# Patient Record
Sex: FEMALE | Race: BLACK OR AFRICAN AMERICAN | Employment: UNEMPLOYED | URBAN - METROPOLITAN AREA
[De-identification: names, ages, dates, MRNs, and addresses within clinical notes are randomized per-mention and may not be internally consistent; named-entity substitution may affect disease eponyms.]

---

## 2017-01-04 ENCOUNTER — HOSPITAL ENCOUNTER (EMERGENCY)
Age: 62
Discharge: HOME | End: 2017-01-04
Attending: EMERGENCY MEDICINE
Payer: COMMERCIAL

## 2017-01-04 ENCOUNTER — APPOINTMENT (OUTPATIENT)
Dept: GENERAL RADIOLOGY | Age: 62
End: 2017-01-04
Attending: EMERGENCY MEDICINE
Payer: COMMERCIAL

## 2017-01-04 ENCOUNTER — APPOINTMENT (OUTPATIENT)
Dept: ULTRASOUND IMAGING | Age: 62
End: 2017-01-04
Attending: EMERGENCY MEDICINE
Payer: COMMERCIAL

## 2017-01-04 VITALS
SYSTOLIC BLOOD PRESSURE: 129 MMHG | TEMPERATURE: 98 F | OXYGEN SATURATION: 96 % | RESPIRATION RATE: 18 BRPM | DIASTOLIC BLOOD PRESSURE: 76 MMHG | HEART RATE: 75 BPM

## 2017-01-04 DIAGNOSIS — M17.12 OSTEOARTHRITIS OF LEFT KNEE, UNSPECIFIED OSTEOARTHRITIS TYPE: Primary | ICD-10-CM

## 2017-01-04 LAB
ALBUMIN SERPL BCP-MCNC: 3.7 G/DL (ref 3.5–5)
ALBUMIN/GLOB SERPL: 0.8 {RATIO} (ref 1.1–2.2)
ALP SERPL-CCNC: 95 U/L (ref 45–117)
ALT SERPL-CCNC: 27 U/L (ref 12–78)
ANION GAP BLD CALC-SCNC: 7 MMOL/L (ref 5–15)
AST SERPL W P-5'-P-CCNC: 13 U/L (ref 15–37)
BASOPHILS # BLD AUTO: 0 K/UL (ref 0–0.1)
BASOPHILS # BLD: 0 % (ref 0–1)
BILIRUB SERPL-MCNC: 0.2 MG/DL (ref 0.2–1)
BUN SERPL-MCNC: 14 MG/DL (ref 6–20)
BUN/CREAT SERPL: 18 (ref 12–20)
CALCIUM SERPL-MCNC: 10.1 MG/DL (ref 8.5–10.1)
CHLORIDE SERPL-SCNC: 101 MMOL/L (ref 97–108)
CO2 SERPL-SCNC: 31 MMOL/L (ref 21–32)
CREAT SERPL-MCNC: 0.76 MG/DL (ref 0.55–1.02)
EOSINOPHIL # BLD: 0 K/UL (ref 0–0.4)
EOSINOPHIL NFR BLD: 0 % (ref 0–7)
ERYTHROCYTE [DISTWIDTH] IN BLOOD BY AUTOMATED COUNT: 14.7 % (ref 11.5–14.5)
GLOBULIN SER CALC-MCNC: 4.7 G/DL (ref 2–4)
GLUCOSE SERPL-MCNC: 102 MG/DL (ref 65–100)
HCT VFR BLD AUTO: 39.2 % (ref 35–47)
HGB BLD-MCNC: 12.6 G/DL (ref 11.5–16)
LYMPHOCYTES # BLD AUTO: 8 % (ref 12–49)
LYMPHOCYTES # BLD: 1.1 K/UL (ref 0.8–3.5)
MCH RBC QN AUTO: 29 PG (ref 26–34)
MCHC RBC AUTO-ENTMCNC: 32.1 G/DL (ref 30–36.5)
MCV RBC AUTO: 90.3 FL (ref 80–99)
MONOCYTES # BLD: 0.5 K/UL (ref 0–1)
MONOCYTES NFR BLD AUTO: 4 % (ref 5–13)
NEUTS SEG # BLD: 12.4 K/UL (ref 1.8–8)
NEUTS SEG NFR BLD AUTO: 88 % (ref 32–75)
PLATELET # BLD AUTO: 359 K/UL (ref 150–400)
POTASSIUM SERPL-SCNC: 4 MMOL/L (ref 3.5–5.1)
PROT SERPL-MCNC: 8.4 G/DL (ref 6.4–8.2)
RBC # BLD AUTO: 4.34 M/UL (ref 3.8–5.2)
SODIUM SERPL-SCNC: 139 MMOL/L (ref 136–145)
WBC # BLD AUTO: 13.9 K/UL (ref 3.6–11)

## 2017-01-04 PROCEDURE — 74011250637 HC RX REV CODE- 250/637: Performed by: EMERGENCY MEDICINE

## 2017-01-04 PROCEDURE — 99284 EMERGENCY DEPT VISIT MOD MDM: CPT

## 2017-01-04 PROCEDURE — 85025 COMPLETE CBC W/AUTO DIFF WBC: CPT | Performed by: EMERGENCY MEDICINE

## 2017-01-04 PROCEDURE — 73562 X-RAY EXAM OF KNEE 3: CPT

## 2017-01-04 PROCEDURE — 93971 EXTREMITY STUDY: CPT

## 2017-01-04 PROCEDURE — 80053 COMPREHEN METABOLIC PANEL: CPT | Performed by: EMERGENCY MEDICINE

## 2017-01-04 PROCEDURE — 36415 COLL VENOUS BLD VENIPUNCTURE: CPT | Performed by: EMERGENCY MEDICINE

## 2017-01-04 RX ORDER — OXYCODONE AND ACETAMINOPHEN 5; 325 MG/1; MG/1
1 TABLET ORAL
Qty: 20 TAB | Refills: 0 | Status: SHIPPED | OUTPATIENT
Start: 2017-01-04 | End: 2017-01-08

## 2017-01-04 RX ORDER — OXYCODONE AND ACETAMINOPHEN 5; 325 MG/1; MG/1
1 TABLET ORAL
Status: COMPLETED | OUTPATIENT
Start: 2017-01-04 | End: 2017-01-04

## 2017-01-04 RX ADMIN — OXYCODONE HYDROCHLORIDE AND ACETAMINOPHEN 1 TABLET: 5; 325 TABLET ORAL at 18:39

## 2017-01-04 NOTE — ED PROVIDER NOTES
HPI Comments:   Zain Saunders is a 57 y.o. female with a hx of arthritis, HTN, and neuropathy presenting to the ED via EMS with her  C/O LLE pain/swelling/numbness which started 3 weeks ago. Pt states she has been unable to walk due to the pain. The pain is not present in the morning when she wakes up but develops and worsens throughout the day. Pt reports her left knee has been \"popping out\" recurrently and moves back in place on its own. She saw her orthopedic surgeon regarding the pain last week and was given a cortisone injection with no relief. Pt then saw her PCP for the pain yesterday, had an x-ray of her lumbar spine performed to see if it was caused by a back issue, it resulted normal, had an MRI of her back scheduled for today for further evaluation, and was rx'd Prednisone with no improvement. She also notes frequency over the last week. Pt denies hx of DVT/PE's. Patient denies back pain, SOB, fever, chills, bladder/bowel incontinence, or any other symptoms or complaints. PCP: Kenneth Fajardo MD  Orthopedics: Dr. Wong Haro    There are no other complaints, changes or physical findings at this time. Written by CARMEN Santos, as dictated by Ej Smith MD      The history is provided by the patient. No  was used.         Past Medical History:   Diagnosis Date    Arrhythmia      irregular heart beat hx and beating fast per patient/no cardiologist    Arthritis      JOINTS  WORSE WAIST DOWN     Hypertension     Ill-defined condition      neuropathy feet       Past Surgical History:   Procedure Laterality Date    Hx heent       cyst removed left eye    Hx tubal ligation      Hx hysterectomy      Pr breast surgery procedure unlisted       mass removed left breast x2 benign    Hx orthopaedic       bilat knee injections    Hx orthopaedic       laser treatment left knee and foot    Hx orthopaedic       hx of gel shots bilat knee    Hx other surgical       cyst removed front left scalp    Hx other surgical       colonoscopy    Hx other surgical       tooth implant    Vascular surgery procedure unlist       vein striping         History reviewed. No pertinent family history. Social History     Social History    Marital status:      Spouse name: N/A    Number of children: N/A    Years of education: N/A     Occupational History    Not on file. Social History Main Topics    Smoking status: Never Smoker    Smokeless tobacco: Never Used    Alcohol use Yes      Comment: once a year    Drug use: No    Sexual activity: Not on file     Other Topics Concern    Not on file     Social History Narrative         ALLERGIES: Review of patient's allergies indicates no known allergies. Review of Systems   Constitutional: Negative for chills, fatigue and fever. HENT: Negative for congestion, ear pain, facial swelling, rhinorrhea, sinus pressure, sore throat, trouble swallowing and voice change. Eyes: Negative for photophobia, pain, discharge and visual disturbance. Respiratory: Negative for cough, chest tightness, shortness of breath and wheezing. Cardiovascular: Positive for leg swelling (LLE). Negative for chest pain. Gastrointestinal: Negative for abdominal pain, blood in stool, constipation, diarrhea, nausea and vomiting. Endocrine: Negative for polyuria. Genitourinary: Positive for frequency. Negative for dysuria, flank pain, hematuria, pelvic pain, urgency and vaginal discharge. No bladder/bowel incontinence   Musculoskeletal: Negative for arthralgias, back pain, gait problem, joint swelling, neck pain and neck stiffness. +LLE pain   Skin: Negative for rash and wound. Allergic/Immunologic: Negative for immunocompromised state. Neurological: Positive for numbness (LLE). Negative for dizziness, weakness, light-headedness and headaches. Hematological: Negative for adenopathy.    Psychiatric/Behavioral: Negative for agitation, confusion, hallucinations and suicidal ideas. The patient is not nervous/anxious. Vitals:    01/04/17 1623 01/04/17 1628 01/04/17 1730 01/04/17 1830   BP: 147/76 147/76 153/87 129/76   Pulse:  86 75    Resp:  16 18    Temp:  98 °F (36.7 °C)     SpO2: 98% 98% 97% 96%            Physical Exam   Constitutional: She is oriented to person, place, and time. She appears well-developed and well-nourished. No distress. HENT:   Head: Normocephalic and atraumatic. Eyes: Conjunctivae are normal.   Neck: Normal range of motion. Neck supple. Cardiovascular: Normal rate, regular rhythm, normal heart sounds and intact distal pulses. Exam reveals no gallop and no friction rub. No murmur heard. Pulses:       Dorsalis pedis pulses are 2+ on the right side, and 2+ on the left side. Posterior tibial pulses are 2+ on the right side, and 2+ on the left side. Pulmonary/Chest: Effort normal and breath sounds normal.   Abdominal: Soft. Normal aorta and bowel sounds are normal. She exhibits no distension, no abdominal bruit, no pulsatile midline mass and no mass. There is no hepatosplenomegaly. There is no tenderness. There is no rebound, no guarding and no CVA tenderness. No hernia. Musculoskeletal: Normal range of motion. She exhibits tenderness. She exhibits no edema or deformity. Left knee: She exhibits normal range of motion, no swelling, no effusion, no deformity and no erythema. Tenderness found. Lateral joint line tenderness noted. No warmth or signs of gout or septic joint since cortisone injection    No cords and negative Rohit's    No ischemic changes, good distal pulses no trophic changes   Neurological: She is alert and oriented to person, place, and time. Skin: She is not diaphoretic. Nursing note and vitals reviewed.        MDM  Number of Diagnoses or Management Options  Osteoarthritis of left knee, unspecified osteoarthritis type:   Diagnosis management comments: DDx: DJD, sciatica, arterial ischemia, DVT, septic arthritis, osteoarthritis, gout    Impression and Plan: Pt presents with atraumatic left knee pain for 2 weeks and has been seen by orthopedics with steroid injection. She described her knee \"popping out. \" She also saw her PCP who thought she may have sciatica and was prescribed steroids and supposed to get an MRI today. She has no spinal cord or neurological symptoms at this time. X-ray shows severe osteoarthritis. Clinically no signs of infection. Will treat supportively and follow back with orthopedics. Amount and/or Complexity of Data Reviewed  Clinical lab tests: ordered and reviewed  Tests in the radiology section of CPT®: ordered and reviewed    Risk of Complications, Morbidity, and/or Mortality  Presenting problems: moderate  Diagnostic procedures: moderate  Management options: low    Patient Progress  Patient progress: stable    Procedures    PROGRESS NOTE:   9:08 PM  Pain improved, does have a mild elevation of white count explained by steroids and injection and she is currently on steroids. No clinical evidence of septic arthritis. Written by CARMEN Adamsibharris, as dictated by Arnold Patel MD.     LABORATORY TESTS:  Recent Results (from the past 12 hour(s))   METABOLIC PANEL, COMPREHENSIVE    Collection Time: 01/04/17  6:56 PM   Result Value Ref Range    Sodium 139 136 - 145 mmol/L    Potassium 4.0 3.5 - 5.1 mmol/L    Chloride 101 97 - 108 mmol/L    CO2 31 21 - 32 mmol/L    Anion gap 7 5 - 15 mmol/L    Glucose 102 (H) 65 - 100 mg/dL    BUN 14 6 - 20 MG/DL    Creatinine 0.76 0.55 - 1.02 MG/DL    BUN/Creatinine ratio 18 12 - 20      GFR est AA >60 >60 ml/min/1.73m2    GFR est non-AA >60 >60 ml/min/1.73m2    Calcium 10.1 8.5 - 10.1 MG/DL    Bilirubin, total 0.2 0.2 - 1.0 MG/DL    ALT 27 12 - 78 U/L    AST 13 (L) 15 - 37 U/L    Alk.  phosphatase 95 45 - 117 U/L    Protein, total 8.4 (H) 6.4 - 8.2 g/dL    Albumin 3.7 3.5 - 5.0 g/dL Globulin 4.7 (H) 2.0 - 4.0 g/dL    A-G Ratio 0.8 (L) 1.1 - 2.2     CBC WITH AUTOMATED DIFF    Collection Time: 01/04/17  6:56 PM   Result Value Ref Range    WBC 13.9 (H) 3.6 - 11.0 K/uL    RBC 4.34 3.80 - 5.20 M/uL    HGB 12.6 11.5 - 16.0 g/dL    HCT 39.2 35.0 - 47.0 %    MCV 90.3 80.0 - 99.0 FL    MCH 29.0 26.0 - 34.0 PG    MCHC 32.1 30.0 - 36.5 g/dL    RDW 14.7 (H) 11.5 - 14.5 %    PLATELET 005 712 - 927 K/uL    NEUTROPHILS 88 (H) 32 - 75 %    LYMPHOCYTES 8 (L) 12 - 49 %    MONOCYTES 4 (L) 5 - 13 %    EOSINOPHILS 0 0 - 7 %    BASOPHILS 0 0 - 1 %    ABS. NEUTROPHILS 12.4 (H) 1.8 - 8.0 K/UL    ABS. LYMPHOCYTES 1.1 0.8 - 3.5 K/UL    ABS. MONOCYTES 0.5 0.0 - 1.0 K/UL    ABS. EOSINOPHILS 0.0 0.0 - 0.4 K/UL    ABS. BASOPHILS 0.0 0.0 - 0.1 K/UL       IMAGING RESULTS:  XR KNEE LT 3 V   Final Result   EXAM: XR KNEE LT 3 V     INDICATION: pain.     COMPARISON: None.     FINDINGS: Three views of the left knee demonstrate tricompartment osteoarthritis  without visible fracture or subluxation. There is no significant joint  effusion.     IMPRESSION  IMPRESSION:   1. Tricompartment osteoarthritis. Signed by      Signed Date/Time    Phone Pager     RACIEL JAUREGUI 1/04/2017 20:23 [phone]       DUPLEX LOWER EXT VENOUS LEFT   Final Result   INDICATION: leg pain     COMPARISON: None. Nik Alexander FINDINGS: Duplex Doppler sonography of the left lower extremity was performed  from the groin to the calf. The left common femoral, femoral and popliteal veins  are compressible with normal color-flow and wave forms and response to  augmentation. .  IMPRESSION  IMPRESSION:   No deep venous thrombosis identified. Signed by      Signed Date/Time    Phone Pager     RACIEL JAUREGUI 1/04/2017 19:42 [phone]           MEDICATIONS GIVEN:  Medications   oxyCODONE-acetaminophen (PERCOCET) 5-325 mg per tablet 1 Tab (1 Tab Oral Given 1/4/17 1839)       IMPRESSION:  1.  Osteoarthritis of left knee, unspecified osteoarthritis type PLAN:  1. Discharge Medication List as of 2017  9:05 PM      START taking these medications    Details   oxyCODONE-acetaminophen (PERCOCET) 5-325 mg per tablet Take 1 Tab by mouth every four (4) hours as needed for Pain. Max Daily Amount: 6 Tabs., Print, Disp-20 Tab, R-0         CONTINUE these medications which have NOT CHANGED    Details   olmesartan-hydrochlorothiazide (BENICAR HCT) 40-12.5 mg per tablet Take 1 Tab by mouth daily. , Historical Med      cholecalciferol, vitamin D3, 2,000 unit tab Take 1 Tab by mouth daily. , Historical Med      vitamin E (AQUA GEMS) 400 unit capsule Take 400 Units by mouth daily. , Historical Med      lansoprazole (PREVACID) 15 mg capsule Take 15 mg by mouth every Monday, Wednesday, Friday., Historical Med      PV W-O YAN/FERROUS FUMARATE/FA (M-VIT PO) Take 1 Tab by mouth daily. , Historical Med      estradiol (VIVELLE) 0.05 mg/24 hr 1 Patch by TransDERmal route two (2) days a week. Wed and sun, Historical Med      diclofenac EC (VOLTAREN) 75 mg EC tablet Take 75 mg by mouth two (2) times a day., Historical Med      niacin ER (NIASPAN) 500 mg tablet Take 500 mg by mouth nightly., Historical Med      ascorbic acid (VITAMIN C) 250 mg tablet Take 500 mg by mouth daily. , Historical Med      Ferrous Fumarate 325 mg (106 mg iron) tab Take 1 Tab by mouth two (2) times a day., Historical Med      acetaminophen (TYLENOL) 650 mg CR tablet Take 1,300 mg by mouth two (2) times a day., Historical Med      aspirin delayed-release 325 mg tablet Take 325 mg by mouth nightly., Historical Med      Omega-3-DHA-EPA-Fish Oil 1,000 mg (120 mg-180 mg) cap Take 2 Tabs by mouth two (2) times a day., Historical Med         STOP taking these medications       HYDROcodone-acetaminophen (NORCO) 5-325 mg per tablet Comments:   Reason for Stoppin.   Follow-up Information     Follow up With Details Comments Contact Info    Jasbir Lawrence MD Call in 1 day  [address] [facility]  [phone]      [facility] EMERGENCY DEPT  If symptoms worsen [address] [facility] [place] [address]  [phone]        Return to ED if worse     Discharge Note:  9:11 PM  The patient is ready for discharge. The patient's signs, symptoms, diagnosis, and discharge instruction have been discussed and the patient has conveyed their understanding. The patient is to follow up as recommended or return to the ER should their symptoms worsen. Plan has been discussed and the patient is in agreement. Written by Syliva Kocher, ED Scribe, as dictated by Storm House MD.     Attestation: This note is prepared by Syliva Kocher, acting as Scribe for Storm House MD.    Storm House MD: The scribe's documentation has been prepared under my direction and personally reviewed by me in its entirety. I confirm that the note above accurately reflects all work, treatment, procedures, and medical decision making performed by me.

## 2017-01-05 NOTE — ED NOTES
Patient discharged per LP. IV removed catheter intact. Family is at bedside. No acute distress noted at discharge.

## 2017-01-05 NOTE — DISCHARGE INSTRUCTIONS
Knee Arthritis: Care Instructions  Your Care Instructions  Knee arthritis is a breakdown of the cartilage that cushions your knee joint. When the cartilage wears down, your bones rub against each other. This causes pain and stiffness. Knee arthritis tends to get worse with time. Treatment for knee arthritis involves reducing pain, making the leg muscles stronger, and staying at a healthy body weight. The treatment usually does not improve the health of the cartilage, but it can reduce pain and improve how well your knee works. You can take simple measures to protect your knee joints, ease your pain, and help you stay active. Follow-up care is a key part of your treatment and safety. Be sure to make and go to all appointments, and call your doctor if you are having problems. It's also a good idea to know your test results and keep a list of the medicines you take. How can you care for yourself at home? · Know that knee arthritis will cause more pain on some days than on others. · Stay at a healthy weight. Lose weight if you are overweight. When you stand up, the pressure on your knees from every pound of body weight is multiplied four times. So if you lose 10 pounds, you will reduce the pressure on your knees by 40 pounds. · Talk to your doctor or physical therapist about exercises that will help ease joint pain. ¨ Stretch to help prevent stiffness and to prevent injury before you exercise. You may enjoy gentle forms of yoga to help keep your knee joints and muscles flexible. ¨ Walk instead of jog. ¨ Ride a bike. This makes your thigh muscles stronger and takes pressure off your knee. ¨ Wear well-fitting and comfortable shoes. ¨ Exercise in chest-deep water. This can help you exercise longer with less pain. ¨ Avoid exercises that include squatting or kneeling. They can put a lot of strain on your knees.   ¨ Talk to your doctor to make sure that the exercise you do is not making the arthritis worse.  · Do not sit for long periods of time. Try to walk once in a while to keep your knee from getting stiff. · Ask your doctor or physical therapist whether shoe inserts may reduce your arthritis pain. · If you can afford it, get new athletic shoes at least every year. This can help reduce the strain on your knees. · Use a device to help you do everyday activities. ¨ A cane or walking stick can help you keep your balance when you walk. Hold the cane or walking stick in the hand opposite the painful knee. ¨ If you feel like you may fall when you walk, try using crutches or a front-wheeled walker. These can prevent falls that could cause more damage to your knee. ¨ A knee brace may help keep your knee stable and prevent pain. ¨ You also can use other things to make life easier, such as a higher toilet seat and handrails in the bathtub or shower. · Take pain medicines exactly as directed. ¨ Do not wait until you are in severe pain. You will get better results if you take it sooner. ¨ If you are not taking a prescription pain medicine, take an over-the-counter medicine such as acetaminophen (Tylenol), ibuprofen (Advil, Motrin), or naproxen (Aleve). Read and follow all instructions on the label. ¨ Do not take two or more pain medicines at the same time unless the doctor told you to. Many pain medicines have acetaminophen, which is Tylenol. Too much acetaminophen (Tylenol) can be harmful. ¨ Tell your doctor if you take a blood thinner, have diabetes, or have allergies to shellfish. · Ask your doctor if you might benefit from a shot of steroid medicine into your knee. This may provide pain relief for several months. · Many people take the supplements glucosamine and chondroitin for osteoarthritis. Some people feel they help, but the medical research does not show that they work. Talk to your doctor before you take these supplements. When should you call for help?   Call your doctor now or seek immediate medical care if:  · You have sudden swelling, warmth, or pain in your knee. · You have knee pain and a fever or rash. · You have such bad pain that you cannot use your knee. Watch closely for changes in your health, and be sure to contact your doctor if you have any problems. Where can you learn more? Go to http://mikael-oskar.info/. Enter D064 in the search box to learn more about \"Knee Arthritis: Care Instructions. \"  Current as of: 2016  Content Version: 11.1  © 5937-8869 [employer]. Care instructions adapted under license by [employer] (which disclaims liability or warranty for this information). If you have questions about a medical condition or this instruction, always ask your healthcare professional. [address] any warranty or liability for your use of this information. [employer] Activation    Thank you for requesting access to [employer]. Please follow the instructions below to securely access and download your online medical record. [employer] allows you to send messages to your doctor, view your test results, renew your prescriptions, schedule appointments, and more. How Do I Sign Up? 1. In your internet browser, go to [email or web address]  2. Click on the First Time User? Click Here link in the Sign In box. You will be redirect to the New Member Sign Up page. 3. Enter your [employer] Access Code exactly as it appears below. You will not need to use this code after youve completed the sign-up process. If you do not sign up before the expiration date, you must request a new code. [employer] Access Code: [email or web address]  Expires: 2017  2:50 PM (This is the date your [employer] access code will )    4. Enter the last four digits of your Social Security Number (xxxx) and Date of Birth (mm/dd/yyyy) as indicated and click Submit. You will be taken to the next sign-up page. 5. Create a [employer] ID.  This will be your [employer] login ID and cannot be changed, so think of one that is secure and easy to remember. 6. Create a [employer] password. You can change your password at any time. 7. Enter your Password Reset Question and Answer. This can be used at a later time if you forget your password. 8. Enter your e-mail address. You will receive e-mail notification when new information is available in [address] 9. Click Sign Up. You can now view and download portions of your medical record. 10. Click the Download Summary menu link to download a portable copy of your medical information. Additional Information    If you have questions, please visit the Frequently Asked Questions section of the [employer] website at [email or web address] Remember, [employer] is NOT to be used for urgent needs. For medical emergencies, dial 911.

## 2017-01-08 ENCOUNTER — HOSPITAL ENCOUNTER (EMERGENCY)
Age: 62
Discharge: HOME | End: 2017-01-08
Attending: EMERGENCY MEDICINE
Payer: COMMERCIAL

## 2017-01-08 ENCOUNTER — APPOINTMENT (OUTPATIENT)
Dept: GENERAL RADIOLOGY | Age: 62
End: 2017-01-08
Attending: EMERGENCY MEDICINE
Payer: COMMERCIAL

## 2017-01-08 VITALS
TEMPERATURE: 97.5 F | HEIGHT: 62 IN | SYSTOLIC BLOOD PRESSURE: 139 MMHG | HEART RATE: 70 BPM | BODY MASS INDEX: 37.54 KG/M2 | OXYGEN SATURATION: 97 % | RESPIRATION RATE: 18 BRPM | DIASTOLIC BLOOD PRESSURE: 70 MMHG | WEIGHT: 204 LBS

## 2017-01-08 DIAGNOSIS — M17.12 PRIMARY OSTEOARTHRITIS OF LEFT KNEE: Primary | ICD-10-CM

## 2017-01-08 PROCEDURE — 73502 X-RAY EXAM HIP UNI 2-3 VIEWS: CPT

## 2017-01-08 PROCEDURE — 96372 THER/PROPH/DIAG INJ SC/IM: CPT

## 2017-01-08 PROCEDURE — 99283 EMERGENCY DEPT VISIT LOW MDM: CPT

## 2017-01-08 PROCEDURE — 81001 URINALYSIS AUTO W/SCOPE: CPT | Performed by: EMERGENCY MEDICINE

## 2017-01-08 PROCEDURE — 74011250636 HC RX REV CODE- 250/636: Performed by: EMERGENCY MEDICINE

## 2017-01-08 RX ORDER — TRAMADOL HYDROCHLORIDE 50 MG/1
50 TABLET ORAL
COMMUNITY
End: 2017-03-16

## 2017-01-08 RX ORDER — MORPHINE SULFATE 10 MG/ML
6 INJECTION, SOLUTION INTRAMUSCULAR; INTRAVENOUS
Status: COMPLETED | OUTPATIENT
Start: 2017-01-08 | End: 2017-01-08

## 2017-01-08 RX ORDER — OXYCODONE AND ACETAMINOPHEN 5; 325 MG/1; MG/1
1 TABLET ORAL
Qty: 15 TAB | Refills: 0 | Status: SHIPPED | OUTPATIENT
Start: 2017-01-08 | End: 2017-03-16

## 2017-01-08 RX ADMIN — MORPHINE SULFATE 6 MG: 10 INJECTION INTRAMUSCULAR; INTRAVENOUS; SUBCUTANEOUS at 12:13

## 2017-01-08 NOTE — ED NOTES
Pt states she cannot walk on the leg due to the pain, and needs something stronger. Her ortho by phone, told her there is nothing else she can call her in.

## 2017-01-08 NOTE — ED PROVIDER NOTES
HPI Comments: Endy uDbon is a 57 y.o. female who presents ambulatory to ED [facility] ED with cc of left leg pain extending from the knee up to the hip. She states that she has been experiencing the pain intermittently for the past month \"every time (her) knee pops,\" and notes that the pain has become constant for the past five days. Pt states that she has been unable to ambulate secondary to severity of pain. Pt states that she was evaluated in the ED for symptoms four days ago with a negative Duplex and XR significant for arthritis. Pt was discharged home at that time with Percocet and states that she subsequently received a prescription fore prednisone and tramadol from her orthopedist, but she denies any relief in pain from medication. Pt notes several evaluations with her PCP and orthopedist for symptoms over the past month with treatment including cortisone injections into the left knee, but denies any relief in symptoms. Pt states that she was scheduled for an MRI earlier this week, but was unable to make the appointment secondary to pain. She denies any fever, chills, abdominal pain, nausea, vomiting, diarrhea, back pain. Mohini Mcnamara MD  Orthopedics: Dr. Nga Red    PMHx: HTN, arrhythmia, arthritis  Social Hx: - smoking; - EtOH; - drug use    There are no other complains, changes, or physical findings at this time. The history is provided by the patient. No  was used.         Past Medical History:   Diagnosis Date    Arrhythmia      irregular heart beat hx and beating fast per patient/no cardiologist    Arthritis      JOINTS  WORSE WAIST DOWN     Hypertension     Ill-defined condition      neuropathy feet       Past Surgical History:   Procedure Laterality Date    Hx heent       cyst removed left eye    Hx tubal ligation      Hx hysterectomy      Pr breast surgery procedure unlisted       mass removed left breast x2 benign    Hx orthopaedic       bilat knee injections    Hx orthopaedic       laser treatment left knee and foot    Hx orthopaedic       hx of gel shots bilat knee    Hx other surgical       cyst removed front left scalp    Hx other surgical       colonoscopy    Hx other surgical       tooth implant    Vascular surgery procedure unlist       vein striping         History reviewed. No pertinent family history. Social History     Social History    Marital status:      Spouse name: N/A    Number of children: N/A    Years of education: N/A     Occupational History    Not on file. Social History Main Topics    Smoking status: Never Smoker    Smokeless tobacco: Never Used    Alcohol use Yes      Comment: once a year    Drug use: No    Sexual activity: Not on file     Other Topics Concern    Not on file     Social History Narrative         ALLERGIES: Review of patient's allergies indicates no known allergies. Review of Systems   Constitutional: Negative for chills, fatigue and fever. HENT: Negative for congestion, rhinorrhea and sore throat. Eyes: Negative for pain, discharge and visual disturbance. Respiratory: Negative for cough, chest tightness, shortness of breath and wheezing. Cardiovascular: Negative for chest pain, palpitations and leg swelling. Gastrointestinal: Negative for abdominal pain, constipation, diarrhea, nausea and vomiting. Genitourinary: Negative for dysuria, frequency and hematuria. Musculoskeletal: Positive for arthralgias and myalgias. Negative for back pain. Skin: Negative for rash. Neurological: Negative for dizziness, weakness, light-headedness and headaches. Psychiatric/Behavioral: Negative.         Vitals:    01/08/17 1137 01/08/17 1218 01/08/17 1220   BP: (!) 163/110 132/71    Pulse: 70     Resp: 18     Temp: 97.5 °F (36.4 °C)     SpO2: 98%  98%   Weight: 92.5 kg (204 lb)     Height: 5' 2\" (1.575 m)              Physical Exam   Constitutional: She is oriented to person, place, and time. She appears well-developed and well-nourished. No distress. HENT:   Head: Normocephalic and atraumatic. Eyes: EOM are normal. Right eye exhibits no discharge. Left eye exhibits no discharge. No scleral icterus. Neck: Normal range of motion. Neck supple. No tracheal deviation present. Cardiovascular: Normal rate, regular rhythm, normal heart sounds and intact distal pulses. Exam reveals no gallop and no friction rub. No murmur heard. Pulses:       Dorsalis pedis pulses are 2+ on the right side, and 2+ on the left side. Pulmonary/Chest: Effort normal and breath sounds normal. No respiratory distress. She has no wheezes. She has no rales. Abdominal: Soft. She exhibits no distension. There is no tenderness. Musculoskeletal: Normal range of motion. She exhibits no edema. SLR negative bilaterally. All joints with good ROM, without tenderness. No calf tenderness. Lymphadenopathy:     She has no cervical adenopathy. Neurological: She is alert and oriented to person, place, and time. No focal neuro deficits   Skin: Skin is warm and dry. No rash noted. No LE joint or calf erythema, edema, or warmth. Psychiatric: She has a normal mood and affect. MDM  Number of Diagnoses or Management Options  Primary osteoarthritis of left knee:   Diagnosis management comments:     Patient presents to ED with left posterior upper leg pain. She is NVI on exam without joint erythema, warmth to suggest septic joint or inflammatory arthritis. She had duplex on 1/4 that was negative for DVT; do not suspect DVT given exam today. X-ray of left knee on 1/4 showed tricompartment arthritis, and left hip x-ray today is WNL. She denies back pain, and exam is not consistent with sciatica. Suspect symptoms are secondary to OA. She is already on prednisone, which was prescribed by orthopedist.  She has crutches at home.   Treated pain in ED and advised patient to continue taking prednisone and analgesics as previously prescribed. Will provide additional prescription for analgesic so patient has enough medication until she can follow up with orthopedist and obtain MRI. Discussed results, prescriptions and follow up plan with patient. Provided customary return to ED instructions. Patient expressed understanding. Dorene Monae MD           Amount and/or Complexity of Data Reviewed  Tests in the radiology section of CPT®: ordered and reviewed  Review and summarize past medical records: yes    Patient Progress  Patient progress: stable    ED Course       Procedures    Progress Note:  1:20 PM  Upon entering room to give pt discharge paperwork, she reports that she has been experiencing urinary frequency and notes concern for a possible UTI. IMAGING RESULTS:  EXAM: XR HIP LT W OR WO PELV 2-3 VWS     INDICATION: pain.     COMPARISON: None.     FINDINGS: An AP view of the pelvis and a frogleg lateral view of the left hip  demonstrate no fracture, dislocation or other acute abnormality.     IMPRESSION  IMPRESSION: No acute abnormality.                    VITALS:  Patient Vitals for the past 12 hrs:   Temp Pulse Resp BP SpO2   01/08/17 1220 - - - - 98 %   01/08/17 1218 - - - 132/71 -   01/08/17 1137 97.5 °F (36.4 °C) 70 18 (!) 163/110 98 %       MEDICATIONS GIVEN:  Medications   morphine injection 6 mg (6 mg IntraMUSCular Given 1/8/17 1213)       IMPRESSION:  1. Primary osteoarthritis of left knee        PLAN:  1. Current Discharge Medication List      CONTINUE these medications which have CHANGED    Details   oxyCODONE-acetaminophen (PERCOCET) 5-325 mg per tablet Take 1 Tab by mouth every four (4) hours as needed for Pain. Max Daily Amount: 6 Tabs. Qty: 15 Tab, Refills: 0         CONTINUE these medications which have NOT CHANGED    Details   traMADol (ULTRAM) 50 mg tablet Take 50 mg by mouth every six (6) hours as needed for Pain.       olmesartan-hydrochlorothiazide (BENICAR HCT) 40-12.5 mg per tablet Take 1 Tab by mouth daily. cholecalciferol, vitamin D3, 2,000 unit tab Take 1 Tab by mouth daily. vitamin E (AQUA GEMS) 400 unit capsule Take 400 Units by mouth daily. lansoprazole (PREVACID) 15 mg capsule Take 15 mg by mouth every Monday, Wednesday, Friday. PV W-O YAN/FERROUS FUMARATE/FA (M-VIT PO) Take 1 Tab by mouth daily. estradiol (VIVELLE) 0.05 mg/24 hr 1 Patch by TransDERmal route two (2) days a week. Wed and sun      diclofenac EC (VOLTAREN) 75 mg EC tablet Take 75 mg by mouth two (2) times a day. niacin ER (NIASPAN) 500 mg tablet Take 500 mg by mouth nightly. ascorbic acid (VITAMIN C) 250 mg tablet Take 500 mg by mouth daily. Ferrous Fumarate 325 mg (106 mg iron) tab Take 1 Tab by mouth two (2) times a day. acetaminophen (TYLENOL) 650 mg CR tablet Take 1,300 mg by mouth two (2) times a day. aspirin delayed-release 325 mg tablet Take 325 mg by mouth nightly. Omega-3-DHA-EPA-Fish Oil 1,000 mg (120 mg-180 mg) cap Take 2 Tabs by mouth two (2) times a day. 2.   Follow-up Information     Follow up With Details Comments Contact Info    Soledad Forbes MD  Please follow up with your orthopedist as soon as possible [address] [facility]  [phone]      [facility] EMERGENCY DEPT  As needed, If symptoms worsen [address]  [address] [facility]  [phone]        3. Return to ED if worse     Discharge Note:  2:39 PM  The patient has been re-evaluated and is ready for discharge. Reviewed available results with patient. Counseled patient on diagnosis and care plan. Patient has expressed understanding, and all questions have been answered. Patient agrees with plan and agrees to follow up as recommended, or to return to the ED if their symptoms worsen. Discharge instructions have been provided and explained to the patient, along with reasons to return to the ED.         This note is prepared by Julian Swain, acting as Scribe for Nirav Montilla MD.    Nirav Montilla MD: The scribe's documentation has been prepared under my direction and personally reviewed by me in its entirety. I confirm that the note above accurately reflects all work, treatment, procedures, and medical decision making performed by me.

## 2017-01-08 NOTE — ED NOTES
Pt discharged by Dr. Karen Liu. Patient provided with discharge instructions, Rx, and follow-up care instructions. Pt out of ED via wheelchair, accompanied by family.

## 2017-01-08 NOTE — ED NOTES
Per Dr. Day Finney, upon attempting to discharge the pt, the pt advised the MD that she has been urinating every five minutes, and would like to have her urine checked, and would like to wait for the results. Pt provided with a urinal, per pt request, for urine sample at this time.

## 2017-01-08 NOTE — ED NOTES
Routine rounding on pt. Pt resting comfortably in bed. Call bell within reach. When asked about pain, pt began moving in bed and states that her pain is no better and is still a 10 on the pain scale.

## 2017-01-08 NOTE — DISCHARGE INSTRUCTIONS
Arthritis: Care Instructions  Your Care Instructions  Arthritis, also called osteoarthritis, is a breakdown of the cartilage that cushions your joints. When the cartilage wears down, your bones rub against each other. This causes pain and stiffness. Many people have some arthritis as they age. Arthritis most often affects the joints of the spine, hands, hips, knees, or feet. You can take simple measures to protect your joints, ease your pain, and help you stay active. Follow-up care is a key part of your treatment and safety. Be sure to make and go to all appointments, and call your doctor if you are having problems. It's also a good idea to know your test results and keep a list of the medicines you take. How can you care for yourself at home? · Stay at a healthy weight. Being overweight puts extra strain on your joints. · Talk to your doctor or physical therapist about exercises that will help ease joint pain. ¨ Stretch. You may enjoy gentle forms of yoga to help keep your joints and muscles flexible. ¨ Walk instead of jog. Other types of exercise that are less stressful on the joints include riding a bicycle, swimming, or water exercise. ¨ Lift weights. Strong muscles help reduce stress on your joints. Stronger thigh muscles, for example, take some of the stress off of the knees and hips. Learn the right way to lift weights so you do not make joint pain worse. · Take your medicines exactly as prescribed. Call your doctor if you think you are having a problem with your medicine. · Take pain medicines exactly as directed. ¨ If the doctor gave you a prescription medicine for pain, take it as prescribed. ¨ If you are not taking a prescription pain medicine, ask your doctor if you can take an over-the-counter medicine. · Use a cane, crutch, walker, or another device if you need help to get around. These can help rest your joints.  You also can use other things to make life easier, such as a higher toilet seat and padded handles on kitchen utensils. · Do not sit in low chairs, which can make it hard to get up. · Put heat or cold on your sore joints as needed. Use whichever helps you most. You also can take turns with hot and cold packs. ¨ Apply heat 2 or 3 times a day for 20 to 30 minutes--using a heating pad, hot shower, or hot pack--to relieve pain and stiffness. ¨ Put ice or a cold pack on your sore joint for 10 to 20 minutes at a time. Put a thin cloth between the ice and your skin. When should you call for help? Call your doctor now or seek immediate medical care if:  · You have sudden swelling, warmth, or pain in any joint. · You have joint pain and a fever or rash. · You have such bad pain that you cannot use a joint. Watch closely for changes in your health, and be sure to contact your doctor if:  · You have mild joint symptoms that continue even with more than 6 weeks of care at home. · You have stomach pain or other problems with your medicine. Where can you learn more? Go to http://mikael-oskar.info/. Enter O877 in the search box to learn more about \"Arthritis: Care Instructions. \"  Current as of: February 24, 2016  Content Version: 11.1  © 1058-1277 [employer]. Care instructions adapted under license by [employer] (which disclaims liability or warranty for this information). If you have questions about a medical condition or this instruction, always ask your healthcare professional. [place] [address] any warranty or liability for your use of this information.

## 2017-01-08 NOTE — ED NOTES
Pt states she has arthritis in her left knee. Pt was seen here on Wednesday, and discharged with the same, and told to follow up with ortho. Pt has been taking pain meds and prednisone, and icing and putting heat on the leg, with no improvement.

## 2017-01-09 LAB
APPEARANCE UR: CLEAR
BACTERIA URNS QL MICRO: NEGATIVE /HPF
BILIRUB UR QL: NEGATIVE
COLOR UR: YELLOW
EPITH CASTS URNS QL MICRO: NORMAL /LPF
GLUCOSE UR STRIP.AUTO-MCNC: NEGATIVE MG/DL
HGB UR QL STRIP: NEGATIVE
HYALINE CASTS URNS QL MICRO: NORMAL /LPF (ref 0–5)
KETONES UR QL STRIP.AUTO: NEGATIVE MG/DL
LEUKOCYTE ESTERASE UR QL STRIP.AUTO: NEGATIVE
NITRITE UR QL STRIP.AUTO: NEGATIVE
PH UR STRIP: 6 [PH] (ref 5–8)
PROT UR STRIP-MCNC: NEGATIVE MG/DL
RBC #/AREA URNS HPF: NORMAL /HPF (ref 0–5)
SP GR UR REFRACTOMETRY: <1.005 (ref 1–1.03)
UA: UC IF INDICATED,UAUC: NORMAL
UROBILINOGEN UR QL STRIP.AUTO: 0.2 EU/DL (ref 0.2–1)
WBC URNS QL MICRO: NORMAL /HPF (ref 0–4)

## 2017-01-23 ENCOUNTER — HOSPITAL ENCOUNTER (OUTPATIENT)
Dept: MRI IMAGING | Age: 62
Discharge: HOME | End: 2017-01-23
Attending: INTERNAL MEDICINE

## 2017-01-23 DIAGNOSIS — M54.9 BACK PAIN: ICD-10-CM

## 2017-01-30 ENCOUNTER — HOSPITAL ENCOUNTER (OUTPATIENT)
Dept: MRI IMAGING | Age: 62
Discharge: HOME | End: 2017-01-30
Attending: INTERNAL MEDICINE

## 2017-02-22 ENCOUNTER — HOSPITAL ENCOUNTER (OUTPATIENT)
Dept: MRI IMAGING | Age: 62
Discharge: HOME | End: 2017-02-22
Attending: INTERNAL MEDICINE
Payer: COMMERCIAL

## 2017-02-22 VITALS
OXYGEN SATURATION: 100 % | DIASTOLIC BLOOD PRESSURE: 68 MMHG | SYSTOLIC BLOOD PRESSURE: 116 MMHG | HEIGHT: 66 IN | RESPIRATION RATE: 20 BRPM | BODY MASS INDEX: 32.95 KG/M2 | WEIGHT: 205 LBS | HEART RATE: 88 BPM

## 2017-02-22 DIAGNOSIS — M54.9 BACK PAIN: ICD-10-CM

## 2017-02-22 PROCEDURE — 72148 MRI LUMBAR SPINE W/O DYE: CPT

## 2017-02-22 PROCEDURE — 74011250636 HC RX REV CODE- 250/636: Performed by: RADIOLOGY

## 2017-02-22 RX ORDER — FENTANYL CITRATE 50 UG/ML
100 INJECTION, SOLUTION INTRAMUSCULAR; INTRAVENOUS
Status: DISCONTINUED | OUTPATIENT
Start: 2017-02-22 | End: 2017-02-26 | Stop reason: HOSPADM

## 2017-02-22 RX ORDER — MIDAZOLAM HYDROCHLORIDE 1 MG/ML
5 INJECTION, SOLUTION INTRAMUSCULAR; INTRAVENOUS
Status: DISCONTINUED | OUTPATIENT
Start: 2017-02-22 | End: 2017-02-26 | Stop reason: HOSPADM

## 2017-02-22 RX ADMIN — MIDAZOLAM HYDROCHLORIDE 2 MG: 1 INJECTION INTRAMUSCULAR; INTRAVENOUS at 13:20

## 2017-02-22 RX ADMIN — FENTANYL CITRATE 50 MCG: 50 INJECTION, SOLUTION INTRAMUSCULAR; INTRAVENOUS at 13:10

## 2017-02-22 NOTE — DISCHARGE INSTRUCTIONS
[facility] [address]  Special Procedures/Radiology Department    MRI With Sedation Discharge Instructions    You received sedation for your MRI. You may feel tired today, so rest as much as possible. You may return to work tomorrow without restrictions. No driving for the next 24 hours. Do not sign any legal documents for the next 24 hours. Resume your previous diet and follow the medication reconciliation form. Follow up with your physician for the test results.

## 2017-02-22 NOTE — IP AVS SNAPSHOT
Current Discharge Medication List  
  
ASK your doctor about these medications Dose & Instructions Dispensing Information Comments Morning Noon Evening Bedtime  
 acetaminophen 650 mg CR tablet Commonly known as:  TYLENOL Your next dose is: Today, Tomorrow Other:  ____________ Dose:  1300 mg Take 1,300 mg by mouth two (2) times a day. Refills:  0  
     
   
   
   
  
 ascorbic acid (vitamin C) 250 mg tablet Commonly known as:  VITAMIN C Your next dose is: Today, Tomorrow Other:  ____________ Dose:  500 mg Take 500 mg by mouth daily. Refills:  0  
     
   
   
   
  
 aspirin delayed-release 325 mg tablet Your next dose is: Today, Tomorrow Other:  ____________ Dose:  325 mg Take 325 mg by mouth nightly. Refills:  0 BENICAR HCT 40-12.5 mg per tablet Generic drug:  olmesartan-hydroCHLOROthiazide Your next dose is: Today, Tomorrow Other:  ____________ Dose:  1 Tab Take 1 Tab by mouth daily. Refills:  0  
     
   
   
   
  
 cholecalciferol (vitamin D3) 2,000 unit Tab Your next dose is: Today, Tomorrow Other:  ____________ Dose:  1 Tab Take 1 Tab by mouth daily. Refills:  0  
     
   
   
   
  
 diclofenac EC 75 mg EC tablet Commonly known as:  VOLTAREN Your next dose is: Today, Tomorrow Other:  ____________ Dose:  75 mg Take 75 mg by mouth two (2) times a day. Refills:  0  
     
   
   
   
  
 estradiol 0.05 mg/24 hr  
Commonly known as:  Shan Loser Your next dose is: Today, Tomorrow Other:  ____________ Dose:  1 Patch 1 Patch by TransDERmal route two (2) days a week. Wed and sun Refills:  0 Ferrous Fumarate 325 mg (106 mg iron) Tab Your next dose is: Today, Tomorrow Other:  ____________ Dose:  1 Tab Take 1 Tab by mouth two (2) times a day. Refills:  0  
     
   
   
   
  
 lansoprazole 15 mg capsule Commonly known as:  PREVACID Your next dose is: Today, Tomorrow Other:  ____________ Dose:  15 mg Take 15 mg by mouth every Monday, Wednesday, Friday. Refills:  0  
     
   
   
   
  
 M-VIT PO Your next dose is: Today, Tomorrow Other:  ____________ Dose:  1 Tab Take 1 Tab by mouth daily. Refills:  0  
     
   
   
   
  
 niacin  mg tablet Commonly known as:  Cresenciano Taylor Your next dose is: Today, Tomorrow Other:  ____________ Dose:  500 mg Take 500 mg by mouth nightly. Refills:  0 Omega-3-DHA-EPA-Fish Oil 1,000 mg (120 mg-180 mg) Cap Your next dose is: Today, Tomorrow Other:  ____________ Dose:  2 Tab Take 2 Tabs by mouth two (2) times a day. Refills:  0  
     
   
   
   
  
 oxyCODONE-acetaminophen 5-325 mg per tablet Commonly known as:  PERCOCET Your next dose is: Today, Tomorrow Other:  ____________ Dose:  1 Tab Take 1 Tab by mouth every four (4) hours as needed for Pain. Max Daily Amount: 6 Tabs. Quantity:  15 Tab Refills:  0  
     
   
   
   
  
 traMADol 50 mg tablet Commonly known as:  ULTRAM  
   
Your next dose is: Today, Tomorrow Other:  ____________ Dose:  50 mg Take 50 mg by mouth every six (6) hours as needed for Pain. Refills:  0  
     
   
   
   
  
 vitamin E 400 unit capsule Commonly known as:  [address] Your next dose is: Today, Tomorrow Other:  ____________ Dose:  400 Units Take 400 Units by mouth daily. Refills:  0

## 2017-02-22 NOTE — IP AVS SNAPSHOT
[address] [facility] 
[phone] Patient: Анна Huang MRN: [id number] UM You are allergic to the following No active allergies Recent Documentation Height  
  
  
  
  
  
 1.676 m Emergency Contacts Name Discharge Info Relation Home Work Mobile Adrian Youssef DISCHARGE CAREGIVER [3] Spouse [3] [phone] About your hospitalization You were admitted on:  2017 You last received care in the:  [facility] You were discharged on:  2017 Unit phone number:  [phone] Why you were hospitalized Your primary diagnosis was:  Not on File Providers Seen During Your Hospitalizations Provider Role Specialty Primary office phone Shana Haddad MD Attending Provider Internal Medicine [phone] Your Primary Care Physician (PCP) Primary Care Physician Office Phone Office Fax Kianna Abel [phone] Follow-up Information None Current Discharge Medication List  
  
ASK your doctor about these medications Dose & Instructions Dispensing Information Comments Morning Noon Evening Bedtime  
 acetaminophen 650 mg CR tablet Commonly known as:  TYLENOL Your next dose is: Today, Tomorrow Other:  _________ Dose:  1300 mg Take 1,300 mg by mouth two (2) times a day. Refills:  0  
     
   
   
   
  
 ascorbic acid (vitamin C) 250 mg tablet Commonly known as:  VITAMIN C Your next dose is: Today, Tomorrow Other:  _________ Dose:  500 mg Take 500 mg by mouth daily. Refills:  0  
     
   
   
   
  
 aspirin delayed-release 325 mg tablet Your next dose is: Today, Tomorrow Other:  _________ Dose:  325 mg Take 325 mg by mouth nightly. Refills:  0 BENICAR HCT 40-12.5 mg per tablet Generic drug:  olmesartan-hydroCHLOROthiazide Your next dose is: Today, Tomorrow Other:  _________ Dose:  1 Tab Take 1 Tab by mouth daily. Refills:  0  
     
   
   
   
  
 cholecalciferol (vitamin D3) 2,000 unit Tab Your next dose is: Today, Tomorrow Other:  _________ Dose:  1 Tab Take 1 Tab by mouth daily. Refills:  0  
     
   
   
   
  
 diclofenac EC 75 mg EC tablet Commonly known as:  VOLTAREN Your next dose is: Today, Tomorrow Other:  _________ Dose:  75 mg Take 75 mg by mouth two (2) times a day. Refills:  0  
     
   
   
   
  
 estradiol 0.05 mg/24 hr  
Commonly known as:  Perlita Morita Your next dose is: Today, Tomorrow Other:  _________ Dose:  1 Patch 1 Patch by TransDERmal route two (2) days a week. Wed and sun Refills:  0 Ferrous Fumarate 325 mg (106 mg iron) Tab Your next dose is: Today, Tomorrow Other:  _________ Dose:  1 Tab Take 1 Tab by mouth two (2) times a day. Refills:  0  
     
   
   
   
  
 lansoprazole 15 mg capsule Commonly known as:  PREVACID Your next dose is: Today, Tomorrow Other:  _________ Dose:  15 mg Take 15 mg by mouth every Monday, Wednesday, Friday. Refills:  0  
     
   
   
   
  
 M-VIT PO Your next dose is: Today, Tomorrow Other:  _________ Dose:  1 Tab Take 1 Tab by mouth daily. Refills:  0  
     
   
   
   
  
 niacin  mg tablet Commonly known as:  Verita Reap Your next dose is: Today, Tomorrow Other:  _________ Dose:  500 mg Take 500 mg by mouth nightly. Refills:  0 Omega-3-DHA-EPA-Fish Oil 1,000 mg (120 mg-180 mg) Cap Your next dose is: Today, Tomorrow Other:  _________ Dose:  2 Tab Take 2 Tabs by mouth two (2) times a day. Refills:  0  
     
   
   
   
  
 oxyCODONE-acetaminophen 5-325 mg per tablet Commonly known as:  PERCOCET Your next dose is: Today, Tomorrow Other:  _________ Dose:  1 Tab Take 1 Tab by mouth every four (4) hours as needed for Pain. Max Daily Amount: 6 Tabs. Quantity:  15 Tab Refills:  0  
     
   
   
   
  
 traMADol 50 mg tablet Commonly known as:  ULTRAM  
   
Your next dose is: Today, Tomorrow Other:  _________ Dose:  50 mg Take 50 mg by mouth every six (6) hours as needed for Pain. Refills:  0  
     
   
   
   
  
 vitamin E 400 unit capsule Commonly known as:  [address] Your next dose is: Today, Tomorrow Other:  _________ Dose:  400 Units Take 400 Units by mouth daily. Refills:  0 Discharge Instructions [facility] Special Procedures/Radiology Department MRI With Sedation Discharge Instructions You received sedation for your MRI. You may feel tired today, so rest as much as possible. You may return to work tomorrow without restrictions. No driving for the next 24 hours. Do not sign any legal documents for the next 24 hours. Resume your previous diet and follow the medication reconciliation form. Follow up with your physician for the test results. Discharge Orders None Introducing [facility] SERVICES! [facility] introduces [employer] patient portal. Now you can access parts of your medical record, email your doctor's office, and request medication refills online. 1. In your internet browser, go to [email or web address] 2. Click on the First Time User? Click Here link in the Sign In box. You will see the New Member Sign Up page. 3. Enter your [employer] Access Code exactly as it appears below.  You will not need to use this code after youve completed the sign-up process. If you do not sign up before the expiration date, you must request a new code. · [employer] Access Code: [email or web address] Expires: 4/23/2017  4:05 PM 
 
4. Enter the last four digits of your Social Security Number (xxxx) and Date of Birth (mm/dd/yyyy) as indicated and click Submit. You will be taken to the next sign-up page. 5. Create a [employer] ID. This will be your [employer] login ID and cannot be changed, so think of one that is secure and easy to remember. 6. Create a [employer] password. You can change your password at any time. 7. Enter your Password Reset Question and Answer. This can be used at a later time if you forget your password. 8. Enter your e-mail address. You will receive e-mail notification when new information is available in [address] 9. Click Sign Up. You can now view and download portions of your medical record. 10. Click the Download Summary menu link to download a portable copy of your medical information. If you have questions, please visit the Frequently Asked Questions section of the [employer] website. Remember, [employer] is NOT to be used for urgent needs. For medical emergencies, dial 911. Now available from your iPhone and Android! General Information Please provide this summary of care documentation to your next provider. Patient Signature:  ____________________________________________________________ Date:  ____________________________________________________________  
  
Ema Ortega Provider Signature:  ____________________________________________________________ Date:  ____________________________________________________________

## 2017-02-22 NOTE — H&P
Interventional Radiology History and Physical (Outpatient)    2/22/2017    Patient: Jillian Booker 57 y.o. female     Referring Physician:  Cameron Chen MD    Chief Complaint: presents for lumbar spine MRI with conscious sedation    History of Present Illness: back pain and leg numbness    History:  Past Medical History:   Diagnosis Date    Arrhythmia     irregular heart beat hx and beating fast per patient/no cardiologist    Arthritis     JOINTS  WORSE WAIST DOWN     Hypertension     Ill-defined condition     neuropathy feet     No family history on file. Social History     Social History    Marital status:      Spouse name: N/A    Number of children: N/A    Years of education: N/A     Occupational History    Not on file. Social History Main Topics    Smoking status: Never Smoker    Smokeless tobacco: Never Used    Alcohol use Yes      Comment: once a year    Drug use: No    Sexual activity: Not on file     Other Topics Concern    Not on file     Social History Narrative       Allergies: No Known Allergies    Prior to Admission Medications:  Prior to Admission medications    Medication Sig Start Date End Date Taking? Authorizing Provider   traMADol (ULTRAM) 50 mg tablet Take 50 mg by mouth every six (6) hours as needed for Pain. Mayra Isidro MD   oxyCODONE-acetaminophen (PERCOCET) 5-325 mg per tablet Take 1 Tab by mouth every four (4) hours as needed for Pain. Max Daily Amount: 6 Tabs. 1/8/17   Natalie Harvey MD   olmesartan-hydrochlorothiazide (BENICAR HCT) 40-12.5 mg per tablet Take 1 Tab by mouth daily. Historical Provider   cholecalciferol, vitamin D3, 2,000 unit tab Take 1 Tab by mouth daily. Historical Provider   vitamin E (AQUA GEMS) 400 unit capsule Take 400 Units by mouth daily. Historical Provider   lansoprazole (PREVACID) 15 mg capsule Take 15 mg by mouth every Monday, Wednesday, Friday.     Historical Provider   PV W-O YAN/FERROUS FUMARATE/FA (M-VIT PO) Take 1 Tab by mouth daily. Historical Provider   estradiol (VIVELLE) 0.05 mg/24 hr 1 Patch by TransDERmal route two (2) days a week. Wed and sun    Historical Provider   diclofenac EC (VOLTAREN) 75 mg EC tablet Take 75 mg by mouth two (2) times a day. Historical Provider   niacin ER (NIASPAN) 500 mg tablet Take 500 mg by mouth nightly. Historical Provider   ascorbic acid (VITAMIN C) 250 mg tablet Take 500 mg by mouth daily. Historical Provider   Ferrous Fumarate 325 mg (106 mg iron) tab Take 1 Tab by mouth two (2) times a day. Historical Provider   acetaminophen (TYLENOL) 650 mg CR tablet Take 1,300 mg by mouth two (2) times a day. Historical Provider   aspirin delayed-release 325 mg tablet Take 325 mg by mouth nightly. Historical Provider   Omega-3-DHA-EPA-Fish Oil 1,000 mg (120 mg-180 mg) cap Take 2 Tabs by mouth two (2) times a day. Historical Provider       Physical Exam:    Resp. rate 20, height 5' 6\" (1.676 m), weight 93 kg (205 lb), not currently breastfeeding. General: alert, cooperative, no distress, appears stated age  Heart: normal S1 and S2  Lungs: clear to auscultation bilaterally    Plan of Care/Planned Procedure:  Risks, benefits, and alternatives reviewed with patient and she agrees to proceed with the procedure.      Dora iSmental MD

## 2017-02-22 NOTE — ROUTINE PROCESS
Pt moved self from stretcher, scanner, & to wheelchair w/o assistance - pt remains pain free - pt reviewed discharge instructions again as prior to procedure and verbalized understanding of limitations - disc given to pt to take to Dr. Kelsey putnam, which is scheduled.  drove pt home.

## 2017-03-08 ENCOUNTER — HOSPITAL ENCOUNTER (OUTPATIENT)
Dept: PREADMISSION TESTING | Age: 62
Discharge: HOME | End: 2017-03-08
Attending: NEUROLOGICAL SURGERY
Payer: COMMERCIAL

## 2017-03-08 VITALS
WEIGHT: 191.36 LBS | OXYGEN SATURATION: 98 % | HEIGHT: 66 IN | HEART RATE: 74 BPM | DIASTOLIC BLOOD PRESSURE: 62 MMHG | SYSTOLIC BLOOD PRESSURE: 116 MMHG | TEMPERATURE: 97.7 F | RESPIRATION RATE: 18 BRPM | BODY MASS INDEX: 30.75 KG/M2

## 2017-03-08 LAB
ABO + RH BLD: NORMAL
ALBUMIN SERPL BCP-MCNC: 3.4 G/DL (ref 3.5–5)
ALBUMIN/GLOB SERPL: 0.9 {RATIO} (ref 1.1–2.2)
ALP SERPL-CCNC: 79 U/L (ref 45–117)
ALT SERPL-CCNC: 17 U/L (ref 12–78)
ANION GAP BLD CALC-SCNC: 8 MMOL/L (ref 5–15)
APPEARANCE UR: ABNORMAL
APTT PPP: 30.6 SEC (ref 22.1–32.5)
AST SERPL W P-5'-P-CCNC: 7 U/L (ref 15–37)
BACTERIA URNS QL MICRO: ABNORMAL /HPF
BILIRUB SERPL-MCNC: 0.3 MG/DL (ref 0.2–1)
BILIRUB UR QL CFM: NEGATIVE
BLOOD GROUP ANTIBODIES SERPL: NORMAL
BUN SERPL-MCNC: 14 MG/DL (ref 6–20)
BUN/CREAT SERPL: 16 (ref 12–20)
CALCIUM SERPL-MCNC: 9.6 MG/DL (ref 8.5–10.1)
CHLORIDE SERPL-SCNC: 100 MMOL/L (ref 97–108)
CO2 SERPL-SCNC: 32 MMOL/L (ref 21–32)
COLOR UR: ABNORMAL
CREAT SERPL-MCNC: 0.89 MG/DL (ref 0.55–1.02)
EPITH CASTS URNS QL MICRO: ABNORMAL /LPF
ERYTHROCYTE [DISTWIDTH] IN BLOOD BY AUTOMATED COUNT: 14.1 % (ref 11.5–14.5)
EST. AVERAGE GLUCOSE BLD GHB EST-MCNC: 114 MG/DL
GLOBULIN SER CALC-MCNC: 3.7 G/DL (ref 2–4)
GLUCOSE SERPL-MCNC: 89 MG/DL (ref 65–100)
GLUCOSE UR STRIP.AUTO-MCNC: NEGATIVE MG/DL
HBA1C MFR BLD: 5.6 % (ref 4.2–6.3)
HCT VFR BLD AUTO: 33.7 % (ref 35–47)
HGB BLD-MCNC: 10.9 G/DL (ref 11.5–16)
HGB UR QL STRIP: NEGATIVE
INR PPP: 1 (ref 0.9–1.1)
KETONES UR QL STRIP.AUTO: ABNORMAL MG/DL
LEUKOCYTE ESTERASE UR QL STRIP.AUTO: NEGATIVE
MCH RBC QN AUTO: 28.6 PG (ref 26–34)
MCHC RBC AUTO-ENTMCNC: 32.3 G/DL (ref 30–36.5)
MCV RBC AUTO: 88.5 FL (ref 80–99)
NITRITE UR QL STRIP.AUTO: NEGATIVE
PH UR STRIP: 5.5 [PH] (ref 5–8)
PLATELET # BLD AUTO: 272 K/UL (ref 150–400)
POTASSIUM SERPL-SCNC: 2.8 MMOL/L (ref 3.5–5.1)
PROT SERPL-MCNC: 7.1 G/DL (ref 6.4–8.2)
PROT UR STRIP-MCNC: 30 MG/DL
PROTHROMBIN TIME: 9.9 SEC (ref 9–11.1)
RBC # BLD AUTO: 3.81 M/UL (ref 3.8–5.2)
RBC #/AREA URNS HPF: ABNORMAL /HPF (ref 0–5)
SODIUM SERPL-SCNC: 140 MMOL/L (ref 136–145)
SP GR UR REFRACTOMETRY: 1.03 (ref 1–1.03)
SPECIMEN EXP DATE BLD: NORMAL
THERAPEUTIC RANGE,PTTT: NORMAL SECS (ref 58–77)
UA: UC IF INDICATED,UAUC: ABNORMAL
UROBILINOGEN UR QL STRIP.AUTO: 1 EU/DL (ref 0.2–1)
WBC # BLD AUTO: 5.8 K/UL (ref 3.6–11)
WBC URNS QL MICRO: ABNORMAL /HPF (ref 0–4)

## 2017-03-08 PROCEDURE — 86850 RBC ANTIBODY SCREEN: CPT | Performed by: NEUROLOGICAL SURGERY

## 2017-03-08 PROCEDURE — 87086 URINE CULTURE/COLONY COUNT: CPT | Performed by: NEUROLOGICAL SURGERY

## 2017-03-08 PROCEDURE — 83036 HEMOGLOBIN GLYCOSYLATED A1C: CPT | Performed by: NEUROLOGICAL SURGERY

## 2017-03-08 PROCEDURE — 81001 URINALYSIS AUTO W/SCOPE: CPT | Performed by: NEUROLOGICAL SURGERY

## 2017-03-08 PROCEDURE — 36415 COLL VENOUS BLD VENIPUNCTURE: CPT | Performed by: NEUROLOGICAL SURGERY

## 2017-03-08 PROCEDURE — 80053 COMPREHEN METABOLIC PANEL: CPT | Performed by: NEUROLOGICAL SURGERY

## 2017-03-08 PROCEDURE — 85027 COMPLETE CBC AUTOMATED: CPT | Performed by: NEUROLOGICAL SURGERY

## 2017-03-08 PROCEDURE — 85610 PROTHROMBIN TIME: CPT | Performed by: NEUROLOGICAL SURGERY

## 2017-03-08 PROCEDURE — 85730 THROMBOPLASTIN TIME PARTIAL: CPT | Performed by: NEUROLOGICAL SURGERY

## 2017-03-08 RX ORDER — CEFAZOLIN SODIUM IN 0.9 % NACL 2 G/100 ML
2 PLASTIC BAG, INJECTION (ML) INTRAVENOUS ONCE
Status: CANCELLED | OUTPATIENT
Start: 2017-03-15 | End: 2017-03-15

## 2017-03-08 RX ORDER — SODIUM CHLORIDE, SODIUM LACTATE, POTASSIUM CHLORIDE, CALCIUM CHLORIDE 600; 310; 30; 20 MG/100ML; MG/100ML; MG/100ML; MG/100ML
25 INJECTION, SOLUTION INTRAVENOUS CONTINUOUS
Status: CANCELLED | OUTPATIENT
Start: 2017-03-15

## 2017-03-08 NOTE — PERIOP NOTES
Faxed CHEM results to both Dr Heather Rodriguez and Dr Lucy Woodson. Confirmation faxes received.  Terry YU

## 2017-03-08 NOTE — PERIOP NOTES
Called Dr Schaffer's got answering service, asked for the doctor on-call to please call [phone] so I can report lab value of Potassium 2.8. Answering service states Dr Jannice Collet is on call. Dr Jannice Collet returned my call and ordered patient contact her PCP for Potassium replacement. I called Luis Alberto Weber and relayed Dr Jannice Collet instructions, pt states she will call in the morning as the office is now closed,  to Dr Harkins Friends and give them her Potassium result of 2.8 and ask for them to manage Potassium Replacement.  Bernie Myers RN

## 2017-03-08 NOTE — PERIOP NOTES
[facility]  Preoperative Instructions        Surgery Date 3/15/2017          Time of Arrival 5:45 AM    1. On the day of your surgery, please report to the Surgical Services Registration Desk and sign in at your designated time. The Surgery Center is located to the right of the Emergency Room. 2. You must have someone with you to drive you home. You should not drive a car for 24 hours following surgery. Please make arrangements for a friend or family member to stay with you for the first 24 hours after your surgery. 3. Do not have anything to eat or drink (including water, gum, mints, coffee, juice) after midnight 3/12/2017              . This may not apply to medications prescribed by your physician. Please note special instructions, if applicable. If you are currently taking Plavix, Coumadin, or other blood-thinning agents, contact your surgeon for instructions. 4. We recommend you do not drink any alcoholic beverages for 24 hours before and after your surgery. 5. Have a list of all current medications, including vitamins, herbal supplements and any other over the counter medications. Stop all Aspirin and non-steroidal anti-inflammatory drugs (I.e. Advil, Aleve), as directed by your surgeon's office. Stop all vitamins and herbal supplements seven days prior to your surgery. 6. Wear comfortable clothes. Wear glasses instead of contacts. Do not bring any money or jewelry. Please bring picture ID, insurance card, and any prearranged co-payment or hospital payment. Do not wear make-up, particularly mascara the morning of your surgery. Do not wear nail polish, particularly if you are having foot /hand surgery. Wear your hair loose or down, no ponytails, buns, erin pins or clips. All body piercings must be removed.   Please shower with antibacterial soap for three consecutive days before and on the morning of surgery, but do not apply any lotions, powders or deodorants after the shower on the day of surgery. Please use a fresh towels after each shower. Please sleep in clean clothes and change bed linens the night before surgery. Please do not shave for 48 hours prior to surgery. Shaving of the face is acceptable. 7. You should understand that if you do not follow these instructions your surgery may be cancelled. If your physical condition changes (I.e. fever, cold or flu) please contact your surgeon as soon as possible. 8. It is important that you be on time. If a situation occurs where you may be late, please call [phone] (OR Holding Area). 9. If you have any questions and or problems, please call [phone] (Pre-admission Testing). 10. Your surgery time may be subject to change. You will receive a phone call the evening prior if your time changes. 11.  If having outpatient surgery, you must have someone to drive you here, stay with you during the duration of your stay, and to drive you home at time of discharge. Special Instructions: Stop Aspirin per surgeons instructions. Stop Diclofenac 7 days prior to surgery. Stop all Vitamins and Vitamin Supplements 7 days prior to surgery. MEDICATIONS TO TAKE THE MORNING OF SURGERY WITH A SIP OF WATER:Tramadol if needed, Oxycodone if needed, May take Prevacid      I understand a pre-operative phone call will be made to verify my surgery time. In the event that I am not available, I give permission for a message to be left on my answering service and/or with another person?  Yes [phone]         ___________________      __________   _________    (Signature of Patient)             (Witness)                (Date and Time)

## 2017-03-09 LAB
BACTERIA SPEC CULT: ABNORMAL
BACTERIA SPEC CULT: ABNORMAL
SERVICE CMNT-IMP: ABNORMAL

## 2017-03-10 LAB
BACTERIA SPEC CULT: NORMAL
CC UR VC: NORMAL
SERVICE CMNT-IMP: NORMAL

## 2017-03-10 NOTE — PERIOP NOTES
Phoned Dr Jerry Deluna office and spoke with  Aubrey Pierson in regards to previously faxed MRSA CX preliminary report showing \"Staph Aureus\". She states she will call prescription in today for Bactroban as prescribed by Dr Any Alvarez.   Brian Rey RN

## 2017-03-10 NOTE — PERIOP NOTES
Faxed  [facility] MRSA CX Result showing \"Apparent Staph\", and UA CX Results. Confirmation fax received.  Soledad Samaniego RN

## 2017-03-13 NOTE — PERIOP NOTES
Called Dr Ratna Johnson and left  requesting call back today at [phone]  regarding previously faxed UA CX and MRSA CX results. The pt's surgery is 3/15/2017 and I need confirmation the MRSA CX is being treated and that the UA CX has been evaluated as well.  Terry YU

## 2017-03-14 NOTE — PERIOP NOTES
Spoke to [facility] in Dr. Migue Mosquera office and no treatment for urine culture results and patient is being treated with mupirocin ointment for MSSA culture results. Chandler states she left a message for the patient to call her regarding the prescription. I called and spoke to the patient regarding the mupirocin prescription and explained that she needed to pick it up and start it today. Patient verbalized understanding.

## 2017-03-15 ENCOUNTER — APPOINTMENT (OUTPATIENT)
Dept: GENERAL RADIOLOGY | Age: 62
End: 2017-03-15
Attending: NEUROLOGICAL SURGERY
Payer: COMMERCIAL

## 2017-03-15 ENCOUNTER — HOSPITAL ENCOUNTER (OUTPATIENT)
Age: 62
Setting detail: OBSERVATION
Discharge: HOME | End: 2017-03-16
Attending: NEUROLOGICAL SURGERY | Admitting: NEUROLOGICAL SURGERY
Payer: COMMERCIAL

## 2017-03-15 ENCOUNTER — ANESTHESIA EVENT (OUTPATIENT)
Dept: SURGERY | Age: 62
End: 2017-03-15
Payer: COMMERCIAL

## 2017-03-15 ENCOUNTER — ANESTHESIA (OUTPATIENT)
Dept: SURGERY | Age: 62
End: 2017-03-15
Payer: COMMERCIAL

## 2017-03-15 LAB
ANION GAP BLD CALC-SCNC: 17 MMOL/L (ref 5–15)
BUN BLD-MCNC: 5 MG/DL (ref 9–20)
CA-I BLD-MCNC: 1.26 MMOL/L (ref 1.12–1.32)
CHLORIDE BLD-SCNC: 103 MMOL/L (ref 98–107)
CO2 BLD-SCNC: 23 MMOL/L (ref 21–32)
CREAT BLD-MCNC: 0.7 MG/DL (ref 0.6–1.3)
GLUCOSE BLD-MCNC: 100 MG/DL (ref 65–105)
HCT VFR BLD CALC: 37 % (ref 35–47)
HGB BLD-MCNC: 12.6 GM/DL (ref 11.5–16)
POTASSIUM BLD-SCNC: 4.4 MMOL/L (ref 3.5–5.1)
SERVICE CMNT-IMP: ABNORMAL
SODIUM BLD-SCNC: 137 MMOL/L (ref 136–145)

## 2017-03-15 PROCEDURE — 77030004391 HC BUR FLUT MEDT -C: Performed by: NEUROLOGICAL SURGERY

## 2017-03-15 PROCEDURE — 74011000250 HC RX REV CODE- 250: Performed by: NEUROLOGICAL SURGERY

## 2017-03-15 PROCEDURE — 76010000162 HC OR TIME 1.5 TO 2 HR INTENSV-TIER 1: Performed by: NEUROLOGICAL SURGERY

## 2017-03-15 PROCEDURE — 77030003029 HC SUT VCRL J&J -B: Performed by: NEUROLOGICAL SURGERY

## 2017-03-15 PROCEDURE — 77030020782 HC GWN BAIR PAWS FLX 3M -B

## 2017-03-15 PROCEDURE — 74011000250 HC RX REV CODE- 250

## 2017-03-15 PROCEDURE — 77030029099 HC BN WAX SSPC -A: Performed by: NEUROLOGICAL SURGERY

## 2017-03-15 PROCEDURE — 99218 HC RM OBSERVATION: CPT

## 2017-03-15 PROCEDURE — 77030018836 HC SOL IRR NACL ICUM -A: Performed by: NEUROLOGICAL SURGERY

## 2017-03-15 PROCEDURE — 76210000017 HC OR PH I REC 1.5 TO 2 HR: Performed by: NEUROLOGICAL SURGERY

## 2017-03-15 PROCEDURE — 74011250636 HC RX REV CODE- 250/636: Performed by: NEUROLOGICAL SURGERY

## 2017-03-15 PROCEDURE — 76060000034 HC ANESTHESIA 1.5 TO 2 HR: Performed by: NEUROLOGICAL SURGERY

## 2017-03-15 PROCEDURE — 74011250636 HC RX REV CODE- 250/636: Performed by: ANESTHESIOLOGY

## 2017-03-15 PROCEDURE — 77030002933 HC SUT MCRYL J&J -A: Performed by: NEUROLOGICAL SURGERY

## 2017-03-15 PROCEDURE — 76001 XR FLUOROSCOPY OVER 60 MINUTES: CPT

## 2017-03-15 PROCEDURE — 74011000272 HC RX REV CODE- 272: Performed by: NEUROLOGICAL SURGERY

## 2017-03-15 PROCEDURE — 77030014650 HC SEAL MTRX FLOSEL BAXT -C: Performed by: NEUROLOGICAL SURGERY

## 2017-03-15 PROCEDURE — 72020 X-RAY EXAM OF SPINE 1 VIEW: CPT

## 2017-03-15 PROCEDURE — 77030013474 HC CRD BPLR DISP ADLR -A: Performed by: NEUROLOGICAL SURGERY

## 2017-03-15 PROCEDURE — 74011250636 HC RX REV CODE- 250/636

## 2017-03-15 PROCEDURE — 74011250637 HC RX REV CODE- 250/637: Performed by: NEUROLOGICAL SURGERY

## 2017-03-15 PROCEDURE — 80047 BASIC METABLC PNL IONIZED CA: CPT

## 2017-03-15 PROCEDURE — 77030003028 HC SUT VCRL J&J -A: Performed by: NEUROLOGICAL SURGERY

## 2017-03-15 PROCEDURE — 88304 TISSUE EXAM BY PATHOLOGIST: CPT | Performed by: NEUROLOGICAL SURGERY

## 2017-03-15 PROCEDURE — 77030026438 HC STYL ET INTUB CARD -A: Performed by: ANESTHESIOLOGY

## 2017-03-15 PROCEDURE — 77030008684 HC TU ET CUF COVD -B: Performed by: ANESTHESIOLOGY

## 2017-03-15 PROCEDURE — 77030018846 HC SOL IRR STRL H20 ICUM -A: Performed by: NEUROLOGICAL SURGERY

## 2017-03-15 RX ORDER — SODIUM CHLORIDE 0.9 % (FLUSH) 0.9 %
5-10 SYRINGE (ML) INJECTION AS NEEDED
Status: DISCONTINUED | OUTPATIENT
Start: 2017-03-15 | End: 2017-03-16 | Stop reason: HOSPADM

## 2017-03-15 RX ORDER — CEFAZOLIN SODIUM IN 0.9 % NACL 2 G/100 ML
2 PLASTIC BAG, INJECTION (ML) INTRAVENOUS ONCE
Status: COMPLETED | OUTPATIENT
Start: 2017-03-15 | End: 2017-03-15

## 2017-03-15 RX ORDER — HYDROMORPHONE HYDROCHLORIDE 1 MG/ML
1 INJECTION, SOLUTION INTRAMUSCULAR; INTRAVENOUS; SUBCUTANEOUS
Status: DISCONTINUED | OUTPATIENT
Start: 2017-03-15 | End: 2017-03-16 | Stop reason: HOSPADM

## 2017-03-15 RX ORDER — FENTANYL CITRATE 50 UG/ML
25 INJECTION, SOLUTION INTRAMUSCULAR; INTRAVENOUS
Status: DISCONTINUED | OUTPATIENT
Start: 2017-03-15 | End: 2017-03-15 | Stop reason: HOSPADM

## 2017-03-15 RX ORDER — LIDOCAINE HYDROCHLORIDE 10 MG/ML
0.1 INJECTION, SOLUTION EPIDURAL; INFILTRATION; INTRACAUDAL; PERINEURAL AS NEEDED
Status: DISCONTINUED | OUTPATIENT
Start: 2017-03-15 | End: 2017-03-15 | Stop reason: HOSPADM

## 2017-03-15 RX ORDER — FENTANYL CITRATE 50 UG/ML
INJECTION, SOLUTION INTRAMUSCULAR; INTRAVENOUS
Status: COMPLETED
Start: 2017-03-15 | End: 2017-03-15

## 2017-03-15 RX ORDER — SODIUM CHLORIDE, SODIUM LACTATE, POTASSIUM CHLORIDE, CALCIUM CHLORIDE 600; 310; 30; 20 MG/100ML; MG/100ML; MG/100ML; MG/100ML
25 INJECTION, SOLUTION INTRAVENOUS CONTINUOUS
Status: DISCONTINUED | OUTPATIENT
Start: 2017-03-15 | End: 2017-03-15 | Stop reason: HOSPADM

## 2017-03-15 RX ORDER — HYDROCODONE BITARTRATE AND ACETAMINOPHEN 5; 325 MG/1; MG/1
1 TABLET ORAL
Status: DISCONTINUED | OUTPATIENT
Start: 2017-03-15 | End: 2017-03-16 | Stop reason: HOSPADM

## 2017-03-15 RX ORDER — HYDROMORPHONE HYDROCHLORIDE 1 MG/ML
0.5 INJECTION, SOLUTION INTRAMUSCULAR; INTRAVENOUS; SUBCUTANEOUS
Status: DISCONTINUED | OUTPATIENT
Start: 2017-03-15 | End: 2017-03-15 | Stop reason: HOSPADM

## 2017-03-15 RX ORDER — CEFAZOLIN SODIUM IN 0.9 % NACL 2 G/100 ML
2 PLASTIC BAG, INJECTION (ML) INTRAVENOUS EVERY 8 HOURS
Status: COMPLETED | OUTPATIENT
Start: 2017-03-15 | End: 2017-03-16

## 2017-03-15 RX ORDER — DOCUSATE SODIUM 100 MG/1
100 CAPSULE, LIQUID FILLED ORAL 2 TIMES DAILY
Status: DISCONTINUED | OUTPATIENT
Start: 2017-03-15 | End: 2017-03-16 | Stop reason: HOSPADM

## 2017-03-15 RX ORDER — PHENYLEPHRINE HCL IN 0.9% NACL 0.4MG/10ML
SYRINGE (ML) INTRAVENOUS AS NEEDED
Status: DISCONTINUED | OUTPATIENT
Start: 2017-03-15 | End: 2017-03-15 | Stop reason: HOSPADM

## 2017-03-15 RX ORDER — DEXAMETHASONE SODIUM PHOSPHATE 4 MG/ML
INJECTION, SOLUTION INTRA-ARTICULAR; INTRALESIONAL; INTRAMUSCULAR; INTRAVENOUS; SOFT TISSUE AS NEEDED
Status: DISCONTINUED | OUTPATIENT
Start: 2017-03-15 | End: 2017-03-15 | Stop reason: HOSPADM

## 2017-03-15 RX ORDER — PANTOPRAZOLE SODIUM 40 MG/1
40 TABLET, DELAYED RELEASE ORAL
Status: DISCONTINUED | OUTPATIENT
Start: 2017-03-16 | End: 2017-03-16 | Stop reason: HOSPADM

## 2017-03-15 RX ORDER — POTASSIUM CHLORIDE 20 MEQ/1
20 TABLET, EXTENDED RELEASE ORAL 2 TIMES DAILY
Status: DISCONTINUED | OUTPATIENT
Start: 2017-03-15 | End: 2017-03-16 | Stop reason: HOSPADM

## 2017-03-15 RX ORDER — ONDANSETRON 2 MG/ML
4 INJECTION INTRAMUSCULAR; INTRAVENOUS AS NEEDED
Status: DISCONTINUED | OUTPATIENT
Start: 2017-03-15 | End: 2017-03-15 | Stop reason: HOSPADM

## 2017-03-15 RX ORDER — DEXTROSE, SODIUM CHLORIDE, AND POTASSIUM CHLORIDE 5; .45; .075 G/100ML; G/100ML; G/100ML
INJECTION INTRAVENOUS
Status: COMPLETED
Start: 2017-03-15 | End: 2017-03-15

## 2017-03-15 RX ORDER — NEOSTIGMINE METHYLSULFATE 1 MG/ML
INJECTION INTRAVENOUS AS NEEDED
Status: DISCONTINUED | OUTPATIENT
Start: 2017-03-15 | End: 2017-03-15 | Stop reason: HOSPADM

## 2017-03-15 RX ORDER — DEXTROSE, SODIUM CHLORIDE, AND POTASSIUM CHLORIDE 5; .45; .075 G/100ML; G/100ML; G/100ML
75 INJECTION INTRAVENOUS CONTINUOUS
Status: DISCONTINUED | OUTPATIENT
Start: 2017-03-15 | End: 2017-03-16 | Stop reason: HOSPADM

## 2017-03-15 RX ORDER — NALOXONE HYDROCHLORIDE 0.4 MG/ML
0.4 INJECTION, SOLUTION INTRAMUSCULAR; INTRAVENOUS; SUBCUTANEOUS AS NEEDED
Status: DISCONTINUED | OUTPATIENT
Start: 2017-03-15 | End: 2017-03-16 | Stop reason: HOSPADM

## 2017-03-15 RX ORDER — SUCCINYLCHOLINE CHLORIDE 20 MG/ML
INJECTION INTRAMUSCULAR; INTRAVENOUS AS NEEDED
Status: DISCONTINUED | OUTPATIENT
Start: 2017-03-15 | End: 2017-03-15 | Stop reason: HOSPADM

## 2017-03-15 RX ORDER — PROPOFOL 10 MG/ML
INJECTION, EMULSION INTRAVENOUS AS NEEDED
Status: DISCONTINUED | OUTPATIENT
Start: 2017-03-15 | End: 2017-03-15 | Stop reason: HOSPADM

## 2017-03-15 RX ORDER — ACETAMINOPHEN 325 MG/1
650 TABLET ORAL
Status: DISCONTINUED | OUTPATIENT
Start: 2017-03-15 | End: 2017-03-16 | Stop reason: HOSPADM

## 2017-03-15 RX ORDER — DIPHENHYDRAMINE HYDROCHLORIDE 50 MG/ML
12.5 INJECTION, SOLUTION INTRAMUSCULAR; INTRAVENOUS AS NEEDED
Status: DISCONTINUED | OUTPATIENT
Start: 2017-03-15 | End: 2017-03-15 | Stop reason: HOSPADM

## 2017-03-15 RX ORDER — SODIUM CHLORIDE, SODIUM LACTATE, POTASSIUM CHLORIDE, CALCIUM CHLORIDE 600; 310; 30; 20 MG/100ML; MG/100ML; MG/100ML; MG/100ML
INJECTION, SOLUTION INTRAVENOUS
Status: DISCONTINUED | OUTPATIENT
Start: 2017-03-15 | End: 2017-03-15 | Stop reason: HOSPADM

## 2017-03-15 RX ORDER — DIPHENHYDRAMINE HCL 25 MG
25 CAPSULE ORAL
Status: DISCONTINUED | OUTPATIENT
Start: 2017-03-15 | End: 2017-03-16 | Stop reason: HOSPADM

## 2017-03-15 RX ORDER — ONDANSETRON 2 MG/ML
INJECTION INTRAMUSCULAR; INTRAVENOUS AS NEEDED
Status: DISCONTINUED | OUTPATIENT
Start: 2017-03-15 | End: 2017-03-15

## 2017-03-15 RX ORDER — MIDAZOLAM HYDROCHLORIDE 1 MG/ML
0.5 INJECTION, SOLUTION INTRAMUSCULAR; INTRAVENOUS
Status: DISCONTINUED | OUTPATIENT
Start: 2017-03-15 | End: 2017-03-15 | Stop reason: HOSPADM

## 2017-03-15 RX ORDER — ROCURONIUM BROMIDE 10 MG/ML
INJECTION, SOLUTION INTRAVENOUS AS NEEDED
Status: DISCONTINUED | OUTPATIENT
Start: 2017-03-15 | End: 2017-03-15 | Stop reason: HOSPADM

## 2017-03-15 RX ORDER — SODIUM CHLORIDE 0.9 % (FLUSH) 0.9 %
5-10 SYRINGE (ML) INJECTION EVERY 8 HOURS
Status: DISCONTINUED | OUTPATIENT
Start: 2017-03-15 | End: 2017-03-16 | Stop reason: HOSPADM

## 2017-03-15 RX ORDER — ONDANSETRON 2 MG/ML
4 INJECTION INTRAMUSCULAR; INTRAVENOUS
Status: DISCONTINUED | OUTPATIENT
Start: 2017-03-15 | End: 2017-03-16 | Stop reason: HOSPADM

## 2017-03-15 RX ORDER — FAMOTIDINE 20 MG/1
20 TABLET, FILM COATED ORAL EVERY 12 HOURS
Status: DISCONTINUED | OUTPATIENT
Start: 2017-03-15 | End: 2017-03-16 | Stop reason: HOSPADM

## 2017-03-15 RX ORDER — SODIUM CHLORIDE, SODIUM LACTATE, POTASSIUM CHLORIDE, CALCIUM CHLORIDE 600; 310; 30; 20 MG/100ML; MG/100ML; MG/100ML; MG/100ML
100 INJECTION, SOLUTION INTRAVENOUS CONTINUOUS
Status: DISCONTINUED | OUTPATIENT
Start: 2017-03-15 | End: 2017-03-15 | Stop reason: HOSPADM

## 2017-03-15 RX ORDER — LIDOCAINE HYDROCHLORIDE 20 MG/ML
INJECTION, SOLUTION EPIDURAL; INFILTRATION; INTRACAUDAL; PERINEURAL AS NEEDED
Status: DISCONTINUED | OUTPATIENT
Start: 2017-03-15 | End: 2017-03-15 | Stop reason: HOSPADM

## 2017-03-15 RX ORDER — GLYCOPYRROLATE 0.2 MG/ML
INJECTION INTRAMUSCULAR; INTRAVENOUS AS NEEDED
Status: DISCONTINUED | OUTPATIENT
Start: 2017-03-15 | End: 2017-03-15 | Stop reason: HOSPADM

## 2017-03-15 RX ORDER — FENTANYL CITRATE 50 UG/ML
INJECTION, SOLUTION INTRAMUSCULAR; INTRAVENOUS AS NEEDED
Status: DISCONTINUED | OUTPATIENT
Start: 2017-03-15 | End: 2017-03-15 | Stop reason: HOSPADM

## 2017-03-15 RX ORDER — MIDAZOLAM HYDROCHLORIDE 1 MG/ML
1 INJECTION, SOLUTION INTRAMUSCULAR; INTRAVENOUS AS NEEDED
Status: DISCONTINUED | OUTPATIENT
Start: 2017-03-15 | End: 2017-03-15 | Stop reason: HOSPADM

## 2017-03-15 RX ORDER — CYCLOBENZAPRINE HCL 10 MG
5 TABLET ORAL
Status: DISCONTINUED | OUTPATIENT
Start: 2017-03-15 | End: 2017-03-16 | Stop reason: HOSPADM

## 2017-03-15 RX ORDER — FENTANYL CITRATE 50 UG/ML
50 INJECTION, SOLUTION INTRAMUSCULAR; INTRAVENOUS AS NEEDED
Status: DISCONTINUED | OUTPATIENT
Start: 2017-03-15 | End: 2017-03-15 | Stop reason: HOSPADM

## 2017-03-15 RX ORDER — HYDROCODONE BITARTRATE AND ACETAMINOPHEN 5; 325 MG/1; MG/1
1 TABLET ORAL AS NEEDED
Status: DISCONTINUED | OUTPATIENT
Start: 2017-03-15 | End: 2017-03-15 | Stop reason: HOSPADM

## 2017-03-15 RX ORDER — BUPIVACAINE HYDROCHLORIDE AND EPINEPHRINE 5; 5 MG/ML; UG/ML
INJECTION, SOLUTION EPIDURAL; INTRACAUDAL; PERINEURAL AS NEEDED
Status: DISCONTINUED | OUTPATIENT
Start: 2017-03-15 | End: 2017-03-15 | Stop reason: HOSPADM

## 2017-03-15 RX ORDER — MIDAZOLAM HYDROCHLORIDE 1 MG/ML
INJECTION, SOLUTION INTRAMUSCULAR; INTRAVENOUS AS NEEDED
Status: DISCONTINUED | OUTPATIENT
Start: 2017-03-15 | End: 2017-03-15 | Stop reason: HOSPADM

## 2017-03-15 RX ADMIN — CEFAZOLIN 2 G: 10 INJECTION, POWDER, FOR SOLUTION INTRAVENOUS; PARENTERAL at 16:09

## 2017-03-15 RX ADMIN — ROCURONIUM BROMIDE 25 MG: 10 INJECTION, SOLUTION INTRAVENOUS at 07:50

## 2017-03-15 RX ADMIN — ONDANSETRON 4 MG: 2 INJECTION INTRAMUSCULAR; INTRAVENOUS at 07:50

## 2017-03-15 RX ADMIN — GLYCOPYRROLATE 0.4 MG: 0.2 INJECTION INTRAMUSCULAR; INTRAVENOUS at 09:04

## 2017-03-15 RX ADMIN — FENTANYL CITRATE 50 MCG: 50 INJECTION, SOLUTION INTRAMUSCULAR; INTRAVENOUS at 09:14

## 2017-03-15 RX ADMIN — FENTANYL CITRATE 25 MCG: 50 INJECTION, SOLUTION INTRAMUSCULAR; INTRAVENOUS at 10:14

## 2017-03-15 RX ADMIN — CEFAZOLIN 2 G: 10 INJECTION, POWDER, FOR SOLUTION INTRAVENOUS; PARENTERAL at 07:50

## 2017-03-15 RX ADMIN — FAMOTIDINE 20 MG: 20 TABLET ORAL at 22:00

## 2017-03-15 RX ADMIN — Medication 80 MCG: at 08:34

## 2017-03-15 RX ADMIN — ONDANSETRON 4 MG: 2 INJECTION INTRAMUSCULAR; INTRAVENOUS at 09:14

## 2017-03-15 RX ADMIN — NEOSTIGMINE METHYLSULFATE 3 MG: 1 INJECTION INTRAVENOUS at 09:10

## 2017-03-15 RX ADMIN — Medication 80 MCG: at 08:54

## 2017-03-15 RX ADMIN — FENTANYL CITRATE 25 MCG: 50 INJECTION, SOLUTION INTRAMUSCULAR; INTRAVENOUS at 10:04

## 2017-03-15 RX ADMIN — MIDAZOLAM HYDROCHLORIDE 2 MG: 1 INJECTION, SOLUTION INTRAMUSCULAR; INTRAVENOUS at 07:34

## 2017-03-15 RX ADMIN — FAMOTIDINE 20 MG: 20 TABLET ORAL at 13:13

## 2017-03-15 RX ADMIN — FENTANYL CITRATE 25 MCG: 50 INJECTION, SOLUTION INTRAMUSCULAR; INTRAVENOUS at 10:26

## 2017-03-15 RX ADMIN — PROPOFOL 50 MG: 10 INJECTION, EMULSION INTRAVENOUS at 07:57

## 2017-03-15 RX ADMIN — ROCURONIUM BROMIDE 20 MG: 10 INJECTION, SOLUTION INTRAVENOUS at 08:00

## 2017-03-15 RX ADMIN — Medication 40 MCG: at 09:00

## 2017-03-15 RX ADMIN — SODIUM CHLORIDE, SODIUM LACTATE, POTASSIUM CHLORIDE, CALCIUM CHLORIDE: 600; 310; 30; 20 INJECTION, SOLUTION INTRAVENOUS at 07:28

## 2017-03-15 RX ADMIN — DOCUSATE SODIUM 100 MG: 100 CAPSULE, LIQUID FILLED ORAL at 13:13

## 2017-03-15 RX ADMIN — ROCURONIUM BROMIDE 5 MG: 10 INJECTION, SOLUTION INTRAVENOUS at 07:43

## 2017-03-15 RX ADMIN — Medication 80 MCG: at 08:58

## 2017-03-15 RX ADMIN — DEXAMETHASONE SODIUM PHOSPHATE 8 MG: 4 INJECTION, SOLUTION INTRA-ARTICULAR; INTRALESIONAL; INTRAMUSCULAR; INTRAVENOUS; SOFT TISSUE at 07:41

## 2017-03-15 RX ADMIN — SODIUM CHLORIDE, SODIUM LACTATE, POTASSIUM CHLORIDE, AND CALCIUM CHLORIDE 25 ML/HR: 600; 310; 30; 20 INJECTION, SOLUTION INTRAVENOUS at 07:27

## 2017-03-15 RX ADMIN — DEXTROSE MONOHYDRATE, SODIUM CHLORIDE, AND POTASSIUM CHLORIDE 75 ML/HR: 50; 4.5; .745 INJECTION, SOLUTION INTRAVENOUS at 10:00

## 2017-03-15 RX ADMIN — HYDROCODONE BITARTRATE AND ACETAMINOPHEN 1 TABLET: 5; 325 TABLET ORAL at 18:41

## 2017-03-15 RX ADMIN — FENTANYL CITRATE 100 MCG: 50 INJECTION, SOLUTION INTRAMUSCULAR; INTRAVENOUS at 07:43

## 2017-03-15 RX ADMIN — FENTANYL CITRATE 50 MCG: 50 INJECTION, SOLUTION INTRAMUSCULAR; INTRAVENOUS at 09:00

## 2017-03-15 RX ADMIN — HYDROCODONE BITARTRATE AND ACETAMINOPHEN 1 TABLET: 5; 325 TABLET ORAL at 22:55

## 2017-03-15 RX ADMIN — Medication 10 ML: at 22:00

## 2017-03-15 RX ADMIN — PROPOFOL 50 MG: 10 INJECTION, EMULSION INTRAVENOUS at 07:52

## 2017-03-15 RX ADMIN — DOCUSATE SODIUM 100 MG: 100 CAPSULE, LIQUID FILLED ORAL at 17:51

## 2017-03-15 RX ADMIN — PROPOFOL 200 MG: 10 INJECTION, EMULSION INTRAVENOUS at 07:44

## 2017-03-15 RX ADMIN — LIDOCAINE HYDROCHLORIDE 80 MG: 20 INJECTION, SOLUTION EPIDURAL; INFILTRATION; INTRACAUDAL; PERINEURAL at 07:41

## 2017-03-15 RX ADMIN — SODIUM CHLORIDE, SODIUM LACTATE, POTASSIUM CHLORIDE, CALCIUM CHLORIDE: 600; 310; 30; 20 INJECTION, SOLUTION INTRAVENOUS at 07:55

## 2017-03-15 RX ADMIN — FENTANYL CITRATE 25 MCG: 50 INJECTION, SOLUTION INTRAMUSCULAR; INTRAVENOUS at 10:19

## 2017-03-15 RX ADMIN — HYDROCODONE BITARTRATE AND ACETAMINOPHEN 1 TABLET: 5; 325 TABLET ORAL at 13:13

## 2017-03-15 RX ADMIN — POTASSIUM CHLORIDE 20 MEQ: 20 TABLET, EXTENDED RELEASE ORAL at 17:51

## 2017-03-15 RX ADMIN — PROPOFOL 50 MG: 10 INJECTION, EMULSION INTRAVENOUS at 07:47

## 2017-03-15 RX ADMIN — SUCCINYLCHOLINE CHLORIDE 140 MG: 20 INJECTION INTRAMUSCULAR; INTRAVENOUS at 07:44

## 2017-03-15 NOTE — OP NOTES
[address], [address]   OP NOTE       Name:  Mariam Sifuentes   MR#:  [id number]   :  1955   Account #:  [de-identified]    Surgery Date:  03/15/2017   Date of Adm:  03/15/2017       PROCEDURES PERFORMED: Lumbar laminectomy, diskectomy, 1   level, L5-S1 on the left. PREOPERATIVE DIAGNOSIS: Herniated disk, L5-S1 on the left. POSTOPERATIVE DIAGNOSIS: Herniated disk, L5-S1 on the left. SURGEON: Brandon Aguayo MD     COMPLICATIONS: None. ESTIMATED BLOOD LOSS: Minimal.     ANESTHESIA: General.    SPECIMENS REMOVED: Disk, L5-S1, left. DESCRIPTION OF PROCEDURE: Review of imaging studies revealed   a free fragment disk herniation on the left, impinging on the S1 root on   the left side in this the patient with severe radiculopathy. After   discussing risks, benefits, and alternatives, she agreed to proceed with   surgery. She was taken to the operating room where she was induced under   general endotracheal anesthesia, and placed on the operating table in   the prone position on chest rolls. The lumbar region was scrubbed,   prepped and draped in the usual sterile fashion. A small midline vertical incision was made and carried down to the   paraspinal muscle fascia. The muscles were taken down in   subperiosteal fashion off L5 and the sacrum, which was visually   identified in order to identify the interspace. Under loupe magnification,   a hemilaminectomy was performed at L5 on the left side using a   combination of Kerrison and Leksell rongeurs as well as a high-speed   drill to improve the exposure. The ligamentum flavum was removed. The S1 root was visualized. It was retracted medially and as it was   retracted medially, the disk herniation began to ooze out from   underneath it.  Fragments were grasped with pituitary rongeurs and   then while protecting the nerve root, the disk was incised with a #15   blade scalpel in order to enlarge the opening and further fragments of   disk material were retrieved until the nerve root and thecal sac were   seen to occupy more normal anatomical shape and position. In fact,   there were fragments of disk material that had migrated laterally that   were impinging upon the L5 root that were retrieved as well to   decompress it. The disk space itself was entered with pituitary   rongeurs and multiple fragments of disk material were retrieved until no   other free fragments were identified. The area was irrigated copiously with antibiotic irrigation. Bleeding was   easily controlled with bipolar electrocautery and some FloSeal in the   epidural space. The area was irrigated copiously with antibiotic   irrigation. The fascia was closed with 0 Vicryl, the subcuticular layer   with 3-0 interrupted inverted Vicryl sutures and a running 4-0 Monocryl. Steri-Strips were applied to the skin edges. Sterile dressing was   applied. The needle, sponge and instrument counts were correct at the   end of the procedure.         MD Whitney Vallejo / Melanie Dukes   D:  03/15/2017   09:32   T:  03/15/2017   09:54   Job #:  [id number]

## 2017-03-15 NOTE — PERIOP NOTES
1036: Family updated. 1102: Report given to SAMUEL Raymundo RN to hold while waiting for ortho room.

## 2017-03-15 NOTE — ANESTHESIA POSTPROCEDURE EVALUATION
Post-Anesthesia Evaluation and Assessment    Patient: Endy Dubon MRN: [id number]  SSN: [id number]    YOB: 1955  Age: 57 y.o. Sex: female       Cardiovascular Function/Vital Signs  Visit Vitals    /59 (BP 1 Location: Left arm, BP Patient Position: At rest)    Pulse 61    Temp 36.4 °C (97.5 °F)    Resp 18    Ht 5' 6\" (1.676 m)    Wt 86.5 kg (190 lb 11.2 oz)    SpO2 100%    BMI 30.78 kg/m2       Patient is status post general anesthesia for Procedure(s):  LUMBAR LAMINECTOMY/DISCECTOMY L5-S1 LEFT. Nausea/Vomiting: None    Postoperative hydration reviewed and adequate. Pain:  Pain Scale 1: Numeric (0 - 10) (03/15/17 1030)  Pain Intensity 1: 4 (03/15/17 1030)   Managed    Neurological Status:   Neuro (WDL): Exceptions to WDL (03/15/17 0930)  Neuro  Neurologic State: Appropriate for age;Drowsy; Eyes open spontaneously; Eyes open to voice (03/15/17 0930)  Orientation Level: Oriented to person (03/15/17 0930)  Cognition: Decreased attention/concentration; Follows commands (03/15/17 0930)  Speech: Appropriate for age;Clear (03/15/17 5403)  Assessment R Pupil: Deferred (03/15/17 0974)  LUE Motor Response: Purposeful (03/15/17 0930)  LLE Motor Response: Purposeful (03/15/17 0930)  RUE Motor Response: Purposeful (03/15/17 0930)  RLE Motor Response: Purposeful (03/15/17 0930)   At baseline    Mental Status and Level of Consciousness: Arousable    Pulmonary Status:   O2 Device: Nasal cannula (03/15/17 1030)   Adequate oxygenation and airway patent    Complications related to anesthesia: None    Post-anesthesia assessment completed.  No concerns    Signed By: Beverly Dugan MD     March 15, 2017

## 2017-03-15 NOTE — PROGRESS NOTES
Patient arrived to room 3236 with 5/10 pain, and denies nausea. Skin is clear with surgical incision on lower back  Dual skin assessment with Marco.  Skin is clear

## 2017-03-15 NOTE — PERIOP NOTES
Handoff Report from Operating Room to PACU    Report received from YOHANA Fernandes and CHERRY Alcocer CRNA regarding Joni Castellano. Surgeon(s):  Faheem Thomson MD  And Procedure(s) (LRB):  LUMBAR LAMINECTOMY/DISCECTOMY L5-S1 LEFT (N/A)  confirmed   with allergies and dressings discussed. Anesthesia type, drugs, patient history, complications, estimated blood loss, vital signs, intake and output, and last pain medication, lines, reversal medications and temperature were reviewed.

## 2017-03-15 NOTE — PROGRESS NOTES
Orthopedic End of Shift Note    Bedside and Verbal shift change report given to Katie Reveles (oncoming nurse) by Jose Russell (offgoing nurse). Report included the following information SBAR, Kardex, Intake/Output and MAR. POD# 0  Significant issues during shift: drainage during shift    Issues for Physician to [facility]    Nausea/Vomiting [] yes [x] no     Sifuentes Catheter [] in [x] removed    Bowel Movements [] yes [] no     Foot Pumps or SCD [x] yes [] no    Ice Pack [] yes    [x] no    Incentive Spirometer [x] yes [] no Volume:   1250   Supplemental O2 [] yes [x] no Sat off O2:  98%     Patient Vitals for the past 12 hrs:   Temp Pulse Resp BP SpO2   03/15/17 1906 97.8 °F (36.6 °C) 80 16 95/64 95 %   03/15/17 1507 97.7 °F (36.5 °C) 74 18 101/64 92 %   03/15/17 1356 98.2 °F (36.8 °C) 78 18 113/72 94 %   03/15/17 1256 98 °F (36.7 °C) 65 18 103/58 96 %   03/15/17 1226 97.5 °F (36.4 °C) 65 18 104/64 97 %   03/15/17 1206 - - - 104/51 -   03/15/17 1100 97.8 °F (36.6 °C) 81 21 121/66 96 %   03/15/17 1045 - 64 19 113/57 94 %   03/15/17 1030 - 61 18 115/59 100 %   03/15/17 1015 - 65 21 118/62 100 %   03/15/17 1000 - (!) 59 16 112/54 100 %   03/15/17 0945 - 69 19 109/53 98 %   03/15/17 0940 - 66 22 104/53 100 %   03/15/17 0935 - (!) 59 18 107/53 100 %   03/15/17 0933 97.5 °F (36.4 °C) 62 (!) 59 114/54 100 %   03/15/17 0931 - 71 18 - 100 %   03/15/17 0930 97.5 °F (36.4 °C) - - 114/54 -     Lab Results   Component Value Date/Time    HGB 10.9 03/08/2017 03:02 PM    HCT 33.7 03/08/2017 03:02 PM     Lab Results   Component Value Date/Time    INR 1.0 03/08/2017 03:02 PM       Intake/Output Summary (Last 24 hours) at 03/15/17 1927  Last data filed at 03/15/17 1100   Gross per 24 hour   Intake             1475 ml   Output                0 ml   Net             1475 ml     Wound Leg Right; Lower (Active)   Number of days:66       Wound Back Mid;Lower (Active)   DRESSING STATUS Saturated 3/15/2017  6:45 PM   DRESSING TYPE Transparent film 3/15/2017  6:45 PM   Incision site well approximated? Yes 3/15/2017  6:45 PM   Drainage Amount  Moderate 3/15/2017  6:45 PM   Drainage Color Serosanguinous 3/15/2017  6:45 PM   Wound Odor None 3/15/2017  6:45 PM   Number of days:0            Peripheral Intravenous Line:  Peripheral IV 03/15/17 Left Hand (Active)   Site Assessment Clean, dry, & intact 3/15/2017  7:06 PM   Phlebitis Assessment 0 3/15/2017  7:06 PM   Infiltration Assessment 0 3/15/2017  7:06 PM   Dressing Status Clean, dry, & intact 3/15/2017  7:06 PM   Dressing Type Tape;Transparent 3/15/2017  7:06 PM   Hub Color/Line Status Blue; Infusing 3/15/2017  7:06 PM     Drain(s):

## 2017-03-15 NOTE — PROGRESS NOTES
TRANSFER - IN REPORT:    Verbal report received from Gaby LOPES(name) on Sarah Hutchison  being received from PACU phase 2(unit) for routine post - op      Report consisted of patients Situation, Background, Assessment and   Recommendations(SBAR). Information from the following report(s) SBAR, Kardex, Intake/Output, MAR and Recent Results was reviewed with the receiving nurse. Opportunity for questions and clarification was provided. Assessment completed upon patients arrival to unit and care assumed.

## 2017-03-15 NOTE — ANESTHESIA PREPROCEDURE EVALUATION
Anesthetic History   No history of anesthetic complications            Review of Systems / Medical History  Patient summary reviewed, nursing notes reviewed and pertinent labs reviewed    Pulmonary  Within defined limits                 Neuro/Psych   Within defined limits           Cardiovascular    Hypertension        Dysrhythmias : PVC      Exercise tolerance: >4 METS     GI/Hepatic/Renal  Within defined limits              Endo/Other        Arthritis     Other Findings              Physical Exam    Airway  Mallampati: II  TM Distance: 4 - 6 cm  Neck ROM: normal range of motion   Mouth opening: Normal     Cardiovascular  Regular rate and rhythm,  S1 and S2 normal,  no murmur, click, rub, or gallop             Dental  No notable dental hx       Pulmonary  Breath sounds clear to auscultation               Abdominal  GI exam deferred       Other Findings            Anesthetic Plan    ASA: 2  Anesthesia type: general          Induction: Intravenous  Anesthetic plan and risks discussed with: Patient

## 2017-03-15 NOTE — BRIEF OP NOTE
BRIEF OPERATIVE NOTE    Date of Procedure: 3/15/2017   Preoperative Diagnosis: HERNIATED LUMBAR DISC  Postoperative Diagnosis: HERNIATED LUMBAR DISC    Procedure(s):  LUMBAR LAMINECTOMY/DISCECTOMY L5-S1 LEFT  Surgeon(s) and Role:     * Deborah Montes MD - Primary            Surgical Staff:  Circ-1: Samantha Richey RN  Radiology Technician: RT Tim  Scrub Tech-1: Juan Ramon Gaona  Surg Asst-1: Veda Solomon  Event Time In   Incision Start 0813   Incision Close 0914     Anesthesia: General   Estimated Blood Loss: none  Specimens:   ID Type Source Tests Collected by Time Destination   1 : LEFT L5 - S1 DISK Preservative Disc Material  Deborah Montes MD 3/15/2017 1168 Pathology      Findings: multiple fragments of herniated disc material under the L5 and S 1 roots were removed  Complications: none  Implants: * No implants in log *

## 2017-03-15 NOTE — PERIOP NOTES
Challenge stick for ic crna tony (from [facility] ) started iv.  I stat done  wnl

## 2017-03-15 NOTE — IP AVS SNAPSHOT
[address] [facility]. 
[phone] Patient: Gabbi Hamilton MRN: [id number] RYW:[id number] You are allergic to the following No active allergies Recent Documentation Height Weight BMI OB Status Smoking Status 1.676 m 86.5 kg 30.78 kg/m2 Hysterectomy Never Smoker Emergency Contacts Name Discharge Info Relation Home Work Mobile Adrian Youssef DISCHARGE CAREGIVER [3] Spouse [3] [phone] About your hospitalization You were admitted on:  March 15, 2017 You last received care in the:  [facility] 3 ORTHOPEDICS You were discharged on:  March 16, 2017 Unit phone number:  [phone] Why you were hospitalized Your primary diagnosis was:  Not on File Providers Seen During Your Hospitalizations Provider Role Specialty Primary office phone Morenita Rivera MD Attending Provider Neurosurgery [phone] Your Primary Care Physician (PCP) Primary Care Physician Office Phone Office Fax Jada Chavez [phone] Follow-up Information Follow up With Details Comments Contact Info Morenita Rivera MD Schedule an appointment as soon as possible for a visit in 10 days  [address] 
[phone] Sandy Bell MD   [facility] 83. [phone] Current Discharge Medication List  
  
START taking these medications Dose & Instructions Dispensing Information Comments Morning Noon Evening Bedtime HYDROcodone-acetaminophen 5-325 mg per tablet Commonly known as:  Bennett Hollis Your last dose was: Your next dose is:    
   
   
 Dose:  1 Tab Take 1 Tab by mouth every four (4) hours as needed. Max Daily Amount: 6 Tabs. Quantity:  60 Tab Refills:  0 polyethylene glycol 17 gram/dose powder Commonly known as:  Walworth Wagneror Your last dose was: Your next dose is:    
   
   
 Dose:  17 g Take 17 g by mouth daily for 15 days. Quantity:  255 g Refills:  0  
     
   
   
   
  
 senna-docusate 8.6-50 mg per tablet Commonly known as:  SENNA PLUS Your last dose was: Your next dose is:    
   
   
 Dose:  1 Tab Take 1 Tab by mouth two (2) times a day. Take until having regular bowel movements. Quantity:  60 Tab Refills:  0 CONTINUE these medications which have CHANGED Dose & Instructions Dispensing Information Comments Morning Noon Evening Bedtime  
 aspirin delayed-release 325 mg tablet What changed:  additional instructions Your last dose was: Your next dose is:    
   
   
 Dose:  325 mg Take 1 Tab by mouth nightly. May resume in two weeks. Refills:  0 BENICAR HCT 40-12.5 mg per tablet Generic drug:  olmesartan-hydroCHLOROthiazide What changed:  additional instructions Your last dose was: Your next dose is:    
   
   
 Dose:  1 Tab Take 1 Tab by mouth daily. May resume medication when b/p greater than 120/80. Refills:  0  
     
   
   
   
  
 niacin  mg tablet Commonly known as:  Annalisa Lewis What changed:  additional instructions Your last dose was: Your next dose is:    
   
   
 Dose:  500 mg Take 1 Tab by mouth nightly. May resume medication when b/p greater than 120/80. Refills:  0 Omega-3-DHA-EPA-Fish Oil 1,000 mg (120 mg-180 mg) Cap What changed:  additional instructions Your last dose was: Your next dose is:    
   
   
 Dose:  2 Tab Take 2 Tabs by mouth two (2) times a day. Hold for two weeks Refills:  0  
     
   
   
   
  
 vitamin E 400 unit capsule Commonly known as:  Raul Osegu[address] 82 What changed:  additional instructions Your last dose was: Your next dose is:    
   
   
 Dose:  400 Units Take 1 Cap by mouth daily. Hold for two weeks. Refills:  0 CONTINUE these medications which have NOT CHANGED Dose & Instructions Dispensing Information Comments Morning Noon Evening Bedtime  
 ascorbic acid (vitamin C) 250 mg tablet Commonly known as:  VITAMIN C Your last dose was: Your next dose is:    
   
   
 Dose:  500 mg Take 500 mg by mouth daily. Refills:  0 BACTROBAN NASAL 2 % nasal ointment Generic drug:  mupirocin calcium Your last dose was: Your next dose is:    
   
   
 by Both Nostrils route two (2) times a day. Indications: Prescription called in 3- per Dr Marybel Rosas Quantity:  1 g Refills:  0  
     
   
   
   
  
 cholecalciferol (vitamin D3) 2,000 unit Tab Your last dose was: Your next dose is:    
   
   
 Dose:  1 Tab Take 1 Tab by mouth daily. Refills:  0  
     
   
   
   
  
 estradiol 0.05 mg/24 hr  
Commonly known as:  Arleclive Distance Your last dose was: Your next dose is:    
   
   
 Dose:  1 Patch 1 Patch by TransDERmal route two (2) days a week. Wed and sun Refills:  0 Ferrous Fumarate 325 mg (106 mg iron) Tab Your last dose was: Your next dose is:    
   
   
 Dose:  1 Tab Take 1 Tab by mouth two (2) times a day. Refills:  0  
     
   
   
   
  
 lansoprazole 15 mg capsule Commonly known as:  PREVACID Your last dose was: Your next dose is:    
   
   
 Dose:  15 mg Take 15 mg by mouth every Monday, Wednesday, Friday. Refills:  0  
     
   
   
   
  
 M-VIT PO Your last dose was: Your next dose is:    
   
   
 Dose:  1 Tab Take 1 Tab by mouth daily. Refills:  0 POTASSIUM CHLORIDE SR 10 MEQ TAB Your last dose was: Your next dose is: Dose:  20 mEq 20 mEq two (2) times a day. Refills:  0 STOP taking these medications   
 acetaminophen 650 mg CR tablet Commonly known as:  TYLENOL  
   
  
 diclofenac EC 75 mg EC tablet Commonly known as:  VOLTAREN  
   
  
 oxyCODONE-acetaminophen 5-325 mg per tablet Commonly known as:  PERCOCET  
   
  
 traMADol 50 mg tablet Commonly known as:  ULTRAM  
   
  
  
  
Where to Get Your Medications Information on where to get these meds will be given to you by the nurse or doctor. ! Ask your nurse or doctor about these medications HYDROcodone-acetaminophen 5-325 mg per tablet  
 polyethylene glycol 17 gram/dose powder  
 senna-docusate 8.6-50 mg per tablet Discharge Instructions SPINE DISCHARGE  INSTRUCTIONS Geno Cheema M.D. What can I do? ? The only exercise you should do is walking. Start by walking twice a day for 5 minutes, then increase by  2-3 minutes every day until you reach 25 minutes twice a day. DO NOT sit for long periods of time (20 minutes at a time for the first couple of weeks, then gradually increase. ? No heavy lifting (5 lbs max); no straining, twisting, or bending. ? No driving until your physician tells you it is ok. It is, however, ok to ride short distances in a car. 
? If you are required to wear a back brace, you may remove it when you are sleeping unless your doctor has advised against it. ? If you are required to wear a cervical collar, you must sleep in it. You can remove it only for showers. What can I eat?  
? You may resume your regular home diet as tolerated. If your throat is sore, you may want to eat soft food for the first few days. When can I take a shower? ? You may shower leaving on your occlusive (waterproof) dressing on allowing water to run over the dressing. The dressing may be removed in 5 days.   The small pieces of tape (steri strips)  underneath it should stay on.  Let water run over them, then pat dry gently. ? Do not take baths or soak in pools. ? You may remove your brace for showering. Medications: 
? Check with your physician before taking any anti-inflammatory medicines (Advil, Aleve, ibuprofen, aspirin). ? Take prescription medicine as directed. DO NOT take more than prescribed. Call your physician if the prescribed dose is not sufficiently controlling your pain. ? It is important to have regular bowel movements. Pain medications may cause constipation. Drink plenty of fluids, get enough fiber in your diet, and use stool softeners and drink prune juice to help prevent constipation. Do not take laxatives if at all possible except in severe situations. It can result in a vicious cycle of constipation and diarrhea. ? Do not put any ointments or creams on your incision unless directed to do so by your physician. ? Tobacco products should be avoided during the postoperative phase. When do I see the physician again? ? Please call your physicians office as soon as you get home to schedule your 1st post operative appointment. You should be seen approximately two weeks after your surgery. At this appointment you will see your doctors Assistant for a wound check and to answer any questions you may have. 
? You will see your physician approximately six weeks after your surgery. ? If you had a fusion, you will need to get an order for xrays to be taken prior to the six week appointment. These should be done on the day of the appointment or 1-2 days before. ? If you need the number to your doctors office, please request it from your nurse or see below. NOTIFY YOUR PHYSICIAN OF ANY OF THE FOLLOWING: 
 
? Fever above 101º for 24 hrs. 
? Nausea or vomiting. 
? Inability to urinate ? Loss of bowel or bladder function (sudden onset of incontinence) ? Changes in sensation (numbness, tingling, color change) in your extremities ? Pain not relieved by your medicine. ? Redness, swelling or drainage from your incision ? Persistent pain in the calf of either leg ? Development of severe pain FOR APPOINTMENTS OR QUESTIONS CALL: 
 
[facility], [address] [place] [address] 
[phone] Discharge Orders None MyChart Announcement We are excited to announce that we are making your provider's discharge notes available to you in [employer]. You will see these notes when they are completed and signed by the physician that discharged you from your recent hospital stay. If you have any questions or concerns about any information you see in [employer], please call the Health Information Department where you were seen or reach out to your Primary Care Provider for more information about your plan of care. Introducing [facility] SERVICES! Antoinette Duffy introduces [employer] patient portal. Now you can access parts of your medical record, email your doctor's office, and request medication refills online. 1. In your internet browser, go to [email or web address] 2. Click on the First Time User? Click Here link in the Sign In box. You will see the New Member Sign Up page. 3. Enter your [employer] Access Code exactly as it appears below. You will not need to use this code after youve completed the sign-up process. If you do not sign up before the expiration date, you must request a new code. · [employer] Access Code: [email or web address] Expires: 4/23/2017  5:05 PM 
 
4. Enter the last four digits of your Social Security Number (xxxx) and Date of Birth (mm/dd/yyyy) as indicated and click Submit. You will be taken to the next sign-up page. 5. Create a [employer] ID. This will be your [employer] login ID and cannot be changed, so think of one that is secure and easy to remember. 6. Create a [employer] password. You can change your password at any time. 7. Enter your Password Reset Question and Answer. This can be used at a later time if you forget your password. 8. Enter your e-mail address. You will receive e-mail notification when new information is available in [address] 9. Click Sign Up. You can now view and download portions of your medical record. 10. Click the Download Summary menu link to download a portable copy of your medical information. If you have questions, please visit the Frequently Asked Questions section of the [email or web address] website. Remember, [email or web address] is NOT to be used for urgent needs. For medical emergencies, dial 911. Now available from your iPhone and Android! General Information Please provide this summary of care documentation to your next provider. Patient Signature:  ____________________________________________________________ Date:  ____________________________________________________________  
  
Анна Fermin Provider Signature:  ____________________________________________________________ Date:  ____________________________________________________________

## 2017-03-15 NOTE — IP AVS SNAPSHOT
Summary of Care Report The Summary of Care report has been created to help improve care coordination. Users with access to [place] or [address] (Web-based application) may access additional patient information including the Discharge Summary. If you are not currently a [address] user and need more information, please call the number listed below in the [place] [address] section and ask to be connected with Medical Records. Facility Information Name Address Phone [address] [address] [phone] Patient Information Patient Name Sex TERRANCE Weber ([id number]) Female 1955 Discharge Information Admitting Provider Service Area Unit Kylah Hendrickson MD / [phone] [address] [facility] Orthopedics  [phone] Discharge Provider Discharge Date/Time Discharge Disposition Destination (none) 3/16/2017 (Pending) AHR (none) Patient Language Language ENGLISH [13] You are allergic to the following No active allergies Current Discharge Medication List  
  
START taking these medications Dose & Instructions Dispensing Information Comments HYDROcodone-acetaminophen 5-325 mg per tablet Commonly known as:  Daysi Niall Dose:  1 Tab Take 1 Tab by mouth every four (4) hours as needed. Max Daily Amount: 6 Tabs. Quantity:  60 Tab Refills:  0  
   
 polyethylene glycol 17 gram/dose powder Commonly known as:  Flor Luana Dose:  17 g Take 17 g by mouth daily for 15 days. Quantity:  255 g Refills:  0  
   
 senna-docusate 8.6-50 mg per tablet Commonly known as:  SENNA PLUS Dose:  1 Tab Take 1 Tab by mouth two (2) times a day. Take until having regular bowel movements. Quantity:  60 Tab Refills:  0 CONTINUE these medications which have CHANGED Dose & Instructions Dispensing Information Comments  
 aspirin delayed-release 325 mg tablet What changed:  additional instructions Dose:  325 mg Take 1 Tab by mouth nightly. May resume in two weeks. Refills:  0 BENICAR HCT 40-12.5 mg per tablet Generic drug:  olmesartan-hydroCHLOROthiazide What changed:  additional instructions Dose:  1 Tab Take 1 Tab by mouth daily. May resume medication when b/p greater than 120/80. Refills:  0  
   
 niacin  mg tablet Commonly known as:  Behzad Thomson What changed:  additional instructions Dose:  500 mg Take 1 Tab by mouth nightly. May resume medication when b/p greater than 120/80. Refills:  0 Omega-3-DHA-EPA-Fish Oil 1,000 mg (120 mg-180 mg) Cap What changed:  additional instructions Dose:  2 Tab Take 2 Tabs by mouth two (2) times a day. Hold for two weeks Refills:  0  
   
 vitamin E 400 unit capsule Commonly known as:  [address] What changed:  additional instructions Dose:  400 Units Take 1 Cap by mouth daily. Hold for two weeks. Refills:  0 CONTINUE these medications which have NOT CHANGED Dose & Instructions Dispensing Information Comments  
 ascorbic acid (vitamin C) 250 mg tablet Commonly known as:  VITAMIN C Dose:  500 mg Take 500 mg by mouth daily. Refills:  0 BACTROBAN NASAL 2 % nasal ointment Generic drug:  mupirocin calcium  
 by Both Nostrils route two (2) times a day. Indications: Prescription called in 3- per Dr Haris Hernandez Quantity:  1 g Refills:  0  
   
 cholecalciferol (vitamin D3) 2,000 unit Tab Dose:  1 Tab Take 1 Tab by mouth daily. Refills:  0  
   
 estradiol 0.05 mg/24 hr  
Commonly known as:  Victorino Hernandez Dose:  1 Patch 1 Patch by TransDERmal route two (2) days a week. Wed and sun Refills:  0 Ferrous Fumarate 325 mg (106 mg iron) Tab Dose:  1 Tab Take 1 Tab by mouth two (2) times a day. Refills:  0 lansoprazole 15 mg capsule Commonly known as:  PREVACID Dose:  15 mg Take 15 mg by mouth every Monday, Wednesday, Friday. Refills:  0  
   
 M-VIT PO Dose:  1 Tab Take 1 Tab by mouth daily. Refills:  0 POTASSIUM CHLORIDE SR 10 MEQ TAB Dose:  20 mEq 20 mEq two (2) times a day. Refills:  0 STOP taking these medications Comments  
 acetaminophen 650 mg CR tablet Commonly known as:  TYLENOL  
   
   
 diclofenac EC 75 mg EC tablet Commonly known as:  VOLTAREN  
   
   
 oxyCODONE-acetaminophen 5-325 mg per tablet Commonly known as:  PERCOCET  
   
   
 traMADol 50 mg tablet Commonly known as:  ULTRAM  
   
   
  
  
  
Current Immunizations Name Date Influenza Vaccine 11/7/2016 Surgery Information ID Date/Time Status Primary Surgeon All Procedures Location [id number] 3/15/2017 130 Yane Anne MD LUMBAR LAMINECTOMY/DISCECTOMY L5-S1 LEFT [facility] MAIN OR Follow-up Information Follow up With Details Comments Contact Info Cara Mclaughlin MD Schedule an appointment as soon as possible for a visit in 10 days  [address] [facility]. [address] [place] [address] 
[phone] Sandy Bell MD   [address] [facility] 
[phone] Discharge Instructions SPINE DISCHARGE  INSTRUCTIONS Lucia Mendez M.D. What can I do? ? The only exercise you should do is walking. Start by walking twice a day for 5 minutes, then increase by  2-3 minutes every day until you reach 25 minutes twice a day. DO NOT sit for long periods of time (20 minutes at a time for the first couple of weeks, then gradually increase. ? No heavy lifting (5 lbs max); no straining, twisting, or bending. ? No driving until your physician tells you it is ok.   It is, however, ok to ride short distances in a car. 
? If you are required to wear a back brace, you may remove it when you are sleeping unless your doctor has advised against it. ? If you are required to wear a cervical collar, you must sleep in it. You can remove it only for showers. What can I eat?  
? You may resume your regular home diet as tolerated. If your throat is sore, you may want to eat soft food for the first few days. When can I take a shower? ? You may shower leaving on your occlusive (waterproof) dressing on allowing water to run over the dressing. The dressing may be removed in 5 days. The small pieces of tape (steri strips)  underneath it should stay on. Let water run over them, then pat dry gently. ? Do not take baths or soak in pools. ? You may remove your brace for showering. Medications: 
? Check with your physician before taking any anti-inflammatory medicines (Advil, Aleve, ibuprofen, aspirin). ? Take prescription medicine as directed. DO NOT take more than prescribed. Call your physician if the prescribed dose is not sufficiently controlling your pain. ? It is important to have regular bowel movements. Pain medications may cause constipation. Drink plenty of fluids, get enough fiber in your diet, and use stool softeners and drink prune juice to help prevent constipation. Do not take laxatives if at all possible except in severe situations. It can result in a vicious cycle of constipation and diarrhea. ? Do not put any ointments or creams on your incision unless directed to do so by your physician. ? Tobacco products should be avoided during the postoperative phase. When do I see the physician again? ? Please call your physicians office as soon as you get home to schedule your 1st post operative appointment. You should be seen approximately two weeks after your surgery. At this appointment you will see your doctors Assistant for a wound check and to answer any questions you may have. 
? You will see your physician approximately six weeks after your surgery. ? If you had a fusion, you will need to get an order for xrays to be taken prior to the six week appointment. These should be done on the day of the appointment or 1-2 days before. ? If you need the number to your doctors office, please request it from your nurse or see below. NOTIFY YOUR PHYSICIAN OF ANY OF THE FOLLOWING: 
 
? Fever above 101º for 24 hrs. 
? Nausea or vomiting. 
? Inability to urinate ? Loss of bowel or bladder function (sudden onset of incontinence) ? Changes in sensation (numbness, tingling, color change) in your extremities ? Pain not relieved by your medicine. ? Redness, swelling or drainage from your incision ? Persistent pain in the calf of either leg ? Development of severe pain FOR APPOINTMENTS OR QUESTIONS CALL: 
 
[facility] [address] [place] [address] 
[phone] Chart Review Routing History Recipient Method Report Sent By Yudy Bowers MD  
Phone: [phone] In Basket IP Auto Routed [employer], MD [[id number]] 7/11/2016 12:03 PM 07/11/2016 Jerzy Bowers MD  
Phone: [phone] In Basket IP Auto Routed [employer], MD [[id number]] 11/14/2016  8:50 AM 11/14/2016 Jerzy Bowers MD  
Phone: [phone] In Basket IP Auto Routed Notes [facility] 2/22/2017  1:18 PM 02/22/2017

## 2017-03-15 NOTE — IP AVS SNAPSHOT
[address] [facility] 
[phone] Patient: Ifeanyi French MRN: [id number] You are allergic to the following No active allergies Recent Documentation Height Weight BMI OB Status Smoking Status 1.676 m 86.5 kg 30.78 kg/m2 Hysterectomy Never Smoker Emergency Contacts Name Discharge Info Relation Home Work Mobile Adrian Youssef DISCHARGE CAREGIVER [3] Spouse [3] [phone] About your hospitalization You were admitted on:  March 15, 2017 You last received care in the:  [facility] 3 ORTHOPEDICS You were discharged on:  March 16, 2017 Why you were hospitalized Your primary diagnosis was:  Not on File Providers Seen During Your Hospitalizations Provider Role Specialty Primary office phone Jorge Lux MD Attending Provider Neurosurgery [phone] Your Primary Care Physician (PCP) Primary Care Physician Office Phone Office Fax Rebeca Calle [phone] Follow-up Information Follow up With Details Comments Contact Info Jorge Lux MD Schedule an appointment as soon as possible for a visit in 10 days  [address] [place] [address] [address] [place] [address] 
[phone] MD Frantz Dang 70 [facility] 
[phone] Current Discharge Medication List  
  
START taking these medications Dose & Instructions Dispensing Information Comments Morning Noon Evening Bedtime HYDROcodone-acetaminophen 5-325 mg per tablet Commonly known as:  Julaine Kava Your last dose was: Your next dose is:    
   
   
 Dose:  1 Tab Take 1 Tab by mouth every four (4) hours as needed. Max Daily Amount: 6 Tabs. Quantity:  60 Tab Refills:  0  
     
   
   
   
  
 polyethylene glycol 17 gram/dose powder Commonly known as:  KartMe Sport Your last dose was: Your next dose is:    
   
   
 Dose:  17 g Take 17 g by mouth daily for 15 days. Quantity:  255 g Refills:  0  
     
   
   
   
  
 senna-docusate 8.6-50 mg per tablet Commonly known as:  SENNA PLUS Your last dose was: Your next dose is:    
   
   
 Dose:  1 Tab Take 1 Tab by mouth two (2) times a day. Take until having regular bowel movements. Quantity:  60 Tab Refills:  0 CONTINUE these medications which have CHANGED Dose & Instructions Dispensing Information Comments Morning Noon Evening Bedtime  
 aspirin delayed-release 325 mg tablet What changed:  additional instructions Your last dose was: Your next dose is:    
   
   
 Dose:  325 mg Take 1 Tab by mouth nightly. May resume in two weeks. Refills:  0 BENICAR HCT 40-12.5 mg per tablet Generic drug:  olmesartan-hydroCHLOROthiazide What changed:  additional instructions Your last dose was: Your next dose is:    
   
   
 Dose:  1 Tab Take 1 Tab by mouth daily. May resume medication when b/p greater than 120/80. Refills:  0  
     
   
   
   
  
 niacin  mg tablet Commonly known as:  Shannan Haley What changed:  additional instructions Your last dose was: Your next dose is:    
   
   
 Dose:  500 mg Take 1 Tab by mouth nightly. May resume medication when b/p greater than 120/80. Refills:  0 Omega-3-DHA-EPA-Fish Oil 1,000 mg (120 mg-180 mg) Cap What changed:  additional instructions Your last dose was: Your next dose is:    
   
   
 Dose:  2 Tab Take 2 Tabs by mouth two (2) times a day. Hold for two weeks Refills:  0  
     
   
   
   
  
 vitamin E 400 unit capsule Commonly known as:  [address] What changed:  additional instructions Your last dose was: Your next dose is: Dose:  400 Units Take 1 Cap by mouth daily. Hold for two weeks. Refills:  0 CONTINUE these medications which have NOT CHANGED Dose & Instructions Dispensing Information Comments Morning Noon Evening Bedtime  
 ascorbic acid (vitamin C) 250 mg tablet Commonly known as:  VITAMIN C Your last dose was: Your next dose is:    
   
   
 Dose:  500 mg Take 500 mg by mouth daily. Refills:  0 BACTROBAN NASAL 2 % nasal ointment Generic drug:  mupirocin calcium Your last dose was: Your next dose is:    
   
   
 by Both Nostrils route two (2) times a day. Indications: Prescription called in 3- per Dr Kaylynn Flores Quantity:  1 g Refills:  0  
     
   
   
   
  
 cholecalciferol (vitamin D3) 2,000 unit Tab Your last dose was: Your next dose is:    
   
   
 Dose:  1 Tab Take 1 Tab by mouth daily. Refills:  0  
     
   
   
   
  
 estradiol 0.05 mg/24 hr  
Commonly known as:  Philip Galaviz Your last dose was: Your next dose is:    
   
   
 Dose:  1 Patch 1 Patch by TransDERmal route two (2) days a week. Wed and sun Refills:  0 Ferrous Fumarate 325 mg (106 mg iron) Tab Your last dose was: Your next dose is:    
   
   
 Dose:  1 Tab Take 1 Tab by mouth two (2) times a day. Refills:  0  
     
   
   
   
  
 lansoprazole 15 mg capsule Commonly known as:  PREVACID Your last dose was: Your next dose is:    
   
   
 Dose:  15 mg Take 15 mg by mouth every Monday, Wednesday, Friday. Refills:  0  
     
   
   
   
  
 M-VIT PO Your last dose was: Your next dose is:    
   
   
 Dose:  1 Tab Take 1 Tab by mouth daily. Refills:  0 POTASSIUM CHLORIDE SR 10 MEQ TAB Your last dose was: Your next dose is:    
   
   
 Dose:  20 mEq 20 mEq two (2) times a day. Refills:  0 STOP taking these medications   
 acetaminophen 650 mg CR tablet Commonly known as:  TYLENOL  
   
  
 diclofenac EC 75 mg EC tablet Commonly known as:  VOLTAREN  
   
  
 oxyCODONE-acetaminophen 5-325 mg per tablet Commonly known as:  PERCOCET  
   
  
 traMADol 50 mg tablet Commonly known as:  ULTRAM  
   
  
  
  
Where to Get Your Medications Information on where to get these meds will be given to you by the nurse or doctor. ! Ask your nurse or doctor about these medications HYDROcodone-acetaminophen 5-325 mg per tablet  
 polyethylene glycol 17 gram/dose powder  
 senna-docusate 8.6-50 mg per tablet Discharge Instructions SPINE DISCHARGE  INSTRUCTIONS Tiburcio Wagoner M.D. What can I do? ? The only exercise you should do is walking. Start by walking twice a day for 5 minutes, then increase by  2-3 minutes every day until you reach 25 minutes twice a day. DO NOT sit for long periods of time (20 minutes at a time for the first couple of weeks, then gradually increase. ? No heavy lifting (5 lbs max); no straining, twisting, or bending. ? No driving until your physician tells you it is ok. It is, however, ok to ride short distances in a car. 
? If you are required to wear a back brace, you may remove it when you are sleeping unless your doctor has advised against it. ? If you are required to wear a cervical collar, you must sleep in it. You can remove it only for showers. What can I eat?  
? You may resume your regular home diet as tolerated. If your throat is sore, you may want to eat soft food for the first few days. When can I take a shower? ? You may shower leaving on your occlusive (waterproof) dressing on allowing water to run over the dressing. The dressing may be removed in 5 days. The small pieces of tape (steri strips)  underneath it should stay on. Let water run over them, then pat dry gently. ? Do not take baths or soak in pools. ? You may remove your brace for showering. Medications: 
? Check with your physician before taking any anti-inflammatory medicines (Advil, Aleve, ibuprofen, aspirin). ? Take prescription medicine as directed. DO NOT take more than prescribed. Call your physician if the prescribed dose is not sufficiently controlling your pain. ? It is important to have regular bowel movements. Pain medications may cause constipation. Drink plenty of fluids, get enough fiber in your diet, and use stool softeners and drink prune juice to help prevent constipation. Do not take laxatives if at all possible except in severe situations. It can result in a vicious cycle of constipation and diarrhea. ? Do not put any ointments or creams on your incision unless directed to do so by your physician. ? Tobacco products should be avoided during the postoperative phase. When do I see the physician again? ? Please call your physicians office as soon as you get home to schedule your 1st post operative appointment. You should be seen approximately two weeks after your surgery. At this appointment you will see your doctors Assistant for a wound check and to answer any questions you may have. 
? You will see your physician approximately six weeks after your surgery. ? If you had a fusion, you will need to get an order for xrays to be taken prior to the six week appointment. These should be done on the day of the appointment or 1-2 days before. ? If you need the number to your doctors office, please request it from your nurse or see below. NOTIFY YOUR PHYSICIAN OF ANY OF THE FOLLOWING: 
 
? Fever above 101º for 24 hrs. 
? Nausea or vomiting. 
? Inability to urinate ? Loss of bowel or bladder function (sudden onset of incontinence) ? Changes in sensation (numbness, tingling, color change) in your extremities ? Pain not relieved by your medicine. ? Redness, swelling or drainage from your incision ? Persistent pain in the calf of either leg ? Development of severe pain FOR APPOINTMENTS OR QUESTIONS CALL: 
 
[facility] [address] 
[phone] Discharge Orders None General Information Please provide this summary of care documentation to your next provider. Introducing \[facility]\"" & [facility] SERVICES! Rose Nick introduces [employer] patient portal. Now you can access parts of your medical record, email your doctor's office, and request medication refills online. 1. In your internet browser, go to [email or web address] 2. Click on the First Time User? Click Here link in the Sign In box. You will see the New Member Sign Up page. 3. Enter your [employer] Access Code exactly as it appears below. You will not need to use this code after youve completed the sign-up process. If you do not sign up before the expiration date, you must request a new code. · [employer] Access Code: [email or web address] Expires: 4/23/2017  5:05 PM 
 
4. Enter the last four digits of your Social Security Number (xxxx) and Date of Birth (mm/dd/yyyy) as indicated and click Submit. You will be taken to the next sign-up page. 5. Create a [employer] Search ID. This will be your [employer] login ID and cannot be changed, so think of one that is secure and easy to remember. 6. Create a [employer] password. You can change your password at any time. 7. Enter your Password Reset Question and Answer. This can be used at a later time if you forget your password. 8. Enter your e-mail address. You will receive e-mail notification when new information is available in [address] 9. Click Sign Up. You can now view and download portions of your medical record. 10. Click the Download Summary menu link to download a portable copy of your medical information. If you have questions, please visit the Frequently Asked Questions section of the MyChart website. Remember, MyChart is NOT to be used for urgent needs. For medical emergencies, dial 911. Now available from your iPhone and Android! Patient Signature:  ____________________________________________________________ Date:  ____________________________________________________________  
  
Kiarra Sleight Provider Signature:  ____________________________________________________________ Date:  ____________________________________________________________

## 2017-03-15 NOTE — PERIOP NOTES
TRANSFER - OUT REPORT:    Verbal report given to Reyes RN(name) on Rohit Betancourt  being transferred to [facility](unit) for routine progression of care       Report consisted of patients Situation, Background, Assessment and   Recommendations(SBAR). Information from the following report(s) SBAR, OR Summary, Procedure Summary, Intake/Output and MAR was reviewed with the receiving nurse. Opportunity for questions and clarification was provided.       Patient transported with:   [employer]

## 2017-03-15 NOTE — ROUTINE PROCESS
0908: Patient: Debbie Muse MRN: [id number]  SSN: [id number]   YOB: 1955  Age: 57 y.o. Sex: female     Patient is status post Procedure(s):  LUMBAR LAMINECTOMY/DISCECTOMY L5-S1 LEFT. Surgeon(s) and Role:     * Ivania Avelar MD - Primary    Local/Dose/Irrigation:  MARCAINE 0.5% WITH EPI                  Peripheral IV 03/15/17 Right Hand (Active)   Site Assessment Clean, dry, & intact 3/15/2017  7:27 AM   Phlebitis Assessment 0 3/15/2017  7:27 AM   Infiltration Assessment 0 3/15/2017  7:27 AM   Dressing Status Clean, dry, & intact 3/15/2017  7:27 AM   Dressing Type Tape;Transparent 3/15/2017  7:27 AM   Hub Color/Line Status Pink; Infusing 3/15/2017  7:27 AM       Peripheral IV 03/15/17 Left Hand (Active)            Airway - Endotracheal Tube 03/15/17 (Active)                   Dressing/Packing:  Wound Back Mid;Lower-DRESSING TYPE: Adhesive wound closure strips (Steri-Strips); Adhesive wound dressing (Mastisol); Other (Comment) (HONEY COMB) (03/15/17 5149)  Splint/Cast:  ]    Other:

## 2017-03-15 NOTE — IP AVS SNAPSHOT
Current Discharge Medication List  
  
START taking these medications Dose & Instructions Dispensing Information Comments Morning Noon Evening Bedtime HYDROcodone-acetaminophen 5-325 mg per tablet Commonly known as:  Wayna Glaze Your last dose was: Your next dose is:    
   
   
 Dose:  1 Tab Take 1 Tab by mouth every four (4) hours as needed. Max Daily Amount: 6 Tabs. Quantity:  60 Tab Refills:  0  
     
   
   
   
  
 polyethylene glycol 17 gram/dose powder Commonly known as:  [place] Your last dose was: Your next dose is:    
   
   
 Dose:  17 g Take 17 g by mouth daily for 15 days. Quantity:  255 g Refills:  0  
     
   
   
   
  
 senna-docusate 8.6-50 mg per tablet Commonly known as:  SENNA PLUS Your last dose was: Your next dose is:    
   
   
 Dose:  1 Tab Take 1 Tab by mouth two (2) times a day. Take until having regular bowel movements. Quantity:  60 Tab Refills:  0 CONTINUE these medications which have CHANGED Dose & Instructions Dispensing Information Comments Morning Noon Evening Bedtime  
 aspirin delayed-release 325 mg tablet What changed:  additional instructions Your last dose was: Your next dose is:    
   
   
 Dose:  325 mg Take 1 Tab by mouth nightly. May resume in two weeks. Refills:  0 BENICAR HCT 40-12.5 mg per tablet Generic drug:  olmesartan-hydroCHLOROthiazide What changed:  additional instructions Your last dose was: Your next dose is:    
   
   
 Dose:  1 Tab Take 1 Tab by mouth daily. May resume medication when b/p greater than 120/80. Refills:  0  
     
   
   
   
  
 niacin  mg tablet Commonly known as:  [place] Zamora What changed:  additional instructions Your last dose was:     
   
Your next dose is:    
   
   
 Dose:  500 mg  
 Take 1 Tab by mouth nightly. May resume medication when b/p greater than 120/80. Refills:  0 Omega-3-DHA-EPA-Fish Oil 1,000 mg (120 mg-180 mg) Cap What changed:  additional instructions Your last dose was: Your next dose is:    
   
   
 Dose:  2 Tab Take 2 Tabs by mouth two (2) times a day. Hold for two weeks Refills:  0  
     
   
   
   
  
 vitamin E 400 unit capsule Commonly known as:  [address] What changed:  additional instructions Your last dose was: Your next dose is:    
   
   
 Dose:  400 Units Take 1 Cap by mouth daily. Hold for two weeks. Refills:  0 CONTINUE these medications which have NOT CHANGED Dose & Instructions Dispensing Information Comments Morning Noon Evening Bedtime  
 ascorbic acid (vitamin C) 250 mg tablet Commonly known as:  VITAMIN C Your last dose was: Your next dose is:    
   
   
 Dose:  500 mg Take 500 mg by mouth daily. Refills:  0 BACTROBAN NASAL 2 % nasal ointment Generic drug:  mupirocin calcium Your last dose was: Your next dose is:    
   
   
 by Both Nostrils route two (2) times a day. Indications: Prescription called in 3- per Dr Cheko Purdy Quantity:  1 g Refills:  0  
     
   
   
   
  
 cholecalciferol (vitamin D3) 2,000 unit Tab Your last dose was: Your next dose is:    
   
   
 Dose:  1 Tab Take 1 Tab by mouth daily. Refills:  0  
     
   
   
   
  
 estradiol 0.05 mg/24 hr  
Commonly known as:  Nancy Lease Your last dose was: Your next dose is:    
   
   
 Dose:  1 Patch 1 Patch by TransDERmal route two (2) days a week. Wed and sun Refills:  0 Ferrous Fumarate 325 mg (106 mg iron) Tab Your last dose was: Your next dose is:    
   
   
 Dose:  1 Tab Take 1 Tab by mouth two (2) times a day. Refills:  0 lansoprazole 15 mg capsule Commonly known as:  PREVACID Your last dose was: Your next dose is:    
   
   
 Dose:  15 mg Take 15 mg by mouth every Monday, Wednesday, Friday. Refills:  0  
     
   
   
   
  
 M-VIT PO Your last dose was: Your next dose is:    
   
   
 Dose:  1 Tab Take 1 Tab by mouth daily. Refills:  0 POTASSIUM CHLORIDE SR 10 MEQ TAB Your last dose was: Your next dose is:    
   
   
 Dose:  20 mEq 20 mEq two (2) times a day. Refills:  0 STOP taking these medications   
 acetaminophen 650 mg CR tablet Commonly known as:  TYLENOL  
   
  
 diclofenac EC 75 mg EC tablet Commonly known as:  VOLTAREN  
   
  
 oxyCODONE-acetaminophen 5-325 mg per tablet Commonly known as:  PERCOCET  
   
  
 traMADol 50 mg tablet Commonly known as:  ULTRAM  
   
  
  
  
Where to Get Your Medications Information on where to get these meds will be given to you by the nurse or doctor. ! Ask your nurse or doctor about these medications HYDROcodone-acetaminophen 5-325 mg per tablet  
 polyethylene glycol 17 gram/dose powder  
 senna-docusate 8.6-50 mg per tablet

## 2017-03-16 VITALS
WEIGHT: 190.7 LBS | TEMPERATURE: 97.8 F | RESPIRATION RATE: 18 BRPM | SYSTOLIC BLOOD PRESSURE: 101 MMHG | BODY MASS INDEX: 30.65 KG/M2 | HEART RATE: 61 BPM | DIASTOLIC BLOOD PRESSURE: 55 MMHG | OXYGEN SATURATION: 99 % | HEIGHT: 66 IN

## 2017-03-16 PROCEDURE — 74011250636 HC RX REV CODE- 250/636: Performed by: NEUROLOGICAL SURGERY

## 2017-03-16 PROCEDURE — 74011250637 HC RX REV CODE- 250/637: Performed by: NEUROLOGICAL SURGERY

## 2017-03-16 PROCEDURE — 99218 HC RM OBSERVATION: CPT

## 2017-03-16 RX ORDER — AMOXICILLIN 250 MG
1 CAPSULE ORAL 2 TIMES DAILY
Qty: 60 TAB | Refills: 0 | Status: SHIPPED | OUTPATIENT
Start: 2017-03-16 | End: 2018-03-09 | Stop reason: ALTCHOICE

## 2017-03-16 RX ORDER — ASPIRIN 325 MG
325 TABLET, DELAYED RELEASE (ENTERIC COATED) ORAL
Status: SHIPPED | COMMUNITY
Start: 2017-03-16 | End: 2017-06-03

## 2017-03-16 RX ORDER — GLUCOSAM/CHONDRO/HERB 149/HYAL 750-100 MG
2 TABLET ORAL 2 TIMES DAILY
Status: SHIPPED | COMMUNITY
Start: 2017-03-16 | End: 2017-08-31

## 2017-03-16 RX ORDER — HYDROCODONE BITARTRATE AND ACETAMINOPHEN 5; 325 MG/1; MG/1
1 TABLET ORAL
Qty: 60 TAB | Refills: 0 | Status: SHIPPED | OUTPATIENT
Start: 2017-03-16 | End: 2017-08-21 | Stop reason: ALTCHOICE

## 2017-03-16 RX ORDER — NIACIN 500 MG/1
500 TABLET, EXTENDED RELEASE ORAL
Status: SHIPPED | COMMUNITY
Start: 2017-03-16 | End: 2017-12-28 | Stop reason: SDUPTHER

## 2017-03-16 RX ORDER — VITAMIN E 268 MG
400 CAPSULE ORAL DAILY
Status: SHIPPED | COMMUNITY
Start: 2017-03-16

## 2017-03-16 RX ORDER — OLMESARTAN MEDOXOMIL AND HYDROCHLOROTHIAZIDE 40/12.5 40; 12.5 MG/1; MG/1
1 TABLET ORAL DAILY
Status: SHIPPED | COMMUNITY
Start: 2017-03-16 | End: 2017-12-01 | Stop reason: ALTCHOICE

## 2017-03-16 RX ORDER — POLYETHYLENE GLYCOL 3350 17 G/17G
17 POWDER, FOR SOLUTION ORAL DAILY
Qty: 255 G | Refills: 0 | Status: SHIPPED | OUTPATIENT
Start: 2017-03-16 | End: 2017-03-31

## 2017-03-16 RX ADMIN — HYDROCODONE BITARTRATE AND ACETAMINOPHEN 1 TABLET: 5; 325 TABLET ORAL at 03:06

## 2017-03-16 RX ADMIN — POTASSIUM CHLORIDE 20 MEQ: 20 TABLET, EXTENDED RELEASE ORAL at 09:32

## 2017-03-16 RX ADMIN — DEXTROSE MONOHYDRATE, SODIUM CHLORIDE, AND POTASSIUM CHLORIDE 75 ML/HR: 50; 4.5; .745 INJECTION, SOLUTION INTRAVENOUS at 00:03

## 2017-03-16 RX ADMIN — CEFAZOLIN 2 G: 10 INJECTION, POWDER, FOR SOLUTION INTRAVENOUS; PARENTERAL at 00:03

## 2017-03-16 RX ADMIN — HYDROCODONE BITARTRATE AND ACETAMINOPHEN 1 TABLET: 5; 325 TABLET ORAL at 06:57

## 2017-03-16 RX ADMIN — Medication 10 ML: at 06:58

## 2017-03-16 RX ADMIN — DOCUSATE SODIUM 100 MG: 100 CAPSULE, LIQUID FILLED ORAL at 09:32

## 2017-03-16 RX ADMIN — FAMOTIDINE 20 MG: 20 TABLET ORAL at 09:32

## 2017-03-16 RX ADMIN — PANTOPRAZOLE SODIUM 40 MG: 40 TABLET, DELAYED RELEASE ORAL at 09:32

## 2017-03-16 RX ADMIN — HYDROCODONE BITARTRATE AND ACETAMINOPHEN 1 TABLET: 5; 325 TABLET ORAL at 11:40

## 2017-03-16 NOTE — PROGRESS NOTES
Ortho/ NeuroSurgery NP Note    POD# 1 s/p LUMBAR LAMINECTOMY/DISCECTOMY L5-S1 LEFT     Pt resting in bed. No complaints. Pre-op had numbness and pain in the left leg and buttock. This has \"faded away\" some since surgery   VSS Afebrile. Sifuentes removed. Patient is voiding in adequate amounts. Labs  Lab Results   Component Value Date/Time    HGB 10.9 03/08/2017 03:02 PM      Lab Results   Component Value Date/Time    INR 1.0 03/08/2017 03:02 PM      Dressing c.d.i,   Calves soft and supple; No pain with passive stretch  Sensation and motor intact  SCDs for mechanical DVT proph while in bed     PLAN:  1)Patient mobilized with nursing and was found to be safe and steady with ambulation. 2) Plan d/c to home.     Huan Ortiz NP

## 2017-03-16 NOTE — PROGRESS NOTES
Bedside and Verbal shift change report given to Julio C Ramirez RN (oncoming nurse) by Rayna Lamar RN (offgoing nurse). Report included the following information SBAR, Kardex, Procedure Summary, MAR, Recent Results and Med Rec Status.

## 2017-03-16 NOTE — DISCHARGE SUMMARY
Spine Discharge Summary    Patient ID:  Ifeanyi French  [id number]  female  57 y.o.  1955    Admit date: 3/15/2017    Discharge date: 3/16/2017    Admitting Physician: Jorge Lux MD     Consulting Physician(s):   Treatment Team: Attending Provider: Jorge Lux MD; Utilization Review: Renaldo Bamberger, RN    Date of Surgery:   3/15/2017     Preoperative Diagnosis:  HERNIATED LUMBAR DISC    Postoperative Diagnosis:   HERNIATED LUMBAR DISC    Procedure(s):  LUMBAR LAMINECTOMY/DISCECTOMY L5-S1 LEFT     Anesthesia Type:   General     Surgeon: Jorge Lux MD                            HPI:  Pt is a 57 y.o. female who has a history of HERNIATED LUMBAR [address]  with pain and limitations of activities of daily living who presents at this time for a L5-S1 Lum Otto Discectomy following the failure of conservative management. PMH:   Past Medical History:   Diagnosis Date    Arrhythmia     irregular heart beat hx and beating fast per patient/no cardiologist    Arthritis     JOINTS  WORSE WAIST DOWN     Hypertension     Ill-defined condition     neuropathy feet       Medications upon admission :   Prior to Admission Medications   Prescriptions Last Dose Informant Patient Reported? Taking? Ferrous Fumarate 325 mg (106 mg iron) tab 3/14/2017 at 2230   Yes No   Sig: Take 1 Tab by mouth two (2) times a day. Omega-3-DHA-EPA-Fish Oil 1,000 mg (120 mg-180 mg) cap   Yes Yes   Sig: Take 2 Tabs by mouth two (2) times a day. Hold for two weeks   POTASSIUM CHLORIDE SR 10 MEQ TAB 3/14/2017 at 1830  Yes Yes   Si mEq two (2) times a day. PV W-O YAN/FERROUS FUMARATE/FA (M-VIT PO) 2017  Yes No   Sig: Take 1 Tab by mouth daily. acetaminophen (TYLENOL) 650 mg CR tablet 2017  Yes No   Sig: Take 1,300 mg by mouth two (2) times a day. ascorbic acid (VITAMIN C) 250 mg tablet 2017  Yes No   Sig: Take 500 mg by mouth daily. aspirin delayed-release 325 mg tablet   Yes Yes   Sig: Take 1 Tab by mouth nightly. May resume in two weeks. cholecalciferol, vitamin D3, 2,000 unit tab 2017  Yes No   Sig: Take 1 Tab by mouth daily. diclofenac EC (VOLTAREN) 75 mg EC tablet 3/8/2017  Yes No   Sig: Take 75 mg by mouth two (2) times a day. estradiol (VIVELLE) 0.05 mg/24 hr 3/12/2017  Yes No   Si Patch by TransDERmal route two (2) days a week. Wed and sun   lansoprazole (PREVACID) 15 mg capsule 2016  Yes No   Sig: Take 15 mg by mouth every Monday, Wednesday, Friday. mupirocin calcium (BACTROBAN NASAL) 2 % nasal ointment   Yes Yes   Sig: by Both Nostrils route two (2) times a day. Indications: Prescription called in 3- per Dr Lindsey St   niacin ER (NIASPAN) 500 mg tablet   Yes Yes   Sig: Take 1 Tab by mouth nightly. May resume medication when b/p greater than 120/80. olmesartan-hydroCHLOROthiazide (BENICAR HCT) 40-12.5 mg per tablet   Yes Yes   Sig: Take 1 Tab by mouth daily. May resume medication when b/p greater than 120/80. oxyCODONE-acetaminophen (PERCOCET) 5-325 mg per tablet 2017  No No   Sig: Take 1 Tab by mouth every four (4) hours as needed for Pain. Max Daily Amount: 6 Tabs. traMADol (ULTRAM) 50 mg tablet 2017  Yes No   Sig: Take 50 mg by mouth every six (6) hours as needed for Pain.   vitamin E (AQUA GEMS) 400 unit capsule   Yes Yes   Sig: Take 1 Cap by mouth daily. Hold for two weeks. Facility-Administered Medications: None        Allergies:  No Known Allergies     Hospital Course: The patient underwent surgery. Complications:  None; patient tolerated the procedure well. Was taken to the PACU in stable condition and then transferred to the ortho floor.       Perioperative Antibiotics:  Ancef     Postoperative Pain Management:  Norco    Postoperative transfusions:    Number of units banked PRBCs =   none     Post Op complications: none    Hemoglobin at discharge:    Lab Results   Component Value Date/Time    HGB 10.9 (L) 2017 03:02 PM    INR 1.0 2017 03:02 PM Dressing - clean, dry and intact. No significant erythema or swelling. Neurovascular exam found to be within normal limits. Wound appears to be healing without any evidence of infection. Patient mobilized with nursing and was found to be safe and steady with ambulation. Discharged to: Home. Condition on Discharge:   stable    Discharge instructions:    - Take pain medications as prescribed  - Resume pre hospital diet      - Discharge activity: activity as tolerated  - Ambulate as tolerated  - Wound Care Keep wound clean and dry. See discharge instruction sheet.  - Staples, if present, to be removed 10-14 days after surgery            -DISCHARGE MEDICATION LIST     Current Discharge Medication List      START taking these medications    Details   HYDROcodone-acetaminophen (NORCO) 5-325 mg per tablet Take 1 Tab by mouth every four (4) hours as needed. Max Daily Amount: 6 Tabs. Qty: 60 Tab, Refills: 0      polyethylene glycol (MIRALAX) 17 gram/dose powder Take 17 g by mouth daily for 15 days. Qty: 255 g, Refills: 0      senna-docusate (SENNA PLUS) 8.6-50 mg per tablet Take 1 Tab by mouth two (2) times a day. Take until having regular bowel movements. Qty: 60 Tab, Refills: 0         CONTINUE these medications which have CHANGED    Details   aspirin delayed-release 325 mg tablet Take 1 Tab by mouth nightly. May resume in two weeks. mupirocin calcium (BACTROBAN NASAL) 2 % nasal ointment by Both Nostrils route two (2) times a day. Indications: Prescription called in 3- per Dr Kevin Simmonds: 1 g, Refills: 0      niacin ER (NIASPAN) 500 mg tablet Take 1 Tab by mouth nightly. May resume medication when b/p greater than 120/80. olmesartan-hydroCHLOROthiazide (BENICAR HCT) 40-12.5 mg per tablet Take 1 Tab by mouth daily. May resume medication when b/p greater than 120/80. Omega-3-DHA-EPA-Fish Oil 1,000 mg (120 mg-180 mg) cap Take 2 Tabs by mouth two (2) times a day.  Hold for two weeks vitamin E (AQUA GEMS) 400 unit capsule Take 1 Cap by mouth daily. Hold for two weeks. CONTINUE these medications which have NOT CHANGED    Details   POTASSIUM CHLORIDE SR 10 MEQ TAB 20 mEq two (2) times a day. cholecalciferol, vitamin D3, 2,000 unit tab Take 1 Tab by mouth daily. lansoprazole (PREVACID) 15 mg capsule Take 15 mg by mouth every Monday, Wednesday, Friday. PV W-O YAN/FERROUS FUMARATE/FA (M-VIT PO) Take 1 Tab by mouth daily. estradiol (VIVELLE) 0.05 mg/24 hr 1 Patch by TransDERmal route two (2) days a week. Wed and sun      ascorbic acid (VITAMIN C) 250 mg tablet Take 500 mg by mouth daily. Ferrous Fumarate 325 mg (106 mg iron) tab Take 1 Tab by mouth two (2) times a day.          STOP taking these medications       traMADol (ULTRAM) 50 mg tablet Comments:   Reason for Stopping:         oxyCODONE-acetaminophen (PERCOCET) 5-325 mg per tablet Comments:   Reason for Stopping:         diclofenac EC (VOLTAREN) 75 mg EC tablet Comments:   Reason for Stopping:         acetaminophen (TYLENOL) 650 mg CR tablet Comments:   Reason for Stopping:            per medical continuation form      -Follow up in office in 2 weeks      Signed:  Tavares Jacob  MSN, APRN, NP-C, [facility]  Orthopaedic Nurse Practitioner    3/16/2017  12:11 PM

## 2017-03-16 NOTE — DISCHARGE INSTRUCTIONS
Katharine Coleman M.D. What can I do?  The only exercise you should do is walking. Start by walking twice a day for 5 minutes, then increase by  2-3 minutes every day until you reach 25 minutes twice a day. DO NOT sit for long periods of time (20 minutes at a time for the first couple of weeks, then gradually increase.  No heavy lifting (5 lbs max); no straining, twisting, or bending.  No driving until your physician tells you it is ok. It is, however, ok to ride short distances in a car.  If you are required to wear a back brace, you may remove it when you are sleeping unless your doctor has advised against it.  If you are required to wear a cervical collar, you must sleep in it. You can remove it only for showers. What can I eat?  You may resume your regular home diet as tolerated. If your throat is sore, you may want to eat soft food for the first few days. When can I take a shower?  You may shower leaving on your occlusive (waterproof) dressing on allowing water to run over the dressing. The dressing may be removed in 5 days. The small pieces of tape (steri strips)  underneath it should stay on. Let water run over them, then pat dry gently.  Do not take baths or soak in pools.  You may remove your brace for showering. Medications:   Check with your physician before taking any anti-inflammatory medicines (Advil, Aleve, ibuprofen, aspirin).  Take prescription medicine as directed. DO NOT take more than prescribed. Call your physician if the prescribed dose is not sufficiently controlling your pain.  It is important to have regular bowel movements. Pain medications may cause constipation. Drink plenty of fluids, get enough fiber in your diet, and use stool softeners and drink prune juice to help prevent constipation. Do not take laxatives if at all possible except in severe situations.   It can result in a vicious cycle of constipation and diarrhea.  Do not put any ointments or creams on your incision unless directed to do so by your physician.  Tobacco products should be avoided during the postoperative phase. When do I see the physician again?  Please call your physicians office as soon as you get home to schedule your 1st post operative appointment. You should be seen approximately two weeks after your surgery. At this appointment you will see your doctors Assistant for a wound check and to answer any questions you may have.  You will see your physician approximately six weeks after your surgery.  If you had a fusion, you will need to get an order for xrays to be taken prior to the six week appointment. These should be done on the day of the appointment or 1-2 days before.  If you need the number to your doctors office, please request it from your nurse or see below. NOTIFY YOUR PHYSICIAN OF ANY OF THE FOLLOWING:     Fever above 101º for 24 hrs.  Nausea or vomiting.  Inability to urinate   Loss of bowel or bladder function (sudden onset of incontinence)   Changes in sensation (numbness, tingling, color change) in your extremities   Pain not relieved by your medicine.    Redness, swelling or drainage from your incision   Persistent pain in the calf of either leg   Development of severe pain      FOR APPOINTMENTS OR QUESTIONS CALL:    [facility]  [address]  [phone]

## 2017-05-15 ENCOUNTER — HOSPITAL ENCOUNTER (OUTPATIENT)
Dept: CT IMAGING | Age: 62
Discharge: HOME | End: 2017-05-15
Attending: INTERNAL MEDICINE
Payer: COMMERCIAL

## 2017-05-15 DIAGNOSIS — R10.2 ADNEXAL TENDERNESS, RIGHT: ICD-10-CM

## 2017-05-15 PROCEDURE — 74011250636 HC RX REV CODE- 250/636: Performed by: INTERNAL MEDICINE

## 2017-05-15 PROCEDURE — 74011000255 HC RX REV CODE- 255: Performed by: INTERNAL MEDICINE

## 2017-05-15 PROCEDURE — 74177 CT ABD & PELVIS W/CONTRAST: CPT

## 2017-05-15 PROCEDURE — 74011636320 HC RX REV CODE- 636/320: Performed by: INTERNAL MEDICINE

## 2017-05-15 RX ORDER — SODIUM CHLORIDE 9 MG/ML
50 INJECTION, SOLUTION INTRAVENOUS
Status: COMPLETED | OUTPATIENT
Start: 2017-05-15 | End: 2017-05-15

## 2017-05-15 RX ORDER — BARIUM SULFATE 20 MG/ML
900 SUSPENSION ORAL
Status: COMPLETED | OUTPATIENT
Start: 2017-05-15 | End: 2017-05-15

## 2017-05-15 RX ORDER — SODIUM CHLORIDE 0.9 % (FLUSH) 0.9 %
10 SYRINGE (ML) INJECTION
Status: COMPLETED | OUTPATIENT
Start: 2017-05-15 | End: 2017-05-15

## 2017-05-15 RX ADMIN — SODIUM CHLORIDE 50 ML/HR: 900 INJECTION, SOLUTION INTRAVENOUS at 11:08

## 2017-05-15 RX ADMIN — BARIUM SULFATE 900 ML: 21 SUSPENSION ORAL at 11:08

## 2017-05-15 RX ADMIN — Medication 10 ML: at 11:08

## 2017-05-15 RX ADMIN — IOPAMIDOL 100 ML: 755 INJECTION, SOLUTION INTRAVENOUS at 11:08

## 2017-05-23 ENCOUNTER — OFFICE VISIT (OUTPATIENT)
Dept: SURGERY | Age: 62
End: 2017-05-23

## 2017-05-23 VITALS
BODY MASS INDEX: 31.92 KG/M2 | DIASTOLIC BLOOD PRESSURE: 85 MMHG | HEART RATE: 78 BPM | SYSTOLIC BLOOD PRESSURE: 147 MMHG | WEIGHT: 198.6 LBS | OXYGEN SATURATION: 98 % | RESPIRATION RATE: 20 BRPM | HEIGHT: 66 IN

## 2017-05-23 DIAGNOSIS — K42.9 UMBILICAL HERNIA WITHOUT OBSTRUCTION AND WITHOUT GANGRENE: Primary | ICD-10-CM

## 2017-05-23 DIAGNOSIS — K40.90 RIGHT INGUINAL HERNIA: ICD-10-CM

## 2017-05-23 NOTE — MR AVS SNAPSHOT
Visit Information Date & Time Provider Department Dept. Phone Encounter #  
 5/23/2017  3:40 PM Dwaine Shrestha MD [facility]  [address] [phone] [id number] Your Appointments 8/21/2017  9:40 AM  
FOLLOW UP 10 with MD JENNIFER De Jesus [facility] ([address]) Appt Note: 3 month flp [address] [id number] [address] [address] Upcoming Health Maintenance Date Due Hepatitis C Screening 1955 DTaP/Tdap/Td series (1 - Tdap) 7/12/1975 PAP AKA CERVICAL CYTOLOGY 7/12/1975 BREAST CANCER SCRN MAMMOGRAM 7/12/2004 FOBT Q 1 YEAR AGE 50-75 7/12/2004 ZOSTER VACCINE AGE 60> 7/12/2014 INFLUENZA AGE 9 TO ADULT 8/1/2017 Allergies as of 5/23/2017  Review Complete On: 5/23/2017 By: Brandie White No Known Allergies Current Immunizations  Never Reviewed Name Date Influenza Vaccine 11/7/2016 Not reviewed this visit Vitals BP Pulse Resp Height(growth percentile) Weight(growth percentile) SpO2  
 147/85 (BP 1 Location: Left arm, BP Patient Position: Sitting) 78 20 5' 6\" (1.676 m) 198 lb 9.6 oz (90.1 kg) 98% BMI OB Status Smoking Status 32.05 kg/m2 Hysterectomy Never Smoker BMI and BSA Data Body Mass Index Body Surface Area 32.05 kg/m 2 2.05 m 2 Preferred Pharmacy Pharmacy Name Phone [phone] [phone] Your Updated Medication List  
  
   
This list is accurate as of: 5/23/17 11:59 PM.  Always use your most recent med list.  
  
  
  
  
 ascorbic acid (vitamin C) 250 mg tablet Commonly known as:  VITAMIN C Take 500 mg by mouth daily. aspirin delayed-release 325 mg tablet Take 1 Tab by mouth nightly. May resume in two weeks. BACTROBAN NASAL 2 % nasal ointment Generic drug:  mupirocin calcium  
by Both Nostrils route two (2) times a day. Indications: Prescription called in 3- per Dr Laina DIAZ HCT 40-12.5 mg per tablet Generic drug:  olmesartan-hydroCHLOROthiazide Take 1 Tab by mouth daily. May resume medication when b/p greater than 120/80. cholecalciferol (vitamin D3) 2,000 unit Tab Take 1 Tab by mouth daily. estradiol 0.05 mg/24 hr  
Commonly known as:  VIVELLE  
1 Patch by TransDERmal route two (2) days a week. Wed and sun Ferrous Fumarate 325 mg (106 mg iron) Tab Take 1 Tab by mouth two (2) times a day. HYDROcodone-acetaminophen 5-325 mg per tablet Commonly known as:  Elyn Barley Take 1 Tab by mouth every four (4) hours as needed. Max Daily Amount: 6 Tabs. lansoprazole 15 mg capsule Commonly known as:  PREVACID Take 15 mg by mouth every Monday, Wednesday, Friday. M-VIT PO Take 1 Tab by mouth daily. niacin  mg tablet Commonly known as:  Feliciano Kipper Take 1 Tab by mouth nightly. May resume medication when b/p greater than 120/80. Omega-3-DHA-EPA-Fish Oil 1,000 mg (120 mg-180 mg) Cap Take 2 Tabs by mouth two (2) times a day. Hold for two weeks POTASSIUM CHLORIDE SR 10 MEQ TAB 20 mEq two (2) times a day. senna-docusate 8.6-50 mg per tablet Commonly known as:  SENNA PLUS Take 1 Tab by mouth two (2) times a day. Take until having regular bowel movements. vitamin E 400 unit capsule Commonly known as:  [address] Take 1 Cap by mouth daily. Hold for two weeks. Introducing [facility]! Daysi Cleverly introduces [employer] patient portal. Now you can access parts of your medical record, email your doctor's office, and request medication refills online. 1. In your internet browser, go to [email or web address] 2. Click on the First Time User? Click Here link in the Sign In box. You will see the New Member Sign Up page. 3. Enter your [employer] Access Code exactly as it appears below. You will not need to use this code after youve completed the sign-up process. If you do not sign up before the expiration date, you must request a new code. · [employer] Access Code: [email or web address] Expires: 8/10/2017  1:50 PM 
 
4. Enter the last four digits of your Social Security Number (xxxx) and Date of Birth (mm/dd/yyyy) as indicated and click Submit. You will be taken to the next sign-up page. 5. Create a [employer] ID. This will be your [employer] login ID and cannot be changed, so think of one that is secure and easy to remember. 6. Create a [employer] password. You can change your password at any time. 7. Enter your Password Reset Question and Answer. This can be used at a later time if you forget your password. 8. Enter your e-mail address. You will receive e-mail notification when new information is available in [address] 9. Click Sign Up. You can now view and download portions of your medical record. 10. Click the Download Summary menu link to download a portable copy of your medical information. If you have questions, please visit the Frequently Asked Questions section of the [employer] website. Remember, [employer] is NOT to be used for urgent needs. For medical emergencies, dial 911. Now available from your iPhone and Android! Please provide this summary of care documentation to your next provider. Your primary care clinician is listed as [place]. If you have any questions after today's visit, please call [phone].

## 2017-05-25 NOTE — PROGRESS NOTES
To: Lamar Marcano MD  From: Madelon Schirmer, MD    Thank you for sending Jose A Michelle to see us. Encounter Date: 5/23/2017  History and Physical    Assessment:   Umbilical hernia and tender right inguinal hernia - adipose only in both on CT. Comorbid anemia, anxiety, CKD, COPD, Crohn's, GERD, HTN. Body mass index is 32.05 kg/(m^2). Plan:   Robotic repair of both. Discussed possibility of incarceration, strangulation, increase in size of the hernia over time, and the risk of emergency surgery in the face of strangulation. Discussed techniques for reducing the hernia should it not reduce spontaneously. Knows to call immediately for incarceration. Also discussed the risk of surgery including recurrence, bleeding, hematoma, infection, difficulty voiding, chronic nerve pain, and the risks of general anesthetic. The patient expressed understanding of the risks and all questions were answered to the patient's satisfaction. HPI:   Alon Soto is a 57 y.o. female who is seen in consultation at the request of Lamar Marcano MD for painful right inguinal hernia. She is also noted to have an umbilical hernia. This only bothers her occasionally but has gotten bigger. Symptoms were first noted over the last month. Pain is described as sharp in the right groin. Patient has a bulge. Bulge is not reducible  Patient has urinary symptoms. Frequency. Patient does not have difficulty with bowel movements. Patient does not have nausea or vomiting. Patient does not have history of previous hernia surgery. Patient has history of prior abdominal operations. BTL and hysterectomy.      Past Medical History:   Diagnosis Date    Arrhythmia     irregular heart beat hx and beating fast per patient/no cardiologist    Arthritis     JOINTS  WORSE WAIST DOWN     Hypertension     Ill-defined condition     neuropathy feet     Past Surgical History:   Procedure Laterality Date    BREAST SURGERY PROCEDURE UNLISTED      mass removed left breast x2 benign    HX COLONOSCOPY  12/2010    normal    HX COLONOSCOPY  12/30/2014    normal     HX HEENT      cyst removed left eye    HX HYSTERECTOMY      HX ORTHOPAEDIC      bilat knee injections    HX ORTHOPAEDIC      laser treatment left knee and foot    HX ORTHOPAEDIC      hx of gel shots bilat knee    HX ORTHOPAEDIC  12/27/2016    left knee coritizon shot    HX OTHER SURGICAL      cyst removed front left scalp    HX OTHER SURGICAL      colonoscopy    HX OTHER SURGICAL      tooth implant    HX TUBAL LIGATION      VASCULAR SURGERY PROCEDURE UNLIST      vein striping      No family history on file. Social History   Substance Use Topics    Smoking status: Never Smoker    Smokeless tobacco: Never Used    Alcohol use Yes      Comment: once a year      Current Outpatient Prescriptions   Medication Sig    aspirin delayed-release 325 mg tablet Take 1 Tab by mouth nightly. May resume in two weeks.  mupirocin calcium (BACTROBAN NASAL) 2 % nasal ointment by Both Nostrils route two (2) times a day. Indications: Prescription called in 3- per Dr Alfredo Davidson niacin ER (NIASPAN) 500 mg tablet Take 1 Tab by mouth nightly. May resume medication when b/p greater than 120/80.  olmesartan-hydroCHLOROthiazide (BENICAR HCT) 40-12.5 mg per tablet Take 1 Tab by mouth daily. May resume medication when b/p greater than 120/80.  Omega-3-DHA-EPA-Fish Oil 1,000 mg (120 mg-180 mg) cap Take 2 Tabs by mouth two (2) times a day. Hold for two weeks    vitamin E (AQUA GEMS) 400 unit capsule Take 1 Cap by mouth daily. Hold for two weeks.  POTASSIUM CHLORIDE SR 10 MEQ TAB 20 mEq two (2) times a day.  cholecalciferol, vitamin D3, 2,000 unit tab Take 1 Tab by mouth daily.  lansoprazole (PREVACID) 15 mg capsule Take 15 mg by mouth every Monday, Wednesday, Friday.  PV W-O YAN/FERROUS FUMARATE/FA (M-VIT PO) Take 1 Tab by mouth daily.     estradiol (VIVELLE) 0.05 mg/24 hr 1 Patch by TransDERmal route two (2) days a week. Wed and sun    ascorbic acid (VITAMIN C) 250 mg tablet Take 500 mg by mouth daily.  Ferrous Fumarate 325 mg (106 mg iron) tab Take 1 Tab by mouth two (2) times a day.  HYDROcodone-acetaminophen (NORCO) 5-325 mg per tablet Take 1 Tab by mouth every four (4) hours as needed. Max Daily Amount: 6 Tabs.  senna-docusate (SENNA PLUS) 8.6-50 mg per tablet Take 1 Tab by mouth two (2) times a day. Take until having regular bowel movements. No current facility-administered medications for this visit. Allergies:  No Known Allergies     Review of Systems:  10 systems reviewed. See scanned sheet in \"Media\" section. See HPI for pertinent positives and negatives. Objective:     Visit Vitals    /85 (BP 1 Location: Left arm, BP Patient Position: Sitting)    Pulse 78    Resp 20    Ht 5' 6\" (1.676 m)    Wt 90.1 kg (198 lb 9.6 oz)    SpO2 98%    BMI 32.05 kg/m2       Physical Exam:  General appearance  Alert, cooperative, no distress, appears stated age   HEENT Anicteric   Neck Supple       Lungs   Clear to auscultation bilaterally   Heart  Regular rate and rhythm. No murmur, rub or gallop   Abdomen   Soft. Bowel sounds normal. No organomegaly. umbilcal hernia, non-reducible RIH TTP, no erythema or induration.      Extremities no cyanosis or edema   Pulses 2+ right radial   Skin Skin color, texture, turgor normal.   Lymph nodes Inguinal nodes normal.   Neurologic Without overt sensory or motor deficit     Signed By: Jj Issa MD     May 25, 2017

## 2017-05-26 NOTE — PERIOP NOTES
[facility]  Preoperative Instructions        Surgery Date 6/2/17          Time of Arrival 0900 contact [phone]    1. On the day of your surgery, please report to the Surgical Services Registration Desk and sign in at your designated time. The Surgery Center is located to the right of the Emergency Room. 2. You must have someone with you to drive you home. You should not drive a car for 24 hours following surgery. Please make arrangements for a friend or family member to stay with you for the first 24 hours after your surgery. 3. Do not have anything to eat or drink (including water, gum, mints, coffee, juice) after midnight 6/1/17    . This may not apply to medications prescribed by your physician. Please note special instructions, if applicable. If you are currently taking Plavix, Coumadin, or other blood-thinning agents, contact your surgeon for instructions. 4. We recommend you do not drink any alcoholic beverages for 24 hours before and after your surgery. 5. Have a list of all current medications, including vitamins, herbal supplements and any other over the counter medications. Stop all Aspirin and non-steroidal anti-inflammatory drugs (I.e. Advil, Aleve), as directed by your surgeon's office. Stop all vitamins and herbal supplements seven days prior to your surgery. 6. Wear comfortable clothes. Wear glasses instead of contacts. Do not bring any money or jewelry. Please bring picture ID, insurance card, and any prearranged co-payment or hospital payment. Do not wear make-up, particularly mascara the morning of your surgery. Do not wear nail polish, particularly if you are having foot /hand surgery. Wear your hair loose or down, no ponytails, buns, erin pins or clips. All body piercings must be removed.   Please shower with antibacterial soap for three consecutive days before and on the morning of surgery, but do not apply any lotions, powders or deodorants after the shower on the day of surgery. Please use a fresh towels after each shower. Please sleep in clean clothes and change bed linens the night before surgery. Please do not shave for 48 hours prior to surgery. Shaving of the face is acceptable. 7. You should understand that if you do not follow these instructions your surgery may be cancelled. If your physical condition changes (I.e. fever, cold or flu) please contact your surgeon as soon as possible. 8. It is important that you be on time. If a situation occurs where you may be late, please call [phone] (OR Holding Area). 9. If you have any questions and or problems, please call [phone] (Pre-admission Testing). 10. Your surgery time may be subject to change. You will receive a phone call the evening prior if your time changes. 11.  If having outpatient surgery, you must have someone to drive you here, stay with you during the duration of your stay, and to drive you home at time of discharge. Special Instructions: none     MEDICATIONS TO TAKE THE MORNING OF SURGERY WITH A SIP OF WATER: none       I understand a pre-operative phone call will be made to verify my surgery time. In the event that I am not available, I give permission for a message to be left on my answering service and/or with another person?   yes         ___________________      __________   _________    (Signature of Patient)             (Witness)                (Date and Time)

## 2017-06-02 ENCOUNTER — ANESTHESIA (OUTPATIENT)
Dept: SURGERY | Age: 62
End: 2017-06-02
Payer: COMMERCIAL

## 2017-06-02 ENCOUNTER — ANESTHESIA EVENT (OUTPATIENT)
Dept: SURGERY | Age: 62
End: 2017-06-02
Payer: COMMERCIAL

## 2017-06-02 ENCOUNTER — HOSPITAL ENCOUNTER (OUTPATIENT)
Age: 62
Setting detail: OBSERVATION
Discharge: HOME | End: 2017-06-03
Attending: SURGERY | Admitting: SURGERY
Payer: COMMERCIAL

## 2017-06-02 PROBLEM — K43.9 VENTRAL HERNIA WITHOUT OBSTRUCTION OR GANGRENE: Status: ACTIVE | Noted: 2017-06-02

## 2017-06-02 PROBLEM — K40.90 RIGHT INGUINAL HERNIA: Status: ACTIVE | Noted: 2017-06-02

## 2017-06-02 LAB
HCT VFR BLD AUTO: 35.1 % (ref 35–47)
HGB BLD-MCNC: 11 G/DL (ref 11.5–16)

## 2017-06-02 PROCEDURE — 77030013079 HC BLNKT BAIR HGGR 3M -A: Performed by: ANESTHESIOLOGY

## 2017-06-02 PROCEDURE — 74011000272 HC RX REV CODE- 272: Performed by: SURGERY

## 2017-06-02 PROCEDURE — 74011250636 HC RX REV CODE- 250/636: Performed by: ANESTHESIOLOGY

## 2017-06-02 PROCEDURE — 74011000250 HC RX REV CODE- 250: Performed by: SURGERY

## 2017-06-02 PROCEDURE — 77030035464 HC SUT VLOC NON ABS COVD -B: Performed by: SURGERY

## 2017-06-02 PROCEDURE — 85018 HEMOGLOBIN: CPT | Performed by: SURGERY

## 2017-06-02 PROCEDURE — 77030018673: Performed by: SURGERY

## 2017-06-02 PROCEDURE — 77030002933 HC SUT MCRYL J&J -A: Performed by: SURGERY

## 2017-06-02 PROCEDURE — 77030035048 HC TRCR ENDOSC OPTCL COVD -B: Performed by: SURGERY

## 2017-06-02 PROCEDURE — 74011000250 HC RX REV CODE- 250

## 2017-06-02 PROCEDURE — 76060000037 HC ANESTHESIA 3 TO 3.5 HR: Performed by: SURGERY

## 2017-06-02 PROCEDURE — 77030036554: Performed by: SURGERY

## 2017-06-02 PROCEDURE — 77030025871: Performed by: SURGERY

## 2017-06-02 PROCEDURE — 77030010507 HC ADH SKN DERMBND J&J -B: Performed by: SURGERY

## 2017-06-02 PROCEDURE — 77030018836 HC SOL IRR NACL ICUM -A: Performed by: SURGERY

## 2017-06-02 PROCEDURE — 76210000006 HC OR PH I REC 0.5 TO 1 HR: Performed by: SURGERY

## 2017-06-02 PROCEDURE — 77030008684 HC TU ET CUF COVD -B: Performed by: ANESTHESIOLOGY

## 2017-06-02 PROCEDURE — 77030035488 HC SEAL UNIV DISP INTU -C: Performed by: SURGERY

## 2017-06-02 PROCEDURE — 77030012890

## 2017-06-02 PROCEDURE — 99218 HC RM OBSERVATION: CPT

## 2017-06-02 PROCEDURE — 77030016151 HC PROTCTR LNS DFOG COVD -B: Performed by: SURGERY

## 2017-06-02 PROCEDURE — 74011250636 HC RX REV CODE- 250/636

## 2017-06-02 PROCEDURE — 36415 COLL VENOUS BLD VENIPUNCTURE: CPT | Performed by: SURGERY

## 2017-06-02 PROCEDURE — 77030008517 HC TBNG INSUF ENDO STOR -B: Performed by: SURGERY

## 2017-06-02 PROCEDURE — 77030032490 HC SLV COMPR SCD KNE COVD -B: Performed by: SURGERY

## 2017-06-02 PROCEDURE — C1781 MESH (IMPLANTABLE): HCPCS | Performed by: SURGERY

## 2017-06-02 PROCEDURE — 76010000878 HC OR TIME 3 TO 3.5HR INTENSV - TIER 2: Performed by: SURGERY

## 2017-06-02 PROCEDURE — 74011250636 HC RX REV CODE- 250/636: Performed by: SURGERY

## 2017-06-02 PROCEDURE — 74011250637 HC RX REV CODE- 250/637: Performed by: SURGERY

## 2017-06-02 PROCEDURE — 77030020782 HC GWN BAIR PAWS FLX 3M -B

## 2017-06-02 PROCEDURE — 77030026438 HC STYL ET INTUB CARD -A: Performed by: ANESTHESIOLOGY

## 2017-06-02 PROCEDURE — 77030035277 HC OBTRTR BLDELSS DISP INTU -B: Performed by: SURGERY

## 2017-06-02 PROCEDURE — 77030022704 HC SUT VLOC COVD -B: Performed by: SURGERY

## 2017-06-02 PROCEDURE — 77030019908 HC STETH ESOPH SIMS -A: Performed by: ANESTHESIOLOGY

## 2017-06-02 PROCEDURE — 77030009852 HC PCH RTVR ENDOSC COVD -B: Performed by: SURGERY

## 2017-06-02 PROCEDURE — 77030011640 HC PAD GRND REM COVD -A: Performed by: SURGERY

## 2017-06-02 DEVICE — MESH SURG DIA12CM WHT POLY CLLGN FLM RND MFIL BIOABSRB: Type: IMPLANTABLE DEVICE | Site: UMBILICAL | Status: FUNCTIONAL

## 2017-06-02 DEVICE — MESH SLF-FLT PROGRIP RT -- PROGRIP: Type: IMPLANTABLE DEVICE | Site: INGUINAL | Status: FUNCTIONAL

## 2017-06-02 RX ORDER — SODIUM CHLORIDE 0.9 % (FLUSH) 0.9 %
5-10 SYRINGE (ML) INJECTION AS NEEDED
Status: DISCONTINUED | OUTPATIENT
Start: 2017-06-02 | End: 2017-06-02 | Stop reason: HOSPADM

## 2017-06-02 RX ORDER — LIDOCAINE HYDROCHLORIDE 20 MG/ML
INJECTION, SOLUTION EPIDURAL; INFILTRATION; INTRACAUDAL; PERINEURAL AS NEEDED
Status: DISCONTINUED | OUTPATIENT
Start: 2017-06-02 | End: 2017-06-02 | Stop reason: HOSPADM

## 2017-06-02 RX ORDER — ROCURONIUM BROMIDE 10 MG/ML
INJECTION, SOLUTION INTRAVENOUS AS NEEDED
Status: DISCONTINUED | OUTPATIENT
Start: 2017-06-02 | End: 2017-06-02 | Stop reason: HOSPADM

## 2017-06-02 RX ORDER — ASCORBIC ACID 500 MG
500 TABLET ORAL DAILY
Status: DISCONTINUED | OUTPATIENT
Start: 2017-06-03 | End: 2017-06-03 | Stop reason: HOSPADM

## 2017-06-02 RX ORDER — DOCUSATE SODIUM 100 MG/1
100 CAPSULE, LIQUID FILLED ORAL 2 TIMES DAILY
Qty: 60 CAP | Refills: 0 | Status: SHIPPED | OUTPATIENT
Start: 2017-06-02 | End: 2018-03-09 | Stop reason: ALTCHOICE

## 2017-06-02 RX ORDER — HYDROMORPHONE HYDROCHLORIDE 1 MG/ML
0.5 INJECTION, SOLUTION INTRAMUSCULAR; INTRAVENOUS; SUBCUTANEOUS
Status: DISCONTINUED | OUTPATIENT
Start: 2017-06-02 | End: 2017-06-03 | Stop reason: HOSPADM

## 2017-06-02 RX ORDER — DIPHENHYDRAMINE HYDROCHLORIDE 50 MG/ML
12.5 INJECTION, SOLUTION INTRAMUSCULAR; INTRAVENOUS
Status: DISCONTINUED | OUTPATIENT
Start: 2017-06-02 | End: 2017-06-02 | Stop reason: HOSPADM

## 2017-06-02 RX ORDER — PROPOFOL 10 MG/ML
INJECTION, EMULSION INTRAVENOUS AS NEEDED
Status: DISCONTINUED | OUTPATIENT
Start: 2017-06-02 | End: 2017-06-02 | Stop reason: HOSPADM

## 2017-06-02 RX ORDER — MORPHINE SULFATE 10 MG/ML
2 INJECTION, SOLUTION INTRAMUSCULAR; INTRAVENOUS
Status: DISCONTINUED | OUTPATIENT
Start: 2017-06-02 | End: 2017-06-02 | Stop reason: HOSPADM

## 2017-06-02 RX ORDER — FENTANYL CITRATE 50 UG/ML
25 INJECTION, SOLUTION INTRAMUSCULAR; INTRAVENOUS
Status: DISCONTINUED | OUTPATIENT
Start: 2017-06-02 | End: 2017-06-02 | Stop reason: HOSPADM

## 2017-06-02 RX ORDER — HYDROMORPHONE HYDROCHLORIDE 1 MG/ML
.2-.5 INJECTION, SOLUTION INTRAMUSCULAR; INTRAVENOUS; SUBCUTANEOUS
Status: DISCONTINUED | OUTPATIENT
Start: 2017-06-02 | End: 2017-06-02 | Stop reason: HOSPADM

## 2017-06-02 RX ORDER — PANTOPRAZOLE SODIUM 40 MG/1
40 TABLET, DELAYED RELEASE ORAL
Status: DISCONTINUED | OUTPATIENT
Start: 2017-06-03 | End: 2017-06-03 | Stop reason: HOSPADM

## 2017-06-02 RX ORDER — ACETAMINOPHEN 10 MG/ML
INJECTION, SOLUTION INTRAVENOUS AS NEEDED
Status: DISCONTINUED | OUTPATIENT
Start: 2017-06-02 | End: 2017-06-02 | Stop reason: HOSPADM

## 2017-06-02 RX ORDER — HYDROCODONE BITARTRATE AND ACETAMINOPHEN 5; 325 MG/1; MG/1
1-2 TABLET ORAL
Qty: 40 TAB | Refills: 0 | Status: SHIPPED | OUTPATIENT
Start: 2017-06-02 | End: 2017-08-21 | Stop reason: ALTCHOICE

## 2017-06-02 RX ORDER — SODIUM CHLORIDE, SODIUM LACTATE, POTASSIUM CHLORIDE, CALCIUM CHLORIDE 600; 310; 30; 20 MG/100ML; MG/100ML; MG/100ML; MG/100ML
25 INJECTION, SOLUTION INTRAVENOUS CONTINUOUS
Status: DISCONTINUED | OUTPATIENT
Start: 2017-06-02 | End: 2017-06-03 | Stop reason: HOSPADM

## 2017-06-02 RX ORDER — SODIUM CHLORIDE 0.9 % (FLUSH) 0.9 %
5-10 SYRINGE (ML) INJECTION EVERY 8 HOURS
Status: DISCONTINUED | OUTPATIENT
Start: 2017-06-02 | End: 2017-06-02

## 2017-06-02 RX ORDER — SODIUM CHLORIDE, SODIUM LACTATE, POTASSIUM CHLORIDE, CALCIUM CHLORIDE 600; 310; 30; 20 MG/100ML; MG/100ML; MG/100ML; MG/100ML
25 INJECTION, SOLUTION INTRAVENOUS CONTINUOUS
Status: DISCONTINUED | OUTPATIENT
Start: 2017-06-02 | End: 2017-06-02

## 2017-06-02 RX ORDER — NALOXONE HYDROCHLORIDE 0.4 MG/ML
0.4 INJECTION, SOLUTION INTRAMUSCULAR; INTRAVENOUS; SUBCUTANEOUS AS NEEDED
Status: DISCONTINUED | OUTPATIENT
Start: 2017-06-02 | End: 2017-06-03 | Stop reason: HOSPADM

## 2017-06-02 RX ORDER — PHENYLEPHRINE HCL IN 0.9% NACL 0.4MG/10ML
SYRINGE (ML) INTRAVENOUS AS NEEDED
Status: DISCONTINUED | OUTPATIENT
Start: 2017-06-02 | End: 2017-06-02 | Stop reason: HOSPADM

## 2017-06-02 RX ORDER — KETOROLAC TROMETHAMINE 30 MG/ML
INJECTION, SOLUTION INTRAMUSCULAR; INTRAVENOUS AS NEEDED
Status: DISCONTINUED | OUTPATIENT
Start: 2017-06-02 | End: 2017-06-02 | Stop reason: HOSPADM

## 2017-06-02 RX ORDER — BUPIVACAINE HYDROCHLORIDE AND EPINEPHRINE 5; 5 MG/ML; UG/ML
INJECTION, SOLUTION EPIDURAL; INTRACAUDAL; PERINEURAL AS NEEDED
Status: DISCONTINUED | OUTPATIENT
Start: 2017-06-02 | End: 2017-06-02

## 2017-06-02 RX ORDER — HYDROCODONE BITARTRATE AND ACETAMINOPHEN 5; 325 MG/1; MG/1
1-2 TABLET ORAL
Status: DISCONTINUED | OUTPATIENT
Start: 2017-06-02 | End: 2017-06-03 | Stop reason: HOSPADM

## 2017-06-02 RX ORDER — HYDROCHLOROTHIAZIDE 25 MG/1
12.5 TABLET ORAL DAILY
Status: DISCONTINUED | OUTPATIENT
Start: 2017-06-03 | End: 2017-06-03 | Stop reason: HOSPADM

## 2017-06-02 RX ORDER — SUCCINYLCHOLINE CHLORIDE 20 MG/ML
INJECTION INTRAMUSCULAR; INTRAVENOUS AS NEEDED
Status: DISCONTINUED | OUTPATIENT
Start: 2017-06-02 | End: 2017-06-02 | Stop reason: HOSPADM

## 2017-06-02 RX ORDER — DOCUSATE SODIUM 100 MG/1
100 CAPSULE, LIQUID FILLED ORAL 2 TIMES DAILY
Status: DISCONTINUED | OUTPATIENT
Start: 2017-06-02 | End: 2017-06-03 | Stop reason: HOSPADM

## 2017-06-02 RX ORDER — SODIUM CHLORIDE, SODIUM LACTATE, POTASSIUM CHLORIDE, CALCIUM CHLORIDE 600; 310; 30; 20 MG/100ML; MG/100ML; MG/100ML; MG/100ML
25 INJECTION, SOLUTION INTRAVENOUS CONTINUOUS
Status: DISCONTINUED | OUTPATIENT
Start: 2017-06-02 | End: 2017-06-02 | Stop reason: HOSPADM

## 2017-06-02 RX ORDER — DEXAMETHASONE SODIUM PHOSPHATE 4 MG/ML
INJECTION, SOLUTION INTRA-ARTICULAR; INTRALESIONAL; INTRAMUSCULAR; INTRAVENOUS; SOFT TISSUE AS NEEDED
Status: DISCONTINUED | OUTPATIENT
Start: 2017-06-02 | End: 2017-06-02 | Stop reason: HOSPADM

## 2017-06-02 RX ORDER — AMOXICILLIN 250 MG
1 CAPSULE ORAL 2 TIMES DAILY
Status: DISCONTINUED | OUTPATIENT
Start: 2017-06-02 | End: 2017-06-03 | Stop reason: HOSPADM

## 2017-06-02 RX ORDER — SODIUM CHLORIDE 9 MG/ML
75 INJECTION, SOLUTION INTRAVENOUS CONTINUOUS
Status: DISCONTINUED | OUTPATIENT
Start: 2017-06-02 | End: 2017-06-03 | Stop reason: HOSPADM

## 2017-06-02 RX ORDER — NIACIN 500 MG/1
500 TABLET, EXTENDED RELEASE ORAL
Status: DISCONTINUED | OUTPATIENT
Start: 2017-06-02 | End: 2017-06-03 | Stop reason: HOSPADM

## 2017-06-02 RX ORDER — VALSARTAN 160 MG/1
160 TABLET ORAL DAILY
Status: DISCONTINUED | OUTPATIENT
Start: 2017-06-03 | End: 2017-06-03 | Stop reason: HOSPADM

## 2017-06-02 RX ORDER — ONDANSETRON 2 MG/ML
4 INJECTION INTRAMUSCULAR; INTRAVENOUS
Status: DISCONTINUED | OUTPATIENT
Start: 2017-06-02 | End: 2017-06-03 | Stop reason: HOSPADM

## 2017-06-02 RX ORDER — NEOSTIGMINE METHYLSULFATE 1 MG/ML
INJECTION INTRAVENOUS AS NEEDED
Status: DISCONTINUED | OUTPATIENT
Start: 2017-06-02 | End: 2017-06-02 | Stop reason: HOSPADM

## 2017-06-02 RX ORDER — MIDAZOLAM HYDROCHLORIDE 1 MG/ML
INJECTION, SOLUTION INTRAMUSCULAR; INTRAVENOUS AS NEEDED
Status: DISCONTINUED | OUTPATIENT
Start: 2017-06-02 | End: 2017-06-02 | Stop reason: HOSPADM

## 2017-06-02 RX ORDER — POTASSIUM CHLORIDE 20 MEQ/1
20 TABLET, EXTENDED RELEASE ORAL 2 TIMES DAILY
Status: DISCONTINUED | OUTPATIENT
Start: 2017-06-02 | End: 2017-06-03 | Stop reason: HOSPADM

## 2017-06-02 RX ORDER — SODIUM CHLORIDE 0.9 % (FLUSH) 0.9 %
5-10 SYRINGE (ML) INJECTION AS NEEDED
Status: DISCONTINUED | OUTPATIENT
Start: 2017-06-02 | End: 2017-06-02

## 2017-06-02 RX ORDER — GLYCOPYRROLATE 0.2 MG/ML
INJECTION INTRAMUSCULAR; INTRAVENOUS AS NEEDED
Status: DISCONTINUED | OUTPATIENT
Start: 2017-06-02 | End: 2017-06-02 | Stop reason: HOSPADM

## 2017-06-02 RX ORDER — ASPIRIN 325 MG
325 TABLET, DELAYED RELEASE (ENTERIC COATED) ORAL
Status: DISCONTINUED | OUTPATIENT
Start: 2017-06-02 | End: 2017-06-03 | Stop reason: HOSPADM

## 2017-06-02 RX ORDER — LIDOCAINE HYDROCHLORIDE 10 MG/ML
0.1 INJECTION, SOLUTION EPIDURAL; INFILTRATION; INTRACAUDAL; PERINEURAL AS NEEDED
Status: DISCONTINUED | OUTPATIENT
Start: 2017-06-02 | End: 2017-06-02

## 2017-06-02 RX ORDER — MELATONIN
2000 DAILY
Status: DISCONTINUED | OUTPATIENT
Start: 2017-06-03 | End: 2017-06-03 | Stop reason: HOSPADM

## 2017-06-02 RX ORDER — CEFAZOLIN SODIUM IN 0.9 % NACL 2 G/100 ML
2 PLASTIC BAG, INJECTION (ML) INTRAVENOUS ONCE
Status: COMPLETED | OUTPATIENT
Start: 2017-06-02 | End: 2017-06-02

## 2017-06-02 RX ORDER — MORPHINE SULFATE 10 MG/ML
INJECTION, SOLUTION INTRAMUSCULAR; INTRAVENOUS AS NEEDED
Status: DISCONTINUED | OUTPATIENT
Start: 2017-06-02 | End: 2017-06-02 | Stop reason: HOSPADM

## 2017-06-02 RX ORDER — ONDANSETRON 2 MG/ML
INJECTION INTRAMUSCULAR; INTRAVENOUS AS NEEDED
Status: DISCONTINUED | OUTPATIENT
Start: 2017-06-02 | End: 2017-06-02 | Stop reason: HOSPADM

## 2017-06-02 RX ORDER — FENTANYL CITRATE 50 UG/ML
INJECTION, SOLUTION INTRAMUSCULAR; INTRAVENOUS AS NEEDED
Status: DISCONTINUED | OUTPATIENT
Start: 2017-06-02 | End: 2017-06-02 | Stop reason: HOSPADM

## 2017-06-02 RX ORDER — VITAMIN E 268 MG
400 CAPSULE ORAL DAILY
Status: DISCONTINUED | OUTPATIENT
Start: 2017-06-03 | End: 2017-06-03 | Stop reason: HOSPADM

## 2017-06-02 RX ADMIN — MORPHINE SULFATE 2 MG: 10 INJECTION, SOLUTION INTRAMUSCULAR; INTRAVENOUS at 13:56

## 2017-06-02 RX ADMIN — DOCUSATE SODIUM 100 MG: 100 CAPSULE, LIQUID FILLED ORAL at 19:21

## 2017-06-02 RX ADMIN — ONDANSETRON HYDROCHLORIDE 4 MG: 2 INJECTION, SOLUTION INTRAMUSCULAR; INTRAVENOUS at 20:32

## 2017-06-02 RX ADMIN — LIDOCAINE HYDROCHLORIDE 60 MG: 20 INJECTION, SOLUTION EPIDURAL; INFILTRATION; INTRACAUDAL; PERINEURAL at 11:07

## 2017-06-02 RX ADMIN — PROPOFOL 140 MG: 10 INJECTION, EMULSION INTRAVENOUS at 11:07

## 2017-06-02 RX ADMIN — SUCCINYLCHOLINE CHLORIDE 140 MG: 20 INJECTION INTRAMUSCULAR; INTRAVENOUS at 11:07

## 2017-06-02 RX ADMIN — MIDAZOLAM HYDROCHLORIDE 2 MG: 1 INJECTION, SOLUTION INTRAMUSCULAR; INTRAVENOUS at 10:57

## 2017-06-02 RX ADMIN — Medication 40 MCG: at 11:26

## 2017-06-02 RX ADMIN — FENTANYL CITRATE 100 MCG: 50 INJECTION, SOLUTION INTRAMUSCULAR; INTRAVENOUS at 11:07

## 2017-06-02 RX ADMIN — MORPHINE SULFATE 4 MG: 10 INJECTION, SOLUTION INTRAMUSCULAR; INTRAVENOUS at 13:40

## 2017-06-02 RX ADMIN — ASPIRIN 325 MG: 325 TABLET, DELAYED RELEASE ORAL at 22:40

## 2017-06-02 RX ADMIN — CEFAZOLIN 2 G: 10 INJECTION, POWDER, FOR SOLUTION INTRAVENOUS; PARENTERAL at 10:57

## 2017-06-02 RX ADMIN — GLYCOPYRROLATE 0.4 MG: 0.2 INJECTION INTRAMUSCULAR; INTRAVENOUS at 13:40

## 2017-06-02 RX ADMIN — SODIUM CHLORIDE 75 ML/HR: 900 INJECTION, SOLUTION INTRAVENOUS at 14:45

## 2017-06-02 RX ADMIN — HYDROMORPHONE HYDROCHLORIDE 0.5 MG: 1 INJECTION, SOLUTION INTRAMUSCULAR; INTRAVENOUS; SUBCUTANEOUS at 20:33

## 2017-06-02 RX ADMIN — SODIUM CHLORIDE, POTASSIUM CHLORIDE, SODIUM LACTATE AND CALCIUM CHLORIDE: 600; 310; 30; 20 INJECTION, SOLUTION INTRAVENOUS at 12:28

## 2017-06-02 RX ADMIN — POTASSIUM CHLORIDE 20 MEQ: 20 TABLET, EXTENDED RELEASE ORAL at 19:21

## 2017-06-02 RX ADMIN — KETOROLAC TROMETHAMINE 15 MG: 30 INJECTION, SOLUTION INTRAMUSCULAR; INTRAVENOUS at 13:42

## 2017-06-02 RX ADMIN — ROCURONIUM BROMIDE 5 MG: 10 INJECTION, SOLUTION INTRAVENOUS at 12:12

## 2017-06-02 RX ADMIN — Medication 40 MCG: at 11:20

## 2017-06-02 RX ADMIN — HYDROCODONE BITARTRATE AND ACETAMINOPHEN 2 TABLET: 5; 325 TABLET ORAL at 19:21

## 2017-06-02 RX ADMIN — ROCURONIUM BROMIDE 5 MG: 10 INJECTION, SOLUTION INTRAVENOUS at 13:05

## 2017-06-02 RX ADMIN — DEXAMETHASONE SODIUM PHOSPHATE 8 MG: 4 INJECTION, SOLUTION INTRA-ARTICULAR; INTRALESIONAL; INTRAMUSCULAR; INTRAVENOUS; SOFT TISSUE at 11:15

## 2017-06-02 RX ADMIN — ONDANSETRON 4 MG: 2 INJECTION INTRAMUSCULAR; INTRAVENOUS at 13:40

## 2017-06-02 RX ADMIN — ROCURONIUM BROMIDE 5 MG: 10 INJECTION, SOLUTION INTRAVENOUS at 11:07

## 2017-06-02 RX ADMIN — MORPHINE SULFATE 1 MG: 10 INJECTION, SOLUTION INTRAMUSCULAR; INTRAVENOUS at 11:20

## 2017-06-02 RX ADMIN — DOCUSATE SODIUM AND SENNOSIDES 1 TABLET: 8.6; 5 TABLET, FILM COATED ORAL at 19:21

## 2017-06-02 RX ADMIN — ROCURONIUM BROMIDE 10 MG: 10 INJECTION, SOLUTION INTRAVENOUS at 11:45

## 2017-06-02 RX ADMIN — NIACIN 500 MG: 500 TABLET, FILM COATED, EXTENDED RELEASE ORAL at 22:40

## 2017-06-02 RX ADMIN — NEOSTIGMINE METHYLSULFATE 2.5 MG: 1 INJECTION INTRAVENOUS at 13:40

## 2017-06-02 RX ADMIN — ROCURONIUM BROMIDE 30 MG: 10 INJECTION, SOLUTION INTRAVENOUS at 11:16

## 2017-06-02 RX ADMIN — GLYCOPYRROLATE 0.2 MG: 0.2 INJECTION INTRAMUSCULAR; INTRAVENOUS at 11:23

## 2017-06-02 RX ADMIN — ACETAMINOPHEN 1000 MG: 10 INJECTION, SOLUTION INTRAVENOUS at 11:30

## 2017-06-02 RX ADMIN — ROCURONIUM BROMIDE 10 MG: 10 INJECTION, SOLUTION INTRAVENOUS at 12:40

## 2017-06-02 RX ADMIN — SODIUM CHLORIDE, POTASSIUM CHLORIDE, SODIUM LACTATE AND CALCIUM CHLORIDE: 600; 310; 30; 20 INJECTION, SOLUTION INTRAVENOUS at 10:57

## 2017-06-02 RX ADMIN — MORPHINE SULFATE 3 MG: 10 INJECTION, SOLUTION INTRAMUSCULAR; INTRAVENOUS at 10:57

## 2017-06-02 RX ADMIN — HYDROMORPHONE HYDROCHLORIDE 0.5 MG: 1 INJECTION, SOLUTION INTRAMUSCULAR; INTRAVENOUS; SUBCUTANEOUS at 14:48

## 2017-06-02 NOTE — DISCHARGE INSTRUCTIONS
Discharge Instructions: Hernia -- Dr. Beryl Menjivar    Call on next business day to arrange appointment for follow up in 3 weeks -- [phone]. Activity:  Walk regularly. No lifting more than 10 pounds for 6 weeks. Light aerobic activity is okay when you feel up to it. You may resume driving in three days unless still requiring narcotics for pain. Return to work in 1-2 weeks but no heavy lifting for 6 weeks. Apply ice to areas of tenderness for 20 minutes at a time. Keep a cloth between the skin and the bag of ice. Work:  You may return to work in 1 or 2 weeks to light activity. No lifting more than 10 pounds (=\"light duty\") for 6 weeks. If your employer can not accommodate \"light duty,\" your employer will need to provide any necessary paperwork to our office. This document with serve as the initial \"note\" to your employer. Diet:  You may resume normal diet. Wound Care:  Dermabond glue dressing will fall off on its own. If you experience a lot of drainage, develop redness around the wound, or a fever over 101 F occurs please call the office. There will be significant swelling under the incision(s) that will develop over the next 3-4 days. Ice will help reduce this. Wear your abdominal binder at all times for 3 weeks. May remove to shower. May wash binder. Wear a cotton T-shirt under the binder for comfort. You may shower. No baths or swimming for 3 weeks. Medications:  See attached \"Medication Reconciliation. \"    Resume home medications as indicated on the Medical Reconciliation form. Do not use blood thinners (such as Aspirin, Coumadin, or Plavix) until 3 days after surgery. Pain medications:  Non steroidal antiinflammatories (ibuprofen -- Advil or Motrin) seem to work best for post surgical pain. Try these first.      PUT ICE ON YOUR INCISIONS 20 MINUTES EVERY HOUR WHILE THEY REMAIN SORE. PLACE A CLOTH BETWEEN YOUR SKIN THE THE ICE BAG.     A narcotic prescription will also be given for breakthrough pain. Tylenol can be used in place of the narcotic. Colace or Miralax should be used twice daily to prevent constipation while on narcotics. If you are still having trouble having a BM after 1-2 days, try milk of magnesia. If this does not work within 24 hours, try a bottle of magnesium citrate. Narcotics and anesthesia sometimes cause nausea and vomiting. If persistent please call the office. Do not hesitate to call with questions or concerns.     Danielle Ocasio MD  Tel [phone]  Fax [phone]

## 2017-06-02 NOTE — ANESTHESIA PREPROCEDURE EVALUATION
Anesthetic History   No history of anesthetic complications            Review of Systems / Medical History  Patient summary reviewed, nursing notes reviewed and pertinent labs reviewed    Pulmonary  Within defined limits                 Neuro/Psych   Within defined limits           Cardiovascular    Hypertension: well controlled        Dysrhythmias : PVC      Exercise tolerance: >4 METS     GI/Hepatic/Renal  Within defined limits              Endo/Other        Obesity, arthritis and anemia     Other Findings            Physical Exam    Airway  Mallampati: II  TM Distance: 4 - 6 cm  Neck ROM: normal range of motion   Mouth opening: Normal     Cardiovascular  Regular rate and rhythm,  S1 and S2 normal,  no murmur, click, rub, or gallop             Dental  No notable dental hx       Pulmonary  Breath sounds clear to auscultation               Abdominal  GI exam deferred       Other Findings            Anesthetic Plan    ASA: 2  Anesthesia type: general    Monitoring Plan: BIS      Induction: Intravenous  Anesthetic plan and risks discussed with: Patient

## 2017-06-02 NOTE — PERIOP NOTES
Handoff Report from Operating Room to PACU    Report received from Derek Greco RN  and Adams Holliday CRNA  regarding Luba Aldana. Surgeon(s):  Ira Sims MD  And Procedure(s) (LRB):  DAVINCI RIGHT INGUINAL HERNIA AND DAVINCI UMBILICAL HERNIA REPAIRS WITH MESH  (N/A)  confirmed   with dressings discussed. Anesthesia type, drugs, patient history, complications, estimated blood loss, vital signs, intake and output, and last pain medication and reversal medications were reviewed.

## 2017-06-02 NOTE — PROGRESS NOTES
End of Shift Nursing Note    Bedside shift change report given to Jr Peralta (oncoming nurse) by Stacia Hurtado (offgoing nurse). Report included the following information SBAR, Kardex, Intake/Output and Recent Results. Significant changes during shift:    Pt ambulating to bathroom ,voiding, up to chair.     Non-emergent issues for physician to address:   none         Vital Signs:    Temp: 97.4 °F (36.3 °C)     Pulse (Heart Rate): (!) 48     BP: 103/65     Resp Rate: 18     O2 Sat (%): 94 %    GI:    Current diet:  DIET REGULAR    Nausea: NO  Vomiting: NO  Bowel Sounds: YES  Flatus: NO  Last Bowel Movement: yesterday    Rex Medina

## 2017-06-02 NOTE — PERIOP NOTES
Pt family in waiting area updated of pt status, HR 40s consulted with Dr. Oscar Conner, pt asymptomatic, preop was 50s, no orders, pt will be on tele monitoring in [facility], pt awake and alert , no c/o noted

## 2017-06-02 NOTE — PERIOP NOTES
TRANSFER - OUT REPORT:    Verbal report given to GIGI Gaitan on Dolores Downey  being transferred to [employer], 2119 for routine post - op       Report consisted of patients Situation, Background, Assessment and   Recommendations(SBAR). Information from the following report(s) SBAR, Kardex, OR Summary, Intake/Output, MAR and Cardiac Rhythm sinus monie was reviewed with the receiving nurse. Opportunity for questions and clarification was provided.       Patient transported with:   O2 @ 0 liters  Tech

## 2017-06-02 NOTE — PROGRESS NOTES
Primary Nurse Roxie Foote and Guy Stewart, RN performed a dual skin assessment on this patient No impairment noted  Derik score is 21

## 2017-06-02 NOTE — IP AVS SNAPSHOT
[address] [facility] 
[phone] Patient: Dolores Downey MRN: [id number] KVF:[id number] You are allergic to the following No active allergies Recent Documentation Height Weight BMI OB Status Smoking Status 1.676 m 88.6 kg 31.53 kg/m2 Hysterectomy Never Smoker Emergency Contacts Name Discharge Info Relation Home Work Mobile Adrian Youssef  Spouse [3] [phone] About your hospitalization You were admitted on:  June 2, 2017 You last received care in the:  [facility] 2 GENERAL SURGERY You were discharged on:  Stacey 3, 2017 Unit phone number:  [phone] Why you were hospitalized Your primary diagnosis was:  Right Inguinal Hernia Your diagnoses also included:  Ventral Hernia Without Obstruction Or Gangrene Providers Seen During Your Hospitalizations Provider Role Specialty Primary office phone Betito Ventura MD Attending Provider General Surgery [phone] Your Primary Care Physician (PCP) Primary Care Physician Office Phone Office Fax Nettie Lopezsch [phone] Follow-up Information Follow up With Details Comments Contact Info Vita Berrios MD   [address] [facility] 
[phone] Current Discharge Medication List  
  
START taking these medications Dose & Instructions Dispensing Information Comments Morning Noon Evening Bedtime  
 docusate sodium 100 mg capsule Commonly known as:  Heather Mabry Your last dose was: Your next dose is:    
   
   
 Dose:  100 mg Take 1 Cap by mouth two (2) times a day. May use OTC instead. Prevents constipation from narcotics. Quantity:  60 Cap Refills:  0 CONTINUE these medications which have CHANGED Dose & Instructions Dispensing Information Comments Morning Noon Evening Bedtime * HYDROcodone-acetaminophen 5-325 mg per tablet Commonly known as:  Olegario Monae What changed:  Another medication with the same name was added. Make sure you understand how and when to take each. Your last dose was: Your next dose is:    
   
   
 Dose:  1 Tab Take 1 Tab by mouth every four (4) hours as needed. Max Daily Amount: 6 Tabs. Quantity:  60 Tab Refills:  0  
     
   
   
   
  
 * HYDROcodone-acetaminophen 5-325 mg per tablet Commonly known as:  Olegario Monae What changed: You were already taking a medication with the same name, and this prescription was added. Make sure you understand how and when to take each. Your last dose was: Your next dose is:    
   
   
 Dose:  1-2 Tab Take 1-2 Tabs by mouth every four (4) hours as needed for Pain. Max Daily Amount: 12 Tabs. Quantity:  40 Tab Refills:  0  
     
   
   
   
  
 * Notice: This list has 2 medication(s) that are the same as other medications prescribed for you. Read the directions carefully, and ask your doctor or other care provider to review them with you. CONTINUE these medications which have NOT CHANGED Dose & Instructions Dispensing Information Comments Morning Noon Evening Bedtime  
 ascorbic acid (vitamin C) 250 mg tablet Commonly known as:  VITAMIN C Your last dose was: Your next dose is:    
   
   
 Dose:  500 mg Take 500 mg by mouth daily. Refills:  0 BENICAR HCT 40-12.5 mg per tablet Generic drug:  olmesartan-hydroCHLOROthiazide Your last dose was: Your next dose is:    
   
   
 Dose:  1 Tab Take 1 Tab by mouth daily. May resume medication when b/p greater than 120/80. Refills:  0  
     
   
   
   
  
 cholecalciferol (vitamin D3) 2,000 unit Tab Your last dose was: Your next dose is:    
   
   
 Dose:  1 Tab Take 1 Tab by mouth daily. Refills:  0  
     
   
   
   
  
 estradiol 0.05 mg/24 hr  
Commonly known as:  Nikkijose angel Leonila Your last dose was: Your next dose is:    
   
   
 Dose:  1 Patch 1 Patch by TransDERmal route two (2) days a week. Wed and sun Refills:  0 Ferrous Fumarate 325 mg (106 mg iron) Tab Your last dose was: Your next dose is:    
   
   
 Dose:  1 Tab Take 1 Tab by mouth two (2) times a day. Refills:  0  
     
   
   
   
  
 lansoprazole 15 mg capsule Commonly known as:  PREVACID Your last dose was: Your next dose is:    
   
   
 Dose:  15 mg Take 15 mg by mouth every Monday, Wednesday, Friday. Refills:  0  
     
   
   
   
  
 M-VIT PO Your last dose was: Your next dose is:    
   
   
 Dose:  1 Tab Take 1 Tab by mouth daily. Refills:  0  
     
   
   
   
  
 niacin  mg tablet Commonly known as:  Santiago Carrel Your last dose was: Your next dose is:    
   
   
 Dose:  500 mg Take 1 Tab by mouth nightly. May resume medication when b/p greater than 120/80. Refills:  0 Omega-3-DHA-EPA-Fish Oil 1,000 mg (120 mg-180 mg) Cap Your last dose was: Your next dose is:    
   
   
 Dose:  2 Tab Take 2 Tabs by mouth two (2) times a day. Hold for two weeks Refills:  0 POTASSIUM CHLORIDE SR 10 MEQ TAB Your last dose was: Your next dose is:    
   
   
 Dose:  20 mEq 20 mEq two (2) times a day. Refills:  0  
     
   
   
   
  
 senna-docusate 8.6-50 mg per tablet Commonly known as:  SENNA PLUS Your last dose was: Your next dose is:    
   
   
 Dose:  1 Tab Take 1 Tab by mouth two (2) times a day. Take until having regular bowel movements. Quantity:  60 Tab Refills:  0  
     
   
   
   
  
 vitamin E 400 unit capsule Commonly known as:  [address] Your last dose was: Your next dose is:    
   
   
 Dose:  400 Units Take 1 Cap by mouth daily. Hold for two weeks. Refills:  0 STOP taking these medications   
 aspirin delayed-release 325 mg tablet Where to Get Your Medications These medications were sent to [address] [place] [address], [address], [address] [place] [id number] Phone:  [phone]  
  docusate sodium 100 mg capsule Information on where to get these meds will be given to you by the nurse or doctor. ! Ask your nurse or doctor about these medications HYDROcodone-acetaminophen 5-325 mg per tablet Discharge Instructions Discharge Instructions: Hernia -- Dr. Cory Saldivar Call on next business day to arrange appointment for follow up in 3 weeks -- [phone]. Activity: 
Walk regularly. No lifting more than 10 pounds for 6 weeks. Light aerobic activity is okay when you feel up to it. You may resume driving in three days unless still requiring narcotics for pain. Return to work in 1-2 weeks but no heavy lifting for 6 weeks. Apply ice to areas of tenderness for 20 minutes at a time. Keep a cloth between the skin and the bag of ice. Work: 
You may return to work in 1 or 2 weeks to light activity. No lifting more than 10 pounds (=\"light duty\") for 6 weeks. If your employer can not accommodate \"light duty,\" your employer will need to provide any necessary paperwork to our office. This document with serve as the initial \"note\" to your employer. Diet: 
You may resume normal diet. Wound Care: 
Dermabond glue dressing will fall off on its own. If you experience a lot of drainage, develop redness around the wound, or a fever over 101 F occurs please call the office. There will be significant swelling under the incision(s) that will develop over the next 3-4 days. Ice will help reduce this. Wear your abdominal binder at all times for 3 weeks. May remove to shower. May wash binder. Wear a cotton T-shirt under the binder for comfort. You may shower. No baths or swimming for 3 weeks. Medications: 
See attached \"Medication Reconciliation. \" 
 
Resume home medications as indicated on the Medical Reconciliation form. Do not use blood thinners (such as Aspirin, Coumadin, or Plavix) until 3 days after surgery. Pain medications:  Non steroidal antiinflammatories (ibuprofen -- Advil or Motrin) seem to work best for post surgical pain. Try these first.   
 
PUT ICE ON YOUR INCISIONS 20 MINUTES EVERY HOUR WHILE THEY REMAIN SORE. PLACE A CLOTH BETWEEN YOUR SKIN THE THE ICE BAG. A narcotic prescription will also be given for breakthrough pain. Tylenol can be used in place of the narcotic. Colace or Miralax should be used twice daily to prevent constipation while on narcotics. If you are still having trouble having a BM after 1-2 days, try milk of magnesia. If this does not work within 24 hours, try a bottle of magnesium citrate. Narcotics and anesthesia sometimes cause nausea and vomiting. If persistent please call the office. Do not hesitate to call with questions or concerns. Melanie William MD 
Tel [phone] Fax [phone] Discharge Orders None [employer] Announcement We are excited to announce that we are making your provider's discharge notes available to you in [employer]. You will see these notes when they are completed and signed by the physician that discharged you from your recent hospital stay. If you have any questions or concerns about any information you see in [employer], please call the Health Information Department where you were seen or reach out to your Primary Care Provider for more information about your plan of care. Introducing [facility] SERVICES!    
 Janki Larry introduces [employer] patient portal. Now you can access parts of your medical record, email your doctor's office, and request medication refills online. 1. In your internet browser, go to [email or web address] 2. Click on the First Time User? Click Here link in the Sign In box. You will see the New Member Sign Up page. 3. Enter your [employer] Access Code exactly as it appears below. You will not need to use this code after youve completed the sign-up process. If you do not sign up before the expiration date, you must request a new code. · [employer] Access Code: [email or web address] Expires: 8/10/2017  1:50 PM 
 
4. Enter the last four digits of your Social Security Number (xxxx) and Date of Birth (mm/dd/yyyy) as indicated and click Submit. You will be taken to the next sign-up page. 5. Create a [employer]ten ID. This will be your [employer] login ID and cannot be changed, so think of one that is secure and easy to remember. 6. Create a [employer] password. You can change your password at any time. 7. Enter your Password Reset Question and Answer. This can be used at a later time if you forget your password. 8. Enter your e-mail address. You will receive e-mail notification when new information is available in [address] 9. Click Sign Up. You can now view and download portions of your medical record. 10. Click the Download Summary menu link to download a portable copy of your medical information. If you have questions, please visit the Frequently Asked Questions section of the [employer] website. Remember, [employer] is NOT to be used for urgent needs. For medical emergencies, dial 911. Now available from your iPhone and Android! General Information Please provide this summary of care documentation to your next provider. Patient Signature:  ____________________________________________________________ Date:  ____________________________________________________________  
  
Tricia Aamir  Provider Signature: ____________________________________________________________ Date:  ____________________________________________________________

## 2017-06-02 NOTE — ANESTHESIA POSTPROCEDURE EVALUATION
Post-Anesthesia Evaluation and Assessment    Patient: Anna Gibson MRN: [id number]  SSN: [id number]    YOB: 1955  Age: 57 y.o. Sex: female       Cardiovascular Function/Vital Signs  Visit Vitals    BP (!) 132/106    Pulse (!) 46    Temp 36.8 °C (98.3 °F)    Resp 16    Ht 5' 6\" (1.676 m)    Wt 88.6 kg (195 lb 5.2 oz)    SpO2 100%    BMI 31.53 kg/m2       Patient is status post general anesthesia for Procedure(s):  DAVINCI RIGHT INGUINAL HERNIA AND DAVINCI UMBILICAL HERNIA REPAIRS WITH MESH . Nausea/Vomiting: None    Postoperative hydration reviewed and adequate. Pain:  Pain Scale 1: Numeric (0 - 10) (06/02/17 1409)  Pain Intensity 1: 0 (06/02/17 1409)   Managed    Neurological Status:   Neuro (WDL): Within Defined Limits (06/02/17 1409)  Neuro  LUE Motor Response: Purposeful;Spontaneous  (06/02/17 1409)  LLE Motor Response: Purposeful;Spontaneous  (06/02/17 1409)  RUE Motor Response: Purposeful;Spontaneous  (06/02/17 1409)  RLE Motor Response: Purposeful;Spontaneous  (06/02/17 1409)   At baseline    Mental Status and Level of Consciousness: Arousable    Pulmonary Status:   O2 Device: Room air (06/02/17 1409)   Adequate oxygenation and airway patent    Complications related to anesthesia: None    Post-anesthesia assessment completed.  No concerns    Signed By: Terrance Perez MD     June 2, 2017

## 2017-06-03 VITALS
TEMPERATURE: 97.6 F | SYSTOLIC BLOOD PRESSURE: 95 MMHG | DIASTOLIC BLOOD PRESSURE: 54 MMHG | OXYGEN SATURATION: 97 % | WEIGHT: 195.33 LBS | BODY MASS INDEX: 31.39 KG/M2 | HEART RATE: 57 BPM | RESPIRATION RATE: 16 BRPM | HEIGHT: 66 IN

## 2017-06-03 LAB
HCT VFR BLD AUTO: 32.2 % (ref 35–47)
HGB BLD-MCNC: 10.2 G/DL (ref 11.5–16)

## 2017-06-03 PROCEDURE — 99218 HC RM OBSERVATION: CPT

## 2017-06-03 PROCEDURE — 85018 HEMOGLOBIN: CPT | Performed by: SURGERY

## 2017-06-03 PROCEDURE — 36415 COLL VENOUS BLD VENIPUNCTURE: CPT | Performed by: SURGERY

## 2017-06-03 PROCEDURE — 74011250637 HC RX REV CODE- 250/637: Performed by: SURGERY

## 2017-06-03 PROCEDURE — 74011250636 HC RX REV CODE- 250/636: Performed by: SURGERY

## 2017-06-03 RX ADMIN — HYDROCODONE BITARTRATE AND ACETAMINOPHEN 2 TABLET: 5; 325 TABLET ORAL at 08:54

## 2017-06-03 RX ADMIN — OXYCODONE HYDROCHLORIDE AND ACETAMINOPHEN 500 MG: 500 TABLET ORAL at 08:55

## 2017-06-03 RX ADMIN — DOCUSATE SODIUM 100 MG: 100 CAPSULE, LIQUID FILLED ORAL at 08:55

## 2017-06-03 RX ADMIN — CHOLECALCIFEROL TAB 25 MCG (1000 UNIT) 2000 UNITS: 25 TAB at 08:55

## 2017-06-03 RX ADMIN — DOCUSATE SODIUM AND SENNOSIDES 1 TABLET: 8.6; 5 TABLET, FILM COATED ORAL at 08:54

## 2017-06-03 RX ADMIN — PANTOPRAZOLE SODIUM 40 MG: 40 TABLET, DELAYED RELEASE ORAL at 06:27

## 2017-06-03 RX ADMIN — POTASSIUM CHLORIDE 20 MEQ: 20 TABLET, EXTENDED RELEASE ORAL at 08:55

## 2017-06-03 RX ADMIN — HYDROMORPHONE HYDROCHLORIDE 0.5 MG: 1 INJECTION, SOLUTION INTRAMUSCULAR; INTRAVENOUS; SUBCUTANEOUS at 01:50

## 2017-06-03 RX ADMIN — SODIUM CHLORIDE 75 ML/HR: 900 INJECTION, SOLUTION INTRAVENOUS at 01:52

## 2017-06-03 RX ADMIN — Medication 400 UNITS: at 08:55

## 2017-06-03 RX ADMIN — PANTOPRAZOLE SODIUM 40 MG: 40 TABLET, DELAYED RELEASE ORAL at 07:30

## 2017-06-03 NOTE — DISCHARGE SUMMARY
Physician Discharge Summary     Patient ID:  Alivia Garner  [id number]  57 y.o.  1955    Admit Date: 6/2/2017    Discharge Date: 6/3/2017    Admission Diagnoses: INGUINAL AND UMBILICAL HERNIAS;INGUINAL AND UMBILICAL HERNI*    Discharge Diagnoses:  Principal Problem:    Right inguinal hernia (6/2/2017)    Active Problems:    Ventral hernia without obstruction or gangrene (6/2/2017)         Admission Condition: Fair    Discharge Condition: Good    Last Procedure: Procedure(s):  [address] Course:   Normal hospital course for this procedure. Consults: None    Disposition: home    Patient Instructions:   Current Discharge Medication List      START taking these medications    Details   docusate sodium (COLACE) 100 mg capsule Take 1 Cap by mouth two (2) times a day. May use OTC instead. Prevents constipation from narcotics. Qty: 60 Cap, Refills: 0      !! HYDROcodone-acetaminophen (NORCO) 5-325 mg per tablet Take 1-2 Tabs by mouth every four (4) hours as needed for Pain. Max Daily Amount: 12 Tabs. Qty: 40 Tab, Refills: 0       !! - Potential duplicate medications found. Please discuss with provider. CONTINUE these medications which have NOT CHANGED    Details   niacin ER (NIASPAN) 500 mg tablet Take 1 Tab by mouth nightly. May resume medication when b/p greater than 120/80. olmesartan-hydroCHLOROthiazide (BENICAR HCT) 40-12.5 mg per tablet Take 1 Tab by mouth daily. May resume medication when b/p greater than 120/80. Omega-3-DHA-EPA-Fish Oil 1,000 mg (120 mg-180 mg) cap Take 2 Tabs by mouth two (2) times a day. Hold for two weeks      vitamin E (AQUA GEMS) 400 unit capsule Take 1 Cap by mouth daily. Hold for two weeks. senna-docusate (SENNA PLUS) 8.6-50 mg per tablet Take 1 Tab by mouth two (2) times a day. Take until having regular bowel movements.   Qty: 60 Tab, Refills: 0      POTASSIUM CHLORIDE SR 10 MEQ TAB 20 mEq two (2) times a day. cholecalciferol, vitamin D3, 2,000 unit tab Take 1 Tab by mouth daily. lansoprazole (PREVACID) 15 mg capsule Take 15 mg by mouth every Monday, Wednesday, Friday. PV W-O YAN/FERROUS FUMARATE/FA (M-VIT PO) Take 1 Tab by mouth daily. estradiol (VIVELLE) 0.05 mg/24 hr 1 Patch by TransDERmal route two (2) days a week. Wed and sun      ascorbic acid (VITAMIN C) 250 mg tablet Take 500 mg by mouth daily. Ferrous Fumarate 325 mg (106 mg iron) tab Take 1 Tab by mouth two (2) times a day.      !! HYDROcodone-acetaminophen (NORCO) 5-325 mg per tablet Take 1 Tab by mouth every four (4) hours as needed. Max Daily Amount: 6 Tabs. Qty: 60 Tab, Refills: 0       !! - Potential duplicate medications found. Please discuss with provider. STOP taking these medications       aspirin delayed-release 325 mg tablet Comments:   Reason for Stopping:             Activity: No driving while on analgesics and No heavy lifting for 4 weeks  Diet: Regular Diet  Wound Care: Keep wound clean and dry    Follow-up with Dr. Beryl Menjivar in 2 weeks.   Follow-up tests/labs none    Signed:  Esperanza Caballero MD  [facility]  6/3/2017  10:09 AM

## 2017-06-03 NOTE — PROGRESS NOTES
Discharge instructions reviewed with pt, 1 written Rx given. PIV removed, pt without further questions/concerns. Will call to schedule follow up appt.

## 2017-06-13 NOTE — OP NOTES
[address], [address]   OP NOTE       Name:  Mitchell Redd   MR#:  [id number]   :  1955   Account #:  [de-identified]    Surgery Date:  2017   Date of Adm:  2017       PREOPERATIVE DIAGNOSES   1. Right inguinal hernia. 2. Umbilical hernia. POSTOPERATIVE DIAGNOSES   1. Right inguinal hernia. 2. Umbilical hernia. PROCEDURES PERFORMED: Da Pradeep repair of umbilical hernia and   right inguinal hernia. ANESTHESIA: General endotracheal.    ESTIMATED BLOOD LOSS: Minimal.    INDICATIONS: The patient is a 70-year-old female who presented with   right groin pain. She was found on CAT scan to have a right inguinal   hernia and umbilical hernia was also noted. FINDINGS: Right inguinal hernia containing adipose, and umbilical   hernia. SPECIMENS REMOVED: None. DESCRIPTION OF PROCEDURE: After obtaining informed consent,   the patient was taken to the operating room, placed supine on the   operating table. An operative time-out was performed and general   endotracheal anesthesia was induced. Preoperative antibiotics were   administered, and the abdomen was prepped and draped in the usual   sterile fashion. An Aminata Flaming was employed. The abdomen was then entered in the left upper quadrant using an 8   mm optical trocar. The abdomen was insufflated without incident. The   above findings were noted. An upper midline 8 mm port was placed   under direct vision, followed by a left mid abdomen port, also under   direct vision. The robot was then docked, and the inguinal hernia was   addressed. The peritoneum was incised and dissected down off of the   inguinal canal. An indirect inguinal hernia was noted, and the herniated   adipose was brought back into the abdominal cavity. A piece of   ProGrip mesh was inserted into the abdominal cavity and was   positioned with the indirect defect at the center of the mesh.  The mesh   was secured to the lacunar ligament with absorbable suture. The   peritoneum was then reapproximated over the mesh using absorbable   V-Loc suture. The umbilical defect was then closed using 0   nonabsorbable V-Loc sutures. A piece of mesh was then positioned   over the defect and secured around its perimeter using the same   suture. The abdomen was inspected for hemostasis and excellent   hemostasis was noted. The abdomen was then desufflated through the   ports and the ports were removed. The skin was closed with 4-0   Monocryl and dressed with Dermabond. The patient was recovered   from general anesthesia and taken to the recovery area in satisfactory   condition. All instrument, sponge and needle counts were reported as   correct.         Mandeep Moise MD DD / Angy Burgess   D:  06/13/2017   13:12   T:  06/13/2017   13:38   Job #:  [id number]

## 2017-06-29 ENCOUNTER — OFFICE VISIT (OUTPATIENT)
Dept: SURGERY | Age: 62
End: 2017-06-29

## 2017-06-29 VITALS
HEIGHT: 66 IN | RESPIRATION RATE: 20 BRPM | WEIGHT: 195 LBS | SYSTOLIC BLOOD PRESSURE: 123 MMHG | HEART RATE: 68 BPM | DIASTOLIC BLOOD PRESSURE: 77 MMHG | OXYGEN SATURATION: 99 % | BODY MASS INDEX: 31.34 KG/M2

## 2017-06-29 DIAGNOSIS — Z09 POSTOPERATIVE EXAMINATION: Primary | ICD-10-CM

## 2017-06-29 NOTE — PROGRESS NOTES
To: Nydia Solomon MD  From: Carla Dave MD    Thank you for referring Morenita Ocasio. Encounter Date: 6/29/2017    Subjective:      Karoline Gao is a 57 y.o. female presents for postop care. Has no complaints today. Feels well. Eating a regular diet without difficulty. Bowel movements are regular. Objective:     General:  alert, cooperative, no distress, appears stated age   Abdomen: soft, bowel sounds active, non-tender   Incision(s):  healing well, no drainage, no erythema, repairs intact with vigorous valsalva. Assessment:     S/p robotic right inguinal and umbilical hernia repairs. Doing well postoperatively. Plan:     Reminded of activity restrictions documented on discharge instructions. Patient understands no further follow-up required. Knows to call for any concerns.       Carla Dave MD

## 2017-06-29 NOTE — MR AVS SNAPSHOT
Visit Information Date & Time Provider Department Dept. Phone Encounter #  
 6/29/2017 10:00 AM Soto Echeverria MD [facility] [id number] Your Appointments 8/21/2017  9:40 AM  
FOLLOW UP 10 with MD [facility] ([facility]) Appt Note: 3 month flp [address] [phone] [address] [address] Upcoming Health Maintenance Date Due Hepatitis C Screening 1955 DTaP/Tdap/Td series (1 - Tdap) 7/12/1975 PAP AKA CERVICAL CYTOLOGY 7/12/1975 BREAST CANCER SCRN MAMMOGRAM 7/12/2004 FOBT Q 1 YEAR AGE 50-75 7/12/2004 ZOSTER VACCINE AGE 60> 7/12/2014 INFLUENZA AGE 9 TO ADULT 8/1/2017 Allergies as of 6/29/2017  Review Complete On: 6/29/2017 By: Soto Echeverria MD  
 No Known Allergies Current Immunizations  Never Reviewed Name Date Influenza Vaccine 11/7/2016 Not reviewed this visit You Were Diagnosed With   
  
 Codes Comments Postoperative examination    -  Primary ICD-10-CM: K09 ICD-9-CM: V67.00 Vitals BP Pulse Resp Height(growth percentile) Weight(growth percentile) SpO2  
 123/77 68 20 5' 6\" (1.676 m) 195 lb (88.5 kg) 99% BMI OB Status Smoking Status 31.47 kg/m2 Hysterectomy Never Smoker BMI and BSA Data Body Mass Index Body Surface Area  
 31.47 kg/m 2 2.03 m 2 Preferred Pharmacy Pharmacy Name Phone [id number] [address] [phone] Your Updated Medication List  
  
   
This list is accurate as of: 6/29/17 12:41 PM.  Always use your most recent med list.  
  
  
  
  
 ascorbic acid (vitamin C) 250 mg tablet Commonly known as:  VITAMIN C Take 500 mg by mouth daily. BENICAR HCT 40-12.5 mg per tablet Generic drug:  olmesartan-hydroCHLOROthiazide Take 1 Tab by mouth daily. May resume medication when b/p greater than 120/80. cholecalciferol (vitamin D3) 2,000 unit Tab Take 1 Tab by mouth daily. docusate sodium 100 mg capsule Commonly known as:  Erik Cost Take 1 Cap by mouth two (2) times a day. May use OTC instead. Prevents constipation from narcotics. estradiol 0.05 mg/24 hr  
Commonly known as:  VIVELLE  
1 Patch by TransDERmal route two (2) days a week. Wed and sun Ferrous Fumarate 325 mg (106 mg iron) Tab Take 1 Tab by mouth two (2) times a day. * HYDROcodone-acetaminophen 5-325 mg per tablet Commonly known as:  Heladio Brittle Take 1 Tab by mouth every four (4) hours as needed. Max Daily Amount: 6 Tabs. * HYDROcodone-acetaminophen 5-325 mg per tablet Commonly known as:  Heladio Brittle Take 1-2 Tabs by mouth every four (4) hours as needed for Pain. Max Daily Amount: 12 Tabs. lansoprazole 15 mg capsule Commonly known as:  PREVACID Take 15 mg by mouth every Monday, Wednesday, Friday. M-VIT PO Take 1 Tab by mouth daily. niacin  mg tablet Commonly known as:  Tawanda Gammons Take 1 Tab by mouth nightly. May resume medication when b/p greater than 120/80. Omega-3-DHA-EPA-Fish Oil 1,000 mg (120 mg-180 mg) Cap Take 2 Tabs by mouth two (2) times a day. Hold for two weeks POTASSIUM CHLORIDE SR 10 MEQ TAB 20 mEq two (2) times a day. senna-docusate 8.6-50 mg per tablet Commonly known as:  SENNA PLUS Take 1 Tab by mouth two (2) times a day. Take until having regular bowel movements. vitamin E 400 unit capsule Commonly known as:  [address] Take 1 Cap by mouth daily. Hold for two weeks. * Notice: This list has 2 medication(s) that are the same as other medications prescribed for you. Read the directions carefully, and ask your doctor or other care provider to review them with you. Introducing [facility] & HEALTH SERVICES! Martínze Lisadrew introduces HihoCoder patient portal. Now you can access parts of your medical record, email your doctor's office, and request medication refills online. 1. In your internet browser, go to [email or web address] 2. Click on the First Time User? Click Here link in the Sign In box. You will see the New Member Sign Up page. 3. Enter your HihoCoder Access Code exactly as it appears below. You will not need to use this code after youve completed the sign-up process. If you do not sign up before the expiration date, you must request a new code. · HihoCoder Access Code: [email or web address] Expires: 8/10/2017  1:50 PM 
 
4. Enter the last four digits of your Social Security Number (xxxx) and Date of Birth (mm/dd/yyyy) as indicated and click Submit. You will be taken to the next sign-up page. 5. Create a HihoCoder ID. This will be your HihoCoder login ID and cannot be changed, so think of one that is secure and easy to remember. 6. Create a HihoCoder password. You can change your password at any time. 7. Enter your Password Reset Question and Answer. This can be used at a later time if you forget your password. 8. Enter your e-mail address. You will receive e-mail notification when new information is available in [address] 9. Click Sign Up. You can now view and download portions of your medical record. 10. Click the Download Summary menu link to download a portable copy of your medical information. If you have questions, please visit the Frequently Asked Questions section of the HihoCoder website. Remember, HihoCoder is NOT to be used for urgent needs. For medical emergencies, dial 911. Now available from your iPhone and Android! Please provide this summary of care documentation to your next provider. Your primary care clinician is listed as Leighton. If you have any questions after today's visit, please call [phone].

## 2017-08-09 PROBLEM — M51.26 LUMBAR HERNIATED DISC: Status: ACTIVE | Noted: 2017-08-09

## 2017-08-09 PROBLEM — N18.9 CKD (CHRONIC KIDNEY DISEASE): Status: ACTIVE | Noted: 2017-08-09

## 2017-08-09 PROBLEM — R20.0 LEG NUMBNESS: Status: ACTIVE | Noted: 2017-08-09

## 2017-08-09 PROBLEM — K21.9 GERD (GASTROESOPHAGEAL REFLUX DISEASE): Status: ACTIVE | Noted: 2017-08-09

## 2017-08-09 PROBLEM — E66.9 OBESITY: Status: ACTIVE | Noted: 2017-08-09

## 2017-08-09 PROBLEM — Z79.890 HORMONE REPLACEMENT THERAPY (HRT): Status: ACTIVE | Noted: 2017-08-09

## 2017-08-09 PROBLEM — K50.90 CROHN DISEASE (HCC): Status: ACTIVE | Noted: 2017-08-09

## 2017-08-09 PROBLEM — E87.6 HYPOKALEMIA: Status: ACTIVE | Noted: 2017-08-09

## 2017-08-09 PROBLEM — M54.9 BACK PAIN: Status: ACTIVE | Noted: 2017-08-09

## 2017-08-09 PROBLEM — M19.90 OSTEOARTHRITIS: Status: ACTIVE | Noted: 2017-08-09

## 2017-08-09 PROBLEM — R10.2 PELVIC PAIN IN FEMALE: Status: ACTIVE | Noted: 2017-08-09

## 2017-08-09 PROBLEM — I83.90 VARICOSE VEIN OF LEG: Status: ACTIVE | Noted: 2017-08-09

## 2017-08-09 PROBLEM — F41.9 ANXIETY: Status: ACTIVE | Noted: 2017-08-09

## 2017-08-09 PROBLEM — N32.81 OAB (OVERACTIVE BLADDER): Status: ACTIVE | Noted: 2017-08-09

## 2017-08-09 PROBLEM — J44.9 COPD (CHRONIC OBSTRUCTIVE PULMONARY DISEASE) (HCC): Status: ACTIVE | Noted: 2017-08-09

## 2017-08-09 PROBLEM — Z79.899 ON STATIN THERAPY: Status: ACTIVE | Noted: 2017-08-09

## 2017-08-09 PROBLEM — K40.90 INGUINAL HERNIA: Status: ACTIVE | Noted: 2017-08-09

## 2017-08-09 PROBLEM — D64.9 ANEMIA: Status: ACTIVE | Noted: 2017-08-09

## 2017-08-09 PROBLEM — E78.5 HYPERLIPIDEMIA: Status: ACTIVE | Noted: 2017-08-09

## 2017-08-09 PROBLEM — I12.9 HYPERTENSION WITH RENAL DISEASE: Status: ACTIVE | Noted: 2017-08-09

## 2017-08-09 PROBLEM — J30.9 ALLERGIC RHINITIS: Status: ACTIVE | Noted: 2017-08-09

## 2017-08-09 PROBLEM — M19.90 DJD (DEGENERATIVE JOINT DISEASE): Status: ACTIVE | Noted: 2017-08-09

## 2017-08-09 PROBLEM — K42.9 UMBILICAL HERNIA: Status: ACTIVE | Noted: 2017-08-09

## 2017-08-09 PROBLEM — E03.9 HYPOTHYROID: Status: ACTIVE | Noted: 2017-08-09

## 2017-08-09 RX ORDER — DICLOFENAC SODIUM 75 MG/1
75 TABLET, DELAYED RELEASE ORAL 2 TIMES DAILY
COMMUNITY
End: 2017-11-25 | Stop reason: SDUPTHER

## 2017-08-09 RX ORDER — ASPIRIN 325 MG
325 TABLET, DELAYED RELEASE (ENTERIC COATED) ORAL DAILY
COMMUNITY
End: 2020-01-24 | Stop reason: ALTCHOICE

## 2017-08-09 RX ORDER — LANOLIN ALCOHOL/MO/W.PET/CERES
CREAM (GRAM) TOPICAL
COMMUNITY

## 2017-08-09 RX ORDER — ACETAMINOPHEN 500 MG
TABLET ORAL
COMMUNITY
End: 2017-08-21 | Stop reason: ALTCHOICE

## 2017-08-21 ENCOUNTER — OFFICE VISIT (OUTPATIENT)
Dept: INTERNAL MEDICINE CLINIC | Age: 62
End: 2017-08-21

## 2017-08-21 ENCOUNTER — HOSPITAL ENCOUNTER (OUTPATIENT)
Dept: LAB | Age: 62
Discharge: HOME | End: 2017-08-21
Payer: COMMERCIAL

## 2017-08-21 VITALS
BODY MASS INDEX: 32.02 KG/M2 | DIASTOLIC BLOOD PRESSURE: 80 MMHG | WEIGHT: 199.2 LBS | RESPIRATION RATE: 16 BRPM | TEMPERATURE: 97.7 F | OXYGEN SATURATION: 96 % | SYSTOLIC BLOOD PRESSURE: 126 MMHG | HEART RATE: 64 BPM | HEIGHT: 66 IN

## 2017-08-21 DIAGNOSIS — Z00.00 ANNUAL PHYSICAL EXAM: Primary | ICD-10-CM

## 2017-08-21 DIAGNOSIS — J44.9 CHRONIC OBSTRUCTIVE PULMONARY DISEASE, UNSPECIFIED COPD TYPE (HCC): ICD-10-CM

## 2017-08-21 DIAGNOSIS — N18.3 CKD (CHRONIC KIDNEY DISEASE), STAGE 3 (MODERATE): ICD-10-CM

## 2017-08-21 DIAGNOSIS — E78.2 MIXED HYPERLIPIDEMIA: ICD-10-CM

## 2017-08-21 DIAGNOSIS — K21.9 GASTROESOPHAGEAL REFLUX DISEASE WITHOUT ESOPHAGITIS: ICD-10-CM

## 2017-08-21 DIAGNOSIS — E66.09 NON MORBID OBESITY DUE TO EXCESS CALORIES: ICD-10-CM

## 2017-08-21 DIAGNOSIS — M15.9 PRIMARY OSTEOARTHRITIS INVOLVING MULTIPLE JOINTS: ICD-10-CM

## 2017-08-21 DIAGNOSIS — I12.9 HYPERTENSION WITH RENAL DISEASE: ICD-10-CM

## 2017-08-21 LAB
ALBUMIN SERPL-MCNC: 4.5 G/DL (ref 3.9–5.4)
ALKALINE PHOS POC: 100 U/L (ref 38–126)
ALT SERPL-CCNC: 25 U/L (ref 9–52)
AST SERPL-CCNC: 20 U/L (ref 14–36)
BACTERIA UA POCT, BACTPOCT: NORMAL
BILIRUB UR QL STRIP: NEGATIVE
BUN BLD-MCNC: 28 MG/DL (ref 7–17)
CALCIUM BLD-MCNC: 10.1 MG/DL (ref 8.4–10.2)
CASTS UA POCT: NORMAL
CHLORIDE BLD-SCNC: 102 MMOL/L (ref 98–107)
CHOLEST SERPL-MCNC: 258 MG/DL (ref 0–200)
CK (CPK) POC: 64 U/L (ref 30–135)
CLUE CELLS, CLUEPOCT: NEGATIVE
CO2 POC: 28 MMOL/L (ref 22–32)
CREAT BLD-MCNC: 0.8 MG/DL (ref 0.7–1.2)
CRYSTALS UA POCT, CRYSPOCT: NEGATIVE
EGFR (POC): 78.5
EPITHELIAL CELLS POCT, EPITHPOCT: NORMAL
GLUCOSE POC: 90 MG/DL (ref 65–105)
GLUCOSE UR-MCNC: NEGATIVE MG/DL
GRAN# POC: 4.7 K/UL (ref 2–7.8)
GRAN% POC: 74.3 % (ref 37–92)
HCT VFR BLD CALC: 38.2 % (ref 37–51)
HDLC SERPL-MCNC: 55 MG/DL (ref 35–130)
HGB BLD-MCNC: 12.3 G/DL (ref 12–18)
KETONES P FAST UR STRIP-MCNC: NEGATIVE MG/DL
LDL CHOLESTEROL POC: 178.8 MG/DL (ref 0–130)
LY# POC: 1.2 K/UL (ref 0.6–4.1)
LY% POC: 20.5 % (ref 10–58.5)
MCH RBC QN: 28.7 PG (ref 26–32)
MCHC RBC-ENTMCNC: 32.2 G/DL (ref 30–36)
MCV RBC: 89 FL (ref 80–97)
MID #, POC: 0.3 K/UL (ref 0–1.8)
MID% POC: 5.2 % (ref 0.1–24)
MUCUS UA POCT, MUCPOCT: NORMAL
PH UR STRIP: 5 [PH] (ref 5–7)
PLATELET # BLD: 289 K/UL (ref 140–440)
POTASSIUM SERPL-SCNC: 4.7 MMOL/L (ref 3.6–5)
PROT SERPL-MCNC: 7.6 G/DL (ref 6.3–8.2)
PROTEIN,URINE POC: NEGATIVE MG/DL
RBC # BLD: 4.29 M/UL (ref 4.2–6.3)
RBC UA POCT, RBCPOCT: NORMAL
SODIUM SERPL-SCNC: 142 MMOL/L (ref 137–145)
SP GR UR STRIP: 1.01 (ref 1.01–1.02)
T4 FREE SERPL-MCNC: 0.9 NG/DL (ref 0.71–1.85)
TCHOL/HDL RATIO (POC): 4.7 (ref 0–4)
TOTAL BILIRUBIN POC: 0.6 MG/DL (ref 0.2–1.3)
TRICH UA POCT, TRICHPOC: NEGATIVE
TRIGL SERPL-MCNC: 121 MG/DL (ref 0–200)
TSH BLD-ACNC: 1.2 UIU/ML (ref 0.4–4.2)
UA UROBILINOGEN AMB POC: NORMAL (ref 0.2–1)
URINALYSIS CLARITY POC: CLEAR
URINALYSIS COLOR POC: NORMAL
URINE BLOOD POC: NEGATIVE
URINE LEUKOCYTES POC: NEGATIVE
URINE NITRITES POC: NEGATIVE
VLDLC SERPL CALC-MCNC: 24.2 MG/DL
WBC # BLD: 6.2 K/UL (ref 4.1–10.9)
WBC UA POCT, WBCPOCT: 0
YEAST UA POCT, YEASTPOC: NEGATIVE

## 2017-08-21 PROCEDURE — 87624 HPV HI-RISK TYP POOLED RSLT: CPT | Performed by: INTERNAL MEDICINE

## 2017-08-21 PROCEDURE — 88175 CYTOPATH C/V AUTO FLUID REDO: CPT | Performed by: INTERNAL MEDICINE

## 2017-08-21 NOTE — PROGRESS NOTES
1. Have you been to the ER, urgent care clinic since your last visit? Hospitalized since your last visit? No    2. Have you seen or consulted any other health care providers outside of the [address] [place] [address] since your last visit? Include any pap smears or colon screening. Yes. Dr. Hassel [place]. Hernia repair June 2, 2017.

## 2017-08-21 NOTE — MR AVS SNAPSHOT
Visit Information Date & Time Provider Department Dept. Phone Encounter #  
 8/21/2017  9:40 AM Pushpa Mcneal, [facility] [phone] [id number] Follow-up Instructions Return in about 3 months (around 11/21/2017). Your Appointments 12/1/2017  9:20 AM  
FOLLOW UP 10 with MD JENNIFER Oliva MAHNAZ HARRIS [facility] ([address]) Appt Note: 3 month flp [address] [phone] [address] [address] Upcoming Health Maintenance Date Due Hepatitis C Screening 1955 Pneumococcal 19-64 Medium Risk (1 of 1 - PPSV23) 7/12/1973 DTaP/Tdap/Td series (1 - Tdap) 7/12/1975 PAP AKA CERVICAL CYTOLOGY 7/12/1975 BREAST CANCER SCRN MAMMOGRAM 7/12/2004 FOBT Q 1 YEAR AGE 50-75 7/12/2004 ZOSTER VACCINE AGE 60> 5/12/2014 INFLUENZA AGE 9 TO ADULT 8/1/2017 Allergies as of 8/21/2017  Review Complete On: 8/21/2017 By: Pushpa Mcneal MD  
  
 Severity Noted Reaction Type Reactions Lisinopril  08/09/2017    Cough Current Immunizations  Never Reviewed Name Date Influenza Vaccine 11/7/2016, 10/29/2015 Not reviewed this visit You Were Diagnosed With   
  
 Codes Comments Annual physical exam    -  Primary ICD-10-CM: Z00.00 ICD-9-CM: V70.0 Hypertension with renal disease     ICD-10-CM: I12.9 ICD-9-CM: 403.90 Primary osteoarthritis involving multiple joints     ICD-10-CM: M15.0 ICD-9-CM: 715.09 Mixed hyperlipidemia     ICD-10-CM: E78.2 ICD-9-CM: 272.2 Non morbid obesity due to excess calories     ICD-10-CM: E66.09 
ICD-9-CM: 278.00 CKD (chronic kidney disease), stage 3 (moderate)     ICD-10-CM: N18.3 ICD-9-CM: 734. 3 Chronic obstructive pulmonary disease, unspecified COPD type ([facility]     ICD-10-CM: J44.9 ICD-9-CM: 417 Gastroesophageal reflux disease without esophagitis     ICD-10-CM: K21.9 ICD-9-CM: 530.81 Vitals BP Pulse Temp Resp Height(growth percentile) Weight(growth percentile) 126/80 (BP 1 Location: Right arm, BP Patient Position: Sitting) 64 97.7 °F (36.5 °C) (Oral) 16 5' 6\" (1.676 m) 199 lb 3.2 oz (90.4 kg) SpO2 BMI OB Status Smoking Status 96% 32.15 kg/m2 Hysterectomy Never Smoker Vitals History BMI and BSA Data Body Mass Index Body Surface Area  
 32.15 kg/m 2 2.05 m 2 Preferred Pharmacy Pharmacy Name Phone [facility] [address] [place] [address] [phone] Your Updated Medication List  
  
   
This list is accurate as of: 8/21/17 11:28 AM.  Always use your most recent med list.  
  
  
  
  
 ascorbic acid (vitamin C) 250 mg tablet Commonly known as:  VITAMIN C Take 500 mg by mouth daily. aspirin delayed-release 325 mg tablet Take 325 mg by mouth daily. At bedtime with Niacin BENICAR HCT 40-12.5 mg per tablet Generic drug:  olmesartan-hydroCHLOROthiazide Take 1 Tab by mouth daily. May resume medication when b/p greater than 120/80. cholecalciferol (vitamin D3) 2,000 unit Tab Take 1 Tab by mouth daily. diclofenac EC 75 mg EC tablet Commonly known as:  VOLTAREN Take 75 mg by mouth two (2) times a day. docusate sodium 100 mg capsule Commonly known as:  Sonia Felt Take 1 Cap by mouth two (2) times a day. May use OTC instead. Prevents constipation from narcotics. estradiol 0.05 mg/24 hr  
Commonly known as:  VIVELLE  
1 Patch by TransDERmal route two (2) days a week. Wed and sun  
  
 ferrous sulfate 325 mg (65 mg iron) tablet Take  by mouth three (3) times daily (with meals). lansoprazole 15 mg capsule Commonly known as:  PREVACID Take 15 mg by mouth every Monday, Wednesday, Friday. Takes as needed M-VIT PO Take 1 Tab by mouth daily. MIRALAX PO Take  by mouth. Take as needed  
  
 niacin  mg tablet Commonly known as:  Frosty Medin Take 1 Tab by mouth nightly. May resume medication when b/p greater than 120/80. Omega-3-DHA-EPA-Fish Oil 1,000 mg (120 mg-180 mg) Cap Take 2 Tabs by mouth two (2) times a day. Hold for two weeks POTASSIUM CHLORIDE SR 10 MEQ TAB 20 mEq two (2) times a day. senna-docusate 8.6-50 mg per tablet Commonly known as:  SENNA PLUS Take 1 Tab by mouth two (2) times a day. Take until having regular bowel movements. vitamin E 400 unit capsule Commonly known as:  [address] Take 1 Cap by mouth daily. Hold for two weeks. We Performed the Following CBC WITH AUTOMATED DIFF [03156 CPT(R)] CK M4224363 CPT(R)] LIPID PANEL [06326 CPT(R)] METABOLIC PANEL, COMPREHENSIVE [22706 CPT(R)] PAP IG, HPV AND RFX HPV 68/47,41(201872) [BEB180100 Custom] T4, FREE J251493 CPT(R)] TSH 3RD GENERATION [74299 CPT(R)] URINALYSIS W/ RFLX MICROSCOPIC [52573 CPT(R)] Follow-up Instructions Return in about 3 months (around 11/21/2017). Introducing [facility] & HEALTH SERVICES! [employer] introduces Activaero patient portal. Now you can access parts of your medical record, email your doctor's office, and request medication refills online. 1. In your internet browser, go to [email or web address] 2. Click on the First Time User? Click Here link in the Sign In box. You will see the New Member Sign Up page. 3. Enter your Activaero Access Code exactly as it appears below. You will not need to use this code after youve completed the sign-up process. If you do not sign up before the expiration date, you must request a new code. · Activaero Access Code: [email or web address] Expires: 11/19/2017  9:43 AM 
 
4. Enter the last four digits of your Social Security Number (xxxx) and Date of Birth (mm/dd/yyyy) as indicated and click Submit.  You will be taken to the next sign-up page. 5. Create a Verve Mobile ID. This will be your Verve Mobile login ID and cannot be changed, so think of one that is secure and easy to remember. 6. Create a Verve Mobile password. You can change your password at any time. 7. Enter your Password Reset Question and Answer. This can be used at a later time if you forget your password. 8. Enter your e-mail address. You will receive e-mail notification when new information is available in [address] 9. Click Sign Up. You can now view and download portions of your medical record. 10. Click the Download Summary menu link to download a portable copy of your medical information. If you have questions, please visit the Frequently Asked Questions section of the Verve Mobile website. Remember, Verve Mobile is NOT to be used for urgent needs. For medical emergencies, dial 911. Now available from your iPhone and Android! Please provide this summary of care documentation to your next provider. Your primary care clinician is listed as Leighton. If you have any questions after today's visit, please call [phone].

## 2017-08-21 NOTE — PROGRESS NOTES
Subjective:   60 y.o. female for annual Comprehensive personal healthcare examination. History of present illness: She currently denies any chest pain shortness of breath cardio instruments. There are no GI/ complaints. She is trying to get some exercise. She has taken her medications and follow diet. There are no other complaints on complete review of systems.       Patient Active Problem List   Diagnosis Code    Right inguinal hernia K40.90    Ventral hernia without obstruction or gangrene K43.9    Allergic rhinitis J30.9    Crohn disease ([facility] K50.90    OAB (overactive bladder) N32.81    Varicose vein of leg D01.76    Umbilical hernia U23.5    Osteoarthritis M19.90    On statin therapy Z79.899    Obesity E66.9    Lumbar herniated disc M51.26    Inguinal hernia K40.90    Hypothyroid E03.9    Hypertension with renal disease I12.9    Hyperlipidemia E78.5    Hormone replacement therapy (HRT) Z79.890    GERD (gastroesophageal reflux disease) K21.9    DJD (degenerative joint disease) M19.90    COPD (chronic obstructive pulmonary disease) ([facility]) J44.9    CKD (chronic kidney disease) N18.9    Back pain M54.9    Anxiety F41.9    Anemia D64.9    Annual physical exam Z00.00        Past Medical History:   Diagnosis Date    Allergic rhinitis 8/9/2017    Anemia 8/9/2017    Anxiety 8/9/2017    Arrhythmia     irregular heart beat hx and beating fast per patient/no cardiologist    Arthritis     JOINTS  WORSE WAIST DOWN     Back pain 8/9/2017    CKD (chronic kidney disease) 8/9/2017    COPD (chronic obstructive pulmonary disease) ([facility] 8/9/2017    Crohn disease ([facility] 8/9/2017    DJD (degenerative joint disease) 8/9/2017    GERD (gastroesophageal reflux disease) 8/9/2017    Hormone replacement therapy (HRT) 8/9/2017    Hyperlipidemia 8/9/2017    Hypertension     Hypertension with renal disease 8/9/2017    Hypokalemia 8/9/2017    Hypothyroid 8/9/2017    Ill-defined condition neuropathy feet    Inguinal hernia 8/9/2017    Leg numbness 8/9/2017    Lumbar herniated disc 8/9/2017    OAB (overactive bladder) 8/9/2017    Obesity 8/9/2017    On statin therapy 8/9/2017    Osteoarthritis 8/9/2017    Pelvic pain in female 2/6/7419    Umbilical hernia 1/8/9900    Varicose vein of leg 8/9/2017      Past Surgical History:   Procedure Laterality Date    BREAST SURGERY PROCEDURE UNLISTED      mass removed left breast x2 benign    HX COLONOSCOPY  12/2010    normal    HX COLONOSCOPY  12/30/2014    normal     HX HEENT      cyst removed left eye    HX HERNIA REPAIR  06/02/2017    right inguinal and umbilical (Dr. Homero Begum)    HX HYSTERECTOMY      HX ORTHOPAEDIC      bilat knee injections    HX ORTHOPAEDIC      laser treatment left knee and foot    HX ORTHOPAEDIC      hx of gel shots bilat knee    HX ORTHOPAEDIC  12/27/2016    left knee coritizon shot    HX OTHER SURGICAL      cyst removed front left scalp    HX OTHER SURGICAL      colonoscopy    HX OTHER SURGICAL      tooth implant    HX TUBAL LIGATION      VASCULAR SURGERY PROCEDURE UNLIST      vein striping     Allergies   Allergen Reactions    Lisinopril Cough      Prior to Admission medications    Medication Sig Start Date End Date Taking? Authorizing Provider   diclofenac EC (VOLTAREN) 75 mg EC tablet Take 75 mg by mouth two (2) times a day. Yes Historical Provider   ferrous sulfate 325 mg (65 mg iron) tablet Take  by mouth three (3) times daily (with meals). Yes Historical Provider   aspirin delayed-release 325 mg tablet Take 325 mg by mouth daily. At bedtime with Niacin   Yes Historical Provider   POLYETHYLENE GLYCOL 3350 (MIRALAX PO) Take  by mouth. Take as needed   Yes Historical Provider   docusate sodium (COLACE) 100 mg capsule Take 1 Cap by mouth two (2) times a day. May use OTC instead. Prevents constipation from narcotics.  6/2/17  Yes Antonella Hidalgo MD   niacin ER (NIASPAN) 500 mg tablet Take 1 Tab by mouth nightly. May resume medication when b/p greater than 120/80. 3/16/17  Yes Diana Haas NP   olmesartan-hydroCHLOROthiazide (BENICAR HCT) 40-12.5 mg per tablet Take 1 Tab by mouth daily. May resume medication when b/p greater than 120/80. 3/16/17  Yes Diana Haas NP   Axyrw-3-NFQ-EPA-Fish Oil 1,000 mg (120 mg-180 mg) cap Take 2 Tabs by mouth two (2) times a day. Hold for two weeks 3/16/17  Yes Diana Haas NP   vitamin E (AQUA GEMS) 400 unit capsule Take 1 Cap by mouth daily. Hold for two weeks. 3/16/17  Yes Diana Haas NP   senna-docusate (SENNA PLUS) 8.6-50 mg per tablet Take 1 Tab by mouth two (2) times a day. Take until having regular bowel movements. 3/16/17  Yes Diana Haas NP   POTASSIUM CHLORIDE SR 10 MEQ TAB 20 mEq two (2) times a day. Yes Historical Provider   cholecalciferol, vitamin D3, 2,000 unit tab Take 1 Tab by mouth daily. Yes Historical Provider   lansoprazole (PREVACID) 15 mg capsule Take 15 mg by mouth every Monday, Wednesday, Friday. Takes as needed   Yes Historical Provider   PV W-O YAN/FERROUS FUMARATE/FA (M-VIT PO) Take 1 Tab by mouth daily. Yes Historical Provider   estradiol (VIVELLE) 0.05 mg/24 hr 1 Patch by TransDERmal route two (2) days a week. Wed and sun   Yes Historical Provider   ascorbic acid (VITAMIN C) 250 mg tablet Take 500 mg by mouth daily. Yes Historical Provider      Social History     Social History    Marital status:      Spouse name: N/A    Number of children: N/A    Years of education: N/A     Occupational History    Not on file.      Social History Main Topics    Smoking status: Never Smoker    Smokeless tobacco: Never Used    Alcohol use Yes      Comment: once a year    Drug use: No    Sexual activity: Yes     Partners: Male     Other Topics Concern    Not on file     Social History Narrative      Family History   Problem Relation Age of Onset    No Known Problems Mother     No Known Problems Father     Stroke Sister Review of Systems              Constitutional:  She denies fever, weight loss, sweats or fatigue. EYES:  No blurred or double vision,                ENT: no headache or dizziness. No difficulty with swallowing, taste, speech or                smell. NECK: no stiffness or swollen glands  Respiratory:  No cough, wheezing or shortness of breath. No sputum production. Cardiac:  Denies chest pain, palpitations, unexplained indigestion, syncope, edema, PND or orthopnea. GI:  No changes in bowel movements, no abdominal pain, no bloating, anorexia, nausea, vomiting or heartburn. :  No frequency or dysuria. Denies incontinence or sexual dysfunction. Breast:  She does monthly self-breast examination. No masses have been felt. No nipple discharge noted. GYN:  Denies vaginal discharge or unexpected bleeding. Extremities:  No joint pain, stiffness or swelling. Skin:  No recent rashes or mole changes. Neurological:  No numbness, tingling, burning paresthesias or loss of motor strength. No syncope, dizziness, frequent headaches or memory loss. HEMATOLOGIC: no easy bruising or bleeding gums  LYMPHATIC: no lymph node enlargement or night sweats    Objective:     Visit Vitals    /80 (BP 1 Location: Right arm, BP Patient Position: Sitting)    Pulse 64    Temp 97.7 °F (36.5 °C) (Oral)    Resp 16    Ht 5' 6\" (1.676 m)    Wt 199 lb 3.2 oz (90.4 kg)    SpO2 96%    BMI 32.15 kg/m2       Physical Examination:              General Appearance:  Well-developed, well-nourished, no acute  distress. Vital Signs:  BP , pulse , respirations , temp. , height  in., weight  lbs., neck in., waist  in., BMI . Vision:  Deferred to ophthalmologist.        Hearing: HEENT:      Ears:  The TMs and ear canals were clear. Eyes:  The pupillary responses were normal.  Extraocular muscle function intact. Lids and conjunctiva not injected.   Funduscopic exam revealed sharp disc margins. Pharynx:  Clear with teeth in good repair. No masses were noted. Neck:  Supple without thyromegaly or adenopathy. No JVD noted. No  carotid                          bruits. Lungs:  Clear to auscultation and percussion. Cardiac:  Regular rate and rhythm without murmur. PMI not displaced. No gallop, rub or click. Abdomen:  Flat, soft, non-tender without palpable organomegaly or mass. No pulsatile mass was felt, and no bruit was heard. Bowel sounds were active. Breasts:  Both symmetric. No palpable masses felt. No skin retractions noted. No nipple discharge evident. GYN:  Normal external genitalia. Status post hysterectomy normal vaginal cuff and no adnexal masses or tenderness noted on exam.  Rectal exam:  Normal sphincter tone. No rectal masses felt. Stool brown and Heme negative. Extremities:  No clubbing, cyanosis or edema. Pulses:  Dorsalis pedis and posterior tibial pulses felt without difficulty. Skin:  No rash or unusual mole changes noted. Lymph Nodes:  None felt in the cervical, supraclavicular, axillary or inguinal region. Neurological:  Cranial nerves II-XII grossly intact. Motor strength 5/5. DTRs 2+ and symmetric. Station and gait normal.        Laboratory:          Assessment/Plan:   Impressions:  1. Annual physical exam    2. Hypertension with renal disease    3. Primary osteoarthritis involving multiple joints    4. Mixed hyperlipidemia    5. Non morbid obesity due to excess calories    6. CKD (chronic kidney disease), stage 3 (moderate)    7. Chronic obstructive pulmonary disease, unspecified COPD type ([address]    8. Gastroesophageal reflux disease without esophagitis      Impression  1. Annual physical exam normal except for weight 199 needs to come down we discussed diet exercise and weight reduction  2. Hypertension that is controlled  3. DJD seems to be stable  4.   Hyperlipidemia reviewed her last numbers with her repeat status pending will make adjustments if necessary  5. Obesity as noted  6. Chronic kidney disease stable  7. COPD seems stable  8. GERD stable  We will call her lab make further recommendations and adjustments if necessary we discussed diet exercise and weight reduction. I will recheck in 3 months or sooner should to be a problem. Orders Placed This Encounter    CBC WITH AUTOMATED DIFF    CK    LIPID PANEL    METABOLIC PANEL, COMPREHENSIVE    T4, FREE    TSH 3RD GENERATION    URINALYSIS W/ RFLX MICROSCOPIC    PAP IG, HPV AND RFX HPV 91/94,36([id number])     Order Specific Question:   Pap Source? Answer:   Vaginal     Order Specific Question:   Total Hysterectomy? Answer:   Yes     Order Specific Question:   Supracervical Hysterectomy? Answer:   No     Order Specific Question:   Post Menopausal?     Answer:   Yes     Order Specific Question:   Hormone Therapy? Answer:   No     Order Specific Question:   IUD? Answer:   No     Order Specific Question:   Abnormal Bleeding? Answer:   No     Order Specific Question:   Pregnant     Answer:   No     Order Specific Question:   Post Partum? Answer:   No       Henrik Wolff MD     No results found for any visits on 08/21/17. The patient verbalized understanding of the problems and plan as explained.

## 2017-08-21 NOTE — LETTER
8/31/2017 1:45 PM 
 
Ms. Iliana Kumar [address] [facility] [phone] [id number] Dear Iliana Kumar: 
 
Please find your most recent results below. Resulted Orders AMB POC CK (CPK) Result Value Ref Range CK (CPK) (POC) 64.0 30 - 135 U/L AMB POC COMPLETE CBC,AUTOMATED ENTER Result Value Ref Range WBC (POC) 6.2 4.1 - 10.9 K/uL  
 RBC (POC) 4.29 4. 20 - 6.30 M/uL  
 HGB (POC) 12.3 12.0 - 18.0 g/dL HCT (POC) 38.2 37.0 - 51.0 %  
 MCV (POC) 89.0 80.0 - 97.0 fL  
 MCH (POC) 28.7 26.0 - 32.0 pg  
 MCHC (POC) 32.2 30.0 - 36.0 g/dL PLATELET (POC) 203.3 140.0 - 440.0 K/uL  
 ABS. LYMPHS (POC) 1.2 0.6 - 4.1 K/uL LYMPHOCYTES (POC) 20.5 10.0 - 58.5 % Mid # (POC) 0.3 0.0 - 1.8 K/uL MID% POC 5.2 0.1 - 24.0 %  
 ABS. GRANS (POC) 4.7 2.0 - 7.8 K/uL GRANULOCYTES (POC) 74.3 37.0 - 92.0 % AMB POC LIPID PROFILE Result Value Ref Range Cholesterol (POC) 258.0 (A) 0 - 200 mg/dL Triglycerides (POC) 121.0 0 - 200 mg/dL HDL Cholesterol (POC) 55.0 35 - 130 mg/dL VLDL (POC) 24.2 MG/DL  
 LDL Cholesterol (POC) 178.8 (A) 0.0 - 130.0 MG/DL TChol/HDL Ratio (POC) 4.7 (A) 0.0 - 4.0 AMB POC COMPREHENSIVE METABOLIC PANEL Result Value Ref Range GLUCOSE 90.0 65 - 105 mg/dL BUN 28.0 (A) 7 - 17 mg/dL Creatinine (POC) 0.8 0.7 - 1.2 mg/dL Sodium (POC) 142.0 137 - 145 MMOL/L Potassium (POC) 4.7 3.6 - 5.0 MMOL/L  
 CHLORIDE 102.0 98 - 107 MMOL/L  
 CO2 28.0 22 - 32 MMOL/L  
 CALCIUM 10.1 8.4 - 10.2 mg/dL TOTAL PROTEIN 7.6 6.3 - 8.2 g/dL ALBUMIN 4.5 3.9 - 5.4 g/dL AST (POC) 20 14 - 36 U/L  
 ALT (POC) 25 9 - 52 U/L ALKALINE PHOS 100.0 38 - 126 U/L  
 TOTAL BILIRUBIN 0.6 0.2 - 1.3 mg/dL  
 eGFR (POC) 78.5 AMB POC T4, FREE Result Value Ref Range FREE T4 (POC) 0.90 0.71 - 1.85 ng/dL AMB POC TSH Result Value Ref Range TSH POC 1.20 0.40 - 4.20 uIU/ml AMB POC URINALYSIS DIP STICK AUTO W/ MICRO Result Value Ref Range Color (UA POC) Light Yellow Clarity (UA POC) Clear Glucose (UA POC) Negative Negative Bilirubin (UA POC) Negative Negative Ketones (UA POC) Negative Negative Specific gravity (UA POC) 1.010 1.010 - 1.025 Blood (UA POC) Negative Negative pH (UA POC) 5.0 5.0 - 7.0 Protein (UA POC) Negative Negative mg/dL Urobilinogen (UA POC) normal 0.2 - 1 Nitrites (UA POC) Negative Negative Leukocyte esterase (UA POC) Negative Negative Epithelial cells (UA POC) Few Mucus (UA POC) None WBCs (UA POC) 0    
 RBCs (UA POC) 0-2 Casts (UA POC) None Negative Crystals (UA POC) Negative Negative Clue Cells (UA POC) NEGATIVE Trichomonas (UA POC) Negative Yeast (UA POC) Negative Bacteria (UA POC) None Negative RECOMMENDATIONS: 
Your cholesterol and LDL level were elevated. Need to start generic Lipitor 20 mg daily (Atorvastatin) and try to get back to your regular low cholesterol diet and exercise. Please call me if you have any questions: [phone] Sincerely, Linette Santiago MD

## 2017-08-31 RX ORDER — ATORVASTATIN CALCIUM 10 MG/1
20 TABLET, FILM COATED ORAL
Qty: 30 TAB | Refills: 11 | Status: SHIPPED | OUTPATIENT
Start: 2017-08-31 | End: 2018-03-10 | Stop reason: SDUPTHER

## 2017-08-31 NOTE — TELEPHONE ENCOUNTER
Requested Prescriptions     Pending Prescriptions Disp Refills    atorvastatin (LIPITOR) 10 mg tablet 30 Tab 11     Sig: Take 2 Tabs by mouth nightly.

## 2017-10-02 DIAGNOSIS — E87.6 HYPOKALEMIA: Primary | ICD-10-CM

## 2017-10-03 RX ORDER — POTASSIUM CHLORIDE 20 MEQ/1
TABLET, EXTENDED RELEASE ORAL
Qty: 60 TAB | Refills: 5 | Status: SHIPPED | OUTPATIENT
Start: 2017-10-03 | End: 2018-04-04 | Stop reason: SDUPTHER

## 2017-10-03 NOTE — TELEPHONE ENCOUNTER
Requested Prescriptions     Pending Prescriptions Disp Refills    potassium chloride (K-DUR, KLOR-CON) 20 mEq tablet [Pharmacy Med Name: POTASSIUM CL ER 20 MEQ TABLET] 60 Tab 5     Sig: take 1 tablet by mouth twice a day

## 2017-11-10 DIAGNOSIS — I10 HYPERTENSION, UNSPECIFIED TYPE: Primary | ICD-10-CM

## 2017-11-13 RX ORDER — OLMESARTAN MEDOXOMIL AND HYDROCHLOROTHIAZIDE 20/12.5 20; 12.5 MG/1; MG/1
TABLET ORAL
Qty: 90 TAB | Refills: 3 | Status: SHIPPED | OUTPATIENT
Start: 2017-11-13 | End: 2018-06-15 | Stop reason: ALTCHOICE

## 2017-11-13 NOTE — TELEPHONE ENCOUNTER
Requested Prescriptions     Pending Prescriptions Disp Refills    olmesartan-hydroCHLOROthiazide (BENICAR HCT) 20-12.5 mg per tablet [Pharmacy Med Name: Olmesartan Medoxomil-HCTZ 20-12.5 MG Tablet] 90 Tab 3     Sig: TAKE 1 TABLET BY MOUTH DAILY

## 2017-11-25 DIAGNOSIS — M13.0 ARTHRITIS OF MULTIPLE SITES: Primary | ICD-10-CM

## 2017-11-27 RX ORDER — DICLOFENAC SODIUM 75 MG/1
TABLET, DELAYED RELEASE ORAL
Qty: 60 TAB | Refills: 11 | Status: SHIPPED | OUTPATIENT
Start: 2017-11-27 | End: 2018-09-14 | Stop reason: ALTCHOICE

## 2017-11-27 NOTE — TELEPHONE ENCOUNTER
Requested Prescriptions     Pending Prescriptions Disp Refills    diclofenac EC (VOLTAREN) 75 mg EC tablet [Pharmacy Med Name: DICLOFENAC SOD EC 75 MG TAB] 60 Tab 11     Sig: take 1 tablet by mouth twice a day

## 2017-12-01 ENCOUNTER — OFFICE VISIT (OUTPATIENT)
Dept: INTERNAL MEDICINE CLINIC | Age: 62
End: 2017-12-01

## 2017-12-01 VITALS
RESPIRATION RATE: 16 BRPM | DIASTOLIC BLOOD PRESSURE: 86 MMHG | TEMPERATURE: 97.4 F | BODY MASS INDEX: 33.43 KG/M2 | HEART RATE: 62 BPM | OXYGEN SATURATION: 97 % | WEIGHT: 208 LBS | SYSTOLIC BLOOD PRESSURE: 128 MMHG | HEIGHT: 66 IN

## 2017-12-01 DIAGNOSIS — E78.2 MIXED HYPERLIPIDEMIA: ICD-10-CM

## 2017-12-01 DIAGNOSIS — J30.89 CHRONIC NONSEASONAL ALLERGIC RHINITIS DUE TO OTHER ALLERGEN: ICD-10-CM

## 2017-12-01 DIAGNOSIS — Z13.820 SCREENING FOR OSTEOPOROSIS: ICD-10-CM

## 2017-12-01 DIAGNOSIS — N18.30 STAGE 3 CHRONIC KIDNEY DISEASE (HCC): ICD-10-CM

## 2017-12-01 DIAGNOSIS — K21.9 GASTROESOPHAGEAL REFLUX DISEASE WITHOUT ESOPHAGITIS: ICD-10-CM

## 2017-12-01 DIAGNOSIS — E66.09 CLASS 1 OBESITY DUE TO EXCESS CALORIES WITHOUT SERIOUS COMORBIDITY WITH BODY MASS INDEX (BMI) OF 33.0 TO 33.9 IN ADULT: ICD-10-CM

## 2017-12-01 DIAGNOSIS — Z23 ENCOUNTER FOR IMMUNIZATION: ICD-10-CM

## 2017-12-01 DIAGNOSIS — I12.9 HYPERTENSION WITH RENAL DISEASE: Primary | ICD-10-CM

## 2017-12-01 DIAGNOSIS — T75.89XA EFFECTS OF EXPOSURE TO EXTERNAL CAUSE, INITIAL ENCOUNTER: ICD-10-CM

## 2017-12-01 DIAGNOSIS — M15.9 PRIMARY OSTEOARTHRITIS INVOLVING MULTIPLE JOINTS: ICD-10-CM

## 2017-12-01 DIAGNOSIS — J44.9 CHRONIC OBSTRUCTIVE PULMONARY DISEASE, UNSPECIFIED COPD TYPE (HCC): ICD-10-CM

## 2017-12-01 LAB
ALBUMIN SERPL-MCNC: 4.4 G/DL (ref 3.9–5.4)
ALKALINE PHOS POC: 93 U/L (ref 38–126)
ALT SERPL-CCNC: 38 U/L (ref 9–52)
AST SERPL-CCNC: 21 U/L (ref 14–36)
BUN BLD-MCNC: 30 MG/DL (ref 7–17)
CALCIUM BLD-MCNC: 10.4 MG/DL (ref 8.4–10.2)
CHLORIDE BLD-SCNC: 103 MMOL/L (ref 98–107)
CHOLEST SERPL-MCNC: 142 MG/DL (ref 0–200)
CO2 POC: 28 MMOL/L (ref 22–32)
CREAT BLD-MCNC: 1.1 MG/DL (ref 0.7–1.2)
EGFR (POC): 53.4
GLUCOSE POC: 89 MG/DL (ref 65–105)
HDLC SERPL-MCNC: 50 MG/DL (ref 35–130)
LDL CHOLESTEROL POC: 78 MG/DL (ref 0–130)
POTASSIUM SERPL-SCNC: 5.1 MMOL/L (ref 3.6–5)
PROT SERPL-MCNC: 7.2 G/DL (ref 6.3–8.2)
SODIUM SERPL-SCNC: 140 MMOL/L (ref 137–145)
TCHOL/HDL RATIO (POC): 2.8 (ref 0–4)
TOTAL BILIRUBIN POC: 0.4 MG/DL (ref 0.2–1.3)
TRIGL SERPL-MCNC: 70 MG/DL (ref 0–200)
VLDLC SERPL CALC-MCNC: 14 MG/DL

## 2017-12-01 NOTE — MR AVS SNAPSHOT
BP Pulse Temp Resp Height(growth percentile) Weight(growth percentile) 128/86 (BP 1 Location: Left arm, BP Patient Position: Sitting) 62 97.4 °F (36.3 °C) (Oral) 16 5' 6\" (1.676 m) 208 lb (94.3 kg) SpO2 BMI OB Status Smoking Status 97% 33.57 kg/m2 Hysterectomy Never Smoker Vitals History BMI and BSA Data Body Mass Index Body Surface Area  
 33.57 kg/m 2 2.1 m 2 Preferred Pharmacy Pharmacy Name Phone [id number] [phone] Your Updated Medication List  
  
   
This list is accurate as of: 12/1/17 10:43 AM.  Always use your most recent med list.  
  
  
  
  
 ascorbic acid (vitamin C) 250 mg tablet Commonly known as:  VITAMIN C Take 500 mg by mouth daily. aspirin delayed-release 325 mg tablet Take 325 mg by mouth daily. At bedtime with Niacin  
  
 atorvastatin 10 mg tablet Commonly known as:  LIPITOR Take 2 Tabs by mouth nightly. cholecalciferol (vitamin D3) 2,000 unit Tab Take 1 Tab by mouth daily. diclofenac EC 75 mg EC tablet Commonly known as:  VOLTAREN  
take 1 tablet by mouth twice a day  
  
 docusate sodium 100 mg capsule Commonly known as:  Ethyl Hoit Take 1 Cap by mouth two (2) times a day. May use OTC instead. Prevents constipation from narcotics. estradiol 0.05 mg/24 hr  
Commonly known as:  VIVELLE  
1 Patch by TransDERmal route two (2) days a week. Wed and sun  
  
 ferrous sulfate 325 mg (65 mg iron) tablet Take  by mouth three (3) times daily (with meals). FISH OIL PO Take  by mouth.  
  
 lansoprazole 15 mg capsule Commonly known as:  PREVACID Take 15 mg by mouth every Monday, Wednesday, Friday. Takes as needed M-VIT PO Take 1 Tab by mouth daily. MIRALAX PO Take  by mouth. Take as needed  
  
 niacin  mg tablet Commonly known as:  Memo Godoy  
 Take 1 Tab by mouth nightly. May resume medication when b/p greater than 120/80. olmesartan-hydroCHLOROthiazide 20-12.5 mg per tablet Commonly known as:  BENICAR HCT  
TAKE 1 TABLET BY MOUTH DAILY potassium chloride 20 mEq tablet Commonly known as:  K-DUR, KLOR-CON  
take 1 tablet by mouth twice a day POTASSIUM CHLORIDE SR 10 MEQ TAB 20 mEq two (2) times a day. senna-docusate 8.6-50 mg per tablet Commonly known as:  SENNA PLUS Take 1 Tab by mouth two (2) times a day. Take until having regular bowel movements. vitamin E 400 unit capsule Commonly known as:  [address] Take 1 Cap by mouth daily. Hold for two weeks. We Performed the Following AMB POC COMPREHENSIVE METABOLIC PANEL [86036 CPT(R)] AMB POC LIPID PROFILE [45778 CPT(R)] HEPATITIS C AB [85037 CPT(R)] REFERRAL TO COLON AND RECTAL SURGERY [REF17 Custom] Comments: Follow-up colonoscopy needed last done 2004 Follow-up Instructions Return in about 3 months (around 3/1/2018). To-Do List   
 12/01/2017 Imaging:  DEXA BONE DENSITY STUDY AXIAL Referral Information Referral ID Referred By Referred To  
  
 [id number] Mary Bonilla MD   
   [id number] [facility], [address] [place], [address] [place] [address] Pkw[address] Phone: [phone] Fax: [phone] Visits Status Start Date End Date 1 New Request 12/1/17 12/1/18 If your referral has a status of pending review or denied, additional information will be sent to support the outcome of this decision. Patient Instructions High Blood Pressure: Care Instructions Your Care Instructions If your blood pressure is usually above 140/90, you have high blood pressure, or hypertension. That means the top number is 140 or higher or the bottom number is 90 or higher, or both.  
Despite what a lot of people think, high blood pressure usually doesn't cause headaches or make you feel dizzy or lightheaded. It usually has no symptoms. But it does increase your risk for heart attack, stroke, and kidney or eye damage. The higher your blood pressure, the more your risk increases. Your doctor will give you a goal for your blood pressure. Your goal will be based on your health and your age. An example of a goal is to keep your blood pressure below 140/90. Lifestyle changes, such as eating healthy and being active, are always important to help lower blood pressure. You might also take medicine to reach your blood pressure goal. 
Follow-up care is a key part of your treatment and safety. Be sure to make and go to all appointments, and call your doctor if you are having problems. It's also a good idea to know your test results and keep a list of the medicines you take. How can you care for yourself at home? Medical treatment · If you stop taking your medicine, your blood pressure will go back up. You may take one or more types of medicine to lower your blood pressure. Be safe with medicines. Take your medicine exactly as prescribed. Call your doctor if you think you are having a problem with your medicine. · Talk to your doctor before you start taking aspirin every day. Aspirin can help certain people lower their risk of a heart attack or stroke. But taking aspirin isn't right for everyone, because it can cause serious bleeding. · See your doctor regularly. You may need to see the doctor more often at first or until your blood pressure comes down. · If you are taking blood pressure medicine, talk to your doctor before you take decongestants or anti-inflammatory medicine, such as ibuprofen. Some of these medicines can raise blood pressure. · Learn how to check your blood pressure at home. Lifestyle changes · Stay at a healthy weight. This is especially important if you put on weight around the waist. Losing even 10 pounds can help you lower your blood pressure. · If your doctor recommends it, get more exercise. Walking is a good choice. Bit by bit, increase the amount you walk every day. Try for at least 30 minutes on most days of the week. You also may want to swim, bike, or do other activities. · Avoid or limit alcohol. Talk to your doctor about whether you can drink any alcohol. · Try to limit how much sodium you eat to less than 2,300 milligrams (mg) a day. Your doctor may ask you to try to eat less than 1,500 mg a day. · Eat plenty of fruits (such as bananas and oranges), vegetables, legumes, whole grains, and low-fat dairy products. · Lower the amount of saturated fat in your diet. Saturated fat is found in animal products such as milk, cheese, and meat. Limiting these foods may help you lose weight and also lower your risk for heart disease. · Do not smoke. Smoking increases your risk for heart attack and stroke. If you need help quitting, talk to your doctor about stop-smoking programs and medicines. These can increase your chances of quitting for good. When should you call for help? Call 911 anytime you think you may need emergency care. This may mean having symptoms that suggest that your blood pressure is causing a serious heart or blood vessel problem. Your blood pressure may be over 180/110. ? For example, call 911 if: 
? · You have symptoms of a heart attack. These may include: ¨ Chest pain or pressure, or a strange feeling in the chest. 
¨ Sweating. ¨ Shortness of breath. ¨ Nausea or vomiting. ¨ Pain, pressure, or a strange feeling in the back, neck, jaw, or upper belly or in one or both shoulders or arms. ¨ Lightheadedness or sudden weakness. ¨ A fast or irregular heartbeat. ? · You have symptoms of a stroke. These may include: 
¨ Sudden numbness, tingling, weakness, or loss of movement in your face, arm, or leg, especially on only one side of your body. ¨ Sudden vision changes. ¨ Sudden trouble speaking. ¨ Sudden confusion or trouble understanding simple statements. ¨ Sudden problems with walking or balance. ¨ A sudden, severe headache that is different from past headaches. ? · You have severe back or belly pain. ?Do not wait until your blood pressure comes down on its own. Get help right away. ?Call your doctor now or seek immediate care if: 
? · Your blood pressure is much higher than normal (such as 180/110 or higher), but you don't have symptoms. ? · You think high blood pressure is causing symptoms, such as: ¨ Severe headache. ¨ Blurry vision. ? Watch closely for changes in your health, and be sure to contact your doctor if: 
? · Your blood pressure measures 140/90 or higher at least 2 times. That means the top number is 140 or higher or the bottom number is 90 or higher, or both. ? · You think you may be having side effects from your blood pressure medicine. ? · Your blood pressure is usually normal, but it goes above normal at least 2 times. Where can you learn more? Go to http://mikael-oskar.info/. Enter B043 in the search box to learn more about \"High Blood Pressure: Care Instructions. \" Current as of: September 21, 2016 Content Version: 11.4 © 5873-8572 [employer]. Care instructions adapted under license by [employer] (which disclaims liability or warranty for this information). If you have questions about a medical condition or this instruction, always ask your healthcare professional. [place] [address] any warranty or liability for your use of this information. Hyperlipidemia: After Your Visit Your Care Instructions Hyperlipidemia is too much fat in your blood. The body has several kinds of fat, including cholesterol and triglycerides. Your body needs fat for many things, such as making new cells. But too much fat in your blood increases your chances of having a heart attack or stroke. You may be able to lower your cholesterol and triglycerides with a heart-healthy diet, exercise, and if needed, medicine. Your doctor may want you to try lifestyle changes first to see whether they lower the fat in your blood. You may need to take medicine if lifestyle changes do not lower the fat in your blood enough. Follow-up care is a key part of your treatment and safety. Be sure to make and go to all appointments, and call your doctor if you are having problems. Its also a good idea to know your test results and keep a list of the medicines you take. How can you care for yourself at home? Take your medicines · Take your medicines exactly as prescribed. Call your doctor if you think you are having a problem with your medicine. · If you take medicine to lower your cholesterol, go to follow-up visits. You will need to have blood tests. · Do not take large doses of niacin, which is a B vitamin, while taking medicine called statins. It may increase the chance of muscle pain and liver problems. · Talk to your doctor about avoiding grapefruit juice if you are taking statins. Grapefruit juice can raise the level of this medicine in your blood. This could increase side effects. Eat more fruits, vegetables, and fiber · Fruits and vegetables have lots of nutrients that help protect against heart disease, and they have littleif anyfat. Try to eat at least five servings a day. Dark green, deep orange, or yellow fruits and vegetables are healthy choices. · Keep carrots, celery, and other veggies handy for snacks. Buy fruit that is in season and store it where you can see it so that you will be tempted to eat it. Cook dishes that have a lot of veggies in them, such as stir-fries and soups. · Foods high in fiber may reduce your cholesterol and provide important vitamins and minerals. High-fiber foods include whole-grain cereals and breads, oatmeal, beans, brown rice, citrus fruits, and apples. · Buy whole-grain breads and cereals instead of white bread and pastries. Limit saturated fat · Read food labels and try to avoid saturated fat and trans fat. They increase your risk of heart disease. · Use olive or canola oil when you cook. Try cholesterol-lowering spreads, such as Benecol or Take Control. · Bake, broil, grill, or steam foods instead of frying them. · Limit the amount of high-fat meats you eat, including hot dogs and sausages. Cut out all visible fat when you prepare meat. · Eat fish, skinless poultry, and soy products such as tofu instead of high-fat meats. Soybeans may be especially good for your heart. Eat at least two servings of fish a week. Certain fish, such as salmon, contain omega-3 fatty acids, which may help reduce your risk of heart attack. · Choose low-fat or fat-free milk and dairy products. Get exercise, limit alcohol, and quit smoking · Get more exercise. Work with your doctor to set up an exercise program. Even if you can do only a small amount, exercise will help you get stronger, have more energy, and manage your weight and your stress. Walking is an easy way to get exercise. Gradually increase the amount you walk every day. Aim for at least 30 minutes on most days of the week. You also may want to swim, bike, or do other activities. · Limit alcohol to no more than 2 drinks a day for men and 1 drink a day for women. · Do not smoke. If you need help quitting, talk to your doctor about stop-smoking programs and medicines. These can increase your chances of quitting for good. When should you call for help? Call 911 anytime you think you may need emergency care. For example, call if: 
· You have symptoms of a heart attack. These may include: ¨ Chest pain or pressure, or a strange feeling in the chest. 
¨ Sweating. ¨ Shortness of breath. ¨ Nausea or vomiting.  
¨ Pain, pressure, or a strange feeling in the back, neck, jaw, or upper belly or in one or both shoulders or arms. ¨ Lightheadedness or sudden weakness. ¨ A fast or irregular heartbeat. After you call 911, the  may tell you to chew 1 adult-strength or 2 to 4 low-dose aspirin. Wait for an ambulance. Do not try to drive yourself. · You have signs of a stroke. These may include: 
¨ Sudden numbness, paralysis, or weakness in your face, arm, or leg, especially on only one side of your body. ¨ New problems with walking or balance. ¨ Sudden vision changes. ¨ Drooling or slurred speech. ¨ New problems speaking or understanding simple statements, or feeling confused. ¨ A sudden, severe headache that is different from past headaches. · You passed out (lost consciousness). Call your doctor now or seek immediate medical care if: 
· You have muscle pain or weakness. Watch closely for changes in your health, and be sure to contact your doctor if: 
· You are very tired. · You have an upset stomach, gas, constipation, or belly pain or cramps. Where can you learn more? Go to MashON.be Enter C406 in the search box to learn more about \"Hyperlipidemia: After Your Visit. \"  
© 1480-5192 [employer]. Care instructions adapted under license by [employer] (which disclaims liability or warranty for this information). This care instruction is for use with your licensed healthcare professional. If you have questions about a medical condition or this instruction, always ask your healthcare professional. [place] [address] any warranty or liability for your use of this information. Content Version: 6.2.551068; Last Revised: October 13, 2011 Kidney Dialysis: Care Instructions Your Care Instructions Dialysis is a process that filters wastes from the blood when your kidneys can no longer do the job. It is not a cure, but it can help you live longer and feel better.  It is a lifesaving treatment when you have kidney failure. Normal kidneys work 24 hours a day to clean wastes from your blood. Your kidneys are not able to do this job, so a process called dialysis will do some of the work for your kidneys. You and your doctor will decide which type of dialysis you should have. Peritoneal dialysis uses the lining of your belly (peritoneum) to filter your blood. You can do it at home, on a daily basis. Hemodialysis uses a man-made filter called a dialyzer to clean your blood. Most people need to go to a hospital or clinic 3 days a week for several hours each time. Sometimes hemodialysis can be done at home. It is normal to have questions about your treatment, and you have a right to know what is happening to you. Learning about dialysis can help you take an active role in your treatment. Dialysis does not cure kidney disease, but it can help you live longer and feel better. You will need to follow your diet and treatment schedule carefully. Follow-up care is a key part of your treatment and safety. Be sure to make and go to all appointments, and call your doctor if you are having problems. It's also a good idea to know your test results and keep a list of the medicines you take. What do you need to know about peritoneal dialysis? Peritoneal dialysis uses the lining of your belly (or peritoneal membrane) to filter your blood. Before you can begin peritoneal dialysis, your doctor will need to place a thin tube called a catheter in your belly. This is the dialysis access. · Peritoneal dialysis can be done at home or in any clean place. You may be able to do it while you sleep. · You can do it by yourself. You do not have to rely on help from others. · You can do it at the times you choose as long as you do the right number of treatments. · It has to be done every day of the week. · Some people find it hard to do all the required steps.  
· It increases your chance for a serious infection of the lining of the belly (peritoneum). What do you need to know about hemodialysis? Hemodialysis uses a man-made membrane called a dialyzer to clean your blood. You are connected to the dialyzer by tubes attached to your blood vessels. Before you start hemodialysis, your doctor will create a site where the blood can flow in and out of your body during your dialysis sessions. This site is called the vascular access. It may be a fistula, made by connecting an artery and a vein. Or it may be a graft, which is a tube implanted under your skin. · Hemodialysis is done mainly by trained health workers who can watch for any problems. · It allows you to be in contact with other people having dialysis. This can help provide emotional support. · You can schedule your treatments in the evenings so you can keep working. · You may be able to do home hemodialysis, which gives you more control over your schedule. · It usually needs to be done on a set schedule 3 times a week. · It can cause side effects. The most common side effects are low blood pressure and muscle cramps. These can often be treated easily. · It requires needle sticks during every treatment, which bothers some people. Others get used to it and even do the needle sticks themselves. How can you care for yourself at home? · Be sure to have all of your dialysis sessions. Do not try to shorten or skip your sessions. You have a better chance of a longer and healthier life by getting your full treatment. · Your doctor or health care team will show you the steps you need to go through each day before, during, and after dialysis. Be sure to follow these steps. If you do not understand a step, talk to your team. 
· Your doctor and dietitian will help you design menus that follow your diet. Be sure to follow your diet guidelines. ¨ You will need to limit fluids and certain foods that contain salt (sodium), potassium, and phosphorus. ¨ You may need to follow a heart-healthy diet to keep the fat (cholesterol) in your blood under control. ¨ You may need higher levels of protein in your diet. · Your doctor may recommend certain vitamins. But do not take any other medicine, including over-the-counter medicines, vitamins, and herbal products, without talking to your doctor first. 
· Do not smoke. Smoking raises your risk of many health problems, including more kidney damage. If you need help quitting, talk to your doctor about stop-smoking programs and medicines. These can increase your chances of quitting for good. · Do not take ibuprofen (Advil, Motrin), naproxen (Aleve), or similar medicines, unless your doctor tells you to. These medicines may make kidney problems worse. When should you call for help? Call your doctor now or seek immediate medical care if: 
? · You have a fever. ? · You are dizzy or lightheaded, or you feel like you may faint. ? · You are confused or cannot think clearly. ? · You have new or worse nausea or vomiting. ? · You have new or more blood in your urine. ? · You have new swelling. ? Watch closely for changes in your health, and be sure to contact your doctor if: 
? · You do not get better as expected. Where can you learn more? Go to http://mikael-oskar.info/. Enter P367 in the search box to learn more about \"Kidney Dialysis: Care Instructions. \" Current as of: May 12, 2017 Content Version: 11.4 © 2972-8845 [employer]. Care instructions adapted under license by [employer] (which disclaims liability or warranty for this information). If you have questions about a medical condition or this instruction, always ask your healthcare professional. [place] [address] any warranty or liability for your use of this information. Introducing [facility]!    
 Candace Casillas introduces [employer] patient portal. Now you can access parts of your medical record, email your doctor's office, and request medication refills online. 1. In your internet browser, go to [email or web address] 2. Click on the First Time User? Click Here link in the Sign In box. You will see the New Member Sign Up page. 3. Enter your [employer] Access Code exactly as it appears below. You will not need to use this code after youve completed the sign-up process. If you do not sign up before the expiration date, you must request a new code. · [employer] Access Code: [email or web address] Expires: 3/1/2018  9:09 AM 
 
4. Enter the last four digits of your Social Security Number (xxxx) and Date of Birth (mm/dd/yyyy) as indicated and click Submit. You will be taken to the next sign-up page. 5. Create a [employer] ID. This will be your [employer] login ID and cannot be changed, so think of one that is secure and easy to remember. 6. Create a [employer] password. You can change your password at any time. 7. Enter your Password Reset Question and Answer. This can be used at a later time if you forget your password. 8. Enter your e-mail address. You will receive e-mail notification when new information is available in [address] 9. Click Sign Up. You can now view and download portions of your medical record. 10. Click the Download Summary menu link to download a portable copy of your medical information. If you have questions, please visit the Frequently Asked Questions section of the [employer] website. Remember, [employer] is NOT to be used for urgent needs. For medical emergencies, dial 911. Now available from your iPhone and Android! Please provide this summary of care documentation to your next provider. Your primary care clinician is listed as [place]. If you have any questions after today's visit, please call [phone].

## 2017-12-01 NOTE — PROGRESS NOTES
Chief Complaint   Patient presents with    Hypertension     3 mo. f/u    Cholesterol Problem     3 mo. f/u    Chronic Kidney Disease     3 mo. f/u       SUBJECTIVE:    Dagmar Woodall is a 60 y.o. female who returns today in follow-up of her medical problems include hypertension, hyperlipidemia, GERD, DJD, COPD, chronic kidney disease, allergic rhinitis, hypothyroidism, and anxiety. She is taking her medications and trying to follow her diet as well as get some exercise but she knows she needs to do better with exercise and diet. She denies any chest pain shortness breath or cardiorespiratory commands. She denies any GI/ complaints. She denies headaches or neurologic planes. She has no arthritic complaints and no other complaints complete review of systems. Current Outpatient Prescriptions   Medication Sig Dispense Refill    DOCOSAHEXANOIC ACID/EPA (FISH OIL PO) Take  by mouth.  diclofenac EC (VOLTAREN) 75 mg EC tablet take 1 tablet by mouth twice a day 60 Tab 11    olmesartan-hydroCHLOROthiazide (BENICAR HCT) 20-12.5 mg per tablet TAKE 1 TABLET BY MOUTH DAILY 90 Tab 3    potassium chloride (K-DUR, KLOR-CON) 20 mEq tablet take 1 tablet by mouth twice a day 60 Tab 5    atorvastatin (LIPITOR) 10 mg tablet Take 2 Tabs by mouth nightly. 30 Tab 11    ferrous sulfate 325 mg (65 mg iron) tablet Take  by mouth three (3) times daily (with meals).  aspirin delayed-release 325 mg tablet Take 325 mg by mouth daily. At bedtime with Niacin      POLYETHYLENE GLYCOL 3350 (MIRALAX PO) Take  by mouth. Take as needed      docusate sodium (COLACE) 100 mg capsule Take 1 Cap by mouth two (2) times a day. May use OTC instead. Prevents constipation from narcotics. 60 Cap 0    niacin ER (NIASPAN) 500 mg tablet Take 1 Tab by mouth nightly. May resume medication when b/p greater than 120/80.  vitamin E (AQUA GEMS) 400 unit capsule Take 1 Cap by mouth daily. Hold for two weeks.       senna-docusate (SENNA PLUS) 8.6-50 mg per tablet Take 1 Tab by mouth two (2) times a day. Take until having regular bowel movements. 60 Tab 0    POTASSIUM CHLORIDE SR 10 MEQ TAB 20 mEq two (2) times a day.  cholecalciferol, vitamin D3, 2,000 unit tab Take 1 Tab by mouth daily.  lansoprazole (PREVACID) 15 mg capsule Take 15 mg by mouth every Monday, Wednesday, Friday. Takes as needed      PV W-O YAN/FERROUS FUMARATE/FA (M-VIT PO) Take 1 Tab by mouth daily.  estradiol (VIVELLE) 0.05 mg/24 hr 1 Patch by TransDERmal route two (2) days a week. Wed and sun      ascorbic acid (VITAMIN C) 250 mg tablet Take 500 mg by mouth daily.        Past Medical History:   Diagnosis Date    Allergic rhinitis 8/9/2017    Anemia 8/9/2017    Anxiety 8/9/2017    Arrhythmia     irregular heart beat hx and beating fast per patient/no cardiologist    Arthritis     JOINTS  WORSE WAIST DOWN     Back pain 8/9/2017    CKD (chronic kidney disease) 8/9/2017    COPD (chronic obstructive pulmonary disease) ([facility] [address] 8/9/2017    Crohn disease ([facility] [address] 8/9/2017    DJD (degenerative joint disease) 8/9/2017    GERD (gastroesophageal reflux disease) 8/9/2017    Hormone replacement therapy (HRT) 8/9/2017    Hyperlipidemia 8/9/2017    Hypertension     Hypertension with renal disease 8/9/2017    Hypokalemia 8/9/2017    Hypothyroid 8/9/2017    Ill-defined condition     neuropathy feet    Inguinal hernia 8/9/2017    Leg numbness 8/9/2017    Lumbar herniated disc 8/9/2017    OAB (overactive bladder) 8/9/2017    Obesity 8/9/2017    On statin therapy 8/9/2017    Osteoarthritis 8/9/2017    Pelvic pain in female 3/9/5893    Umbilical hernia 7/9/4678    Varicose vein of leg 8/9/2017     Past Surgical History:   Procedure Laterality Date    BREAST SURGERY PROCEDURE UNLISTED      mass removed left breast x2 benign    HX COLONOSCOPY  12/2010    normal    HX COLONOSCOPY  12/30/2014    normal     HX HEENT      cyst removed left eye    HX HERNIA REPAIR  06/02/2017    right inguinal and umbilical (Dr. Karen Avalos)    HX HYSTERECTOMY      HX ORTHOPAEDIC      bilat knee injections    HX ORTHOPAEDIC      laser treatment left knee and foot    HX ORTHOPAEDIC      hx of gel shots bilat knee    HX ORTHOPAEDIC  12/27/2016    left knee coritizon shot    HX OTHER SURGICAL      cyst removed front left scalp    HX OTHER SURGICAL      colonoscopy    HX OTHER SURGICAL      tooth implant    HX TUBAL LIGATION      VASCULAR SURGERY PROCEDURE UNLIST      vein striping     Allergies   Allergen Reactions    Lisinopril Cough       REVIEW OF SYSTEMS:  General: negative for - chills or fever, or weight loss or gain  ENT: negative for - headaches, nasal congestion or tinnitus  Eyes: no blurred or visual changes  Neck: No stiffness or swollen nodes  Respiratory: negative for - cough, hemoptysis, shortness of breath or wheezing  Cardiovascular : negative for - chest pain, edema, palpitations or shortness of breath  Gastrointestinal: negative for - abdominal pain, blood in stools, heartburn or nausea/vomiting  Genito-Urinary: no dysuria, trouble voiding, or hematuria  Musculoskeletal: negative for - gait disturbance, joint pain, joint stiffness or joint swelling  Neurological: no TIA or stroke symptoms  Hematologic: no bruises, no bleeding  Lymphatic: no swollen glands  Integument: no lumps, mole changes, nail changes or rash  Endocrine:no malaise/lethargy poly uria or polydipsia or unexpected weight changes        Social History     Social History    Marital status:      Spouse name: N/A    Number of children: N/A    Years of education: N/A     Social History Main Topics    Smoking status: Never Smoker    Smokeless tobacco: Never Used    Alcohol use Yes      Comment: once a year    Drug use: No    Sexual activity: Yes     Partners: Male     Other Topics Concern    None     Social History Narrative     Family History   Problem Relation Age of Onset    No Known Problems Mother     No Known Problems Father     Stroke Sister        OBJECTIVE:     Visit Vitals    /86 (BP 1 Location: Left arm, BP Patient Position: Sitting)    Pulse 62    Temp 97.4 °F (36.3 °C) (Oral)    Resp 16    Ht 5' 6\" (1.676 m)    Wt 208 lb (94.3 kg)    SpO2 97%    BMI 33.57 kg/m2     CONSTITUTIONAL:   well nourished, appears age appropriate  EYES: sclera anicteric, PERRL, EOMI  ENMT:nars clear, moist mucous membranes, pharynx clear  NECK: supple. Thyroid normal, No JVD or bruits  RESPIRATORY: Chest: clear to ascultation and percussion, normal inspiratory effort  CARDIOVASCULAR: Heart: regular rate and rhythm no murmurs, rubs or gallops, PMI not displaced, No thrills  GASTROINTESTINAL: Abdomen: non distended, soft, non tender, bowel sounds normal  HEMATOLOGIC: no purpura, petechiae or bruising  LYMPHATIC: No lymph node enlargemant  MUSCULOSKELETAL: Extremities: no edema or active synovitis, pulse 1+   INTEGUMENT: No unusual rashes or suspicious skin lesions noted. Nails appear normal.  PERIPHERAL VASCULAR: normal pulses femoral, PT and DP  NEUROLOGIC: non-focal exam, A & O X 3  PSYCHIATRIC:, appropriate affect     ASSESSMENT:   1. Hypertension with renal disease    2. Mixed hyperlipidemia    3. Stage 3 chronic kidney disease    4. Chronic obstructive pulmonary disease, unspecified COPD type ([facility] [address]    5. Primary osteoarthritis involving multiple joints    6. Gastroesophageal reflux disease without esophagitis    7. Class 1 obesity due to excess calories without serious comorbidity with body mass index (BMI) of 33.0 to 33.9 in adult    8. Chronic nonseasonal allergic rhinitis due to other allergen    9. Effects of exposure to external cause, initial encounter    10. Screening for osteoporosis    11. Encounter for immunization      Impression  1. Hypertension that is controlled continue current therapy  2.   Hyperlipidemia reviewed prior labs repeat status pending will make adjustments as necessary  3. CKD repeat status pending will adjust medicines if needed  4. COPD seems to be stable  5. DJD currently stable continue current treatment  6. GERD that seems to be stable  7. Obesity we discussed diet exercise weight reduction absolute need to get her weight down which is unfortunately up 9 pounds since last visit  8. Allergic rhinitis stable  Flu shot given today. Bone density scheduled. We will call to get a copy of her last colonoscopy reviewed that. Labs are pending as noted will make further recommendation based on those. Follow stable continue same and follow-up 3 months or sooner should be a problem. PLAN:  .  Orders Placed This Encounter    DEXA BONE DENSITY STUDY AXIAL    Influenza virus vaccine (QUADRIVALENT PRES FREE SYRINGE) IM ([id number])    HEPATITIS C AB    AMB POC LIPID PROFILE    AMB POC COMPREHENSIVE METABOLIC PANEL    DOCOSAHEXANOIC ACID/EPA (FISH OIL PO)         ATTENTION:   This medical record was transcribed using an electronic medical records system. Although proofread, it may and can contain electronic and spelling errors. Other human spelling and other errors may be present. Corrections may be executed at a later time. Please feel free to contact us for any clarifications as needed. Follow-up Disposition:  Return in about 3 months (around 3/1/2018). No results found for any visits on 12/01/17. Isaias Ojeda MD    The patient verbalized understanding of the problems and plans as explained.

## 2017-12-01 NOTE — PATIENT INSTRUCTIONS
High Blood Pressure: Care Instructions  Your Care Instructions    If your blood pressure is usually above 140/90, you have high blood pressure, or hypertension. That means the top number is 140 or higher or the bottom number is 90 or higher, or both. Despite what a lot of people think, high blood pressure usually doesn't cause headaches or make you feel dizzy or lightheaded. It usually has no symptoms. But it does increase your risk for heart attack, stroke, and kidney or eye damage. The higher your blood pressure, the more your risk increases. Your doctor will give you a goal for your blood pressure. Your goal will be based on your health and your age. An example of a goal is to keep your blood pressure below 140/90. Lifestyle changes, such as eating healthy and being active, are always important to help lower blood pressure. You might also take medicine to reach your blood pressure goal.  Follow-up care is a key part of your treatment and safety. Be sure to make and go to all appointments, and call your doctor if you are having problems. It's also a good idea to know your test results and keep a list of the medicines you take. How can you care for yourself at home? Medical treatment  · If you stop taking your medicine, your blood pressure will go back up. You may take one or more types of medicine to lower your blood pressure. Be safe with medicines. Take your medicine exactly as prescribed. Call your doctor if you think you are having a problem with your medicine. · Talk to your doctor before you start taking aspirin every day. Aspirin can help certain people lower their risk of a heart attack or stroke. But taking aspirin isn't right for everyone, because it can cause serious bleeding. · See your doctor regularly. You may need to see the doctor more often at first or until your blood pressure comes down.   · If you are taking blood pressure medicine, talk to your doctor before you take decongestants or anti-inflammatory medicine, such as ibuprofen. Some of these medicines can raise blood pressure. · Learn how to check your blood pressure at home. Lifestyle changes  · Stay at a healthy weight. This is especially important if you put on weight around the waist. Losing even 10 pounds can help you lower your blood pressure. · If your doctor recommends it, get more exercise. Walking is a good choice. Bit by bit, increase the amount you walk every day. Try for at least 30 minutes on most days of the week. You also may want to swim, bike, or do other activities. · Avoid or limit alcohol. Talk to your doctor about whether you can drink any alcohol. · Try to limit how much sodium you eat to less than 2,300 milligrams (mg) a day. Your doctor may ask you to try to eat less than 1,500 mg a day. · Eat plenty of fruits (such as bananas and oranges), vegetables, legumes, whole grains, and low-fat dairy products. · Lower the amount of saturated fat in your diet. Saturated fat is found in animal products such as milk, cheese, and meat. Limiting these foods may help you lose weight and also lower your risk for heart disease. · Do not smoke. Smoking increases your risk for heart attack and stroke. If you need help quitting, talk to your doctor about stop-smoking programs and medicines. These can increase your chances of quitting for good. When should you call for help? Call 911 anytime you think you may need emergency care. This may mean having symptoms that suggest that your blood pressure is causing a serious heart or blood vessel problem. Your blood pressure may be over 180/110. ? For example, call 911 if:  ? · You have symptoms of a heart attack. These may include:  ¨ Chest pain or pressure, or a strange feeling in the chest.  ¨ Sweating. ¨ Shortness of breath. ¨ Nausea or vomiting.   ¨ Pain, pressure, or a strange feeling in the back, neck, jaw, or upper belly or in one or both shoulders or arms.  ¨ Lightheadedness or sudden weakness. ¨ A fast or irregular heartbeat. ? · You have symptoms of a stroke. These may include:  ¨ Sudden numbness, tingling, weakness, or loss of movement in your face, arm, or leg, especially on only one side of your body. ¨ Sudden vision changes. ¨ Sudden trouble speaking. ¨ Sudden confusion or trouble understanding simple statements. ¨ Sudden problems with walking or balance. ¨ A sudden, severe headache that is different from past headaches. ? · You have severe back or belly pain. ?Do not wait until your blood pressure comes down on its own. Get help right away. ?Call your doctor now or seek immediate care if:  ? · Your blood pressure is much higher than normal (such as 180/110 or higher), but you don't have symptoms. ? · You think high blood pressure is causing symptoms, such as:  ¨ Severe headache. ¨ Blurry vision. ? Watch closely for changes in your health, and be sure to contact your doctor if:  ? · Your blood pressure measures 140/90 or higher at least 2 times. That means the top number is 140 or higher or the bottom number is 90 or higher, or both. ? · You think you may be having side effects from your blood pressure medicine. ? · Your blood pressure is usually normal, but it goes above normal at least 2 times. Where can you learn more? Go to http://mikael-oskar.info/. Enter K262 in the search box to learn more about \"High Blood Pressure: Care Instructions. \"  Current as of: September 21, 2016  Content Version: 11.4  © 2031-1690 [employer]. Care instructions adapted under license by [employer] (which disclaims liability or warranty for this information). If you have questions about a medical condition or this instruction, always ask your healthcare professional. [place] [address] any warranty or liability for your use of this information.     Hyperlipidemia: After Your Visit  Your Care Instructions  Hyperlipidemia is too much fat in your blood. The body has several kinds of fat, including cholesterol and triglycerides. Your body needs fat for many things, such as making new cells. But too much fat in your blood increases your chances of having a heart attack or stroke. You may be able to lower your cholesterol and triglycerides with a heart-healthy diet, exercise, and if needed, medicine. Your doctor may want you to try lifestyle changes first to see whether they lower the fat in your blood. You may need to take medicine if lifestyle changes do not lower the fat in your blood enough. Follow-up care is a key part of your treatment and safety. Be sure to make and go to all appointments, and call your doctor if you are having problems. Its also a good idea to know your test results and keep a list of the medicines you take. How can you care for yourself at home? Take your medicines  · Take your medicines exactly as prescribed. Call your doctor if you think you are having a problem with your medicine. · If you take medicine to lower your cholesterol, go to follow-up visits. You will need to have blood tests. · Do not take large doses of niacin, which is a B vitamin, while taking medicine called statins. It may increase the chance of muscle pain and liver problems. · Talk to your doctor about avoiding grapefruit juice if you are taking statins. Grapefruit juice can raise the level of this medicine in your blood. This could increase side effects. Eat more fruits, vegetables, and fiber  · Fruits and vegetables have lots of nutrients that help protect against heart disease, and they have littleif anyfat. Try to eat at least five servings a day. Dark green, deep orange, or yellow fruits and vegetables are healthy choices. · Keep carrots, celery, and other veggies handy for snacks. Buy fruit that is in season and store it where you can see it so that you will be tempted to eat it.  Cook dishes that have a lot of veggies in them, such as stir-fries and soups. · Foods high in fiber may reduce your cholesterol and provide important vitamins and minerals. High-fiber foods include whole-grain cereals and breads, oatmeal, beans, brown rice, citrus fruits, and apples. · Buy whole-grain breads and cereals instead of white bread and pastries. Limit saturated fat  · Read food labels and try to avoid saturated fat and trans fat. They increase your risk of heart disease. · Use olive or canola oil when you cook. Try cholesterol-lowering spreads, such as Benecol or Take Control. · Bake, broil, grill, or steam foods instead of frying them. · Limit the amount of high-fat meats you eat, including hot dogs and sausages. Cut out all visible fat when you prepare meat. · Eat fish, skinless poultry, and soy products such as tofu instead of high-fat meats. Soybeans may be especially good for your heart. Eat at least two servings of fish a week. Certain fish, such as salmon, contain omega-3 fatty acids, which may help reduce your risk of heart attack. · Choose low-fat or fat-free milk and dairy products. Get exercise, limit alcohol, and quit smoking  · Get more exercise. Work with your doctor to set up an exercise program. Even if you can do only a small amount, exercise will help you get stronger, have more energy, and manage your weight and your stress. Walking is an easy way to get exercise. Gradually increase the amount you walk every day. Aim for at least 30 minutes on most days of the week. You also may want to swim, bike, or do other activities. · Limit alcohol to no more than 2 drinks a day for men and 1 drink a day for women. · Do not smoke. If you need help quitting, talk to your doctor about stop-smoking programs and medicines. These can increase your chances of quitting for good. When should you call for help? Call 911 anytime you think you may need emergency care.  For example, call if:  · You have symptoms of a heart attack. These may include:  ¨ Chest pain or pressure, or a strange feeling in the chest.  ¨ Sweating. ¨ Shortness of breath. ¨ Nausea or vomiting. ¨ Pain, pressure, or a strange feeling in the back, neck, jaw, or upper belly or in one or both shoulders or arms. ¨ Lightheadedness or sudden weakness. ¨ A fast or irregular heartbeat. After you call 911, the  may tell you to chew 1 adult-strength or 2 to 4 low-dose aspirin. Wait for an ambulance. Do not try to drive yourself. · You have signs of a stroke. These may include:  ¨ Sudden numbness, paralysis, or weakness in your face, arm, or leg, especially on only one side of your body. ¨ New problems with walking or balance. ¨ Sudden vision changes. ¨ Drooling or slurred speech. ¨ New problems speaking or understanding simple statements, or feeling confused. ¨ A sudden, severe headache that is different from past headaches. · You passed out (lost consciousness). Call your doctor now or seek immediate medical care if:  · You have muscle pain or weakness. Watch closely for changes in your health, and be sure to contact your doctor if:  · You are very tired. · You have an upset stomach, gas, constipation, or belly pain or cramps. Where can you learn more? Go to [employer]  Enter C406 in the search box to learn more about \"Hyperlipidemia: After Your Visit. \"   © 6787-7564 [employer]. Care instructions adapted under license by Antoinette Duffy (which disclaims liability or warranty for this information). This care instruction is for use with your licensed healthcare professional. If you have questions about a medical condition or this instruction, always ask your healthcare professional. [place] [address] any warranty or liability for your use of this information.   Content Version: 3.1.111812; Last Revised: October 13, 2011                 Kidney Dialysis: Care Instructions  Your Care Instructions    Dialysis is a process that filters wastes from the blood when your kidneys can no longer do the job. It is not a cure, but it can help you live longer and feel better. It is a lifesaving treatment when you have kidney failure. Normal kidneys work 24 hours a day to clean wastes from your blood. Your kidneys are not able to do this job, so a process called dialysis will do some of the work for your kidneys. You and your doctor will decide which type of dialysis you should have. Peritoneal dialysis uses the lining of your belly (peritoneum) to filter your blood. You can do it at home, on a daily basis. Hemodialysis uses a man-made filter called a dialyzer to clean your blood. Most people need to go to a hospital or clinic 3 days a week for several hours each time. Sometimes hemodialysis can be done at home. It is normal to have questions about your treatment, and you have a right to know what is happening to you. Learning about dialysis can help you take an active role in your treatment. Dialysis does not cure kidney disease, but it can help you live longer and feel better. You will need to follow your diet and treatment schedule carefully. Follow-up care is a key part of your treatment and safety. Be sure to make and go to all appointments, and call your doctor if you are having problems. It's also a good idea to know your test results and keep a list of the medicines you take. What do you need to know about peritoneal dialysis? Peritoneal dialysis uses the lining of your belly (or peritoneal membrane) to filter your blood. Before you can begin peritoneal dialysis, your doctor will need to place a thin tube called a catheter in your belly. This is the dialysis access. · Peritoneal dialysis can be done at home or in any clean place. You may be able to do it while you sleep. · You can do it by yourself. You do not have to rely on help from others.   · You can do it at the times you choose as long as you do the right number of treatments. · It has to be done every day of the week. · Some people find it hard to do all the required steps. · It increases your chance for a serious infection of the lining of the belly (peritoneum). What do you need to know about hemodialysis? Hemodialysis uses a man-made membrane called a dialyzer to clean your blood. You are connected to the dialyzer by tubes attached to your blood vessels. Before you start hemodialysis, your doctor will create a site where the blood can flow in and out of your body during your dialysis sessions. This site is called the vascular access. It may be a fistula, made by connecting an artery and a vein. Or it may be a graft, which is a tube implanted under your skin. · Hemodialysis is done mainly by trained health workers who can watch for any problems. · It allows you to be in contact with other people having dialysis. This can help provide emotional support. · You can schedule your treatments in the evenings so you can keep working. · You may be able to do home hemodialysis, which gives you more control over your schedule. · It usually needs to be done on a set schedule 3 times a week. · It can cause side effects. The most common side effects are low blood pressure and muscle cramps. These can often be treated easily. · It requires needle sticks during every treatment, which bothers some people. Others get used to it and even do the needle sticks themselves. How can you care for yourself at home? · Be sure to have all of your dialysis sessions. Do not try to shorten or skip your sessions. You have a better chance of a longer and healthier life by getting your full treatment. · Your doctor or health care team will show you the steps you need to go through each day before, during, and after dialysis. Be sure to follow these steps.  If you do not understand a step, talk to your team.  · Your doctor and dietitian will help you design menus that follow your diet. Be sure to follow your diet guidelines. ¨ You will need to limit fluids and certain foods that contain salt (sodium), potassium, and phosphorus. ¨ You may need to follow a heart-healthy diet to keep the fat (cholesterol) in your blood under control. ¨ You may need higher levels of protein in your diet. · Your doctor may recommend certain vitamins. But do not take any other medicine, including over-the-counter medicines, vitamins, and herbal products, without talking to your doctor first.  · Do not smoke. Smoking raises your risk of many health problems, including more kidney damage. If you need help quitting, talk to your doctor about stop-smoking programs and medicines. These can increase your chances of quitting for good. · Do not take ibuprofen (Advil, Motrin), naproxen (Aleve), or similar medicines, unless your doctor tells you to. These medicines may make kidney problems worse. When should you call for help? Call your doctor now or seek immediate medical care if:  ? · You have a fever. ? · You are dizzy or lightheaded, or you feel like you may faint. ? · You are confused or cannot think clearly. ? · You have new or worse nausea or vomiting. ? · You have new or more blood in your urine. ? · You have new swelling. ? Watch closely for changes in your health, and be sure to contact your doctor if:  ? · You do not get better as expected. Where can you learn more? Go to http://mikael-oskar.info/. Enter C258 in the search box to learn more about \"Kidney Dialysis: Care Instructions. \"  Current as of: May 12, 2017  Content Version: 11.4  © 6485-0879 Healthwise, Incorporated. Care instructions adapted under license by [employer] (which disclaims liability or warranty for this information).  If you have questions about a medical condition or this instruction, always ask your healthcare professional. Jamee Christensen any warranty or liability for your use of this information.

## 2017-12-01 NOTE — PROGRESS NOTES
1. Have you been to the ER, urgent care clinic since your last visit? Hospitalized since your last visit? No    2. Have you seen or consulted any other health care providers outside of the [address] [place] [address] since your last visit? Include any pap smears or colon screening. Yes, saw Cynthia Hare DC for foot pain. Chief Complaint   Patient presents with    Hypertension     3 mo. f/u    Cholesterol Problem     3 mo. f/u    Chronic Kidney Disease     3 mo. f/u       Fasting    Joni Castellano is a 60 y.o. female who presents for routine immunizations. She denies any symptoms , reactions or allergies that would exclude them from being immunized today. Risks and adverse reactions were discussed and the VIS was given to them. All questions were addressed. She was observed for 15 min post injection. There were no reactions observed.     [employer], JERRYN

## 2017-12-02 LAB — HCV AB S/CO SERPL IA: <0.1 S/CO RATIO (ref 0–0.9)

## 2017-12-28 RX ORDER — NIACIN 500 MG/1
TABLET, EXTENDED RELEASE ORAL
Qty: 90 TAB | Refills: 2 | Status: SHIPPED | OUTPATIENT
Start: 2017-12-28 | End: 2018-10-03 | Stop reason: SDUPTHER

## 2018-03-08 PROBLEM — E66.09 CLASS 1 OBESITY DUE TO EXCESS CALORIES WITHOUT SERIOUS COMORBIDITY WITH BODY MASS INDEX (BMI) OF 33.0 TO 33.9 IN ADULT: Status: ACTIVE | Noted: 2017-08-09

## 2018-03-08 PROBLEM — J30.89 CHRONIC NON-SEASONAL ALLERGIC RHINITIS: Status: ACTIVE | Noted: 2017-08-09

## 2018-03-08 PROBLEM — E78.2 MIXED HYPERLIPIDEMIA: Status: ACTIVE | Noted: 2017-08-09

## 2018-03-08 PROBLEM — M15.9 PRIMARY OSTEOARTHRITIS INVOLVING MULTIPLE JOINTS: Status: ACTIVE | Noted: 2017-08-09

## 2018-03-08 PROBLEM — N18.30 STAGE 3 CHRONIC KIDNEY DISEASE (HCC): Status: ACTIVE | Noted: 2017-08-09

## 2018-03-09 ENCOUNTER — OFFICE VISIT (OUTPATIENT)
Dept: INTERNAL MEDICINE CLINIC | Age: 63
End: 2018-03-09

## 2018-03-09 VITALS
BODY MASS INDEX: 34.81 KG/M2 | DIASTOLIC BLOOD PRESSURE: 88 MMHG | SYSTOLIC BLOOD PRESSURE: 136 MMHG | WEIGHT: 216.6 LBS | RESPIRATION RATE: 16 BRPM | HEIGHT: 66 IN | OXYGEN SATURATION: 98 % | HEART RATE: 72 BPM

## 2018-03-09 DIAGNOSIS — R06.09 DYSPNEA ON EXERTION: ICD-10-CM

## 2018-03-09 DIAGNOSIS — E66.09 CLASS 1 OBESITY DUE TO EXCESS CALORIES WITHOUT SERIOUS COMORBIDITY WITH BODY MASS INDEX (BMI) OF 33.0 TO 33.9 IN ADULT: ICD-10-CM

## 2018-03-09 DIAGNOSIS — J30.89 CHRONIC NONSEASONAL ALLERGIC RHINITIS DUE TO OTHER ALLERGEN: ICD-10-CM

## 2018-03-09 DIAGNOSIS — Z13.820 OSTEOPOROSIS SCREENING: ICD-10-CM

## 2018-03-09 DIAGNOSIS — E78.2 MIXED HYPERLIPIDEMIA: ICD-10-CM

## 2018-03-09 DIAGNOSIS — Z23 ENCOUNTER FOR IMMUNIZATION: ICD-10-CM

## 2018-03-09 DIAGNOSIS — K21.9 GASTROESOPHAGEAL REFLUX DISEASE WITHOUT ESOPHAGITIS: ICD-10-CM

## 2018-03-09 DIAGNOSIS — J44.9 CHRONIC OBSTRUCTIVE PULMONARY DISEASE, UNSPECIFIED COPD TYPE (HCC): ICD-10-CM

## 2018-03-09 DIAGNOSIS — D64.9 ANEMIA, UNSPECIFIED TYPE: ICD-10-CM

## 2018-03-09 DIAGNOSIS — M15.9 PRIMARY OSTEOARTHRITIS INVOLVING MULTIPLE JOINTS: ICD-10-CM

## 2018-03-09 DIAGNOSIS — I12.9 HYPERTENSION WITH RENAL DISEASE: Primary | ICD-10-CM

## 2018-03-09 DIAGNOSIS — N18.30 STAGE 3 CHRONIC KIDNEY DISEASE (HCC): ICD-10-CM

## 2018-03-09 DIAGNOSIS — K50.90 CROHN'S DISEASE WITHOUT COMPLICATION, UNSPECIFIED GASTROINTESTINAL TRACT LOCATION (HCC): ICD-10-CM

## 2018-03-09 LAB
ALBUMIN SERPL-MCNC: 4.2 G/DL (ref 3.9–5.4)
ALKALINE PHOS POC: 85 U/L (ref 38–126)
ALT SERPL-CCNC: 44 U/L (ref 9–52)
AST SERPL-CCNC: 20 U/L (ref 14–36)
BUN BLD-MCNC: 18 MG/DL (ref 7–17)
CALCIUM BLD-MCNC: 10 MG/DL (ref 8.4–10.2)
CHLORIDE BLD-SCNC: 102 MMOL/L (ref 98–107)
CHOLEST SERPL-MCNC: 138 MG/DL (ref 0–200)
CK (CPK) POC: 64 U/L (ref 30–135)
CO2 POC: 30 MMOL/L (ref 22–32)
CREAT BLD-MCNC: 0.9 MG/DL (ref 0.7–1.2)
EGFR (POC): 68
GLUCOSE POC: 91 MG/DL (ref 65–105)
GRAN# POC: 5.1 K/UL (ref 2–7.8)
GRAN% POC: 77.7 % (ref 37–92)
HCT VFR BLD CALC: 36.3 % (ref 37–51)
HDLC SERPL-MCNC: 51 MG/DL (ref 35–130)
HGB BLD-MCNC: 11.7 G/DL (ref 12–18)
LDL CHOLESTEROL POC: 71.6 MG/DL (ref 0–130)
LY# POC: 1.1 K/UL (ref 0.6–4.1)
LY% POC: 17.4 % (ref 10–58.5)
MCH RBC QN: 29 PG (ref 26–32)
MCHC RBC-ENTMCNC: 32.3 G/DL (ref 30–36)
MCV RBC: 90 FL (ref 80–97)
MID #, POC: 0.3 K/UL (ref 0–1.8)
MID% POC: 4.9 % (ref 0.1–24)
PLATELET # BLD: 266 K/UL (ref 140–440)
POTASSIUM SERPL-SCNC: 4.5 MMOL/L (ref 3.6–5)
PROT SERPL-MCNC: 7 G/DL (ref 6.3–8.2)
RBC # BLD: 4.04 M/UL (ref 4.2–6.3)
SODIUM SERPL-SCNC: 138 MMOL/L (ref 137–145)
TCHOL/HDL RATIO (POC): 2.7 (ref 0–4)
TOTAL BILIRUBIN POC: 0.6 MG/DL (ref 0.2–1.3)
TRIGL SERPL-MCNC: 77 MG/DL (ref 0–200)
VLDLC SERPL CALC-MCNC: 15.4 MG/DL
WBC # BLD: 6.5 K/UL (ref 4.1–10.9)

## 2018-03-09 NOTE — PROGRESS NOTES
Chief Complaint   Patient presents with    Hypertension    Chronic Kidney Disease    Cholesterol Problem    Arthritis    Anxiety    Shortness of Breath     with exertion       SUBJECTIVE:    Carlos Henderson is a 60 y.o. female who returns today in follow-up of medical problems include hypertension, hyperlipidemia, GERD, DJD, Crohn's disease, COPD, anxiety, obesity, and other medical problems. She does have a history of anemia. She is noted no change in her bowel habits and has no GI/ complaints currently. She does complain of dyspnea on exertion which she thinks she just needs to improve her lung function. She notes no chest tightness, lightheadedness, palpitations, PND, orthopnea or other cardiorespiratory complaints. She denies any GI or  complaints at the present time. She denies any headaches or dizziness or neurologic complaints. She denies any current arthritic complaints. There are no other complaints on complete review of systems other than the dyspnea on exertion. She is trying to follow her diet but knows she could do better job there which on 4 she is gaining weight. She really does not get a lot of exercise but she does take her medicines regular. Current Outpatient Prescriptions   Medication Sig Dispense Refill    niacin ER (NIASPAN) 500 mg tablet TAKE 1 TABLET BY MOUTH AT BEDTIME 90 Tab 2    DOCOSAHEXANOIC ACID/EPA (FISH OIL PO) Take  by mouth.  diclofenac EC (VOLTAREN) 75 mg EC tablet take 1 tablet by mouth twice a day 60 Tab 11    olmesartan-hydroCHLOROthiazide (BENICAR HCT) 20-12.5 mg per tablet TAKE 1 TABLET BY MOUTH DAILY 90 Tab 3    potassium chloride (K-DUR, KLOR-CON) 20 mEq tablet take 1 tablet by mouth twice a day 60 Tab 5    atorvastatin (LIPITOR) 10 mg tablet Take 2 Tabs by mouth nightly. 30 Tab 11    ferrous sulfate 325 mg (65 mg iron) tablet Take  by mouth three (3) times daily (with meals).       aspirin delayed-release 325 mg tablet Take 325 mg by mouth daily. At bedtime with Niacin      POLYETHYLENE GLYCOL 3350 (MIRALAX PO) Take  by mouth. Take as needed      vitamin E (AQUA GEMS) 400 unit capsule Take 1 Cap by mouth daily. Hold for two weeks.  POTASSIUM CHLORIDE SR 10 MEQ TAB 20 mEq two (2) times a day.  cholecalciferol, vitamin D3, 2,000 unit tab Take 1 Tab by mouth daily.  lansoprazole (PREVACID) 15 mg capsule Take 15 mg by mouth every Monday, Wednesday, Friday. Takes as needed      PV W-O YAN/FERROUS FUMARATE/FA (M-VIT PO) Take 1 Tab by mouth daily.  estradiol (VIVELLE) 0.05 mg/24 hr 1 Patch by TransDERmal route two (2) days a week. Wed and sun      ascorbic acid (VITAMIN C) 250 mg tablet Take 500 mg by mouth daily.        Past Medical History:   Diagnosis Date    Allergic rhinitis 8/9/2017    Anemia 8/9/2017    Anxiety 8/9/2017    Arrhythmia     irregular heart beat hx and beating fast per patient/no cardiologist    Arthritis     JOINTS  WORSE WAIST DOWN     Back pain 8/9/2017    CKD (chronic kidney disease) 8/9/2017    COPD (chronic obstructive pulmonary disease) ([facility] [address] 8/9/2017    Crohn disease ([facility] [address] 8/9/2017    DJD (degenerative joint disease) 8/9/2017    GERD (gastroesophageal reflux disease) 8/9/2017    Hormone replacement therapy (HRT) 8/9/2017    Hyperlipidemia 8/9/2017    Hypertension     Hypertension with renal disease 8/9/2017    Hypokalemia 8/9/2017    Hypothyroid 8/9/2017    Ill-defined condition     neuropathy feet    Inguinal hernia 8/9/2017    Leg numbness 8/9/2017    Lumbar herniated disc 8/9/2017    OAB (overactive bladder) 8/9/2017    Obesity 8/9/2017    On statin therapy 8/9/2017    Osteoarthritis 8/9/2017    Pelvic pain in female 8/2/4292    Umbilical hernia 6/9/8927    Varicose vein of leg 8/9/2017     Past Surgical History:   Procedure Laterality Date    BREAST SURGERY PROCEDURE UNLISTED      mass removed left breast x2 benign    HX COLONOSCOPY  12/2010    normal    HX COLONOSCOPY  12/30/2014    normal     HX HEENT      cyst removed left eye    HX HERNIA REPAIR  06/02/2017    right inguinal and umbilical (Dr. Bradley Da Silva)    HX HYSTERECTOMY      HX ORTHOPAEDIC      bilat knee injections    HX ORTHOPAEDIC      laser treatment left knee and foot    HX ORTHOPAEDIC      hx of gel shots bilat knee    HX ORTHOPAEDIC  12/27/2016    left knee coritizon shot    HX OTHER SURGICAL      cyst removed front left scalp    HX OTHER SURGICAL      colonoscopy    HX OTHER SURGICAL      tooth implant    HX TUBAL LIGATION      VASCULAR SURGERY PROCEDURE UNLIST      vein striping     Allergies   Allergen Reactions    Lisinopril Cough       REVIEW OF SYSTEMS:  General: negative for - chills or fever, or weight loss or gain  ENT: negative for - headaches, nasal congestion or tinnitus  Eyes: no blurred or visual changes  Neck: No stiffness or swollen nodes  Respiratory: negative for - cough, hemoptysis, shortness of breath or wheezing.   Positive for dyspnea on exertion  Cardiovascular : negative for - chest pain, edema, palpitations or shortness of breath  Gastrointestinal: negative for - abdominal pain, blood in stools, heartburn or nausea/vomiting  Genito-Urinary: no dysuria, trouble voiding, or hematuria  Musculoskeletal: negative for - gait disturbance, joint pain, joint stiffness or joint swelling  Neurological: no TIA or stroke symptoms  Hematologic: no bruises, no bleeding  Lymphatic: no swollen glands  Integument: no lumps, mole changes, nail changes or rash  Endocrine:no malaise/lethargy poly uria or polydipsia or unexpected weight changes        Social History     Social History    Marital status:      Spouse name: N/A    Number of children: N/A    Years of education: N/A     Social History Main Topics    Smoking status: Never Smoker    Smokeless tobacco: Never Used    Alcohol use Yes      Comment: once a year    Drug use: No    Sexual activity: Yes     Partners: Male Other Topics Concern    None     Social History Narrative     Family History   Problem Relation Age of Onset    No Known Problems Mother     No Known Problems Father     Stroke Sister        OBJECTIVE:     Visit Vitals    /88 (BP 1 Location: Right arm, BP Patient Position: Sitting)    Pulse 72    Resp 16    Ht 5' 6\" (1.676 m)    Wt 216 lb 9.6 oz (98.2 kg)    SpO2 98%    BMI 34.96 kg/m2     CONSTITUTIONAL:   well nourished moderately obese black female, appears age appropriate  EYES: sclera anicteric, PERRL, EOMI  ENMT:nares clear, moist mucous membranes, pharynx clear  NECK: supple. Thyroid normal, No JVD or bruits  RESPIRATORY: Chest: clear to ascultation and percussion, normal inspiratory effort  CARDIOVASCULAR: Heart: regular rate and rhythm no murmurs, rubs or gallops, PMI not displaced, No thrills  GASTROINTESTINAL: Abdomen: non distended, soft, non tender, bowel sounds normal  HEMATOLOGIC: no purpura, petechiae or bruising  LYMPHATIC: No lymph node enlargemant  MUSCULOSKELETAL: Extremities: no edema or active synovitis, pulse 1+   INTEGUMENT: No unusual rashes or suspicious skin lesions noted. Nails appear normal.  PERIPHERAL VASCULAR: normal pulses femoral, PT and DP  NEUROLOGIC: non-focal exam, A & O X 3  PSYCHIATRIC:, appropriate affect     ASSESSMENT:   1. Hypertension with renal disease    2. Mixed hyperlipidemia    3. Gastroesophageal reflux disease without esophagitis    4. Primary osteoarthritis involving multiple joints    5. Stage 3 chronic kidney disease    6. Chronic obstructive pulmonary disease, unspecified COPD type (HCC)    7. Class 1 obesity due to excess calories without serious comorbidity with body mass index (BMI) of 33.0 to 33.9 in adult    8. Chronic nonseasonal allergic rhinitis due to other allergen    9. Crohn's disease without complication, unspecified gastrointestinal tract location ([facility] [address]    10. Dyspnea on exertion    11. Anemia, unspecified type    12. Encounter for immunization    13. Osteoporosis screening      Impression  1. Hypertension that is controlled continue current therapy reviewed with her  2. Hyperlipidemia repeat status pending will make adjustments if needed  3. GERD that is stable  4. DJD currently stable  5. CKD stage III repeat status pending reviewed prior labs  6. COPD that may be having some role in her dyspnea on exertion. She has never been a smoker but has a lot of secondhand smoke from her  over the years. 7.  Obesity discussed diet exercise weight reduction unfortunate weight is up in the needs to come down for overall health benefit. 8.  Allergic rhinitis stable  9. Crohn's disease currently asymptomatic  10. Dyspnea on exertion EKG today normal sinus bradycardia within normal limits. Chest x-ray is clear so we will see what the CBC shows. If that is normal then she probably needs a stress echo. 11.  History of anemia could be playing some role in the dyspnea on exertion repeat status pending  Pneumococcal 23 vaccine given today. I will call the lab results and make further recommendations adjustments. If her hemoglobin is normal and she continues with dyspnea on exertion and I think we need to schedule a stress test.  Follow-up scheduled for 3 months or sooner should there be a problem. PLAN:  .  Orders Placed This Encounter    DEXA BONE DENSITY STUDY AXIAL    XR CHEST PA LAT    Pneumococcal polysaccharide vaccine, 23-valent, adult or immunosuppressed pt dose    AMB POC COMPLETE CBC,AUTOMATED ENTER    AMB POC LIPID PROFILE    AMB POC COMPREHENSIVE METABOLIC PANEL    AMB POC CK (CPK)    AMB POC EKG ROUTINE W/ 12 LEADS, INTER & REP         ATTENTION:   This medical record was transcribed using an electronic medical records system. Although proofread, it may and can contain electronic and spelling errors. Other human spelling and other errors may be present. Corrections may be executed at a later time. Please feel free to contact us for any clarifications as needed. Follow-up Disposition:  Return in about 3 months (around 6/9/2018). No results found for any visits on 03/09/18. Nicolle Martinez MD    The patient verbalized understanding of the problems and plans as explained.

## 2018-03-09 NOTE — PATIENT INSTRUCTIONS
Anemia: Care Instructions  Your Care Instructions    Anemia is a low level of red blood cells, which carry oxygen throughout your body. Many things can cause anemia. Lack of iron is one of the most common causes. Your body needs iron to make hemoglobin, a substance in red blood cells that carries oxygen from the lungs to your body's cells. Without enough iron, the body produces fewer and smaller red blood cells. As a result, your body's cells do not get enough oxygen, and you feel tired and weak. And you may have trouble concentrating. Bleeding is the most common cause of a lack of iron. You may have heavy menstrual bleeding or bleeding caused by conditions such as ulcers, hemorrhoids, or cancer. Regular use of aspirin or other anti-inflammatory medicines (such as ibuprofen) also can cause bleeding in some people. A lack of iron in your diet also can cause anemia, especially at times when the body needs more iron, such as during pregnancy, infancy, and the teen years. Your doctor may have prescribed iron pills. It may take several months of treatment for your iron levels to return to normal. Your doctor also may suggest that you eat foods that are rich in iron, such as meat and beans. There are many other causes of anemia. It is not always due to a lack of iron. Finding the specific cause of your anemia will help your doctor find the right treatment for you. Follow-up care is a key part of your treatment and safety. Be sure to make and go to all appointments, and call your doctor if you are having problems. It's also a good idea to know your test results and keep a list of the medicines you take. How can you care for yourself at home? · Take your medicines exactly as prescribed. Call your doctor if you think you are having a problem with your medicine. · If your doctor recommends iron pills, take them as directed:  ¨ Try to take the pills on an empty stomach about 1 hour before or 2 hours after meals. But you may need to take iron with food to avoid an upset stomach. ¨ Do not take antacids or drink milk or caffeine drinks (such as coffee, tea, or cola) at the same time or within 2 hours of the time that you take your iron. They can make it hard for your body to absorb the iron. ¨ Vitamin C (from food or supplements) helps your body absorb iron. Try taking iron pills with a glass of orange juice or some other food that is high in vitamin C, such as citrus fruits. ¨ Iron pills may cause stomach problems, such as heartburn, nausea, diarrhea, constipation, and cramps. Be sure to drink plenty of fluids, and include fruits, vegetables, and fiber in your diet each day. Iron pills often make your bowel movements dark or green. ¨ If you forget to take an iron pill, do not take a double dose of iron the next time you take a pill. ¨ Keep iron pills out of the reach of small children. An overdose of iron can be very dangerous. · Follow your doctor's advice about eating iron-rich foods. These include red meat, shellfish, poultry, eggs, beans, raisins, whole-grain bread, and leafy green vegetables. · Steam vegetables to help them keep their iron content. When should you call for help? Call 911 anytime you think you may need emergency care. For example, call if:  ? · You have symptoms of a heart attack. These may include:  ¨ Chest pain or pressure, or a strange feeling in the chest.  ¨ Sweating. ¨ Shortness of breath. ¨ Nausea or vomiting. ¨ Pain, pressure, or a strange feeling in the back, neck, jaw, or upper belly or in one or both shoulders or arms. ¨ Lightheadedness or sudden weakness. ¨ A fast or irregular heartbeat. After you call 911, the  may tell you to chew 1 adult-strength or 2 to 4 low-dose aspirin. Wait for an ambulance. Do not try to drive yourself. ? · You passed out (lost consciousness).    ?Call your doctor now or seek immediate medical care if:  ? · You have new or increased shortness of breath. ? · You are dizzy or lightheaded, or you feel like you may faint. ? · Your fatigue and weakness continue or get worse. ? · You have any abnormal bleeding, such as:  ¨ Nosebleeds. ¨ Vaginal bleeding that is different (heavier, more frequent, at a different time of the month) than what you are used to. ¨ Bloody or black stools, or rectal bleeding. ¨ Bloody or pink urine. ? Watch closely for changes in your health, and be sure to contact your doctor if:  ? · You do not get better as expected. Where can you learn more? Go to http://mikael-oskar.info/. Enter R301 in the search box to learn more about \"Anemia: Care Instructions. \"  Current as of: October 13, 2016  Content Version: 11.4  © 9790-0068 [employer]. Care instructions adapted under license by [employer] (which disclaims liability or warranty for this information). If you have questions about a medical condition or this instruction, always ask your healthcare professional. [place] [address] any warranty or liability for your use of this information.

## 2018-03-12 RX ORDER — ATORVASTATIN CALCIUM 10 MG/1
TABLET, FILM COATED ORAL
Qty: 30 TAB | Refills: 11 | Status: SHIPPED | OUTPATIENT
Start: 2018-03-12 | End: 2018-09-29 | Stop reason: SDUPTHER

## 2018-03-12 NOTE — TELEPHONE ENCOUNTER
Rx refill request from the patient/pharmacy. Patient last seen 03- with labs, and next appointment scheduled for 06-.

## 2018-04-04 DIAGNOSIS — E87.6 HYPOKALEMIA: ICD-10-CM

## 2018-04-04 RX ORDER — POTASSIUM CHLORIDE 20 MEQ/1
TABLET, EXTENDED RELEASE ORAL
Qty: 60 TAB | Refills: 5 | Status: SHIPPED | OUTPATIENT
Start: 2018-04-04 | End: 2018-10-19 | Stop reason: SDUPTHER

## 2018-06-15 ENCOUNTER — OFFICE VISIT (OUTPATIENT)
Dept: INTERNAL MEDICINE CLINIC | Age: 63
End: 2018-06-15

## 2018-06-15 VITALS
WEIGHT: 212.6 LBS | HEART RATE: 61 BPM | DIASTOLIC BLOOD PRESSURE: 94 MMHG | RESPIRATION RATE: 16 BRPM | TEMPERATURE: 97.6 F | HEIGHT: 66 IN | OXYGEN SATURATION: 96 % | BODY MASS INDEX: 34.17 KG/M2 | SYSTOLIC BLOOD PRESSURE: 142 MMHG

## 2018-06-15 DIAGNOSIS — I10 HYPERTENSION, UNSPECIFIED TYPE: ICD-10-CM

## 2018-06-15 DIAGNOSIS — K21.9 GASTROESOPHAGEAL REFLUX DISEASE WITHOUT ESOPHAGITIS: ICD-10-CM

## 2018-06-15 DIAGNOSIS — J44.9 CHRONIC OBSTRUCTIVE PULMONARY DISEASE, UNSPECIFIED COPD TYPE (HCC): ICD-10-CM

## 2018-06-15 DIAGNOSIS — I12.9 HYPERTENSION WITH RENAL DISEASE: Primary | ICD-10-CM

## 2018-06-15 DIAGNOSIS — M15.9 PRIMARY OSTEOARTHRITIS INVOLVING MULTIPLE JOINTS: ICD-10-CM

## 2018-06-15 DIAGNOSIS — N18.30 STAGE 3 CHRONIC KIDNEY DISEASE (HCC): ICD-10-CM

## 2018-06-15 DIAGNOSIS — E78.2 MIXED HYPERLIPIDEMIA: ICD-10-CM

## 2018-06-15 DIAGNOSIS — E66.09 CLASS 1 OBESITY DUE TO EXCESS CALORIES WITHOUT SERIOUS COMORBIDITY WITH BODY MASS INDEX (BMI) OF 33.0 TO 33.9 IN ADULT: ICD-10-CM

## 2018-06-15 DIAGNOSIS — R07.2 PRECORDIAL PAIN: ICD-10-CM

## 2018-06-15 LAB
ALBUMIN SERPL-MCNC: 4.2 G/DL (ref 3.9–5.4)
ALKALINE PHOS POC: 91 U/L (ref 38–126)
ALT SERPL-CCNC: 48 U/L (ref 9–52)
AST SERPL-CCNC: 22 U/L (ref 14–36)
BUN BLD-MCNC: 27 MG/DL (ref 7–17)
CALCIUM BLD-MCNC: 10.5 MG/DL (ref 8.4–10.2)
CHLORIDE BLD-SCNC: 102 MMOL/L (ref 98–107)
CHOLEST SERPL-MCNC: 146 MG/DL (ref 0–200)
CO2 POC: 28 MMOL/L (ref 22–32)
CREAT BLD-MCNC: 1.2 MG/DL (ref 0.7–1.2)
EGFR (POC): 48.1
GLUCOSE POC: 94 MG/DL (ref 65–105)
GRAN# POC: 5.1 K/UL (ref 2–7.8)
GRAN% POC: 76.63 % (ref 37–92)
HCT VFR BLD CALC: 36.6 % (ref 37–51)
HDLC SERPL-MCNC: 48 MG/DL (ref 35–130)
HGB BLD-MCNC: 11.8 G/DL (ref 12–18)
LDL CHOLESTEROL POC: 77.6 MG/DL (ref 0–130)
LY# POC: 1.1 K/UL (ref 0.6–4.1)
LY% POC: 18.3 % (ref 10–58.5)
MCH RBC QN: 28.7 PG (ref 26–32)
MCHC RBC-ENTMCNC: 32.1 G/DL (ref 30–36)
MCV RBC: 89 FL (ref 80–97)
MID #, POC: 0.3 K/UL (ref 0–1.8)
MID% POC: 5.1 % (ref 0.1–24)
PLATELET # BLD: 368 K/UL (ref 140–440)
POTASSIUM SERPL-SCNC: 5.2 MMOL/L (ref 3.6–5)
PROT SERPL-MCNC: 7.3 G/DL (ref 6.3–8.2)
RBC # BLD: 4.1 M/UL (ref 4.2–6.3)
SODIUM SERPL-SCNC: 142 MMOL/L (ref 137–145)
TCHOL/HDL RATIO (POC): 3 (ref 0–4)
TOTAL BILIRUBIN POC: 0.7 MG/DL (ref 0.2–1.3)
TRIGL SERPL-MCNC: 102 MG/DL (ref 0–200)
VLDLC SERPL CALC-MCNC: 20.4 MG/DL
WBC # BLD: 6.5 K/UL (ref 4.1–10.9)

## 2018-06-15 RX ORDER — OLMESARTAN MEDOXOMIL AND HYDROCHLOROTHIAZIDE 40/25 40; 25 MG/1; MG/1
1 TABLET ORAL DAILY
Qty: 90 TAB | Refills: 3 | Status: SHIPPED | OUTPATIENT
Start: 2018-06-15 | End: 2019-06-23 | Stop reason: SDUPTHER

## 2018-06-15 NOTE — MR AVS SNAPSHOT
[facility] 
 
 
 [address] [address] [phone] Patient: Jewell Gray MRN: [id number] SYZ:[id number] Visit Information Date & Time Provider Department Dept. Phone Encounter #  
 6/15/2018 10:00 AM Jesse Rogers [address] [facility] [phone] [id number] Follow-up Instructions Return in about 3 months (around 9/15/2018). Follow-up and Disposition History Your Appointments 7/13/2018 10:50 AM  
FOLLOW UP 10 with MD JENNIFER Villegas [facility] ([address]) Appt Note: 1 month follow up   BP ck  
 [address] [address] [address] [address] 9/14/2018 10:20 AM  
FOLLOW UP 10 with MD JENNIFER Villegas [facility] ([address]) Appt Note: 3 MO FLP   yearly [address] [address] [phone] Upcoming Health Maintenance Date Due DTaP/Tdap/Td series (1 - Tdap) 7/12/1975 ZOSTER VACCINE AGE 60> 5/12/2014 BREAST CANCER SCRN MAMMOGRAM 1/8/2018 Influenza Age 5 to Adult 8/1/2018 Bone Densitometry 4/12/2020 PAP AKA CERVICAL CYTOLOGY 8/21/2020 COLONOSCOPY 12/30/2024 Allergies as of 6/15/2018  Review Complete On: 6/15/2018 By: Jesse Rogers MD  
  
 Severity Noted Reaction Type Reactions Lisinopril  08/09/2017    Cough Current Immunizations  Never Reviewed Name Date Influenza Vaccine 11/7/2016, 10/29/2015 Influenza Vaccine (Quad) PF 12/1/2017 Pneumococcal Polysaccharide (PPSV-23) 3/9/2018 Not reviewed this visit You Were Diagnosed With   
  
 Codes Comments Hypertension with renal disease    -  Primary ICD-10-CM: I12.9 ICD-9-CM: 403.90 Mixed hyperlipidemia     ICD-10-CM: E78.2 ICD-9-CM: 272.2 Gastroesophageal reflux disease without esophagitis     ICD-10-CM: K21.9 ICD-9-CM: 530.81 Primary osteoarthritis involving multiple joints     ICD-10-CM: M15.0 ICD-9-CM: 715.09 Stage 3 chronic kidney disease     ICD-10-CM: N18.3 ICD-9-CM: 797. 3 Chronic obstructive pulmonary disease, unspecified COPD type ([facility]     ICD-10-CM: J44.9 ICD-9-CM: 292 Class 1 obesity due to excess calories without serious comorbidity with body mass index (BMI) of 33.0 to 33.9 in adult     ICD-10-CM: E66.09, Z68.33 
ICD-9-CM: 278.00, V85.33 Precordial pain     ICD-10-CM: R07.2 ICD-9-CM: 786.51 Hypertension, unspecified type     ICD-10-CM: I10 
ICD-9-CM: 401.9 Vitals BP Pulse Temp Resp Height(growth percentile) Weight(growth percentile) (!) 142/94 (BP 1 Location: Left arm, BP Patient Position: Sitting) 61 97.6 °F (36.4 °C) (Oral) 16 5' 6\" (1.676 m) 212 lb 9.6 oz (96.4 kg) SpO2 BMI OB Status Smoking Status 96% 34.31 kg/m2 Hysterectomy Never Smoker Vitals History BMI and BSA Data Body Mass Index Body Surface Area  
 34.31 kg/m 2 2.12 m 2 Preferred Pharmacy Pharmacy Name Phone [phone] [phone] Your Updated Medication List  
  
   
This list is accurate as of 6/15/18 11:37 AM.  Always use your most recent med list.  
  
  
  
  
 ascorbic acid (vitamin C) 250 mg tablet Commonly known as:  VITAMIN C Take 500 mg by mouth daily. aspirin delayed-release 325 mg tablet Take 325 mg by mouth daily. At bedtime with Niacin  
  
 atorvastatin 10 mg tablet Commonly known as:  LIPITOR  
take 2 tablets by mouth NIGHTLY  
  
 cholecalciferol (vitamin D3) 2,000 unit Tab Take 1 Tab by mouth daily. diclofenac EC 75 mg EC tablet Commonly known as:  VOLTAREN  
take 1 tablet by mouth twice a day  
  
 estradiol 0.05 mg/24 hr  
Commonly known as:  Thersia Cool  
 1 Patch by TransDERmal route two (2) days a week. Wed and sun  
  
 ferrous sulfate 325 mg (65 mg iron) tablet Take  by mouth three (3) times daily (with meals). lansoprazole 15 mg capsule Commonly known as:  PREVACID Take 15 mg by mouth every Monday, Wednesday, Friday. Takes as needed MIRALAX PO Take  by mouth. Take as needed  
  
 niacin  mg tablet Commonly known as:  NIASPAN  
TAKE 1 TABLET BY MOUTH AT BEDTIME  
  
 olmesartan-hydroCHLOROthiazide 40-25 mg per tablet Commonly known as:  BENICAR HCT Take 1 Tab by mouth daily. OTHER Indications: Melaleuca Phytomega - Take 2 softgels twice a day. OTHER Indications: Melaleuca Vitality - Takes 1 tablet twice a day. potassium chloride 20 mEq tablet Commonly known as:  K-DUR, KLOR-CON  
take 1 tablet by mouth twice a day POTASSIUM CHLORIDE SR 10 MEQ TAB 20 mEq two (2) times a day. vitamin E 400 unit capsule Commonly known as:  [address] Take 1 Cap by mouth daily. Hold for two weeks. Prescriptions Sent to Pharmacy Refills  
 olmesartan-hydroCHLOROthiazide (BENICAR HCT) 40-25 mg per tablet 3 Sig: Take 1 Tab by mouth daily. Class: Normal  
 Pharmacy: [facility] [place] [address] Ph #: [phone] Route: Oral  
  
We Performed the Following AMB POC EKG ROUTINE W/ 12 LEADS, INTER & REP [66431 CPT(R)] REFERRAL TO CARDIOLOGY [EBV68 Custom] Comments:  
 Formal consult not needed but need stress echocardiogram for evaluation of chest pain and dyspnea on exertion Follow-up Instructions Return in about 3 months (around 9/15/2018). Referral Information Referral ID Referred By Referred To  
  
 [id number] [facility] [address] [place], [address] [facility] Visits Status Start Date End Date 1 New Request 6/15/18 6/15/19 If your referral has a status of pending review or denied, additional information will be sent to support the outcome of this decision. Patient Instructions Arthritis: Care Instructions Your Care Instructions Arthritis, also called osteoarthritis, is a breakdown of the cartilage that cushions your joints. When the cartilage wears down, your bones rub against each other. This causes pain and stiffness. Many people have some arthritis as they age. Arthritis most often affects the joints of the spine, hands, hips, knees, or feet. You can take simple measures to protect your joints, ease your pain, and help you stay active. Follow-up care is a key part of your treatment and safety. Be sure to make and go to all appointments, and call your doctor if you are having problems. It's also a good idea to know your test results and keep a list of the medicines you take. How can you care for yourself at home? · Stay at a healthy weight. Being overweight puts extra strain on your joints. · Talk to your doctor or physical therapist about exercises that will help ease joint pain. ¨ Stretch. You may enjoy gentle forms of yoga to help keep your joints and muscles flexible. ¨ Walk instead of jog. Other types of exercise that are less stressful on the joints include riding a bicycle, swimming, yadi chi, or water exercise. ¨ Lift weights. Strong muscles help reduce stress on your joints. Stronger thigh muscles, for example, take some of the stress off of the knees and hips. Learn the right way to lift weights so you do not make joint pain worse. · Take your medicines exactly as prescribed. Call your doctor if you think you are having a problem with your medicine. · Take pain medicines exactly as directed. ¨ If the doctor gave you a prescription medicine for pain, take it as prescribed. ¨ If you are not taking a prescription pain medicine, ask your doctor if you can take an over-the-counter medicine. · Use a cane, crutch, walker, or another device if you need help to get around. These can help rest your joints. You also can use other things to make life easier, such as a higher toilet seat and padded handles on kitchen utensils. · Do not sit in low chairs, which can make it hard to get up. · Put heat or cold on your sore joints as needed. Use whichever helps you most. You also can take turns with hot and cold packs. ¨ Apply heat 2 or 3 times a day for 20 to 30 minutes-using a heating pad, hot shower, or hot pack-to relieve pain and stiffness. ¨ Put ice or a cold pack on your sore joint for 10 to 20 minutes at a time. Put a thin cloth between the ice and your skin. When should you call for help? Call your doctor now or seek immediate medical care if: 
? · You have sudden swelling, warmth, or pain in any joint. ? · You have joint pain and a fever or rash. ? · You have such bad pain that you cannot use a joint. ? Watch closely for changes in your health, and be sure to contact your doctor if: 
? · You have mild joint symptoms that continue even with more than 6 weeks of care at home. ? · You have stomach pain or other problems with your medicine. Where can you learn more? Go to http://mikael-oskar.info/. Enter H350 in the search box to learn more about \"Arthritis: Care Instructions. \" Current as of: October 31, 2016 Content Version: 11.4 © 5039-0418 [employer]. Care instructions adapted under license by [employer] (which disclaims liability or warranty for this information). If you have questions about a medical condition or this instruction, always ask your healthcare professional. [place] [address] any warranty or liability for your use of this information. Patient Instructions History Introducing [facility]!    
 Rosalva Duval introduces [employer] patient portal. Now you can access parts of your medical record, email your doctor's office, and request medication refills online. 1. In your internet browser, go to [email or web address] 2. Click on the First Time User? Click Here link in the Sign In box. You will see the New Member Sign Up page. 3. Enter your [facility] Access Code exactly as it appears below. You will not need to use this code after youve completed the sign-up process. If you do not sign up before the expiration date, you must request a new code. · [facility] Access Code: [email or web address] Expires: 9/13/2018 10:12 AM 
 
4. Enter the last four digits of your Social Security Number (xxxx) and Date of Birth (mm/dd/yyyy) as indicated and click Submit. You will be taken to the next sign-up page. 5. Create a [facility] [employer] ID. This will be your [employer] login ID and cannot be changed, so think of one that is secure and easy to remember. 6. Create a [employer] password. You can change your password at any time. 7. Enter your Password Reset Question and Answer. This can be used at a later time if you forget your password. 8. Enter your e-mail address. You will receive e-mail notification when new information is available in [address] 9. Click Sign Up. You can now view and download portions of your medical record. 10. Click the Download Summary menu link to download a portable copy of your medical information. If you have questions, please visit the Frequently Asked Questions section of the [employer] website. Remember, [employer] is NOT to be used for urgent needs. For medical emergencies, dial 911. Now available from your iPhone and Android! Please provide this summary of care documentation to your next provider. Your primary care clinician is listed as [place]. If you have any questions after today's visit, please call [phone].

## 2018-06-15 NOTE — PATIENT INSTRUCTIONS
Arthritis: Care Instructions  Your Care Instructions  Arthritis, also called osteoarthritis, is a breakdown of the cartilage that cushions your joints. When the cartilage wears down, your bones rub against each other. This causes pain and stiffness. Many people have some arthritis as they age. Arthritis most often affects the joints of the spine, hands, hips, knees, or feet. You can take simple measures to protect your joints, ease your pain, and help you stay active. Follow-up care is a key part of your treatment and safety. Be sure to make and go to all appointments, and call your doctor if you are having problems. It's also a good idea to know your test results and keep a list of the medicines you take. How can you care for yourself at home? · Stay at a healthy weight. Being overweight puts extra strain on your joints. · Talk to your doctor or physical therapist about exercises that will help ease joint pain. ¨ Stretch. You may enjoy gentle forms of yoga to help keep your joints and muscles flexible. ¨ Walk instead of jog. Other types of exercise that are less stressful on the joints include riding a bicycle, swimming, yadi chi, or water exercise. ¨ Lift weights. Strong muscles help reduce stress on your joints. Stronger thigh muscles, for example, take some of the stress off of the knees and hips. Learn the right way to lift weights so you do not make joint pain worse. · Take your medicines exactly as prescribed. Call your doctor if you think you are having a problem with your medicine. · Take pain medicines exactly as directed. ¨ If the doctor gave you a prescription medicine for pain, take it as prescribed. ¨ If you are not taking a prescription pain medicine, ask your doctor if you can take an over-the-counter medicine. · Use a cane, crutch, walker, or another device if you need help to get around. These can help rest your joints.  You also can use other things to make life easier, such as a higher toilet seat and padded handles on kitchen utensils. · Do not sit in low chairs, which can make it hard to get up. · Put heat or cold on your sore joints as needed. Use whichever helps you most. You also can take turns with hot and cold packs. ¨ Apply heat 2 or 3 times a day for 20 to 30 minutes-using a heating pad, hot shower, or hot pack-to relieve pain and stiffness. ¨ Put ice or a cold pack on your sore joint for 10 to 20 minutes at a time. Put a thin cloth between the ice and your skin. When should you call for help? Call your doctor now or seek immediate medical care if:  ? · You have sudden swelling, warmth, or pain in any joint. ? · You have joint pain and a fever or rash. ? · You have such bad pain that you cannot use a joint. ? Watch closely for changes in your health, and be sure to contact your doctor if:  ? · You have mild joint symptoms that continue even with more than 6 weeks of care at home. ? · You have stomach pain or other problems with your medicine. Where can you learn more? Go to http://mikael-oskar.info/. Enter Q971 in the search box to learn more about \"Arthritis: Care Instructions. \"  Current as of: October 31, 2016  Content Version: 11.4  © 8664-5629 [employer]. Care instructions adapted under license by [employer] (which disclaims liability or warranty for this information). If you have questions about a medical condition or this instruction, always ask your healthcare professional. [place] [address] any warranty or liability for your use of this information.

## 2018-06-15 NOTE — PROGRESS NOTES
Chief Complaint   Patient presents with    Hypertension     3 month follow up     Chronic Kidney Disease    Cholesterol Problem       SUBJECTIVE:    Eilleen Dandy is a 60 y.o. female who returns in follow-up of medical problems include hypertension, GERD, DJD, obesity, hyperlipidemia, COPD, and CKD stage III. She is noting some shortness of breath when she exercises and walks although is not as bad as it was at one time and still has not gone away. She also is noting some intermittent discomfort in the left arm but it does not seem to be exertional but she is concerned if it could be heart related. Seems to be more prone to shoulder but not in the joint itself. She does note a little tingling in the left hand when she gets a discomfort in the upper arm. She notes no neck pain. She denies any back pain and nothing between his shoulder blades. There is no diaphoresis, nausea, vomiting no other complaints. She denies any other potential cardiorespiratory complaints. She denies any GI or  complaints. She denies any headaches, dizziness or neurologic complaints. There are no current arthritic complaints and left side and the left arm is related to arthritis. She has no other complaints on complete review of systems. She knows she could do better job with diet and exercise but she does take her medicines regular. Current Outpatient Prescriptions   Medication Sig Dispense Refill    OTHER Indications: Melaleuca Phytomega - Take 2 softgels twice a day.  OTHER Indications: Melaleuca Vitality - Takes 1 tablet twice a day.  olmesartan-hydroCHLOROthiazide (BENICAR HCT) 40-25 mg per tablet Take 1 Tab by mouth daily.  90 Tab 3    potassium chloride (K-DUR, KLOR-CON) 20 mEq tablet take 1 tablet by mouth twice a day 60 Tab 5    atorvastatin (LIPITOR) 10 mg tablet take 2 tablets by mouth NIGHTLY 30 Tab 11    niacin ER (NIASPAN) 500 mg tablet TAKE 1 TABLET BY MOUTH AT BEDTIME 90 Tab 2    diclofenac EC (VOLTAREN) 75 mg EC tablet take 1 tablet by mouth twice a day 60 Tab 11    ferrous sulfate 325 mg (65 mg iron) tablet Take  by mouth three (3) times daily (with meals).  aspirin delayed-release 325 mg tablet Take 325 mg by mouth daily. At bedtime with Niacin      POLYETHYLENE GLYCOL 3350 (MIRALAX PO) Take  by mouth. Take as needed      vitamin E (AQUA GEMS) 400 unit capsule Take 1 Cap by mouth daily. Hold for two weeks.  POTASSIUM CHLORIDE SR 10 MEQ TAB 20 mEq two (2) times a day.  cholecalciferol, vitamin D3, 2,000 unit tab Take 1 Tab by mouth daily.  lansoprazole (PREVACID) 15 mg capsule Take 15 mg by mouth every Monday, Wednesday, Friday. Takes as needed      estradiol (VIVELLE) 0.05 mg/24 hr 1 Patch by TransDERmal route two (2) days a week. Wed and sun      ascorbic acid (VITAMIN C) 250 mg tablet Take 500 mg by mouth daily.        Past Medical History:   Diagnosis Date    Allergic rhinitis 8/9/2017    Anemia 8/9/2017    Anxiety 8/9/2017    Arrhythmia     irregular heart beat hx and beating fast per patient/no cardiologist    Arthritis     JOINTS  WORSE WAIST DOWN     Back pain 8/9/2017    CKD (chronic kidney disease) 8/9/2017    COPD (chronic obstructive pulmonary disease) ([facility] [address] 8/9/2017    Crohn disease ([facility] [address] 8/9/2017    DJD (degenerative joint disease) 8/9/2017    GERD (gastroesophageal reflux disease) 8/9/2017    Hormone replacement therapy (HRT) 8/9/2017    Hyperlipidemia 8/9/2017    Hypertension     Hypertension with renal disease 8/9/2017    Hypokalemia 8/9/2017    Hypothyroid 8/9/2017    Ill-defined condition     neuropathy feet    Inguinal hernia 8/9/2017    Leg numbness 8/9/2017    Lumbar herniated disc 8/9/2017    OAB (overactive bladder) 8/9/2017    Obesity 8/9/2017    On statin therapy 8/9/2017    Osteoarthritis 8/9/2017    Pelvic pain in female 5/6/3148    Umbilical hernia 7/4/6654    Varicose vein of leg 8/9/2017     Past Surgical History:   Procedure Laterality Date    BREAST SURGERY PROCEDURE UNLISTED      mass removed left breast x2 benign    HX COLONOSCOPY  12/2010    normal    HX COLONOSCOPY  12/30/2014    normal     HX HEENT      cyst removed left eye    HX HERNIA REPAIR  06/02/2017    right inguinal and umbilical (Dr. Marina Rivero)    HX HYSTERECTOMY      HX ORTHOPAEDIC      bilat knee injections    HX ORTHOPAEDIC      laser treatment left knee and foot    HX ORTHOPAEDIC      hx of gel shots bilat knee    HX ORTHOPAEDIC  12/27/2016    left knee coritizon shot    HX OTHER SURGICAL      cyst removed front left scalp    HX OTHER SURGICAL      colonoscopy    HX OTHER SURGICAL      tooth implant    HX TUBAL LIGATION      VASCULAR SURGERY PROCEDURE UNLIST      vein striping     Allergies   Allergen Reactions    Lisinopril Cough       REVIEW OF SYSTEMS:  General: negative for - chills or fever, or weight loss or gain  ENT: negative for - headaches, nasal congestion or tinnitus  Eyes: no blurred or visual changes  Neck: No stiffness or swollen nodes  Respiratory: negative for - cough, hemoptysis, shortness of breath or wheezing  Cardiovascular : negative for - chest pain, edema, palpitations or shortness of breath except some dyspnea on exertion  Gastrointestinal: negative for - abdominal pain, blood in stools, heartburn or nausea/vomiting  Genito-Urinary: no dysuria, trouble voiding, or hematuria  Musculoskeletal: negative for - gait disturbance, joint pain, joint stiffness or joint swelling.   Left arm/shoulder pain as noted above  Neurological: no TIA or stroke symptoms  Hematologic: no bruises, no bleeding  Lymphatic: no swollen glands  Integument: no lumps, mole changes, nail changes or rash  Endocrine:no malaise/lethargy poly uria or polydipsia or unexpected weight changes        Social History     Social History    Marital status:      Spouse name: N/A    Number of children: N/A    Years of education: N/A Social History Main Topics    Smoking status: Never Smoker    Smokeless tobacco: Never Used    Alcohol use Yes      Comment: once a year    Drug use: No    Sexual activity: Yes     Partners: Male     Other Topics Concern    None     Social History Narrative     Family History   Problem Relation Age of Onset    No Known Problems Mother     No Known Problems Father     Stroke Sister        OBJECTIVE:     Visit Vitals    BP (!) 142/94 (BP 1 Location: Left arm, BP Patient Position: Sitting)    Pulse 61    Temp 97.6 °F (36.4 °C) (Oral)    Resp 16    Ht 5' 6\" (1.676 m)    Wt 212 lb 9.6 oz (96.4 kg)    SpO2 96%    BMI 34.31 kg/m2     CONSTITUTIONAL:   well nourished, appears age appropriate  EYES: sclera anicteric, PERRL, EOMI  ENMT:nares clear, moist mucous membranes, pharynx clear  NECK: supple. Thyroid normal, No JVD or bruits  RESPIRATORY: Chest: clear to ascultation and percussion, normal inspiratory effort  CARDIOVASCULAR: Heart: regular rate and rhythm no murmurs, rubs or gallops, PMI not displaced, No thrills  GASTROINTESTINAL: Abdomen: non distended, soft, non tender, bowel sounds normal  HEMATOLOGIC: no purpura, petechiae or bruising  LYMPHATIC: No lymph node enlargemant  MUSCULOSKELETAL: Extremities: no edema or active synovitis, pulse 1+   INTEGUMENT: No unusual rashes or suspicious skin lesions noted. Nails appear normal.  PERIPHERAL VASCULAR: normal pulses femoral, PT and DP  NEUROLOGIC: non-focal exam, A & O X 3  PSYCHIATRIC:, appropriate affect     ASSESSMENT:   1. Hypertension with renal disease    2. Mixed hyperlipidemia    3. Gastroesophageal reflux disease without esophagitis    4. Primary osteoarthritis involving multiple joints    5. Stage 3 chronic kidney disease    6. Chronic obstructive pulmonary disease, unspecified COPD type (HCC)    7. Class 1 obesity due to excess calories without serious comorbidity with body mass index (BMI) of 33.0 to 33.9 in adult    8.  Precordial pain 9. Hypertension, unspecified type        Impression  1. Hypertension that is not controlled increase Benicar HCT to 40/25 daily  2. Hyperlipidemia repeat status pending will make adjustments as necessary based on today's lab reviewed prior labs with her  3. GERD that is stable  4. DJD currently stable  5. CKD stage III repeat status pending  6. COPD stable  7. Obesity discussed diet exercise weight reduction for [facility]  8. Atypical chest pain and dyspnea on exertion certainly concerning for possible cardiac particular since she was a smoker and has other risk factors. EKG done today without acute process. Stress echo schedule  Labs are pending as noted will make a recommendation adjustments if we need to based on those. If all is stable follow-up scheduled again for 3 months unless I need to see her sooner with the above. I will get him back here again in about 3-4 weeks to reassess her blood pressure. PLAN:  .  Orders Placed This Encounter    Joslyn Int Card Ref [facility]    AMB POC LIPID PROFILE    AMB POC COMPREHENSIVE METABOLIC PANEL    AMB POC COMPLETE CBC,AUTOMATED ENTER    AMB POC EKG ROUTINE W/ 12 LEADS, INTER & REP    OTHER    OTHER    olmesartan-hydroCHLOROthiazide (BENICAR HCT) 40-25 mg per tablet         ATTENTION:   This medical record was transcribed using an electronic medical records system. Although proofread, it may and can contain electronic and spelling errors. Other human spelling and other errors may be present. Corrections may be executed at a later time. Please feel free to contact us for any clarifications as needed. Follow-up Disposition:  Return in about 3 months (around 9/15/2018). No results found for any visits on 06/15/18. Asim Acosta MD    The patient verbalized understanding of the problems and plans as explained.

## 2018-06-15 NOTE — PROGRESS NOTES
1. Have you been to the ER, urgent care clinic since your last visit? Hospitalized since your last visit? No    2. Have you seen or consulted any other health care providers outside of the [address] [place] [address] since your last visit? Include any pap smears or colon screening. No     Chief Complaint   Patient presents with    Hypertension     3 month follow up     Chronic Kidney Disease    Cholesterol Problem       Fasting    Mammogram was last done at [facility] in July/August 2017.

## 2018-07-02 ENCOUNTER — OFFICE VISIT (OUTPATIENT)
Dept: INTERNAL MEDICINE CLINIC | Age: 63
End: 2018-07-02

## 2018-07-02 VITALS
DIASTOLIC BLOOD PRESSURE: 76 MMHG | HEIGHT: 66 IN | RESPIRATION RATE: 20 BRPM | HEART RATE: 74 BPM | WEIGHT: 212.4 LBS | TEMPERATURE: 98.3 F | OXYGEN SATURATION: 97 % | SYSTOLIC BLOOD PRESSURE: 110 MMHG | BODY MASS INDEX: 34.13 KG/M2

## 2018-07-02 DIAGNOSIS — J20.9 ACUTE BRONCHITIS, UNSPECIFIED ORGANISM: Primary | ICD-10-CM

## 2018-07-02 RX ORDER — AZITHROMYCIN 250 MG/1
250 TABLET, FILM COATED ORAL SEE ADMIN INSTRUCTIONS
Qty: 6 TAB | Refills: 0 | Status: SHIPPED | OUTPATIENT
Start: 2018-07-02 | End: 2018-07-07

## 2018-07-02 NOTE — PROGRESS NOTES
1. Have you been to the ER, urgent care clinic since your last visit? Hospitalized since your last visit? No    2. Have you seen or consulted any other health care providers outside of the [address] [place] [address] since your last visit? Include any pap smears or colon screening.  No     Chief Complaint   Patient presents with    Cough     c/o cough and congestion x 2 weeks

## 2018-07-02 NOTE — PROGRESS NOTES
Subjective:   Isaiah Bence is a 60 y.o. female      Chief Complaint   Patient presents with    Cough     c/o cough and congestion x 2 weeks        History of present illness: She presents today complaining a lot of cough and congestion been going for about 2 weeks. She does not get much up but feels like she is very congested in the upper chest as well as having a sore throat. She is noted no fevers or chills and there is no shortness of breath. She has no other cardiorespiratory complaints.     Patient Active Problem List   Diagnosis Code    Right inguinal hernia K40.90    Ventral hernia without obstruction or gangrene K43.9    Chronic non-seasonal allergic rhinitis J30.89    Crohn disease ([address] K50.90    OAB (overactive bladder) N32.81    Varicose vein of leg G69.92    Umbilical hernia N08.7    On statin therapy Z79.899    Class 1 obesity due to excess calories without serious comorbidity with body mass index (BMI) of 33.0 to 33.9 in adult E66.09, Z68.33    Lumbar herniated disc M51.26    Inguinal hernia K40.90    Acquired hypothyroidism E03.9    Hypertension with renal disease I12.9    Mixed hyperlipidemia E78.2    Hormone replacement therapy (HRT) Z79.890    Gastroesophageal reflux disease without esophagitis K21.9    Primary osteoarthritis involving multiple joints M15.0    COPD (chronic obstructive pulmonary disease) (HCC) J44.9    Stage 3 chronic kidney disease N18.3    Back pain M54.9    Anxiety F41.9    Anemia D64.9    Annual physical exam Z00.00    Dyspnea on exertion R06.09    Precordial pain R07.2    Acute bronchitis J20.9      Past Medical History:   Diagnosis Date    Allergic rhinitis 8/9/2017    Anemia 8/9/2017    Anxiety 8/9/2017    Arrhythmia     irregular heart beat hx and beating fast per patient/no cardiologist    Arthritis     JOINTS  WORSE WAIST DOWN     Back pain 8/9/2017    CKD (chronic kidney disease) 8/9/2017    COPD (chronic obstructive pulmonary disease) ([facility] 8/9/2017    Crohn disease ([facility] 8/9/2017    DJD (degenerative joint disease) 8/9/2017    GERD (gastroesophageal reflux disease) 8/9/2017    Hormone replacement therapy (HRT) 8/9/2017    Hyperlipidemia 8/9/2017    Hypertension     Hypertension with renal disease 8/9/2017    Hypokalemia 8/9/2017    Hypothyroid 8/9/2017    Ill-defined condition     neuropathy feet    Inguinal hernia 8/9/2017    Leg numbness 8/9/2017    Lumbar herniated disc 8/9/2017    OAB (overactive bladder) 8/9/2017    Obesity 8/9/2017    On statin therapy 8/9/2017    Osteoarthritis 8/9/2017    Pelvic pain in female 8/4/3423    Umbilical hernia 9/3/9854    Varicose vein of leg 8/9/2017      Allergies   Allergen Reactions    Lisinopril Cough      Family History   Problem Relation Age of Onset    No Known Problems Mother     No Known Problems Father     Stroke Sister       Social History     Social History    Marital status:      Spouse name: N/A    Number of children: N/A    Years of education: N/A     Occupational History    Not on file. Social History Main Topics    Smoking status: Never Smoker    Smokeless tobacco: Never Used    Alcohol use Yes      Comment: once a year    Drug use: No    Sexual activity: Yes     Partners: Male     Other Topics Concern    Not on file     Social History Narrative     Prior to Admission medications    Medication Sig Start Date End Date Taking? Authorizing Provider   azithromycin (ZITHROMAX) 250 mg tablet Take 1 Tab by mouth See Admin Instructions for 5 days. Take 2 tablets the first day, then 1 tablet daily for the next four days. 7/2/18 7/7/18 Yes Fortino Thurston MD   OTHER Indications: Melaleuca Phytomega - Take 2 softgels twice a day. Yes Historical Provider   OTHER Indications: Melaleuca Vitality - Takes 1 tablet twice a day. Yes Historical Provider   olmesartan-hydroCHLOROthiazide (BENICAR HCT) 40-25 mg per tablet Take 1 Tab by mouth daily. 6/15/18  Yes Ivy Siemens, MD   potassium chloride (K-DUR, KLOR-CON) 20 mEq tablet take 1 tablet by mouth twice a day 4/4/18  Yes Ivy Siemens, MD   atorvastatin (LIPITOR) 10 mg tablet take 2 tablets by mouth NIGHTLY 3/12/18  Yes Ivy Siemens, MD   niacin ER (NIASPAN) 500 mg tablet TAKE 1 TABLET BY MOUTH AT BEDTIME 12/28/17  Yes Ivy Siemens, MD   diclofenac EC (VOLTAREN) 75 mg EC tablet take 1 tablet by mouth twice a day 11/27/17  Yes Ivy Siemens, MD   ferrous sulfate 325 mg (65 mg iron) tablet Take  by mouth three (3) times daily (with meals). Yes Historical Provider   aspirin delayed-release 325 mg tablet Take 325 mg by mouth daily. At bedtime with Niacin   Yes Historical Provider   POLYETHYLENE GLYCOL 3350 (MIRALAX PO) Take  by mouth. Take as needed   Yes Historical Provider   vitamin E (AQUA GEMS) 400 unit capsule Take 400 Units by mouth daily. 3/16/17  Yes Macario Handler, TORRES   POTASSIUM CHLORIDE SR 10 MEQ TAB 20 mEq two (2) times a day. Yes Historical Provider   cholecalciferol, vitamin D3, 2,000 unit tab Take 1 Tab by mouth daily. Yes Historical Provider   lansoprazole (PREVACID) 15 mg capsule Take 15 mg by mouth every Monday, Wednesday, Friday. Takes as needed   Yes Historical Provider   estradiol (VIVELLE) 0.05 mg/24 hr 1 Patch by TransDERmal route two (2) days a week. Wed and sun   Yes Historical Provider   ascorbic acid (VITAMIN C) 250 mg tablet Take 500 mg by mouth daily. Yes Historical Provider        Review of Systems              Constitutional:  She denies fever, weight loss, sweats or fatigue. EYES: No blurred or double vision,               ENT: no nasal congestion, no headache or dizziness. No difficulty with swallowing, taste, speech or smell. Positive for sore throat  Respiratory: Positive for cough and congestion with wheezing or shortness of breath. No sputum production.   Cardiac:  Denies chest pain, palpitations, unexplained indigestion, syncope, edema, PND or orthopnea. GI:  No changes in bowel movements, no abdominal pain, no bloating, anorexia, nausea, vomiting or heartburn. :  No frequency or dysuria. Denies incontinence or sexual dysfunction. Extremities:  No joint pain, stiffness or swelling  Back:.no pain or soreness  Skin:  No recent rashes or mole changes. Neurological:  No numbness, tingling, burning paresthesias or loss of motor strength. No syncope, dizziness, frequent headaches or memory loss. Hematologic:  No easy bruising  Lymphatic: No lymph node enlargement    Objective:     Vitals:    07/02/18 1111   BP: 110/76   Pulse: 74   Resp: 20   Temp: 98.3 °F (36.8 °C)   TempSrc: Oral   SpO2: 97%   Weight: 212 lb 6.4 oz (96.3 kg)   Height: 5' 6\" (1.676 m)   PainSc:   0 - No pain       Body mass index is 34.28 kg/(m^2). Physical Examination:              General Appearance:  Well-developed, well-nourished, no acute distress. HEENT:      Ears:  The TMs and ear canals were clear. Eyes:  The pupillary responses were normal.  Extraocular muscle function intact. Lids and conjunctiva not injected. Funduscopic exam revealed sharp disc margins. Nares: Inflamed and edematous  Pharynx:  Clear with teeth in good repair. No masses were noted. Neck:  Supple without thyromegaly or adenopathy. No JVD noted. No carotid                bruits. Lungs:  Clear to auscultation and percussion. Cardiac:  Regular rate and rhythm without murmur. PMI not displaced. No gallop, rub or click. Abdominal: Soft, non-tender, no hepata-spleenomegally or masses  Extremities:  No clubbing, cyanosis or edema. Skin:  No rash or unusual mole changes noted. Lymph Nodes:  None felt in the cervical, supraclavicular, axillary or inguinal region. Neurological: . DTRs 2+ and symmetric. Station and gait normal.   Hematologic:   No purpura or petechiae        Assessment/Plan:         1.  Acute bronchitis, unspecified organism Impressions/Plan:  Impression  1. Acute bronchitis with upper respiratory infection we will treat her with Zithromax and she will notify me if this is not effective. Orders Placed This Encounter    azithromycin (ZITHROMAX) 250 mg tablet       Follow-up Disposition:  Return for At previously scheduled appointment. No results found for any visits on 07/02/18. Nuha Abad MD    The patient was given after the visit summary the patient verbalized an understanding of the plans and problems as explained.

## 2018-07-02 NOTE — MR AVS SNAPSHOT
Blain Kehr 
 
 
 [address] [address] [phone] Patient: Michelle Garcia MRN: [id number] DWJ:[id number] Visit Information Date & Time Provider Department Dept. Phone Encounter #  
 7/2/2018 10:20 AM Elle Oshea MD [place] [id number] [phone] [id number] Your Appointments 7/13/2018 10:50 AM  
FOLLOW UP 10 with MD JENNIFER [facility] ([address]) Appt Note: 1 month follow up   BP ck  
 [address] [address] [address] [address] [address] 9/14/2018 10:20 AM  
FOLLOW UP 10 with MD JENNIFER [facility] ([address]) Appt Note: 3 MO FLP   yearly [address] [address] [phone] Upcoming Health Maintenance Date Due DTaP/Tdap/Td series (1 - Tdap) 7/12/1975 ZOSTER VACCINE AGE 60> 5/12/2014 BREAST CANCER SCRN MAMMOGRAM 1/8/2018 Influenza Age 5 to Adult 8/1/2018 Bone Densitometry 4/12/2020 PAP AKA CERVICAL CYTOLOGY 8/21/2020 COLONOSCOPY 12/30/2024 Allergies as of 7/2/2018  Review Complete On: 7/2/2018 By: Elle Oshea MD  
  
 Severity Noted Reaction Type Reactions Lisinopril  08/09/2017    Cough Current Immunizations  Never Reviewed Name Date Influenza Vaccine 11/7/2016, 10/29/2015 Influenza Vaccine (Quad) PF 12/1/2017 Pneumococcal Polysaccharide (PPSV-23) 3/9/2018 Not reviewed this visit Vitals BP Pulse Temp Resp Height(growth percentile) Weight(growth percentile) 110/76 (BP 1 Location: Left arm, BP Patient Position: Sitting) 74 98.3 °F (36.8 °C) (Oral) 20 5' 6\" (1.676 m) 212 lb 6.4 oz (96.3 kg) SpO2 BMI OB Status Smoking Status 97% 34.28 kg/m2 Hysterectomy Never Smoker Vitals History BMI and BSA Data Body Mass Index Body Surface Area  
 34.28 kg/m 2 2.12 m 2 Preferred Pharmacy Pharmacy Name Phone [id number] [address] [place] [address] [phone] Your Updated Medication List  
  
   
This list is accurate as of 7/2/18 11:38 AM.  Always use your most recent med list.  
  
  
  
  
 ascorbic acid (vitamin C) 250 mg tablet Commonly known as:  VITAMIN C Take 500 mg by mouth daily. aspirin delayed-release 325 mg tablet Take 325 mg by mouth daily. At bedtime with Niacin  
  
 atorvastatin 10 mg tablet Commonly known as:  LIPITOR  
take 2 tablets by mouth NIGHTLY  
  
 cholecalciferol (vitamin D3) 2,000 unit Tab Take 1 Tab by mouth daily. diclofenac EC 75 mg EC tablet Commonly known as:  VOLTAREN  
take 1 tablet by mouth twice a day  
  
 estradiol 0.05 mg/24 hr  
Commonly known as:  VIVELLE  
1 Patch by TransDERmal route two (2) days a week. Wed and sun  
  
 ferrous sulfate 325 mg (65 mg iron) tablet Take  by mouth three (3) times daily (with meals). lansoprazole 15 mg capsule Commonly known as:  PREVACID Take 15 mg by mouth every Monday, Wednesday, Friday. Takes as needed MIRALAX PO Take  by mouth. Take as needed  
  
 niacin  mg tablet Commonly known as:  NIASPAN  
TAKE 1 TABLET BY MOUTH AT BEDTIME  
  
 olmesartan-hydroCHLOROthiazide 40-25 mg per tablet Commonly known as:  BENICAR HCT Take 1 Tab by mouth daily. OTHER Indications: Melaleuca Phytomega - Take 2 softgels twice a day. OTHER Indications: Melaleuca Vitality - Takes 1 tablet twice a day. potassium chloride 20 mEq tablet Commonly known as:  K-DUR, KLOR-CON  
take 1 tablet by mouth twice a day POTASSIUM CHLORIDE SR 10 MEQ TAB 20 mEq two (2) times a day. vitamin E 400 unit capsule Commonly known as:  [address] Take 400 Units by mouth daily. Introducing [address]! [employer] introduces Zhaogang patient portal. Now you can access parts of your medical record, email your doctor's office, and request medication refills online. 1. In your internet browser, go to [email or web address] 2. Click on the First Time User? Click Here link in the Sign In box. You will see the New Member Sign Up page. 3. Enter your Zhaogang Access Code exactly as it appears below. You will not need to use this code after youve completed the sign-up process. If you do not sign up before the expiration date, you must request a new code. · Zhaogang Access Code: [email or web address] Expires: 9/13/2018 10:12 AM 
 
4. Enter the last four digits of your Social Security Number (xxxx) and Date of Birth (mm/dd/yyyy) as indicated and click Submit. You will be taken to the next sign-up page. 5. Create a Zhaogang ID. This will be your Zhaogang login ID and cannot be changed, so think of one that is secure and easy to remember. 6. Create a Zhaogang password. You can change your password at any time. 7. Enter your Password Reset Question and Answer. This can be used at a later time if you forget your password. 8. Enter your e-mail address. You will receive e-mail notification when new information is available in [address] 9. Click Sign Up. You can now view and download portions of your medical record. 10. Click the Download Summary menu link to download a portable copy of your medical information. If you have questions, please visit the Frequently Asked Questions section of the Zhaogang website. Remember, Z[employer]ogang is NOT to be used for urgent needs. For medical emergencies, dial 911. Now available from your iPhone and Android! Please provide this summary of care documentation to your next provider. Your primary care clinician is listed as [place]. If you have any questions after today's visit, please call [phone].

## 2018-07-02 NOTE — PATIENT INSTRUCTIONS
Bronchitis: Care Instructions  Your Care Instructions    Bronchitis is inflammation of the bronchial tubes, which carry air to the lungs. The tubes swell and produce mucus, or phlegm. The mucus and inflamed bronchial tubes make you cough. You may have trouble breathing. Most cases of bronchitis are caused by viruses like those that cause colds. Antibiotics usually do not help and they may be harmful. Bronchitis usually develops rapidly and lasts about 2 to 3 weeks in otherwise healthy people. Follow-up care is a key part of your treatment and safety. Be sure to make and go to all appointments, and call your doctor if you are having problems. It's also a good idea to know your test results and keep a list of the medicines you take. How can you care for yourself at home? · Take all medicines exactly as prescribed. Call your doctor if you think you are having a problem with your medicine. · Get some extra rest.  · Take an over-the-counter pain medicine, such as acetaminophen (Tylenol), ibuprofen (Advil, Motrin), or naproxen (Aleve) to reduce fever and relieve body aches. Read and follow all instructions on the label. · Do not take two or more pain medicines at the same time unless the doctor told you to. Many pain medicines have acetaminophen, which is Tylenol. Too much acetaminophen (Tylenol) can be harmful. · Take an over-the-counter cough medicine that contains dextromethorphan to help quiet a dry, hacking cough so that you can sleep. Avoid cough medicines that have more than one active ingredient. Read and follow all instructions on the label. · Breathe moist air from a humidifier, hot shower, or sink filled with hot water. The heat and moisture will thin mucus so you can cough it out. · Do not smoke. Smoking can make bronchitis worse. If you need help quitting, talk to your doctor about stop-smoking programs and medicines. These can increase your chances of quitting for good.   When should you call for help? Call 911 anytime you think you may need emergency care. For example, call if:  ? · You have severe trouble breathing. ?Call your doctor now or seek immediate medical care if:  ? · You have new or worse trouble breathing. ? · You cough up dark brown or bloody mucus (sputum). ? · You have a new or higher fever. ? · You have a new rash. ? Watch closely for changes in your health, and be sure to contact your doctor if:  ? · You cough more deeply or more often, especially if you notice more mucus or a change in the color of your mucus. ? · You are not getting better as expected. Where can you learn more? Go to http://mikael-oskar.info/. Enter H333 in the search box to learn more about \"Bronchitis: Care Instructions. \"  Current as of: May 12, 2017  Content Version: 11.4  © 3723-5833 [employer]. Care instructions adapted under license by [employer] (which disclaims liability or warranty for this information). If you have questions about a medical condition or this instruction, always ask your healthcare professional. [address] any warranty or liability for your use of this information.

## 2018-07-11 ENCOUNTER — TELEPHONE (OUTPATIENT)
Dept: INTERNAL MEDICINE CLINIC | Age: 63
End: 2018-07-11

## 2018-07-13 ENCOUNTER — OFFICE VISIT (OUTPATIENT)
Dept: INTERNAL MEDICINE CLINIC | Age: 63
End: 2018-07-13

## 2018-07-13 VITALS
DIASTOLIC BLOOD PRESSURE: 86 MMHG | RESPIRATION RATE: 20 BRPM | HEIGHT: 66 IN | BODY MASS INDEX: 33.78 KG/M2 | HEART RATE: 76 BPM | SYSTOLIC BLOOD PRESSURE: 118 MMHG | WEIGHT: 210.2 LBS | OXYGEN SATURATION: 98 %

## 2018-07-13 DIAGNOSIS — E66.09 CLASS 1 OBESITY DUE TO EXCESS CALORIES WITHOUT SERIOUS COMORBIDITY WITH BODY MASS INDEX (BMI) OF 33.0 TO 33.9 IN ADULT: ICD-10-CM

## 2018-07-13 DIAGNOSIS — I12.9 HYPERTENSION WITH RENAL DISEASE: Primary | ICD-10-CM

## 2018-07-13 NOTE — PROGRESS NOTES
Chief Complaint   Patient presents with    Blood Pressure Check     follow up on BP        SUBJECTIVE:    Aneita Ahumada is a 58 y.o. female who returns in follow-up for her hypertension with recent blood pressure medicine adjustment as well as follow-up of obesity. She is tolerating increased medicine without any difficulty. She denies any chest pain, shortness breath, palpitations or cardiorespiratory complaints. She denies any GI or  complaints. She denies any headaches, dizziness or neurologic complaints. She has no current arthritic complaints other than intermittent has some problems with sciatica but not currently today. There are no other complaints on complete review of systems. Current Outpatient Prescriptions   Medication Sig Dispense Refill    OTHER Indications: Melaleuca Phytomega - Take 2 softgels twice a day.  OTHER Indications: Melaleuca Vitality - Takes 1 tablet twice a day.  olmesartan-hydroCHLOROthiazide (BENICAR HCT) 40-25 mg per tablet Take 1 Tab by mouth daily. (Patient taking differently: Take 2 Tabs by mouth daily.) 90 Tab 3    potassium chloride (K-DUR, KLOR-CON) 20 mEq tablet take 1 tablet by mouth twice a day 60 Tab 5    atorvastatin (LIPITOR) 10 mg tablet take 2 tablets by mouth NIGHTLY 30 Tab 11    niacin ER (NIASPAN) 500 mg tablet TAKE 1 TABLET BY MOUTH AT BEDTIME 90 Tab 2    diclofenac EC (VOLTAREN) 75 mg EC tablet take 1 tablet by mouth twice a day 60 Tab 11    ferrous sulfate 325 mg (65 mg iron) tablet Take  by mouth three (3) times daily (with meals).  aspirin delayed-release 325 mg tablet Take 325 mg by mouth daily. At bedtime with Niacin      vitamin E (AQUA GEMS) 400 unit capsule Take 400 Units by mouth daily.  cholecalciferol, vitamin D3, 2,000 unit tab Take 1 Tab by mouth daily.  lansoprazole (PREVACID) 15 mg capsule Take 15 mg by mouth every Monday, Wednesday, Friday.  Takes as needed      estradiol (VIVELLE) 0.05 mg/24 hr 1 Patch by TransDERmal route two (2) days a week. Wed and sun      ascorbic acid (VITAMIN C) 250 mg tablet Take 500 mg by mouth daily.        Past Medical History:   Diagnosis Date    Allergic rhinitis 8/9/2017    Anemia 8/9/2017    Anxiety 8/9/2017    Arrhythmia     irregular heart beat hx and beating fast per patient/no cardiologist    Arthritis     JOINTS  WORSE WAIST DOWN     Back pain 8/9/2017    CKD (chronic kidney disease) 8/9/2017    COPD (chronic obstructive pulmonary disease) ([facility] [address] 8/9/2017    Crohn disease ([facility] [address] 8/9/2017    DJD (degenerative joint disease) 8/9/2017    GERD (gastroesophageal reflux disease) 8/9/2017    Hormone replacement therapy (HRT) 8/9/2017    Hyperlipidemia 8/9/2017    Hypertension     Hypertension with renal disease 8/9/2017    Hypokalemia 8/9/2017    Hypothyroid 8/9/2017    Ill-defined condition     neuropathy feet    Inguinal hernia 8/9/2017    Leg numbness 8/9/2017    Lumbar herniated disc 8/9/2017    OAB (overactive bladder) 8/9/2017    Obesity 8/9/2017    On statin therapy 8/9/2017    Osteoarthritis 8/9/2017    Pelvic pain in female 1/1/3670    Umbilical hernia 5/2/4763    Varicose vein of leg 8/9/2017     Past Surgical History:   Procedure Laterality Date    BREAST SURGERY PROCEDURE UNLISTED      mass removed left breast x2 benign    HX COLONOSCOPY  12/2010    normal    HX COLONOSCOPY  12/30/2014    normal     HX HEENT      cyst removed left eye    HX HERNIA REPAIR  06/02/2017    right inguinal and umbilical (Dr. Yusef Guy)    HX HYSTERECTOMY      HX ORTHOPAEDIC      bilat knee injections    HX ORTHOPAEDIC      laser treatment left knee and foot    HX ORTHOPAEDIC      hx of gel shots bilat knee    HX ORTHOPAEDIC  12/27/2016    left knee coritizon shot    HX OTHER SURGICAL      cyst removed front left scalp    HX OTHER SURGICAL      colonoscopy    HX OTHER SURGICAL      tooth implant    HX TUBAL LIGATION      VASCULAR SURGERY PROCEDURE UNLIST      vein striping     Allergies   Allergen Reactions    Lisinopril Cough       REVIEW OF SYSTEMS:  General: negative for - chills or fever, or weight loss or gain  ENT: negative for - headaches, nasal congestion or tinnitus  Eyes: no blurred or visual changes  Neck: No stiffness or swollen nodes  Respiratory: negative for - cough, hemoptysis, shortness of breath or wheezing  Cardiovascular : negative for - chest pain, edema, palpitations or shortness of breath  Gastrointestinal: negative for - abdominal pain, blood in stools, heartburn or nausea/vomiting  Genito-Urinary: no dysuria, trouble voiding, or hematuria  Musculoskeletal: negative for - gait disturbance, joint pain, joint stiffness or joint swelling  Neurological: no TIA or stroke symptoms  Hematologic: no bruises, no bleeding  Lymphatic: no swollen glands  Integument: no lumps, mole changes, nail changes or rash  Endocrine:no malaise/lethargy poly uria or polydipsia or unexpected weight changes        Social History     Social History    Marital status:      Spouse name: N/A    Number of children: N/A    Years of education: N/A     Social History Main Topics    Smoking status: Never Smoker    Smokeless tobacco: Never Used    Alcohol use Yes      Comment: once a year    Drug use: No    Sexual activity: Yes     Partners: Male     Other Topics Concern    None     Social History Narrative     Family History   Problem Relation Age of Onset    No Known Problems Mother     No Known Problems Father     Stroke Sister        OBJECTIVE:     Visit Vitals    /86    Pulse 76    Resp 20    Ht 5' 6\" (1.676 m)    Wt 210 lb 3.2 oz (95.3 kg)    SpO2 98%    BMI 33.93 kg/m2     CONSTITUTIONAL:   well nourished, appears age appropriate  EYES: sclera anicteric, PERRL, EOMI  ENMT:nares clear, moist mucous membranes, pharynx clear  NECK: supple.  Thyroid normal, No JVD or bruits  RESPIRATORY: Chest: clear to ascultation and percussion, normal inspiratory effort  CARDIOVASCULAR: Heart: regular rate and rhythm no murmurs, rubs or gallops, PMI not displaced, No thrills  GASTROINTESTINAL: Abdomen: non distended, soft, non tender, bowel sounds normal  HEMATOLOGIC: no purpura, petechiae or bruising  LYMPHATIC: No lymph node enlargemant  MUSCULOSKELETAL: Extremities: no edema or active synovitis, pulse 1+   INTEGUMENT: No unusual rashes or suspicious skin lesions noted. Nails appear normal.  PERIPHERAL VASCULAR: normal pulses femoral, PT and DP  NEUROLOGIC: non-focal exam, A & O X 3  PSYCHIATRIC:, appropriate affect     ASSESSMENT:   1. Hypertension with renal disease    2. Class 1 obesity due to excess calories without serious comorbidity with body mass index (BMI) of 33.0 to 33.9 in adult      Impression  1. Hypertension that is controlled continue current therapy reviewed with her  2. Obesity we discussed diet, exercise and weight reduction in overall need to work on his overall health benefit. At this point she is doing well with the medication changes so we will continue the same and recheck as previously scheduled for other medical problems. PLAN:  . No orders of the defined types were placed in this encounter. ATTENTION:   This medical record was transcribed using an electronic medical records system. Although proofread, it may and can contain electronic and spelling errors. Other human spelling and other errors may be present. Corrections may be executed at a later time. Please feel free to contact us for any clarifications as needed. Follow-up Disposition:  Return for At prior schedule appointment. No results found for any visits on 07/13/18. Scottie Oleary MD    The patient verbalized understanding of the problems and plans as explained.

## 2018-07-13 NOTE — PROGRESS NOTES
Chief Complaint   Patient presents with    Blood Pressure Check     follow up on BP        1. Have you been to the ER, urgent care clinic since your last visit? Hospitalized since your last visit? No    2. Have you seen or consulted any other health care providers outside of the [address] [place] [address] since your last visit? Include any pap smears or colon screening.  No

## 2018-07-13 NOTE — PATIENT INSTRUCTIONS
Learning About Obesity  What is obesity? Obesity means having so much body fat that your health is in danger. Having too much body fat can lead to type 2 diabetes, heart disease, high blood pressure, arthritis, sleep apnea, and stroke. Even if you don't feel bad now, think about these health risks. Do they seem like a good reason to start on a new path toward a healthier weight? Or do you have another personal, powerful reason for wanting to lose weight? Whatever it is, keep it in mind. It can be hard to change eating habits and exercise habits. But with your own reason and plan, you can do it. How do you know if your weight is in the obesity range? To know if your weight is in the obesity range, your doctor looks at your body mass index (BMI) and waist size. Your BMI is a number that is calculated from your weight and your height. To figure your BMI for yourself, get a BMI table from your doctor or use an online tool, such as http://www.Ele.me.Gydget/ on the [employer] of [employer]. A healthy BMI is from 18.5 to 24.9. If your BMI is from 30.0 to 39.9, you are considered to have obesity. If your BMI is over 40.0, you are considered to have extreme obesity. What causes obesity? When you take in more calories than you burn off, you gain weight. How you eat, how active you are, and other things affect how your body uses calories and whether you gain weight. If you have family members who have too much body fat, you may have inherited a tendency to gain weight. And your family also helps form your eating and lifestyle habits, which can lead to obesity. Also, our busy lives make it harder to plan and cook healthy meals. For many of us, it's easier to reach for prepared foods, go out to eat, or go to the drive-through. But these foods are often high in saturated fat and calories. Portions are often too large.   What can you do to reach a healthy weight? Focus on health, not diets. Diets are hard to stay on and don't work in the long run. It is very hard to stay with a diet that includes lots of big changes in your eating habits. Instead of a diet, focus on lifestyle changes that will improve your health and achieve the right balance of energy and calories. To lose weight, you need to burn more calories than you take in. You can do it by eating healthy foods in reasonable amounts and becoming more active, even a little bit every day. Making small changes over time can add up to a lot. Make a plan for change. Many people have found that naming their reasons for change and staying focused on their plan can make a big difference. Work with your doctor to create a plan that is right for you. · Ask yourself: Tyrell Henry are my personal, most powerful reasons for wanting this change? What will my life look like when I've made the change? \"  · Set your long-term goal. Make it specific, such as \"I will lose x pounds. \"  · Break your long-term goal into smaller, short-term goals. Make these small steps specific and within your reach, things you know you can do. These steps are what keep you going from day to day. Talk with your doctor about other weight-loss options. If you have a BMI in a certain range and have not been able to lose weight with diet and exercise, medicine or surgery may be an option for you. Before your doctor will prescribe medicines or surgery, he or she will probably want you to be more active and follow your healthy eating plan for a period of time. These habits are key lifelong changes for managing your weight, with or without other medical treatment. And these changes can help you avoid weight-related health problems. How can you stay on your plan for change? Be ready. Choose to start during a time when there are few events that might trigger slip-ups, like holidays, social events, and high-stress periods. Decide on your first few steps.  Most people have more success when they make small changes, one step at a time. For example, you might switch a daily candy bar to a piece of fruit, walk 10 minutes more, or add more vegetables to a meal.  Line up your support people. Make sure you're not going to be alone as you make this change. Connect with people who understand how important it is to you. Ask family members and friends for help in keeping with your plan. And think about who could make it harder for you, and how to handle them. Try tracking. People who keep track of what they eat, feel, and do are better at losing weight. Try writing down things like:  · What and how much you eat. · How you feel before and after each meal.  · Details about each meal (like eating out or at home, eating alone, or with friends or family). · What you do to be active. Look and plan. As you track, look for patterns that you may want to change. Take note of:  · When you eat and whether you skip meals. · How often you eat out. · How many fruits and vegetables you eat. · When you eat beyond feeling full. · When and why you eat for reasons other than being hungry. When you stray from your plan, don't get upset. Figure out what made you slip up and how you can fix it. Can you take medicines or have surgery to lose weight? If you have a BMI in a certain range and have not been able to lose weight with diet and exercise, medicine or surgery may be an option for you. If you have a BMI of at least 30.0 (or a BMI of at least 27.0 and another health problem related to your weight), ask your doctor about weight-loss medicines. They work by making you feel less hungry, making you feel full more quickly, or changing how you digest fat. Medicines are used along with diet changes and more physical activity to help you make lasting changes.   If you have a BMI of 40.0 or more (or a BMI of 35.0 or more and another health problem related to your weight), your doctor may talk with you about surgery. Weight-loss surgery has risks, and you will need to work with your doctor to compare the risk of having obesity with the risks of surgery. With any option you choose, you will still need to eat a healthy diet and get regular exercise. Follow-up care is a key part of your treatment and safety. Be sure to make and go to all appointments, and call your doctor if you are having problems. It's also a good idea to know your test results and keep a list of the medicines you take. Where can you learn more? Go to http://mikael-oskar.info/. Enter N111 in the search box to learn more about \"Learning About Obesity. \"  Current as of: October 9, 2017  Content Version: 11.7  © 8664-8379 [employer]. Care instructions adapted under license by [employer] (which disclaims liability or warranty for this information). If you have questions about a medical condition or this instruction, always ask your healthcare professional. [address] any warranty or liability for your use of this information.

## 2018-07-13 NOTE — MR AVS SNAPSHOT
[facility] 
 
 
 [address] [address] [phone] Patient: Dagmar Woodall MRN: [id number] EIE:1/34/2173 Visit Information Date & Time Provider Department Dept. Phone Encounter #  
 7/13/2018 10:50 AM Holland Fan MD [place] [address] [phone] [id number] Your Appointments 9/14/2018 10:20 AM  
FOLLOW UP 10 with MD CHELSEY Reynolds [facility] ([facility]) Appt Note: 3 MO FLP   yearly [address] [address] [address] [facility] [address] [address] Upcoming Health Maintenance Date Due DTaP/Tdap/Td series (1 - Tdap) 7/12/1975 ZOSTER VACCINE AGE 60> 5/12/2014 BREAST CANCER SCRN MAMMOGRAM 1/8/2018 Influenza Age 5 to Adult 8/1/2018 Bone Densitometry 4/12/2020 PAP AKA CERVICAL CYTOLOGY 8/21/2020 COLONOSCOPY 12/30/2024 Allergies as of 7/13/2018  Review Complete On: 7/13/2018 By: Holland Fan MD  
  
 Severity Noted Reaction Type Reactions Lisinopril  08/09/2017    Cough Current Immunizations  Never Reviewed Name Date Influenza Vaccine 11/7/2016, 10/29/2015 Influenza Vaccine (Quad) PF 12/1/2017 Pneumococcal Polysaccharide (PPSV-23) 3/9/2018 Not reviewed this visit Vitals BP Pulse Resp Height(growth percentile) Weight(growth percentile) SpO2  
 118/86 76 20 5' 6\" (1.676 m) 210 lb 3.2 oz (95.3 kg) 98% BMI OB Status Smoking Status 33.93 kg/m2 Hysterectomy Never Smoker Vitals History BMI and BSA Data Body Mass Index Body Surface Area  
 33.93 kg/m 2 2.11 m 2 Preferred Pharmacy Pharmacy Name Phone [id number] [phone] Your Updated Medication List  
  
   
 This list is accurate as of 7/13/18 11:48 AM.  Always use your most recent med list.  
  
  
  
  
 ascorbic acid (vitamin C) 250 mg tablet Commonly known as:  VITAMIN C Take 500 mg by mouth daily. aspirin delayed-release 325 mg tablet Take 325 mg by mouth daily. At bedtime with Niacin  
  
 atorvastatin 10 mg tablet Commonly known as:  LIPITOR  
take 2 tablets by mouth NIGHTLY  
  
 cholecalciferol (vitamin D3) 2,000 unit Tab Take 1 Tab by mouth daily. diclofenac EC 75 mg EC tablet Commonly known as:  VOLTAREN  
take 1 tablet by mouth twice a day  
  
 estradiol 0.05 mg/24 hr  
Commonly known as:  VIVELLE  
1 Patch by TransDERmal route two (2) days a week. Wed and sun  
  
 ferrous sulfate 325 mg (65 mg iron) tablet Take  by mouth three (3) times daily (with meals). lansoprazole 15 mg capsule Commonly known as:  PREVACID Take 15 mg by mouth every Monday, Wednesday, Friday. Takes as needed  
  
 niacin  mg tablet Commonly known as:  NIASPAN  
TAKE 1 TABLET BY MOUTH AT BEDTIME  
  
 olmesartan-hydroCHLOROthiazide 40-25 mg per tablet Commonly known as:  BENICAR HCT Take 1 Tab by mouth daily. OTHER Indications: Melaleuca Phytomega - Take 2 softgels twice a day. OTHER Indications: Melaleuca Vitality - Takes 1 tablet twice a day. potassium chloride 20 mEq tablet Commonly known as:  K-DUR, KLOR-CON  
take 1 tablet by mouth twice a day  
  
 vitamin E 400 unit capsule Commonly known as:  [address] Take 400 Units by mouth daily. Introducing [facility] & HEALTH SERVICES! Anna Maurice introduces [employer] patient portal. Now you can access parts of your medical record, email your doctor's office, and request medication refills online. 1. In your internet browser, go to [email or web address] 2. Click on the First Time User? Click Here link in the Sign In box. You will see the New Member Sign Up page. 3. Enter your Respi Access Code exactly as it appears below. You will not need to use this code after youve completed the sign-up process. If you do not sign up before the expiration date, you must request a new code. · Respi Access Code: [email or web address] Expires: 9/13/2018 10:12 AM 
 
4. Enter the last four digits of your Social Security Number (xxxx) and Date of Birth (mm/dd/yyyy) as indicated and click Submit. You will be taken to the next sign-up page. 5. Create a Respi ID. This will be your Respi login ID and cannot be changed, so think of one that is secure and easy to remember. 6. Create a Respi password. You can change your password at any time. 7. Enter your Password Reset Question and Answer. This can be used at a later time if you forget your password. 8. Enter your e-mail address. You will receive e-mail notification when new information is available in [address] 9. Click Sign Up. You can now view and download portions of your medical record. 10. Click the Download Summary menu link to download a portable copy of your medical information. If you have questions, please visit the Frequently Asked Questions section of the Respi website. Remember, Respi is NOT to be used for urgent needs. For medical emergencies, dial 911. Now available from your iPhone and Android! Please provide this summary of care documentation to your next provider. Your primary care clinician is listed as Leighton. If you have any questions after today's visit, please call [phone].

## 2018-07-16 RX ORDER — ESTRADIOL 0.05 MG/D
FILM, EXTENDED RELEASE TRANSDERMAL
Qty: 24 PATCH | Refills: 3 | Status: SHIPPED | OUTPATIENT
Start: 2018-07-16 | End: 2019-07-29 | Stop reason: SDUPTHER

## 2018-09-14 ENCOUNTER — OFFICE VISIT (OUTPATIENT)
Dept: INTERNAL MEDICINE CLINIC | Age: 63
End: 2018-09-14

## 2018-09-14 VITALS
SYSTOLIC BLOOD PRESSURE: 128 MMHG | OXYGEN SATURATION: 97 % | BODY MASS INDEX: 34.42 KG/M2 | HEIGHT: 66 IN | DIASTOLIC BLOOD PRESSURE: 82 MMHG | HEART RATE: 62 BPM | WEIGHT: 214.2 LBS | RESPIRATION RATE: 16 BRPM

## 2018-09-14 DIAGNOSIS — M15.9 PRIMARY OSTEOARTHRITIS INVOLVING MULTIPLE JOINTS: ICD-10-CM

## 2018-09-14 DIAGNOSIS — N18.30 STAGE 3 CHRONIC KIDNEY DISEASE (HCC): ICD-10-CM

## 2018-09-14 DIAGNOSIS — J44.9 CHRONIC OBSTRUCTIVE PULMONARY DISEASE, UNSPECIFIED COPD TYPE (HCC): ICD-10-CM

## 2018-09-14 DIAGNOSIS — E03.9 ACQUIRED HYPOTHYROIDISM: ICD-10-CM

## 2018-09-14 DIAGNOSIS — Z00.00 ANNUAL PHYSICAL EXAM: Primary | ICD-10-CM

## 2018-09-14 DIAGNOSIS — J30.89 CHRONIC NON-SEASONAL ALLERGIC RHINITIS: ICD-10-CM

## 2018-09-14 DIAGNOSIS — I12.9 HYPERTENSION WITH RENAL DISEASE: ICD-10-CM

## 2018-09-14 DIAGNOSIS — E66.09 CLASS 1 OBESITY DUE TO EXCESS CALORIES WITHOUT SERIOUS COMORBIDITY WITH BODY MASS INDEX (BMI) OF 33.0 TO 33.9 IN ADULT: ICD-10-CM

## 2018-09-14 DIAGNOSIS — K21.9 GASTROESOPHAGEAL REFLUX DISEASE WITHOUT ESOPHAGITIS: ICD-10-CM

## 2018-09-14 DIAGNOSIS — E78.2 MIXED HYPERLIPIDEMIA: ICD-10-CM

## 2018-09-14 LAB
GRAN# POC: 5.2 K/UL (ref 2–7.8)
GRAN% POC: 79.1 % (ref 37–92)
HCT VFR BLD CALC: 35.8 % (ref 37–51)
HGB BLD-MCNC: 11.7 G/DL (ref 12–18)
LY# POC: 1 K/UL (ref 0.6–4.1)
LY% POC: 16.8 % (ref 10–58.5)
MCH RBC QN: 29.3 PG (ref 26–32)
MCHC RBC-ENTMCNC: 32.7 G/DL (ref 30–36)
MCV RBC: 90 FL (ref 80–97)
MID #, POC: 0.2 K/UL (ref 0–1.8)
MID% POC: 4.1 % (ref 0.1–24)
PLATELET # BLD: 289 K/UL (ref 140–440)
RBC # BLD: 3.99 M/UL (ref 4.2–6.3)
WBC # BLD: 6.4 K/UL (ref 4.1–10.9)

## 2018-09-14 RX ORDER — MELOXICAM 15 MG/1
15 TABLET ORAL DAILY
Qty: 30 TAB | Status: SHIPPED | OUTPATIENT
Start: 2018-09-14 | End: 2020-01-03 | Stop reason: ALTCHOICE

## 2018-09-14 RX ORDER — MELOXICAM 15 MG/1
TABLET ORAL
Refills: 0 | COMMUNITY
Start: 2018-07-25 | End: 2018-09-14 | Stop reason: SDUPTHER

## 2018-09-14 NOTE — PATIENT INSTRUCTIONS
Arthritis: Care Instructions Your Care Instructions Arthritis, also called osteoarthritis, is a breakdown of the cartilage that cushions your joints. When the cartilage wears down, your bones rub against each other. This causes pain and stiffness. Many people have some arthritis as they age. Arthritis most often affects the joints of the spine, hands, hips, knees, or feet. You can take simple measures to protect your joints, ease your pain, and help you stay active. Follow-up care is a key part of your treatment and safety. Be sure to make and go to all appointments, and call your doctor if you are having problems. It's also a good idea to know your test results and keep a list of the medicines you take. How can you care for yourself at home? · Stay at a healthy weight. Being overweight puts extra strain on your joints. · Talk to your doctor or physical therapist about exercises that will help ease joint pain. ¨ Stretch. You may enjoy gentle forms of yoga to help keep your joints and muscles flexible. ¨ Walk instead of jog. Other types of exercise that are less stressful on the joints include riding a bicycle, swimming, yadi chi, or water exercise. ¨ Lift weights. Strong muscles help reduce stress on your joints. Stronger thigh muscles, for example, take some of the stress off of the knees and hips. Learn the right way to lift weights so you do not make joint pain worse. · Take your medicines exactly as prescribed. Call your doctor if you think you are having a problem with your medicine. · Take pain medicines exactly as directed. ¨ If the doctor gave you a prescription medicine for pain, take it as prescribed. ¨ If you are not taking a prescription pain medicine, ask your doctor if you can take an over-the-counter medicine. · Use a cane, crutch, walker, or another device if you need help to get around. These can help rest your joints.  You also can use other things to make life easier, such as a higher toilet seat and padded handles on kitchen utensils. · Do not sit in low chairs, which can make it hard to get up. · Put heat or cold on your sore joints as needed. Use whichever helps you most. You also can take turns with hot and cold packs. ¨ Apply heat 2 or 3 times a day for 20 to 30 minutes-using a heating pad, hot shower, or hot pack-to relieve pain and stiffness. ¨ Put ice or a cold pack on your sore joint for 10 to 20 minutes at a time. Put a thin cloth between the ice and your skin. When should you call for help? Call your doctor now or seek immediate medical care if: 
  · You have sudden swelling, warmth, or pain in any joint.  
  · You have joint pain and a fever or rash.  
  · You have such bad pain that you cannot use a joint.  
 Watch closely for changes in your health, and be sure to contact your doctor if: 
  · You have mild joint symptoms that continue even with more than 6 weeks of care at home.  
  · You have stomach pain or other problems with your medicine. Where can you learn more? Go to http://mikael-oskar.info/. Enter F949 in the search box to learn more about \"Arthritis: Care Instructions. \" Current as of: October 10, 2017 Content Version: 11.7 © 3272-1591 Perfect Memory. Care instructions adapted under license by [employer] (which disclaims liability or warranty for this information). If you have questions about a medical condition or this instruction, always ask your healthcare professional. [place] [address] any warranty or liability for your use of this information.

## 2018-09-14 NOTE — MR AVS SNAPSHOT
[address] [address] [phone] Patient: Vicky De Los Santos MRN: [id number] IXI:3/38/0775 Visit Information Date & Time Provider Department Dept. Phone Encounter #  
 9/14/2018 10:20 AM Saji Gallo MD [place] [address] [phone] [id number] Follow-up Instructions Return in about 3 months (around 12/14/2018). Follow-up and Disposition History Your Appointments 12/14/2018 10:10 AM  
FOLLOW UP 10 with Saji Gallo MD  
[facility] ([address]) Appt Note: [address] [address] [address] [address] Upcoming Health Maintenance Date Due DTaP/Tdap/Td series (1 - Tdap) 7/12/1975 ZOSTER VACCINE AGE 60> 5/12/2014 BREAST CANCER SCRN MAMMOGRAM 1/8/2018 Influenza Age 5 to Adult 8/1/2018 Bone Densitometry 4/12/2020 PAP AKA CERVICAL CYTOLOGY 8/21/2020 COLONOSCOPY 12/30/2024 Allergies as of 9/14/2018  Review Complete On: 9/14/2018 By: Saji Gallo MD  
  
 Severity Noted Reaction Type Reactions Lisinopril  08/09/2017    Cough Current Immunizations  Never Reviewed Name Date Influenza Vaccine 11/7/2016, 10/29/2015 Influenza Vaccine (Quad) PF 12/1/2017 Pneumococcal Polysaccharide (PPSV-23) 3/9/2018 Not reviewed this visit You Were Diagnosed With   
  
 Codes Comments Annual physical exam    -  Primary ICD-10-CM: Z00.00 ICD-9-CM: V70.0 Hypertension with renal disease     ICD-10-CM: I12.9 ICD-9-CM: 403.90 Mixed hyperlipidemia     ICD-10-CM: E78.2 ICD-9-CM: 272.2 Primary osteoarthritis involving multiple joints     ICD-10-CM: M15.0 ICD-9-CM: 715.09 Gastroesophageal reflux disease without esophagitis     ICD-10-CM: K21.9 ICD-9-CM: 530.81   
 Chronic obstructive pulmonary disease, unspecified COPD type ([facility] [email or web address]]ca [facility].)     ICD-10-CM: J44.9 ICD-9-CM: 632 Class 1 obesity due to excess calories without serious comorbidity with body mass index (BMI) of 33.0 to 33.9 in adult     ICD-10-CM: E66.09, Z68.33 
ICD-9-CM: 278.00, V85.33 Stage 3 chronic kidney disease     ICD-10-CM: N18.3 ICD-9-CM: 223. 3 Acquired hypothyroidism     ICD-10-CM: E03.9 ICD-9-CM: 227. 9 Chronic non-seasonal allergic rhinitis     ICD-10-CM: J30.89 ICD-9-CM: 477.9 Vitals BP Pulse Resp Height(growth percentile) Weight(growth percentile) SpO2  
 128/82 (BP 1 Location: Left arm, BP Patient Position: Sitting) 62 16 5' 6\" (1.676 m) 214 lb 3.2 oz (97.2 kg) 97% BMI OB Status Smoking Status 34.57 kg/m2 Hysterectomy Never Smoker Vitals History BMI and BSA Data Body Mass Index Body Surface Area 34.57 kg/m 2 2.13 m 2 Preferred Pharmacy Pharmacy Name Phone [id number] [address] [phone] Your Updated Medication List  
  
   
This list is accurate as of 9/14/18 11:33 AM.  Always use your most recent med list.  
  
  
  
  
 ascorbic acid (vitamin C) 250 mg tablet Commonly known as:  VITAMIN C Take 500 mg by mouth daily. aspirin delayed-release 325 mg tablet Take 325 mg by mouth daily. At bedtime with Niacin  
  
 atorvastatin 10 mg tablet Commonly known as:  LIPITOR  
take 2 tablets by mouth NIGHTLY  
  
 cholecalciferol (vitamin D3) 2,000 unit Tab Take 1 Tab by mouth daily. estradiol 0.05 mg/24 hr  
Commonly known as:  VIVELLE  
APPLY ONE PATCH TWICE WEEKLY  
  
 ferrous sulfate 325 mg (65 mg iron) tablet Take  by mouth three (3) times daily (with meals). lansoprazole 15 mg capsule Commonly known as:  PREVACID Take 15 mg by mouth every Monday, Wednesday, Friday. Takes as needed  
  
 meloxicam 15 mg tablet Commonly known as:  MOBIC  
 Take 1 Tab by mouth daily. niacin  mg tablet Commonly known as:  NIASPAN  
TAKE 1 TABLET BY MOUTH AT BEDTIME  
  
 olmesartan-hydroCHLOROthiazide 40-25 mg per tablet Commonly known as:  BENICAR HCT Take 1 Tab by mouth daily. OTHER Indications: Melaleuca Phytomega - Take 2 softgels twice a day. OTHER Indications: Melaleuca Vitality - Takes 1 tablet twice a day. potassium chloride 20 mEq tablet Commonly known as:  K-DUR, KLOR-CON  
take 1 tablet by mouth twice a day  
  
 vitamin E 400 unit capsule Commonly known as:  [address] Take 400 Units by mouth daily. Prescriptions Sent to Pharmacy Refills  
 meloxicam (MOBIC) 15 mg tablet prn Sig: Take 1 Tab by mouth daily. Class: Normal  
 [facility] [address] [place] [address] #: [phone] Route: Oral  
  
We Performed the Following AMB POC COMPLETE CBC,AUTOMATED ENTER X240247 CPT(R)] AMB POC URINALYSIS DIP STICK AUTO W/ MICRO  [51911 CPT(R)] CK [id number] CPT(R)] LIPID PANEL [60739 CPT(R)] METABOLIC PANEL, COMPREHENSIVE [73111 CPT(R)] T4, FREE [id number] CPT(R)] TSH 3RD GENERATION [59742 CPT(R)] Follow-up Instructions Return in about 3 months (around 12/14/2018). Patient Instructions Arthritis: Care Instructions Your Care Instructions Arthritis, also called osteoarthritis, is a breakdown of the cartilage that cushions your joints. When the cartilage wears down, your bones rub against each other. This causes pain and stiffness. Many people have some arthritis as they age. Arthritis most often affects the joints of the spine, hands, hips, knees, or feet. You can take simple measures to protect your joints, ease your pain, and help you stay active. Follow-up care is a key part of your treatment and safety.  Be sure to make and go to all appointments, and call your doctor if you are having problems. It's also a good idea to know your test results and keep a list of the medicines you take. How can you care for yourself at home? · Stay at a healthy weight. Being overweight puts extra strain on your joints. · Talk to your doctor or physical therapist about exercises that will help ease joint pain. ¨ Stretch. You may enjoy gentle forms of yoga to help keep your joints and muscles flexible. ¨ Walk instead of jog. Other types of exercise that are less stressful on the joints include riding a bicycle, swimming, yadi chi, or water exercise. ¨ Lift weights. Strong muscles help reduce stress on your joints. Stronger thigh muscles, for example, take some of the stress off of the knees and hips. Learn the right way to lift weights so you do not make joint pain worse. · Take your medicines exactly as prescribed. Call your doctor if you think you are having a problem with your medicine. · Take pain medicines exactly as directed. ¨ If the doctor gave you a prescription medicine for pain, take it as prescribed. ¨ If you are not taking a prescription pain medicine, ask your doctor if you can take an over-the-counter medicine. · Use a cane, crutch, walker, or another device if you need help to get around. These can help rest your joints. You also can use other things to make life easier, such as a higher toilet seat and padded handles on kitchen utensils. · Do not sit in low chairs, which can make it hard to get up. · Put heat or cold on your sore joints as needed. Use whichever helps you most. You also can take turns with hot and cold packs. ¨ Apply heat 2 or 3 times a day for 20 to 30 minutes-using a heating pad, hot shower, or hot pack-to relieve pain and stiffness. ¨ Put ice or a cold pack on your sore joint for 10 to 20 minutes at a time. Put a thin cloth between the ice and your skin. When should you call for help? Call your doctor now or seek immediate medical care if:   · You have sudden swelling, warmth, or pain in any joint.  
  · You have joint pain and a fever or rash.  
  · You have such bad pain that you cannot use a joint.  
 Watch closely for changes in your health, and be sure to contact your doctor if: 
  · You have mild joint symptoms that continue even with more than 6 weeks of care at home.  
  · You have stomach pain or other problems with your medicine. Where can you learn more? Go to http://mikael-oskar.info/. Enter S219 in the search box to learn more about \"Arthritis: Care Instructions. \" Current as of: October 10, 2017 Content Version: 11.7 © 5456-5366 [employer]. Care instructions adapted under license by [employer] (which disclaims liability or warranty for this information). If you have questions about a medical condition or this instruction, always ask your healthcare professional. [address] any warranty or liability for your use of this information. Patient Instructions History Introducing [facility]! [facility] introduces DataNitro patient portal. Now you can access parts of your medical record, email your doctor's office, and request medication refills online. 1. In your internet browser, go to [email or web address] 2. Click on the First Time User? Click Here link in the Sign In box. You will see the New Member Sign Up page. 3. Enter your DataNitro Access Code exactly as it appears below. You will not need to use this code after youve completed the sign-up process. If you do not sign up before the expiration date, you must request a new code. · DataNitro Access Code: [email or web address] Expires: 12/13/2018 10:20 AM 
 
4. Enter the last four digits of your Social Security Number (xxxx) and Date of Birth (mm/dd/yyyy) as indicated and click Submit. You will be taken to the next sign-up page. 5. Create a Allied Fiber ID. This will be your [employer] login ID and cannot be changed, so think of one that is secure and easy to remember. 6. Create a [employer] Fiber password. You can change your password at any time. 7. Enter your Password Reset Question and Answer. This can be used at a later time if you forget your password. 8. Enter your e-mail address. You will receive e-mail notification when new information is available in [address] 9. Click Sign Up. You can now view and download portions of your medical record. 10. Click the Download Summary menu link to download a portable copy of your medical information. If you have questions, please visit the Frequently Asked Questions section of the Allied Fiber website. Remember, [employer] is NOT to be used for urgent needs. For medical emergencies, dial 911. Now available from your iPhone and Android! Please provide this summary of care documentation to your next provider. Your primary care clinician is listed as Leighton. If you have any questions after today's visit, please call [phone].

## 2018-09-14 NOTE — PROGRESS NOTES
Annual Wellness Visit HPI:  
 
Lolly Andrea is a 58y.o. year old female who is here for her annual comprehensive healthcare exam and medical problems include hypertension, hyperlipidemia, COPD, CKD, GERD, obesity, DJD, anxiety and other medical problems. She is taking medications and trying to follow her diet and get some exercise. She currently denies any chest pain, shortness of breath, palpitations or cardiorespiratory complaints. She has no GI or  complaints. There are no other complaints on complete review of systems. Visit Vitals  /82 (BP 1 Location: Left arm, BP Patient Position: Sitting)  Pulse 62  Resp 16  
 Ht 5' 6\" (1.676 m)  Wt 214 lb 3.2 oz (97.2 kg)  SpO2 97%  BMI 34.57 kg/m2 Past Medical History:  
Diagnosis Date  Allergic rhinitis 8/9/2017  Anemia 8/9/2017  Anxiety 8/9/2017  Arrhythmia   
 irregular heart beat hx and beating fast per patient/no cardiologist  
 Arthritis JOINTS  WORSE WAIST DOWN   
 Back pain 8/9/2017  CKD (chronic kidney disease) 8/9/2017  COPD (chronic obstructive pulmonary disease) ([facility] [address] 8/9/2017  Crohn disease ([facility] [address] 8/9/2017  DJD (degenerative joint disease) 8/9/2017  GERD (gastroesophageal reflux disease) 8/9/2017  Hormone replacement therapy (HRT) 8/9/2017  Hyperlipidemia 8/9/2017  Hypertension  Hypertension with renal disease 8/9/2017  Hypokalemia 8/9/2017  Hypothyroid 8/9/2017  Ill-defined condition   
 neuropathy feet  Inguinal hernia 8/9/2017  Leg numbness 8/9/2017  Lumbar herniated disc 8/9/2017  
 OAB (overactive bladder) 8/9/2017  Obesity 8/9/2017  On statin therapy 8/9/2017  Osteoarthritis 8/9/2017  Pelvic pain in female 8/9/2017  Umbilical hernia 0/7/1131  Varicose vein of leg 8/9/2017 Past Surgical History:  
Procedure Laterality Date  BREAST SURGERY PROCEDURE UNLISTED    
 mass removed left breast x2 benign  HX COLONOSCOPY  12/2010  
 normal  
 HX COLONOSCOPY  12/30/2014  
 normal   
 HX HEENT    
 cyst removed left eye  HX HERNIA REPAIR  06/02/2017  
 right inguinal and umbilical (Dr. Geo Greenwood)  HX HYSTERECTOMY  HX ORTHOPAEDIC    
 bilat knee injections  HX ORTHOPAEDIC    
 laser treatment left knee and foot  HX ORTHOPAEDIC    
 hx of gel shots bilat knee  HX ORTHOPAEDIC  12/27/2016  
 left knee coritizon shot  HX OTHER SURGICAL    
 cyst removed front left scalp  HX OTHER SURGICAL    
 colonoscopy  HX OTHER SURGICAL    
 tooth implant  HX TUBAL LIGATION    
 VASCULAR SURGERY PROCEDURE UNLIST    
 vein striping Prior to Admission medications Medication Sig Start Date End Date Taking? Authorizing Provider  
meloxicam (MOBIC) 15 mg tablet Take 1 Tab by mouth daily. 9/14/18  Yes Cecilia Ramírez MD  
estradiol (VIVELLE) 0.05 mg/24 hr APPLY ONE PATCH TWICE WEEKLY Patient taking differently: APPLY ONE PATCH WEEKLY 7/16/18  Yes Cecilia Ramírez MD  
OTHER Indications: Melaleuca Phytomega - Take 2 softgels twice a day. Yes Historical Provider OTHER Indications: Melaleuca Vitality - Takes 1 tablet twice a day. Yes Historical Provider  
olmesartan-hydroCHLOROthiazide (BENICAR HCT) 40-25 mg per tablet Take 1 Tab by mouth daily. Patient taking differently: No sig reported 6/15/18  Yes Cecilia Ramírez MD  
potassium chloride (K-DUR, KLOR-CON) 20 mEq tablet take 1 tablet by mouth twice a day 4/4/18  Yes Cecilia Ramírez MD  
atorvastatin (LIPITOR) 10 mg tablet take 2 tablets by mouth NIGHTLY 3/12/18  Yes Cecilia Ramírez MD  
niacin ER (NIASPAN) 500 mg tablet TAKE 1 TABLET BY MOUTH AT BEDTIME 12/28/17  Yes Cecilia Ramírez MD  
ferrous sulfate 325 mg (65 mg iron) tablet Take  by mouth three (3) times daily (with meals). Yes Historical Provider  
aspirin delayed-release 325 mg tablet Take 325 mg by mouth daily. At bedtime with Niacin   Yes Historical Provider vitamin E (AQUA GEMS) 400 unit capsule Take 400 Units by mouth daily. 3/16/17  Yes Megan Hoffman NP  
cholecalciferol, vitamin D3, 2,000 unit tab Take 1 Tab by mouth daily. Yes Historical Provider  
lansoprazole (PREVACID) 15 mg capsule Take 15 mg by mouth every Monday, Wednesday, Friday. Takes as needed   Yes Historical Provider  
ascorbic acid (VITAMIN C) 250 mg tablet Take 500 mg by mouth daily. Yes Historical Provider Allergies Allergen Reactions  Lisinopril Cough Family History Problem Relation Age of Onset  No Known Problems Mother  No Known Problems Father  Stroke Sister Social History Social History  Marital status:  Spouse name: N/A  
 Number of children: N/A  
 Years of education: N/A Occupational History  Not on file. Social History Main Topics  Smoking status: Never Smoker  Smokeless tobacco: Never Used  Alcohol use Yes Comment: once a year  Drug use: No  
 Sexual activity: Yes  
  Partners: Male Other Topics Concern  Not on file Social History Narrative ROS:  
 
Constitutional: She denies fevers, weight loss, sweats. Eyes: No blurred or double vision. ENT: No difficulty with swallowing, taste, speech or smell. NECK: no stiffness swelling or lymph node enlargement Respiratory: No cough wheezing or shortness of breath. Cardiovascular: Denies chest pain, palpitations, unexplained indigestion or syncope. Breast: She has noted no masses or lumps and no discharge or axillary swelling Gastrointestinal:  No changes in bowel movements, no abdominal pain, no bloating. Genitourinary: No discharge or abnormal bleeding or pain Extremities: No joint pain, stiffness or swelling. Neurological:  No numbness, tingling, burring paresthesias or loss of motor strength. No syncope, dizziness or frequent headache Skin:  No recent rashes or mole changes. Psychiatric/Behavioral:  Negative for depression. The patient is not nervous/anxious. HEMATOLOGIC: no easy bruising or bleeding gums Endocrine: no sweats of urinary frequency or excessive thirst 
 
 
Physical Examination:  
 
Vitals:  
 09/14/18 1030 BP: 128/82 Pulse: 62 Resp: 16 SpO2: 97% Weight: 214 lb 3.2 oz (97.2 kg) Height: 5' 6\" (1.676 m) PainSc:   0 - No pain General appearance - alert, well appearing, and in no distress Mental status - alert, oriented to person, place, and time HEENT: 
Ears - bilateral TM's and external ear canals clear Eyes - pupillary responses were normal.  Extraocular muscle function intact. Lids and conjunctiva not injected. Fundoscopic exam revealed sharp disc margins. eye movements intact Pharynx- clear with teeth in good repair. No masses were noted Neck - supple without thyromegaly or burit. No JVD noted Lungs - clear to auscultation and percussion Cardiac- normal rate, regular rhythm without murmurs. PMI not displaced. No gallop, rub or click Breast: deferred to GYN Abdomen - flat, soft, non-tender without palpable organomegaly or mass. No pulsatile mass was felt, and not bruit was heard. Bowel sounds were active  Female - deferred to GYN Rectal - deferred to GYN Extremities -  no clubbing cyanosis or edema Lymphatics - no palpable lymphadenopathy, no hepatosplenomegaly Peripheral vascular - Dorsalis pedis and posterior tibial pulses felt without difficulty Skin - no rash or unusual mole change noted Neurological - Cranial nerves II-XII grossly intact. Motor strength 5/5. DTR's 2+ and symmetric. Station and gait normal 
Back exam - full range of motion, no tenderness, palpable spasm or pain on motion Musculoskeletal - no joint tenderness, deformity or swelling Hematologic: no purpura, petechiae or bruising Assessment/Plan: 1. Annual physical exam   
2. Hypertension with renal disease 3. Mixed hyperlipidemia 4. Primary osteoarthritis involving multiple joints 5. Gastroesophageal reflux disease without esophagitis 6. Chronic obstructive pulmonary disease, unspecified COPD type ([facility] [address] 7. Class 1 obesity due to excess calories without serious comorbidity with body mass index (BMI) of 33.0 to 33.9 in adult 8. Stage 3 chronic kidney disease 9. Acquired hypothyroidism 10. Chronic non-seasonal allergic rhinitis Impression 1. Annual physical examination normal except for mild obesity which we discussed diet, exercise and weight reduction. 2.  Hypertension that is controlled so continue current therapy reviewed with her 3. Hyperlipidemia prior labs reviewed and repeat status pending I will adjust if necessary. 4. DJD that is stable 5 GERD that is stable 6.  C OPD currently stable not having any issues 7. Obesity we discussed diet, exercise and weight reduction for overall health benefit. 8.  CKD stage III repeat status pending 9. Hypothyroidism that status is pending 10. Allergic rhinitis stable I will call with lab and make further recommendations or adjustments if necessary. Follow-up as scheduled again for 3 months or sooner should there be a problem. Orders Placed This Encounter  METABOLIC PANEL, COMPREHENSIVE  T4, FREE  
 TSH 3RD GENERATION  
 LIPID PANEL  
 CK  AMB POC COMPLETE CBC,AUTOMATED ENTER  AMB POC URINALYSIS DIP STICK AUTO W/ MICRO  DISCONTD: meloxicam (MOBIC) 15 mg tablet Refill:  0  
 meloxicam (MOBIC) 15 mg tablet Sig: Take 1 Tab by mouth daily. Dispense:  30 Tab Refill:  prn No results found for any visits on 09/14/18. I have reviewed the patient's medical history in detail and updated the computerized patient record. We had a prolonged discussion about these complex clinical issues and went over the various important aspects to consider. All questions were answered. Advised her to call back or return to office if symptoms do not improve, change in nature, or persist. 
 
She was given an after visit summary or informed of [email or web address] Access which includes patient instructions, diagnoses, current medications, & vitals. She expressed understanding with the diagnosis and plan.   
  
Suzanne Arias MD

## 2018-09-14 NOTE — PROGRESS NOTES
1. Have you been to the ER, urgent care clinic since your last visit? Hospitalized since your last visit? No 
 
2. Have you seen or consulted any other health care providers outside of the [address] [place] [address] since your last visit? Include any pap smears or colon screening. No  
 
Fasting Mammogram was last done , has appt on 09- with Imaging center. No eye exam done in the past year or so. Needs to be scheduled. Chief Complaint Patient presents with [address] [place] [address] Annual Wellness Visit Depression Risk Factor Screening: PHQ over the last two weeks 7/2/2018 Little interest or pleasure in doing things Not at all Feeling down, depressed, irritable, or hopeless Not at all Total Score PHQ 2 0 Patient Care Team  
Patient Care Team: 
Cecilia Ramírez MD as PCP - General (Internal Medicine)

## 2018-09-15 LAB
ALBUMIN SERPL-MCNC: 4.6 G/DL (ref 3.6–4.8)
ALBUMIN/GLOB SERPL: 1.8 {RATIO} (ref 1.2–2.2)
ALP SERPL-CCNC: 106 IU/L (ref 39–117)
ALT SERPL-CCNC: 30 IU/L (ref 0–32)
AST SERPL-CCNC: 17 IU/L (ref 0–40)
BILIRUB SERPL-MCNC: 0.3 MG/DL (ref 0–1.2)
BUN SERPL-MCNC: 25 MG/DL (ref 8–27)
BUN/CREAT SERPL: 22 (ref 12–28)
CALCIUM SERPL-MCNC: 10.1 MG/DL (ref 8.7–10.3)
CHLORIDE SERPL-SCNC: 101 MMOL/L (ref 96–106)
CHOLEST SERPL-MCNC: 155 MG/DL (ref 100–199)
CK SERPL-CCNC: 71 U/L (ref 24–173)
CO2 SERPL-SCNC: 26 MMOL/L (ref 20–29)
CREAT SERPL-MCNC: 1.15 MG/DL (ref 0.57–1)
GLOBULIN SER CALC-MCNC: 2.5 G/DL (ref 1.5–4.5)
GLUCOSE SERPL-MCNC: 91 MG/DL (ref 65–99)
HDLC SERPL-MCNC: 53 MG/DL
LDLC SERPL CALC-MCNC: 87 MG/DL (ref 0–99)
POTASSIUM SERPL-SCNC: 4.6 MMOL/L (ref 3.5–5.2)
PROT SERPL-MCNC: 7.1 G/DL (ref 6–8.5)
SODIUM SERPL-SCNC: 142 MMOL/L (ref 134–144)
T4 FREE SERPL-MCNC: 1.29 NG/DL (ref 0.82–1.77)
TRIGL SERPL-MCNC: 73 MG/DL (ref 0–149)
TSH SERPL DL<=0.005 MIU/L-ACNC: 1.93 UIU/ML (ref 0.45–4.5)
VLDLC SERPL CALC-MCNC: 15 MG/DL (ref 5–40)

## 2018-09-20 LAB
BACTERIA UA POCT, BACTPOCT: NORMAL
BILIRUB UR QL STRIP: NEGATIVE
CASTS UA POCT: NEGATIVE
CLUE CELLS, CLUEPOCT: NEGATIVE
CRYSTALS UA POCT, CRYSPOCT: NEGATIVE
EPITHELIAL CELLS POCT: NORMAL
GLUCOSE UR-MCNC: NEGATIVE MG/DL
KETONES P FAST UR STRIP-MCNC: NORMAL MG/DL
MUCUS UA POCT, MUCPOCT: NORMAL
PH UR STRIP: 5 [PH] (ref 5–7)
PROT UR QL STRIP: NORMAL
RBC UA POCT, RBCPOCT: NORMAL
SP GR UR STRIP: 1.02 (ref 1.01–1.02)
TRICH UA POCT, TRICHPOC: NEGATIVE
UA UROBILINOGEN AMB POC: NORMAL (ref 0.2–1)
URINALYSIS CLARITY POC: NORMAL
URINALYSIS COLOR POC: NORMAL
URINE BLOOD POC: NEGATIVE
URINE CULT COMMENT, POCT: NORMAL
URINE LEUKOCYTES POC: NORMAL
URINE NITRITES POC: NEGATIVE
WBC UA POCT, WBCPOCT: NORMAL
YEAST UA POCT, YEASTPOC: NEGATIVE

## 2018-10-01 RX ORDER — ATORVASTATIN CALCIUM 10 MG/1
20 TABLET, FILM COATED ORAL
Qty: 90 TAB | Refills: 3 | Status: SHIPPED | OUTPATIENT
Start: 2018-10-01 | End: 2019-07-05 | Stop reason: SDUPTHER

## 2018-10-01 NOTE — TELEPHONE ENCOUNTER
RX refill request from the patient/pharmacy. Patient last seen 09- with labs, and next appt. scheduled for 12-.

## 2018-10-04 RX ORDER — NIACIN 500 MG/1
TABLET, EXTENDED RELEASE ORAL
Qty: 90 TAB | Refills: 1 | Status: SHIPPED | OUTPATIENT
Start: 2018-10-04 | End: 2019-04-24 | Stop reason: SDUPTHER

## 2018-10-04 NOTE — TELEPHONE ENCOUNTER
Requested Prescriptions     Pending Prescriptions Disp Refills    niacin ER (NIASPAN) 500 mg tablet [Pharmacy Med Name: Niacin ER (Antihyperlipidemic) 500 MG Tablet Extended Release] 90 Tab 1     Sig: TAKE 1 TABLET BY MOUTH AT BEDTIME     Patient Last Seen:  09- with labs    Last labs done: NA    Next appointment:  12-

## 2018-10-19 DIAGNOSIS — E87.6 HYPOKALEMIA: ICD-10-CM

## 2018-10-19 RX ORDER — POTASSIUM CHLORIDE 20 MEQ/1
TABLET, EXTENDED RELEASE ORAL
Qty: 180 TAB | Refills: 3 | Status: SHIPPED | OUTPATIENT
Start: 2018-10-19 | End: 2020-02-28 | Stop reason: SDUPTHER

## 2018-10-19 NOTE — TELEPHONE ENCOUNTER
RX refill request from the patient/pharmacy. Patient last seen 09- with labs, and next appt. scheduled for 12-  Requested Prescriptions     Pending Prescriptions Disp Refills    potassium chloride (K-DUR, KLOR-CON) 20 mEq tablet 180 Tab 3     Sig: take 1 tablet by mouth twice a day   .

## 2018-12-13 NOTE — PROGRESS NOTES
Chief Complaint   Patient presents with    Hypertension     3 month f/u       SUBJECTIVE:    Bela Dee is a 58 y.o. female who returns in follow-up for medical problems include hypertension, hyperlipidemia, GERD, hypothyroidism, COPD, CKD stage III, obesity and DJD as well as other medical problems. She is taking her medications and trying to follow her diet but knows she could do better with diet and exercise although her weight is down 6 pounds. She currently denies any chest pain, shortness of breath, palpitations, PND, orthopnea or cardiorespiratory complaints. She denies any GI or  complaints. She denies any headaches, dizziness or neurological planes. There are no current arthritic complaints and no other complaints on complete review of systems. Current Outpatient Medications   Medication Sig Dispense Refill    potassium chloride (K-DUR, KLOR-CON) 20 mEq tablet take 1 tablet by mouth twice a day 180 Tab 3    niacin ER (NIASPAN) 500 mg tablet TAKE 1 TABLET BY MOUTH AT BEDTIME 90 Tab 1    atorvastatin (LIPITOR) 10 mg tablet Take 2 Tabs by mouth nightly. 90 Tab 3    meloxicam (MOBIC) 15 mg tablet Take 1 Tab by mouth daily. 30 Tab prn    estradiol (VIVELLE) 0.05 mg/24 hr APPLY ONE PATCH TWICE WEEKLY (Patient taking differently: APPLY ONE PATCH WEEKLY) 24 Patch 3    OTHER Indications: Melaleuca Phytomega - Take 2 softgels twice a day.  OTHER Indications: Melaleuca Vitality - Takes 1 tablet twice a day.  olmesartan-hydroCHLOROthiazide (BENICAR HCT) 40-25 mg per tablet Take 1 Tab by mouth daily. (Patient taking differently: No sig reported) 90 Tab 3    ferrous sulfate 325 mg (65 mg iron) tablet Take  by mouth three (3) times daily (with meals).  aspirin delayed-release 325 mg tablet Take 325 mg by mouth daily. At bedtime with Niacin      vitamin E (AQUA GEMS) 400 unit capsule Take 400 Units by mouth daily.       cholecalciferol, vitamin D3, 2,000 unit tab Take 1 Tab by mouth daily.  lansoprazole (PREVACID) 15 mg capsule Take 15 mg by mouth every Monday, Wednesday, Friday. Takes as needed      ascorbic acid (VITAMIN C) 250 mg tablet Take 500 mg by mouth daily.        Past Medical History:   Diagnosis Date    Allergic rhinitis 8/9/2017    Anemia 8/9/2017    Anxiety 8/9/2017    Arrhythmia     irregular heart beat hx and beating fast per patient/no cardiologist    Arthritis     JOINTS  WORSE WAIST DOWN     Back pain 8/9/2017    CKD (chronic kidney disease) 8/9/2017    COPD (chronic obstructive pulmonary disease) ([facility] [address] 8/9/2017    Crohn disease ([facility] [address] 8/9/2017    DJD (degenerative joint disease) 8/9/2017    GERD (gastroesophageal reflux disease) 8/9/2017    Hormone replacement therapy (HRT) 8/9/2017    Hyperlipidemia 8/9/2017    Hypertension     Hypertension with renal disease 8/9/2017    Hypokalemia 8/9/2017    Hypothyroid 8/9/2017    Ill-defined condition     neuropathy feet    Inguinal hernia 8/9/2017    Leg numbness 8/9/2017    Lumbar herniated disc 8/9/2017    OAB (overactive bladder) 8/9/2017    Obesity 8/9/2017    On statin therapy 8/9/2017    Osteoarthritis 8/9/2017    Pelvic pain in female 3/9/7838    Umbilical hernia 2/2/2073    Varicose vein of leg 8/9/2017     Past Surgical History:   Procedure Laterality Date    BREAST SURGERY PROCEDURE UNLISTED      mass removed left breast x2 benign    HX COLONOSCOPY  12/2010    normal    HX COLONOSCOPY  12/30/2014    normal     HX HEENT      cyst removed left eye    HX HERNIA REPAIR  06/02/2017    right inguinal and umbilical (Dr. Mariano Slider)    HX HYSTERECTOMY      HX ORTHOPAEDIC      bilat knee injections    HX ORTHOPAEDIC      laser treatment left knee and foot    HX ORTHOPAEDIC      hx of gel shots bilat knee    HX ORTHOPAEDIC  12/27/2016    left knee coritizon shot    HX OTHER SURGICAL      cyst removed front left scalp    HX OTHER SURGICAL      colonoscopy    HX OTHER SURGICAL      tooth implant    HX TUBAL LIGATION      VASCULAR SURGERY PROCEDURE UNLIST      vein striping     Allergies   Allergen Reactions    Lisinopril Cough       REVIEW OF SYSTEMS:  General: negative for - chills or fever, or weight loss or gain  ENT: negative for - headaches, nasal congestion or tinnitus  Eyes: no blurred or visual changes  Neck: No stiffness or swollen nodes  Respiratory: negative for - cough, hemoptysis, shortness of breath or wheezing  Cardiovascular : negative for - chest pain, edema, palpitations or shortness of breath  Gastrointestinal: negative for - abdominal pain, blood in stools, heartburn or nausea/vomiting  Genito-Urinary: no dysuria, trouble voiding, or hematuria  Musculoskeletal: negative for - gait disturbance, joint pain, joint stiffness or joint swelling  Neurological: no TIA or stroke symptoms  Hematologic: no bruises, no bleeding  Lymphatic: no swollen glands  Integument: no lumps, mole changes, nail changes or rash  Endocrine:no malaise/lethargy poly uria or polydipsia or unexpected weight changes        Social History     Socioeconomic History    Marital status:      Spouse name: Not on file    Number of children: Not on file    Years of education: Not on file    Highest education level: Not on file   Tobacco Use    Smoking status: Never Smoker    Smokeless tobacco: Never Used   Substance and Sexual Activity    Alcohol use: Yes     Comment: once a year    Drug use: No    Sexual activity: Yes     Partners: Male     Family History   Problem Relation Age of Onset    No Known Problems Mother     No Known Problems Father     Stroke Sister        OBJECTIVE:     Visit Vitals  /84 (BP 1 Location: Left arm, BP Patient Position: Sitting)   Pulse 60   Resp 16   Ht 5' 6\" (1.676 m)   Wt 208 lb 6.4 oz (94.5 kg)   SpO2 97%   BMI 33.64 kg/m²     CONSTITUTIONAL:   well nourished, appears age appropriate  EYES: sclera anicteric, PERRL, EOMI  ENMT:nares clear, moist mucous membranes, pharynx clear  NECK: supple. Thyroid normal, No JVD or bruits  RESPIRATORY: Chest: clear to ascultation and percussion, normal inspiratory effort  CARDIOVASCULAR: Heart: regular rate and rhythm no murmurs, rubs or gallops, PMI not displaced, No thrills  GASTROINTESTINAL: Abdomen: non distended, soft, non tender, bowel sounds normal  HEMATOLOGIC: no purpura, petechiae or bruising  LYMPHATIC: No lymph node enlargemant  MUSCULOSKELETAL: Extremities: no edema or active synovitis, pulse 1+   INTEGUMENT: No unusual rashes or suspicious skin lesions noted. Nails appear normal.  PERIPHERAL VASCULAR: normal pulses femoral, PT and DP  NEUROLOGIC: non-focal exam, A & O X 3  PSYCHIATRIC:, appropriate affect     ASSESSMENT:   1. Hypertension with renal disease    2. Mixed hyperlipidemia    3. Primary osteoarthritis involving multiple joints    4. Gastroesophageal reflux disease without esophagitis    5. Chronic obstructive pulmonary disease, unspecified COPD type (HCC)    6. Class 1 obesity due to excess calories without serious comorbidity with body mass index (BMI) of 33.0 to 33.9 in adult    7. Stage 3 chronic kidney disease (HCC)      Impression  1. Hypertension that is controlled to continue current therapy reviewed with her  2. Hyperlipidemia prior lab reviewed repeat status pending and I will make adjustments if necessary. 3.  DJD that is stable  4 GERD that is currently stable  5. COPD stable  6. Obesity we did discuss diet, exercise and weight reduction for overall health benefit. 7.  CKD stage III repeat status pending  I will call the lab results and make further recommendations or adjustments if necessary. Follow-up as scheduled for 3 months or sooner should there be a problem. PLAN:  .  Orders Placed This Encounter    METABOLIC PANEL, COMPREHENSIVE    LIPID PANEL    CK         ATTENTION:   This medical record was transcribed using an electronic medical records system.   Although proofread, it may and can contain electronic and spelling errors. Other human spelling and other errors may be present. Corrections may be executed at a later time. Please feel free to contact us for any clarifications as needed. Follow-up Disposition:  Return in about 3 months (around 3/14/2019). No results found for any visits on 12/14/18. Sandy Reaves MD    The patient verbalized understanding of the problems and plans as explained.

## 2018-12-14 ENCOUNTER — OFFICE VISIT (OUTPATIENT)
Dept: INTERNAL MEDICINE CLINIC | Age: 63
End: 2018-12-14

## 2018-12-14 VITALS
OXYGEN SATURATION: 97 % | SYSTOLIC BLOOD PRESSURE: 123 MMHG | HEART RATE: 60 BPM | DIASTOLIC BLOOD PRESSURE: 84 MMHG | HEIGHT: 66 IN | BODY MASS INDEX: 33.49 KG/M2 | RESPIRATION RATE: 16 BRPM | WEIGHT: 208.4 LBS

## 2018-12-14 DIAGNOSIS — E66.09 CLASS 1 OBESITY DUE TO EXCESS CALORIES WITHOUT SERIOUS COMORBIDITY WITH BODY MASS INDEX (BMI) OF 33.0 TO 33.9 IN ADULT: ICD-10-CM

## 2018-12-14 DIAGNOSIS — M15.9 PRIMARY OSTEOARTHRITIS INVOLVING MULTIPLE JOINTS: ICD-10-CM

## 2018-12-14 DIAGNOSIS — E78.2 MIXED HYPERLIPIDEMIA: ICD-10-CM

## 2018-12-14 DIAGNOSIS — N18.30 STAGE 3 CHRONIC KIDNEY DISEASE (HCC): ICD-10-CM

## 2018-12-14 DIAGNOSIS — J44.9 CHRONIC OBSTRUCTIVE PULMONARY DISEASE, UNSPECIFIED COPD TYPE (HCC): ICD-10-CM

## 2018-12-14 DIAGNOSIS — I12.9 HYPERTENSION WITH RENAL DISEASE: Primary | ICD-10-CM

## 2018-12-14 DIAGNOSIS — K21.9 GASTROESOPHAGEAL REFLUX DISEASE WITHOUT ESOPHAGITIS: ICD-10-CM

## 2018-12-15 LAB
ALBUMIN SERPL-MCNC: 4.5 G/DL (ref 3.6–4.8)
ALBUMIN/GLOB SERPL: 1.8 {RATIO} (ref 1.2–2.2)
ALP SERPL-CCNC: 105 IU/L (ref 39–117)
ALT SERPL-CCNC: 26 IU/L (ref 0–32)
AST SERPL-CCNC: 20 IU/L (ref 0–40)
BILIRUB SERPL-MCNC: 0.4 MG/DL (ref 0–1.2)
BUN SERPL-MCNC: 21 MG/DL (ref 8–27)
BUN/CREAT SERPL: 20 (ref 12–28)
CALCIUM SERPL-MCNC: 10.1 MG/DL (ref 8.7–10.3)
CHLORIDE SERPL-SCNC: 102 MMOL/L (ref 96–106)
CHOLEST SERPL-MCNC: 146 MG/DL (ref 100–199)
CK SERPL-CCNC: 99 U/L (ref 24–173)
CO2 SERPL-SCNC: 24 MMOL/L (ref 20–29)
CREAT SERPL-MCNC: 1.03 MG/DL (ref 0.57–1)
GLOBULIN SER CALC-MCNC: 2.5 G/DL (ref 1.5–4.5)
GLUCOSE SERPL-MCNC: 88 MG/DL (ref 65–99)
HDLC SERPL-MCNC: 49 MG/DL
LDLC SERPL CALC-MCNC: 82 MG/DL (ref 0–99)
POTASSIUM SERPL-SCNC: 5.3 MMOL/L (ref 3.5–5.2)
PROT SERPL-MCNC: 7 G/DL (ref 6–8.5)
SODIUM SERPL-SCNC: 143 MMOL/L (ref 134–144)
TRIGL SERPL-MCNC: 74 MG/DL (ref 0–149)
VLDLC SERPL CALC-MCNC: 15 MG/DL (ref 5–40)

## 2018-12-21 DIAGNOSIS — M13.0 ARTHRITIS OF MULTIPLE SITES: ICD-10-CM

## 2018-12-21 RX ORDER — DICLOFENAC SODIUM 75 MG/1
TABLET, DELAYED RELEASE ORAL
Qty: 60 TAB | Refills: 11 | Status: SHIPPED | OUTPATIENT
Start: 2018-12-21 | End: 2019-12-20

## 2018-12-21 NOTE — TELEPHONE ENCOUNTER
RX refill request from the patient/pharmacy. Patient last seen 12- with labs, and next appt. scheduled for 03-  Requested Prescriptions     Pending Prescriptions Disp Refills    diclofenac EC (VOLTAREN) 75 mg EC tablet [Pharmacy Med Name: DICLOFENAC SOD EC 75 MG TAB] 60 Tab 11     Sig: take 1 tablet by mouth twice a day   .

## 2019-01-24 ENCOUNTER — OFFICE VISIT (OUTPATIENT)
Dept: INTERNAL MEDICINE CLINIC | Age: 64
End: 2019-01-24

## 2019-01-24 VITALS
HEIGHT: 66 IN | WEIGHT: 206.8 LBS | DIASTOLIC BLOOD PRESSURE: 80 MMHG | HEART RATE: 73 BPM | RESPIRATION RATE: 19 BRPM | OXYGEN SATURATION: 95 % | BODY MASS INDEX: 33.23 KG/M2 | SYSTOLIC BLOOD PRESSURE: 120 MMHG

## 2019-01-24 DIAGNOSIS — H83.03 LABYRINTHITIS OF BOTH EARS: Primary | ICD-10-CM

## 2019-01-24 PROBLEM — J01.00 ACUTE NON-RECURRENT MAXILLARY SINUSITIS: Status: ACTIVE | Noted: 2019-01-24

## 2019-01-24 RX ORDER — AZITHROMYCIN 250 MG/1
250 TABLET, FILM COATED ORAL SEE ADMIN INSTRUCTIONS
Qty: 6 TAB | Refills: 0 | Status: SHIPPED | OUTPATIENT
Start: 2019-01-24 | End: 2019-01-29

## 2019-01-24 RX ORDER — MECLIZINE HCL 12.5 MG 12.5 MG/1
12.5 TABLET ORAL 3 TIMES DAILY
Qty: 21 TAB | Refills: 0 | Status: SHIPPED | OUTPATIENT
Start: 2019-01-24 | End: 2019-02-03

## 2019-01-24 NOTE — PATIENT INSTRUCTIONS
Labyrinthitis: Care Instructions Your Care Instructions Labyrinthitis (say \"lab-uh-rin-THY-tus\") is a problem deep inside your inner ear. It happens when the labyrinth gets inflamed. That's the part of your inner ear that helps control your balance. The problem may cause vertigo. Vertigo makes you feel like you're spinning or whirling. You may feel sick to your stomach or vomit. You may lose your hearing for a while. Or you may have a ringing sound in your ears. Most of the time, labyrinthitis goes away on its own. This often takes several weeks. If the problem is caused by bacteria, your doctor will give you antibiotics. But most cases are caused by a virus. A virus can't be cured with antibiotics. Your doctor may give you medicines to help control the nausea and vomiting. Follow-up care is a key part of your treatment and safety. Be sure to make and go to all appointments, and call your doctor if you are having problems. It's also a good idea to know your test results and keep a list of the medicines you take. How can you care for yourself at home? · Try bed rest and keeping your head still for the first few days you have vertigo. This may help the vertigo and reduce nausea and vomiting. · Return to your normal activities if vertigo lasts more than a few days. This may be hard, but it usually helps your brain adapt to the vertigo more quickly. As your brain adapts, vertigo will slowly go away. · Do what you can to prevent falls. For example, keep your home uncluttered, and use nonskid mats around your house and in your bath. Vertigo makes you more likely to fall. · Try balance exercises for vertigo if your doctor suggests it. An example is to stand with your feet together, arms at your sides. Hold this position for 30 seconds. · Do the Cook-Daroff exercise if your doctor suggests it. It may help your brain adapt to vertigo. ? Sit on the edge of your bed or sofa. ? Quickly lie down on one side. ? Stay in this position until the vertigo goes away or for at least 30 seconds. ? Sit up. If this causes vertigo, wait for it to stop. ? Do the exercise on the other side. ? Repeat these steps 10 times. Do the exercise 2 times a day until the vertigo is gone. · Take your medicines exactly as prescribed. Call your doctor if you think you are having a problem with your medicine. · If your doctor prescribed antibiotics, take them as directed. Do not stop taking them just because you feel better. You need to take the full course of antibiotics. When should you call for help? Call 911 anytime you think you may need emergency care. For example, call if: 
  · You passed out (lost consciousness).  
  · You have symptoms of a stroke. These may include: 
? Sudden numbness, tingling, weakness, or loss of movement in your face, arm, or leg, especially on only one side of your body. ? Sudden vision changes. ? Sudden trouble speaking. ? Sudden confusion or trouble understanding simple statements. ? Sudden problems with walking or balance. ? A sudden, severe headache that is different from past headaches.  
 Call your doctor now or seek immediate medical care if: 
  · You have new or worse dizziness.  
  · You notice changes in your hearing.  
 Watch closely for changes in your health, and be sure to contact your doctor if: 
  · You have new or worse nausea or vomiting.  
  · Your vertigo gets worse.  
  · Your vertigo has not gotten better in 2 weeks. Where can you learn more? Go to http://mikael-oskar.info/. Enter S752 in the search box to learn more about \"Labyrinthitis: Care Instructions. \" Current as of: March 27, 2018 Content Version: 11.9 © 0777-4250 [employer]. Care instructions adapted under license by [employer] (which disclaims liability or warranty for this information).  If you have questions about a medical condition or this instruction, always ask your healthcare professional. [place] [address] any warranty or liability for your use of this information.

## 2019-01-24 NOTE — PROGRESS NOTES
Subjective:  
Mohit Victor is a 58 y.o. female Chief Complaint Patient presents with  Dizziness History of present illness: She presents today complaint a lot of head and nasal congestion with some purulent drainage also she is noting some dizziness with movement this been present over the past few days. She denies any fevers or chills and denies any cough or chest congestion denies any other cardiorespiratory complaints. Patient Active Problem List  
Diagnosis Code  Right inguinal hernia K40.90  Ventral hernia without obstruction or gangrene K43.9  Chronic non-seasonal allergic rhinitis J30.89  Crohn disease ([facility] [address] K50.90  
 OAB (overactive bladder) N32.81  Varicose vein of leg V56.57  
 Umbilical hernia A21.8  Class 1 obesity due to excess calories without serious comorbidity with body mass index (BMI) of 33.0 to 33.9 in adult E66.09, Z68.33  
 Lumbar herniated disc M51.26  
 Inguinal hernia K40.90  
 Acquired hypothyroidism E03.9  Hypertension with renal disease I12.9  Mixed hyperlipidemia E78.2  
 Hormone replacement therapy (HRT) Z79.890  Gastroesophageal reflux disease without esophagitis K21.9  Primary osteoarthritis involving multiple joints M15.0  
 COPD (chronic obstructive pulmonary disease) (HCC) J44.9  Stage 3 chronic kidney disease (HCC) N18.3  Back pain M54.9  Anxiety F41.9  Anemia D64.9  Annual physical exam Z00.00  Precordial pain R07.2  Acute bronchitis J20.9  Labyrinthitis of both ears H83.03  
 Acute non-recurrent maxillary sinusitis J01.00 Past Medical History:  
Diagnosis Date  Allergic rhinitis 8/9/2017  Anemia 8/9/2017  Anxiety 8/9/2017  Arrhythmia   
 irregular heart beat hx and beating fast per patient/no cardiologist  
 Arthritis JOINTS  WORSE WAIST DOWN   
 Back pain 8/9/2017  CKD (chronic kidney disease) 8/9/2017  COPD (chronic obstructive pulmonary disease) ([facility] [address] 8/9/2017  Crohn disease ([facility] [address] 8/9/2017  DJD (degenerative joint disease) 8/9/2017  GERD (gastroesophageal reflux disease) 8/9/2017  Hormone replacement therapy (HRT) 8/9/2017  Hyperlipidemia 8/9/2017  Hypertension  Hypertension with renal disease 8/9/2017  Hypokalemia 8/9/2017  Hypothyroid 8/9/2017  Ill-defined condition   
 neuropathy feet  Inguinal hernia 8/9/2017  Leg numbness 8/9/2017  Lumbar herniated disc 8/9/2017  
 OAB (overactive bladder) 8/9/2017  Obesity 8/9/2017  On statin therapy 8/9/2017  Osteoarthritis 8/9/2017  Pelvic pain in female 8/9/2017  Umbilical hernia 5/4/2066  Varicose vein of leg 8/9/2017 Allergies Allergen Reactions  Lisinopril Cough Family History Problem Relation Age of Onset  No Known Problems Mother  No Known Problems Father  Stroke Sister Social History Socioeconomic History  Marital status:  Spouse name: Not on file  Number of children: Not on file  Years of education: Not on file  Highest education level: Not on file Social Needs  Financial resource strain: Not on file  Food insecurity - worry: Not on file  Food insecurity - inability: Not on file  Transportation needs - medical: Not on file  Transportation needs - non-medical: Not on file Occupational History  Not on file Tobacco Use  Smoking status: Never Smoker  Smokeless tobacco: Never Used Substance and Sexual Activity  Alcohol use: Yes Comment: once a year  Drug use: No  
 Sexual activity: Yes  
  Partners: Male Other Topics Concern  Not on file Social History Narrative  Not on file Prior to Admission medications Medication Sig Start Date End Date Taking? Authorizing Provider  
meclizine (ANTIVERT) 12.5 mg tablet Take 1 Tab by mouth three (3) times daily for 10 days.  1/24/19 2/3/19 Yes Brian Hernandez MD  
 azithromycin (ZITHROMAX) 250 mg tablet Take 1 Tab by mouth See Admin Instructions for 5 days. Take 2 tablets the first day, then 1 tablet daily for the next four days. 1/24/19 1/29/19 Yes Denise Dugan MD  
diclofenac EC (VOLTAREN) 75 mg EC tablet take 1 tablet by mouth twice a day 12/21/18  Yes Denise Dugan MD  
potassium chloride (K-DUR, KLOR-CON) 20 mEq tablet take 1 tablet by mouth twice a day 10/19/18  Yes Denise Dugan MD  
niacin ER (NIASPAN) 500 mg tablet TAKE 1 TABLET BY MOUTH AT BEDTIME 10/4/18  Yes Darrick Mcqueen MD  
atorvastatin (LIPITOR) 10 mg tablet Take 2 Tabs by mouth nightly. 10/1/18  Yes Darrick Mcqueen MD  
meloxicam (MOBIC) 15 mg tablet Take 1 Tab by mouth daily. 9/14/18  Yes Denise Dugan MD  
estradiol (VIVELLE) 0.05 mg/24 hr APPLY ONE PATCH TWICE WEEKLY Patient taking differently: APPLY ONE PATCH WEEKLY 7/16/18  Yes Denise Dugan MD  
OTHER Indications: Melaleuca Phytomega - Take 2 softgels twice a day. Yes Provider, Historical  
OTHER Indications: Melaleuca Vitality - Takes 1 tablet twice a day. Yes Provider, Historical  
olmesartan-hydroCHLOROthiazide (BENICAR HCT) 40-25 mg per tablet Take 1 Tab by mouth daily. Patient taking differently: No sig reported 6/15/18  Yes Denise Dugan MD  
ferrous sulfate 325 mg (65 mg iron) tablet Take  by mouth three (3) times daily (with meals). Yes Provider, Historical  
aspirin delayed-release 325 mg tablet Take 325 mg by mouth daily. At bedtime with Niacin   Yes Provider, Historical  
vitamin E (AQUA GEMS) 400 unit capsule Take 400 Units by mouth daily. 3/16/17  Yes Chiquita Jamil NP  
cholecalciferol, vitamin D3, 2,000 unit tab Take 1 Tab by mouth daily. Yes Provider, Historical  
lansoprazole (PREVACID) 15 mg capsule Take 15 mg by mouth every Monday, Wednesday, Friday. Takes as needed   Yes Provider, Historical  
ascorbic acid (VITAMIN C) 250 mg tablet Take 500 mg by mouth daily.    Yes Provider, Historical  
  
 
Review of Systems Constitutional:  She denies fever, weight loss, sweats or fatigue. EYES: No blurred or double vision, ENT: Positive head and nasal congestion, no headache with movement she has dizziness. No difficulty with               swallowing, taste, speech or smell. Respiratory:  No cough, wheezing or shortness of breath. No sputum production. Cardiac:  Denies chest pain, palpitations, unexplained indigestion, syncope, edema, PND or orthopnea. GI:  No changes in bowel movements, no abdominal pain, no bloating, anorexia, nausea, vomiting or heartburn. :  No frequency or dysuria. Denies incontinence or sexual dysfunction. Extremities:  No joint pain, stiffness or swelling Back:.no pain or soreness Skin:  No recent rashes or mole changes. Neurological:  No numbness, tingling, burning paresthesias or loss of motor strength. No syncope, dizziness, frequent headaches or memory loss. Hematologic:  No easy bruising Lymphatic: No lymph node enlargement Objective:  
 
Vitals:  
 01/24/19 1100 BP: 120/80 Pulse: 73 Resp: 19 SpO2: 95% Weight: 206 lb 12.8 oz (93.8 kg) Height: 5' 6\" (1.676 m) PainSc:   0 - No pain Body mass index is 33.38 kg/m². Physical Examination:  
           General Appearance:  Well-developed, well-nourished, no acute distress. HEENT:   
  Ears:  The TMs and ear canals were clear. Eyes:  The pupillary responses were normal.  Extraocular muscle function intact. Lids and conjunctiva not injected. Funduscopic exam revealed sharp disc margins. Nares: Inflamed and edematous Pharynx:  Clear with teeth in good repair. No masses were noted. Neck:  Supple without thyromegaly or adenopathy. No JVD noted. No carotid                bruits. Lungs:  Clear to auscultation and percussion. Cardiac:  Regular rate and rhythm without murmur. PMI not displaced.   No gallop, rub or click. Abdominal: Soft, non-tender, no hepata-spleenomegally or masses Extremities:  No clubbing, cyanosis or edema. Skin:  No rash or unusual mole changes noted. Lymph Nodes:  None felt in the cervical, supraclavicular, axillary or inguinal region. Neurological: . DTRs 2+ and symmetric. Station and gait normal.  
Hematologic:   No purpura or petechiae Assessment/Plan: 1. Labyrinthitis of both ears Impressions/Plan: 
Impression 1. Labyrinthitis we will treat this with meclizine 2. Acute sinusitis we will treat with Zithromax Recheck if not resolved. Recheck as previously scheduled for other medical problems. Orders Placed This Encounter  meclizine (ANTIVERT) 12.5 mg tablet  azithromycin (ZITHROMAX) 250 mg tablet Follow-up Disposition: 
Return At prior appt. No results found for any visits on 01/24/19. Camilla Huang MD 
 
The patient was given after the visit summary the patient verbalized an understanding of the plans and problems as explained.

## 2019-01-24 NOTE — PROGRESS NOTES
Chief Complaint Patient presents with  Dizziness 1. Have you been to the ER, urgent care clinic since your last visit? Hospitalized since your last visit? No 
 
2. Have you seen or consulted any other health care providers outside of the [address] [place] [address] since your last visit? Include any pap smears or colon screening. No 
Visit Vitals /80 (BP 1 Location: Left arm, BP Patient Position: Sitting) Pulse 73 Resp 19 Ht 5' 6\" (1.676 m) Wt 206 lb 12.8 oz (93.8 kg) SpO2 95% BMI 33.38 kg/m²

## 2019-03-07 NOTE — PROGRESS NOTES
Chief Complaint   Patient presents with    Hypertension     3 month follow up    COPD    Chronic Kidney Disease       SUBJECTIVE:    Iliana Kumar is a 58 y.o. female who returns in follow-up for medical problems include hypertension, hyperlipidemia, GERD, COPD, CKD stage III, obesity and other medical problems. She is taking her medications and trying to follow her diet and trying to get some exercise. She currently denies any chest pain, shortness of breath, palpitations, PND, orthopnea or other cardiorespiratory complaints. She denies any GI or  complaints. She denies any headaches, dizziness or neurologic complaints. No current arthritic complaints and no other complaints on complete review of systems. Current Outpatient Medications   Medication Sig Dispense Refill    diclofenac EC (VOLTAREN) 75 mg EC tablet take 1 tablet by mouth twice a day 60 Tab 11    potassium chloride (K-DUR, KLOR-CON) 20 mEq tablet take 1 tablet by mouth twice a day 180 Tab 3    niacin ER (NIASPAN) 500 mg tablet TAKE 1 TABLET BY MOUTH AT BEDTIME 90 Tab 1    atorvastatin (LIPITOR) 10 mg tablet Take 2 Tabs by mouth nightly. 90 Tab 3    meloxicam (MOBIC) 15 mg tablet Take 1 Tab by mouth daily. 30 Tab prn    estradiol (VIVELLE) 0.05 mg/24 hr APPLY ONE PATCH TWICE WEEKLY (Patient taking differently: APPLY ONE PATCH WEEKLY) 24 Patch 3    OTHER Indications: Melaleuca Phytomega - Take 2 softgels twice a day.  OTHER Indications: Melaleuca Vitality - Takes 1 tablet twice a day.  olmesartan-hydroCHLOROthiazide (BENICAR HCT) 40-25 mg per tablet Take 1 Tab by mouth daily. (Patient taking differently: No sig reported) 90 Tab 3    ferrous sulfate 325 mg (65 mg iron) tablet Take  by mouth three (3) times daily (with meals).  aspirin delayed-release 325 mg tablet Take 325 mg by mouth daily. At bedtime with Niacin      vitamin E (AQUA GEMS) 400 unit capsule Take 400 Units by mouth daily.       cholecalciferol, vitamin D3, 2,000 unit tab Take 1 Tab by mouth daily.  lansoprazole (PREVACID) 15 mg capsule Take 15 mg by mouth every Monday, Wednesday, Friday. Takes as needed      ascorbic acid (VITAMIN C) 250 mg tablet Take 500 mg by mouth daily.        Past Medical History:   Diagnosis Date    Allergic rhinitis 8/9/2017    Anemia 8/9/2017    Anxiety 8/9/2017    Arrhythmia     irregular heart beat hx and beating fast per patient/no cardiologist    Arthritis     JOINTS  WORSE WAIST DOWN     Back pain 8/9/2017    CKD (chronic kidney disease) 8/9/2017    COPD (chronic obstructive pulmonary disease) ([facility] [address] 8/9/2017    Crohn disease ([facility] [address] 8/9/2017    DJD (degenerative joint disease) 8/9/2017    GERD (gastroesophageal reflux disease) 8/9/2017    Hormone replacement therapy (HRT) 8/9/2017    Hyperlipidemia 8/9/2017    Hypertension     Hypertension with renal disease 8/9/2017    Hypokalemia 8/9/2017    Hypothyroid 8/9/2017    Ill-defined condition     neuropathy feet    Inguinal hernia 8/9/2017    Leg numbness 8/9/2017    Lumbar herniated disc 8/9/2017    OAB (overactive bladder) 8/9/2017    Obesity 8/9/2017    On statin therapy 8/9/2017    Osteoarthritis 8/9/2017    Pelvic pain in female 0/2/1744    Umbilical hernia 8/2/1398    Varicose vein of leg 8/9/2017     Past Surgical History:   Procedure Laterality Date    BREAST SURGERY PROCEDURE UNLISTED      mass removed left breast x2 benign    HX COLONOSCOPY  12/2010    normal    HX COLONOSCOPY  12/30/2014    normal     HX HEENT      cyst removed left eye    HX HERNIA REPAIR  06/02/2017    right inguinal and umbilical (Dr. Margaret Zabala)    HX HYSTERECTOMY      HX ORTHOPAEDIC      bilat knee injections    HX ORTHOPAEDIC      laser treatment left knee and foot    HX ORTHOPAEDIC      hx of gel shots bilat knee    HX ORTHOPAEDIC  12/27/2016    left knee coritizon shot    HX OTHER SURGICAL      cyst removed front left scalp    HX OTHER SURGICAL colonoscopy    HX OTHER SURGICAL      tooth implant    HX TUBAL LIGATION      VASCULAR SURGERY PROCEDURE UNLIST      vein striping     Allergies   Allergen Reactions    Lisinopril Cough       REVIEW OF SYSTEMS:  General: negative for - chills or fever, or weight loss or gain  ENT: negative for - headaches, nasal congestion or tinnitus  Eyes: no blurred or visual changes  Neck: No stiffness or swollen nodes  Respiratory: negative for - cough, hemoptysis, shortness of breath or wheezing  Cardiovascular : negative for - chest pain, edema, palpitations or shortness of breath  Gastrointestinal: negative for - abdominal pain, blood in stools, heartburn or nausea/vomiting  Genito-Urinary: no dysuria, trouble voiding, or hematuria  Musculoskeletal: negative for - gait disturbance, joint pain, joint stiffness or joint swelling  Neurological: no TIA or stroke symptoms  Hematologic: no bruises, no bleeding  Lymphatic: no swollen glands  Integument: no lumps, mole changes, nail changes or rash  Endocrine:no malaise/lethargy poly uria or polydipsia or unexpected weight changes        Social History     Socioeconomic History    Marital status:      Spouse name: Not on file    Number of children: Not on file    Years of education: Not on file    Highest education level: Not on file   Tobacco Use    Smoking status: Never Smoker    Smokeless tobacco: Never Used   Substance and Sexual Activity    Alcohol use: Yes     Comment: once a year    Drug use: No    Sexual activity: Yes     Partners: Male     Family History   Problem Relation Age of Onset    No Known Problems Mother     No Known Problems Father     Stroke Sister        OBJECTIVE:     Visit Vitals  /82 (BP 1 Location: Left arm, BP Patient Position: Sitting)   Pulse 65   Temp 97.4 °F (36.3 °C) (Oral)   Resp 19   Ht 5' 6\" (1.676 m)   Wt 210 lb 6.4 oz (95.4 kg)   SpO2 95%   BMI 33.96 kg/m²     CONSTITUTIONAL:   well nourished, appears age appropriate  EYES: sclera anicteric, PERRL, EOMI  ENMT:nares clear, moist mucous membranes, pharynx clear  NECK: supple. Thyroid normal, No JVD or bruits  RESPIRATORY: Chest: clear to ascultation and percussion, normal inspiratory effort  CARDIOVASCULAR: Heart: regular rate and rhythm no murmurs, rubs or gallops, PMI not displaced, No thrills  GASTROINTESTINAL: Abdomen: non distended, soft, non tender, bowel sounds normal  HEMATOLOGIC: no purpura, petechiae or bruising  LYMPHATIC: No lymph node enlargemant  MUSCULOSKELETAL: Extremities: no edema or active synovitis, pulse 1+   INTEGUMENT: No unusual rashes or suspicious skin lesions noted. Nails appear normal.  PERIPHERAL VASCULAR: normal pulses femoral, PT and DP  NEUROLOGIC: non-focal exam, A & O X 3  PSYCHIATRIC:, appropriate affect     ASSESSMENT:   1. Hypertension with renal disease    2. Mixed hyperlipidemia    3. Primary osteoarthritis involving multiple joints    4. Stage 3 chronic kidney disease ([address]    5. Gastroesophageal reflux disease without esophagitis    6. Chronic obstructive pulmonary disease, unspecified COPD type (HCC)    7. Class 1 obesity due to excess calories without serious comorbidity with body mass index (BMI) of 33.0 to 33.9 in adult    8. Crohn's disease without complication, unspecified gastrointestinal tract location [facility])      Impression  1. Hypertension that is controlled so continue current therapy reviewed with her. 2.  Hyperlipidemia prior lab reviewed and repeat status pending I will adjust if needed. 3   DJD that is stable  4. CKD stage III repeat status pending   5   GERD that is stable  6. COPD that is currently stable  7. Obesity we did discuss diet, exercise and weight reduction for overall health benefit. Weight Crohn's disease that is stable  I will call with lab results and make further recommendations or adjustments if necessary.   Follow-up as scheduled for 3 months or sooner if there is a problem. PLAN:  .  Orders Placed This Encounter    METABOLIC PANEL, COMPREHENSIVE ([facility])    LIPID PANEL ([facility])    CK ([facility])         ATTENTION:   This medical record was transcribed using an electronic medical records system. Although proofread, it may and can contain electronic and spelling errors. Other human spelling and other errors may be present. Corrections may be executed at a later time. Please feel free to contact us for any clarifications as needed. Follow-up Disposition:  Return in about 3 months (around 6/8/2019). No results found for any visits on 03/08/19. Mar Urena MD    The patient verbalized understanding of the problems and plans as explained.

## 2019-03-08 ENCOUNTER — OFFICE VISIT (OUTPATIENT)
Dept: INTERNAL MEDICINE CLINIC | Age: 64
End: 2019-03-08

## 2019-03-08 VITALS
DIASTOLIC BLOOD PRESSURE: 82 MMHG | HEART RATE: 65 BPM | TEMPERATURE: 97.4 F | WEIGHT: 210.4 LBS | RESPIRATION RATE: 19 BRPM | HEIGHT: 66 IN | SYSTOLIC BLOOD PRESSURE: 122 MMHG | OXYGEN SATURATION: 95 % | BODY MASS INDEX: 33.82 KG/M2

## 2019-03-08 DIAGNOSIS — J44.9 CHRONIC OBSTRUCTIVE PULMONARY DISEASE, UNSPECIFIED COPD TYPE (HCC): ICD-10-CM

## 2019-03-08 DIAGNOSIS — E66.09 CLASS 1 OBESITY DUE TO EXCESS CALORIES WITHOUT SERIOUS COMORBIDITY WITH BODY MASS INDEX (BMI) OF 33.0 TO 33.9 IN ADULT: ICD-10-CM

## 2019-03-08 DIAGNOSIS — K21.9 GASTROESOPHAGEAL REFLUX DISEASE WITHOUT ESOPHAGITIS: ICD-10-CM

## 2019-03-08 DIAGNOSIS — I12.9 HYPERTENSION WITH RENAL DISEASE: Primary | ICD-10-CM

## 2019-03-08 DIAGNOSIS — N18.30 STAGE 3 CHRONIC KIDNEY DISEASE (HCC): ICD-10-CM

## 2019-03-08 DIAGNOSIS — K50.90 CROHN'S DISEASE WITHOUT COMPLICATION, UNSPECIFIED GASTROINTESTINAL TRACT LOCATION (HCC): ICD-10-CM

## 2019-03-08 DIAGNOSIS — E78.2 MIXED HYPERLIPIDEMIA: ICD-10-CM

## 2019-03-08 DIAGNOSIS — M15.9 PRIMARY OSTEOARTHRITIS INVOLVING MULTIPLE JOINTS: ICD-10-CM

## 2019-03-08 LAB
A-G RATIO,AGRAT: 1.5 RATIO
ALBUMIN SERPL-MCNC: 4.5 G/DL (ref 3.9–5.4)
ALP SERPL-CCNC: 111 U/L (ref 38–126)
ALT SERPL-CCNC: 55 U/L (ref 9–52)
ANION GAP SERPL CALC-SCNC: 10 MMOL/L
AST SERPL W P-5'-P-CCNC: 28 U/L (ref 14–36)
BILIRUB SERPL-MCNC: 0.5 MG/DL (ref 0.2–1.3)
BUN SERPL-MCNC: 23 MG/DL (ref 7–17)
BUN/CREATININE RATIO,BUCR: 29 RATIO
CALCIUM SERPL-MCNC: 10.8 MG/DL (ref 8.4–10.2)
CHLORIDE SERPL-SCNC: 102 MMOL/L (ref 98–107)
CHOL/HDL RATIO,CHHD: 3 RATIO (ref 0–4)
CHOLEST SERPL-MCNC: 155 MG/DL (ref 0–200)
CK SERPL-CCNC: 66 U/L (ref 30–135)
CO2 SERPL-SCNC: 29 MMOL/L (ref 22–32)
CREAT SERPL-MCNC: 0.8 MG/DL (ref 0.7–1.2)
GLOBULIN,GLOB: 3
GLUCOSE SERPL-MCNC: 87 MG/DL (ref 65–105)
HDLC SERPL-MCNC: 50 MG/DL (ref 35–130)
LDL/HDL RATIO,LDHD: 2 RATIO
LDLC SERPL CALC-MCNC: 87 MG/DL (ref 0–130)
POTASSIUM SERPL-SCNC: 4.7 MMOL/L (ref 3.6–5)
PROT SERPL-MCNC: 7.5 G/DL (ref 6.3–8.2)
SODIUM SERPL-SCNC: 141 MMOL/L (ref 137–145)
TRIGL SERPL-MCNC: 92 MG/DL (ref 0–200)
VLDLC SERPL CALC-MCNC: 18 MG/DL

## 2019-03-08 NOTE — PATIENT INSTRUCTIONS
Anemia: Care Instructions  Your Care Instructions    Anemia is a low level of red blood cells, which carry oxygen throughout your body. Many things can cause anemia. Lack of iron is one of the most common causes. Your body needs iron to make hemoglobin, a substance in red blood cells that carries oxygen from the lungs to your body's cells. Without enough iron, the body produces fewer and smaller red blood cells. As a result, your body's cells do not get enough oxygen, and you feel tired and weak. And you may have trouble concentrating. Bleeding is the most common cause of a lack of iron. You may have heavy menstrual bleeding or bleeding caused by conditions such as ulcers, hemorrhoids, or cancer. Regular use of aspirin or other anti-inflammatory medicines (such as ibuprofen) also can cause bleeding in some people. A lack of iron in your diet also can cause anemia, especially at times when the body needs more iron, such as during pregnancy, infancy, and the teen years. Your doctor may have prescribed iron pills. It may take several months of treatment for your iron levels to return to normal. Your doctor also may suggest that you eat foods that are rich in iron, such as meat and beans. There are many other causes of anemia. It is not always due to a lack of iron. Finding the specific cause of your anemia will help your doctor find the right treatment for you. Follow-up care is a key part of your treatment and safety. Be sure to make and go to all appointments, and call your doctor if you are having problems. It's also a good idea to know your test results and keep a list of the medicines you take. How can you care for yourself at home? · Take your medicines exactly as prescribed. Call your doctor if you think you are having a problem with your medicine. · If your doctor recommends iron pills, take them as directed:  ? Try to take the pills on an empty stomach about 1 hour before or 2 hours after meals. But you may need to take iron with food to avoid an upset stomach. ? Do not take antacids or drink milk or caffeine drinks (such as coffee, tea, or cola) at the same time or within 2 hours of the time that you take your iron. They can make it hard for your body to absorb the iron. ? Vitamin C (from food or supplements) helps your body absorb iron. Try taking iron pills with a glass of orange juice or some other food that is high in vitamin C, such as citrus fruits. ? Iron pills may cause stomach problems, such as heartburn, nausea, diarrhea, constipation, and cramps. Be sure to drink plenty of fluids, and include fruits, vegetables, and fiber in your diet each day. Iron pills often make your bowel movements dark or green. ? If you forget to take an iron pill, do not take a double dose of iron the next time you take a pill. ? Keep iron pills out of the reach of small children. An overdose of iron can be very dangerous. · Follow your doctor's advice about eating iron-rich foods. These include red meat, shellfish, poultry, eggs, beans, raisins, whole-grain bread, and leafy green vegetables. · Steam vegetables to help them keep their iron content. When should you call for help? Call 911 anytime you think you may need emergency care. For example, call if:    · You have symptoms of a heart attack. These may include:  ? Chest pain or pressure, or a strange feeling in the chest.  ? Sweating. ? Shortness of breath. ? Nausea or vomiting. ? Pain, pressure, or a strange feeling in the back, neck, jaw, or upper belly or in one or both shoulders or arms. ? Lightheadedness or sudden weakness. ? A fast or irregular heartbeat. After you call 911, the  may tell you to chew 1 adult-strength or 2 to 4 low-dose aspirin. Wait for an ambulance.  Do not try to drive yourself.     · You passed out (lost consciousness).    Call your doctor now or seek immediate medical care if:    · You have new or increased shortness of breath.     · You are dizzy or lightheaded, or you feel like you may faint.     · Your fatigue and weakness continue or get worse.     · You have any abnormal bleeding, such as:  ? Nosebleeds. ? Vaginal bleeding that is different (heavier, more frequent, at a different time of the month) than what you are used to.  ? Bloody or black stools, or rectal bleeding. ? Bloody or pink urine.    Watch closely for changes in your health, and be sure to contact your doctor if:    · You do not get better as expected. Where can you learn more? Go to http://mikael-oskar.info/. Enter R301 in the search box to learn more about \"Anemia: Care Instructions. \"  Current as of: May 6, 2018  Content Version: 11.9  © 0784-4401 [employer]. Care instructions adapted under license by [employer] (which disclaims liability or warranty for this information). If you have questions about a medical condition or this instruction, always ask your healthcare professional. [address] any warranty or liability for your use of this information.

## 2019-03-08 NOTE — PROGRESS NOTES
Tania Malave  Identified pt with two pt identifiers(name and ). Chief Complaint   Patient presents with    Hypertension     3 month follow up    COPD    Chronic Kidney Disease       1. Have you been to the ER, urgent care clinic since your last visit? Hospitalized since your last visit? No    2. Have you seen or consulted any other health care providers outside of the [address] [place] [address] since your last visit? Include any pap smears or colon screening. No      Health Maintenance Topics with due status: Overdue       Topic Date Due    DTaP/Tdap/Td series 1975    Shingrix Vaccine Age 50> 2004    BREAST CANCER SCRN MAMMOGRAM 2018     Health Maintenance Topics with due status: Not Due       Topic Last Completion Date    COLONOSCOPY 2014    PAP AKA CERVICAL CYTOLOGY 2017    Bone Densitometry 2018     Health Maintenance Topics with due status: Completed       Topic Last Completion Date    Pneumococcal 19-64 Medium Risk 2018    Bone Densitometry (Dexa) Screening 2018    Influenza Age 9 to Adult 2019     Health Maintenance Topics with due status: Addressed       Topic Date Due    Hepatitis C Screening Addressed           Medication reconciliation up to date and corrected with patient at this time. Today's provider has been notified of reason for visit, vitals and flowsheets obtained on patients. Reviewed record in preparation for visit, huddled with provider and have obtained necessary documentation.         Wt Readings from Last 3 Encounters:   19 210 lb 6.4 oz (95.4 kg)   19 206 lb 12.8 oz (93.8 kg)   18 208 lb 6.4 oz (94.5 kg)     Temp Readings from Last 3 Encounters:   19 97.4 °F (36.3 °C) (Oral)   18 98.3 °F (36.8 °C) (Oral)   06/15/18 97.6 °F (36.4 °C) (Oral)     BP Readings from Last 3 Encounters:   19 122/82   19 120/80   18 123/84     Pulse Readings from Last 3 Encounters:   19 65 01/24/19 73   12/14/18 60     Vitals:    03/08/19 0950   BP: 122/82   Pulse: 65   Resp: 19   Temp: 97.4 °F (36.3 °C)   TempSrc: Oral   SpO2: 95%   Weight: 210 lb 6.4 oz (95.4 kg)   Height: 5' 6\" (1.676 m)   PainSc:   0 - No pain         Learning Assessment:  :     Learning Assessment 8/21/2017 5/23/2017   PRIMARY LEARNER Patient Patient   HIGHEST LEVEL OF EDUCATION - PRIMARY LEARNER  GRADUATED HIGH SCHOOL OR GED -   PRIMARY LANGUAGE ENGLISH ENGLISH   LEARNER PREFERENCE PRIMARY LISTENING LISTENING   ANSWERED BY patient patient   RELATIONSHIP SELF SELF       Depression Screening:  :     3 most recent PHQ Screens 1/24/2019   Little interest or pleasure in doing things Not at all   Feeling down, depressed, irritable, or hopeless Not at all   Total Score PHQ 2 0       No flowsheet data found. Fall Risk Assessment:  :     No flowsheet data found. Abuse Screening:  :     Abuse Screening Questionnaire 3/8/2019 8/21/2017   Do you ever feel afraid of your partner? N N   Are you in a relationship with someone who physically or mentally threatens you? N N   Is it safe for you to go home? Y Y       ADL Screening:  :     No flowsheet data found.

## 2019-04-24 RX ORDER — NIACIN 500 MG/1
TABLET, EXTENDED RELEASE ORAL
Qty: 90 TAB | Refills: 3 | Status: SHIPPED | OUTPATIENT
Start: 2019-04-24 | End: 2019-07-29 | Stop reason: SDUPTHER

## 2019-04-24 NOTE — TELEPHONE ENCOUNTER
RX refill request from the patient/pharmacy. Patient last seen 03- with labs, and next appt. scheduled for 06-  Requested Prescriptions     Pending Prescriptions Disp Refills    niacin ER (NIASPAN) 500 mg tablet 90 Tab 3     Sig: TAKE 1 TABLET BY MOUTH AT BEDTIME   .

## 2019-06-12 ENCOUNTER — OFFICE VISIT (OUTPATIENT)
Dept: INTERNAL MEDICINE CLINIC | Age: 64
End: 2019-06-12

## 2019-06-12 VITALS
SYSTOLIC BLOOD PRESSURE: 110 MMHG | TEMPERATURE: 97.6 F | BODY MASS INDEX: 33.4 KG/M2 | WEIGHT: 207.8 LBS | RESPIRATION RATE: 18 BRPM | HEIGHT: 66 IN | DIASTOLIC BLOOD PRESSURE: 82 MMHG | HEART RATE: 80 BPM | OXYGEN SATURATION: 97 %

## 2019-06-12 DIAGNOSIS — N18.30 STAGE 3 CHRONIC KIDNEY DISEASE (HCC): ICD-10-CM

## 2019-06-12 DIAGNOSIS — E66.09 CLASS 1 OBESITY DUE TO EXCESS CALORIES WITHOUT SERIOUS COMORBIDITY WITH BODY MASS INDEX (BMI) OF 33.0 TO 33.9 IN ADULT: ICD-10-CM

## 2019-06-12 DIAGNOSIS — I12.9 HYPERTENSION WITH RENAL DISEASE: Primary | ICD-10-CM

## 2019-06-12 DIAGNOSIS — E78.2 MIXED HYPERLIPIDEMIA: ICD-10-CM

## 2019-06-12 DIAGNOSIS — K21.9 GASTROESOPHAGEAL REFLUX DISEASE WITHOUT ESOPHAGITIS: ICD-10-CM

## 2019-06-12 DIAGNOSIS — M15.9 PRIMARY OSTEOARTHRITIS INVOLVING MULTIPLE JOINTS: ICD-10-CM

## 2019-06-12 DIAGNOSIS — J44.9 CHRONIC OBSTRUCTIVE PULMONARY DISEASE, UNSPECIFIED COPD TYPE (HCC): ICD-10-CM

## 2019-06-12 DIAGNOSIS — B36.9 FUNGAL DERMATITIS: ICD-10-CM

## 2019-06-12 LAB
A-G RATIO,AGRAT: 1.7 RATIO
ALBUMIN SERPL-MCNC: 4.7 G/DL (ref 3.9–5.4)
ALP SERPL-CCNC: 117 U/L (ref 38–126)
ALT SERPL-CCNC: 37 U/L (ref 9–52)
ANION GAP SERPL CALC-SCNC: 8 MMOL/L
AST SERPL W P-5'-P-CCNC: 25 U/L (ref 14–36)
BILIRUB SERPL-MCNC: 0.6 MG/DL (ref 0.2–1.3)
BUN SERPL-MCNC: 21 MG/DL (ref 7–17)
BUN/CREATININE RATIO,BUCR: 23 RATIO
CALCIUM SERPL-MCNC: 10.1 MG/DL (ref 8.4–10.2)
CHLORIDE SERPL-SCNC: 102 MMOL/L (ref 98–107)
CHOL/HDL RATIO,CHHD: 3 RATIO (ref 0–4)
CHOLEST SERPL-MCNC: 157 MG/DL (ref 0–200)
CK SERPL-CCNC: 69 U/L (ref 30–135)
CO2 SERPL-SCNC: 30 MMOL/L (ref 22–32)
CREAT SERPL-MCNC: 0.9 MG/DL (ref 0.7–1.2)
GLOBULIN,GLOB: 2.7
GLUCOSE SERPL-MCNC: 94 MG/DL (ref 65–105)
HDLC SERPL-MCNC: 46 MG/DL (ref 35–130)
LDL/HDL RATIO,LDHD: 2 RATIO
LDLC SERPL CALC-MCNC: 93 MG/DL (ref 0–130)
POTASSIUM SERPL-SCNC: 4.4 MMOL/L (ref 3.6–5)
PROT SERPL-MCNC: 7.4 G/DL (ref 6.3–8.2)
SODIUM SERPL-SCNC: 140 MMOL/L (ref 137–145)
TRIGL SERPL-MCNC: 89 MG/DL (ref 0–200)
VLDLC SERPL CALC-MCNC: 18 MG/DL

## 2019-06-12 RX ORDER — FLUCONAZOLE 100 MG/1
100 TABLET ORAL DAILY
Qty: 10 TAB | Refills: 0 | Status: SHIPPED | OUTPATIENT
Start: 2019-06-12 | End: 2019-06-19

## 2019-06-12 NOTE — PROGRESS NOTES
Deanna Somers  Identified pt with two pt identifiers(name and ). Chief Complaint   Patient presents with    Hypertension     3 month follow up     COPD       1. Have you been to the ER, urgent care clinic since your last visit? Hospitalized since your last visit? No    2. Have you seen or consulted any other health care providers outside of the [address] [place] [address] since your last visit? Include any pap smears or colon screening. No    Health Maintenance Topics with due status: Overdue       Topic Date Due    DTaP/Tdap/Td series 1975    Shingrix Vaccine Age 50> 2004    BREAST CANCER SCRN MAMMOGRAM 2018     Health Maintenance Topics with due status: Not Due       Topic Last Completion Date    COLONOSCOPY 2014    PAP AKA CERVICAL CYTOLOGY 2017    Bone Densitometry 2018    Influenza Age 9 to Adult 2019     Health Maintenance Topics with due status: Completed       Topic Last Completion Date    Pneumococcal 0-64 years 2018    Bone Densitometry (Dexa) Screening 2018     Health Maintenance Topics with due status: Addressed       Topic Date Due    Hepatitis C Screening Addressed           Medication reconciliation up to date and corrected with patient at this time. Today's provider has been notified of reason for visit, vitals and flowsheets obtained on patients. Reviewed record in preparation for visit, huddled with provider and have obtained necessary documentation.         Wt Readings from Last 3 Encounters:   19 207 lb 12.8 oz (94.3 kg)   19 210 lb 6.4 oz (95.4 kg)   19 206 lb 12.8 oz (93.8 kg)     Temp Readings from Last 3 Encounters:   19 97.6 °F (36.4 °C) (Oral)   19 97.4 °F (36.3 °C) (Oral)   18 98.3 °F (36.8 °C) (Oral)     BP Readings from Last 3 Encounters:   19 110/82   19 122/82   19 120/80     Pulse Readings from Last 3 Encounters:   19 80   19 65   19 73 Vitals:    06/12/19 1046   BP: 110/82   Pulse: 80   Resp: 18   Temp: 97.6 °F (36.4 °C)   TempSrc: Oral   SpO2: 97%   Weight: 207 lb 12.8 oz (94.3 kg)   Height: 5' 6\" (1.676 m)   PainSc:   0 - No pain         Learning Assessment:  :     Learning Assessment 8/21/2017 5/23/2017   PRIMARY LEARNER Patient Patient   HIGHEST LEVEL OF EDUCATION - PRIMARY LEARNER  GRADUATED HIGH SCHOOL OR GED -   PRIMARY LANGUAGE ENGLISH ENGLISH   LEARNER PREFERENCE PRIMARY LISTENING LISTENING   ANSWERED BY patient patient   RELATIONSHIP SELF SELF       Depression Screening:  :     3 most recent PHQ Screens 3/8/2019   Little interest or pleasure in doing things Not at all   Feeling down, depressed, irritable, or hopeless Not at all   Total Score PHQ 2 0       No flowsheet data found. Fall Risk Assessment:  :     No flowsheet data found. Abuse Screening:  :     Abuse Screening Questionnaire 3/8/2019 8/21/2017   Do you ever feel afraid of your partner? N N   Are you in a relationship with someone who physically or mentally threatens you? N N   Is it safe for you to go home? Y Y       ADL Screening:  :     No flowsheet data found.

## 2019-06-12 NOTE — PROGRESS NOTES
Chief Complaint   Patient presents with    Hypertension     3 month follow up     COPD       SUBJECTIVE:    Sarah Hutchison is a 58 y.o. female who returns in follow-up for medical problems to include hypertension, hyperlipidemia, GERD, COPD, DJD, CKD stage III, obesity and other medical problems. She is developed a rash under the breast that is pruritic in nature not seeming to resolve. She also notes a little bit of a irritation and chapping of the lips. She denies any other dermatologic problems. She denies any chest pain, shortness of breath, palpitations, PND, orthopnea or other cardiorespiratory complaints. She notes no GI or  complaints. She denies any headaches, dizziness or neurologic complaints. There are no current arthritic complaints and she has no other complaints on complete review of systems. Current Outpatient Medications   Medication Sig Dispense Refill    fluconazole (DIFLUCAN) 100 mg tablet Take 1 Tab by mouth daily for 7 days. FDA advises cautious prescribing of oral fluconazole in pregnancy. 10 Tab 0    niacin ER (NIASPAN) 500 mg tablet TAKE 1 TABLET BY MOUTH AT BEDTIME 90 Tab 3    diclofenac EC (VOLTAREN) 75 mg EC tablet take 1 tablet by mouth twice a day 60 Tab 11    potassium chloride (K-DUR, KLOR-CON) 20 mEq tablet take 1 tablet by mouth twice a day 180 Tab 3    atorvastatin (LIPITOR) 10 mg tablet Take 2 Tabs by mouth nightly. 90 Tab 3    meloxicam (MOBIC) 15 mg tablet Take 1 Tab by mouth daily. 30 Tab prn    estradiol (VIVELLE) 0.05 mg/24 hr APPLY ONE PATCH TWICE WEEKLY (Patient taking differently: APPLY ONE PATCH WEEKLY) 24 Patch 3    OTHER Indications: Melaleuca Phytomega - Take 2 softgels twice a day.  OTHER Indications: Melaleuca Vitality - Takes 1 tablet twice a day.  olmesartan-hydroCHLOROthiazide (BENICAR HCT) 40-25 mg per tablet Take 1 Tab by mouth daily.  (Patient taking differently: No sig reported) 90 Tab 3    ferrous sulfate 325 mg (65 mg iron) tablet Take  by mouth three (3) times daily (with meals).  aspirin delayed-release 325 mg tablet Take 325 mg by mouth daily. At bedtime with Niacin      vitamin E (AQUA GEMS) 400 unit capsule Take 400 Units by mouth daily.  cholecalciferol, vitamin D3, 2,000 unit tab Take 1 Tab by mouth daily.  lansoprazole (PREVACID) 15 mg capsule Take 15 mg by mouth every Monday, Wednesday, Friday. Takes as needed      ascorbic acid (VITAMIN C) 250 mg tablet Take 500 mg by mouth daily.        Past Medical History:   Diagnosis Date    Allergic rhinitis 8/9/2017    Anemia 8/9/2017    Anxiety 8/9/2017    Arrhythmia     irregular heart beat hx and beating fast per patient/no cardiologist    Arthritis     JOINTS  WORSE WAIST DOWN     Back pain 8/9/2017    CKD (chronic kidney disease) 8/9/2017    COPD (chronic obstructive pulmonary disease) ([facility] 8/9/2017    Crohn disease ([facility] 8/9/2017    DJD (degenerative joint disease) 8/9/2017    GERD (gastroesophageal reflux disease) 8/9/2017    Hormone replacement therapy (HRT) 8/9/2017    Hyperlipidemia 8/9/2017    Hypertension     Hypertension with renal disease 8/9/2017    Hypokalemia 8/9/2017    Hypothyroid 8/9/2017    Ill-defined condition     neuropathy feet    Inguinal hernia 8/9/2017    Leg numbness 8/9/2017    Lumbar herniated disc 8/9/2017    OAB (overactive bladder) 8/9/2017    Obesity 8/9/2017    On statin therapy 8/9/2017    Osteoarthritis 8/9/2017    Pelvic pain in female 9/9/5102    Umbilical hernia 9/6/6506    Varicose vein of leg 8/9/2017     Past Surgical History:   Procedure Laterality Date    BREAST SURGERY PROCEDURE UNLISTED      mass removed left breast x2 benign    HX COLONOSCOPY  12/2010    normal    HX COLONOSCOPY  12/30/2014    normal     HX HEENT      cyst removed left eye    HX HERNIA REPAIR  06/02/2017    right inguinal and umbilical (Dr. Analilia Caballero)    HX HYSTERECTOMY      HX ORTHOPAEDIC      bilat knee injections    HX ORTHOPAEDIC      laser treatment left knee and foot    HX ORTHOPAEDIC      hx of gel shots bilat knee    HX ORTHOPAEDIC  12/27/2016    left knee coritizon shot    HX OTHER SURGICAL      cyst removed front left scalp    HX OTHER SURGICAL      colonoscopy    HX OTHER SURGICAL      tooth implant    HX TUBAL LIGATION      VASCULAR SURGERY PROCEDURE UNLIST      vein striping     Allergies   Allergen Reactions    Lisinopril Cough       REVIEW OF SYSTEMS:  General: negative for - chills or fever, or weight loss or gain  ENT: negative for - headaches, nasal congestion or tinnitus  Eyes: no blurred or visual changes  Neck: No stiffness or swollen nodes  Respiratory: negative for - cough, hemoptysis, shortness of breath or wheezing  Cardiovascular : negative for - chest pain, edema, palpitations or shortness of breath  Gastrointestinal: negative for - abdominal pain, blood in stools, heartburn or nausea/vomiting  Genito-Urinary: no dysuria, trouble voiding, or hematuria  Musculoskeletal: negative for - gait disturbance, joint pain, joint stiffness or joint swelling  Neurological: no TIA or stroke symptoms  Hematologic: no bruises, no bleeding  Lymphatic: no swollen glands  Integument: no lumps, mole changes, nail changes or rash exam rash under her breast and chapping of the lips  Endocrine:no malaise/lethargy poly uria or polydipsia or unexpected weight changes        Social History     Socioeconomic History    Marital status:      Spouse name: Not on file    Number of children: Not on file    Years of education: Not on file    Highest education level: Not on file   Tobacco Use    Smoking status: Never Smoker    Smokeless tobacco: Never Used   Substance and Sexual Activity    Alcohol use: Yes     Comment: once a year    Drug use: No    Sexual activity: Yes     Partners: Male     Family History   Problem Relation Age of Onset    No Known Problems Mother     No Known Problems Father  Stroke Sister        OBJECTIVE:     Visit Vitals  /82 (BP 1 Location: Left arm, BP Patient Position: Sitting)   Pulse 80   Temp 97.6 °F (36.4 °C) (Oral)   Resp 18   Ht 5' 6\" (1.676 m)   Wt 207 lb 12.8 oz (94.3 kg)   SpO2 97%   BMI 33.54 kg/m²     CONSTITUTIONAL:   well nourished, appears age appropriate  EYES: sclera anicteric, PERRL, EOMI  ENMT:nares clear, moist mucous membranes, pharynx clear  NECK: supple. Thyroid normal, No JVD or bruits  RESPIRATORY: Chest: clear to ascultation and percussion, normal inspiratory effort  CARDIOVASCULAR: Heart: regular rate and rhythm no murmurs, rubs or gallops, PMI not displaced, No thrills  GASTROINTESTINAL: Abdomen: non distended, soft, non tender, bowel sounds normal  HEMATOLOGIC: no purpura, petechiae or bruising  LYMPHATIC: No lymph node enlargemant  MUSCULOSKELETAL: Extremities: no edema or active synovitis, pulse 1+   INTEGUMENT: No unusual rashes or suspicious skin lesions noted. Nails appear normal.  Rash on the breast consistent with fungal dermatitis  PERIPHERAL VASCULAR: normal pulses femoral, PT and DP  NEUROLOGIC: non-focal exam, A & O X 3  PSYCHIATRIC:, appropriate affect     ASSESSMENT:   1. Hypertension with renal disease    2. Mixed hyperlipidemia    3. Gastroesophageal reflux disease without esophagitis    4. Primary osteoarthritis involving multiple joints    5. Chronic obstructive pulmonary disease, unspecified COPD type ([address]    6. Stage 3 chronic kidney disease (HCC)    7. Class 1 obesity due to excess calories without serious comorbidity with body mass index (BMI) of 33.0 to 33.9 in adult    8. Fungal dermatitis      Impression  1. Hypertension that is controlled so continue current therapy reviewed with her. 2.  Hyperlipidemia prior lab reviewed and repeat status pending I will adjust if needed. 3.  GERD that is stable  4. DJD that is stable  5. COPD currently stable  6. CKD stage III repeat status pending  7.   Obesity we did discuss diet, exercise and weight reduction for overall health benefit. 8.  Fungal dermatitis we will treat this with Diflucan 100 daily for 10 days and recheck if not resolved. We will see if this helps the lips as well although I do not see obvious thrush on exam.  I will call with lab results and make further recommendations or adjustments if necessary. Follow-up scheduled for 3 months or sooner should to be a problem. PLAN:  .  Orders Placed This Encounter    METABOLIC PANEL, COMPREHENSIVE ([facility])    LIPID PANEL ([facility])    CK ([facility])    fluconazole (DIFLUCAN) 100 mg tablet         ATTENTION:   This medical record was transcribed using an electronic medical records system. Although proofread, it may and can contain electronic and spelling errors. Other human spelling and other errors may be present. Corrections may be executed at a later time. Please feel free to contact us for any clarifications as needed. Follow-up and Dispositions    · Return in about 3 months (around 9/12/2019). No results found for any visits on 06/12/19. Nuha West MD    The patient verbalized understanding of the problems and plans as explained.

## 2019-06-12 NOTE — PATIENT INSTRUCTIONS
Arthritis: Care Instructions Your Care Instructions Arthritis, also called osteoarthritis, is a breakdown of the cartilage that cushions your joints. When the cartilage wears down, your bones rub against each other. This causes pain and stiffness. Many people have some arthritis as they age. Arthritis most often affects the joints of the spine, hands, hips, knees, or feet. You can take simple measures to protect your joints, ease your pain, and help you stay active. Follow-up care is a key part of your treatment and safety. Be sure to make and go to all appointments, and call your doctor if you are having problems. It's also a good idea to know your test results and keep a list of the medicines you take. How can you care for yourself at home? · Stay at a healthy weight. Being overweight puts extra strain on your joints. · Talk to your doctor or physical therapist about exercises that will help ease joint pain. ? Stretch. You may enjoy gentle forms of yoga to help keep your joints and muscles flexible. ? Walk instead of jog. Other types of exercise that are less stressful on the joints include riding a bicycle, swimming, yadi chi, or water exercise. ? Lift weights. Strong muscles help reduce stress on your joints. Stronger thigh muscles, for example, take some of the stress off of the knees and hips. Learn the right way to lift weights so you do not make joint pain worse. · Take your medicines exactly as prescribed. Call your doctor if you think you are having a problem with your medicine. · Take pain medicines exactly as directed. ? If the doctor gave you a prescription medicine for pain, take it as prescribed. ? If you are not taking a prescription pain medicine, ask your doctor if you can take an over-the-counter medicine. · Use a cane, crutch, walker, or another device if you need help to get around. These can help rest your joints.  You also can use other things to make life easier, such as a higher toilet seat and padded handles on kitchen utensils. · Do not sit in low chairs, which can make it hard to get up. · Put heat or cold on your sore joints as needed. Use whichever helps you most. You also can take turns with hot and cold packs. ? Apply heat 2 or 3 times a day for 20 to 30 minutesusing a heating pad, hot shower, or hot packto relieve pain and stiffness. ? Put ice or a cold pack on your sore joint for 10 to 20 minutes at a time. Put a thin cloth between the ice and your skin. When should you call for help? Call your doctor now or seek immediate medical care if: 
  · You have sudden swelling, warmth, or pain in any joint.  
  · You have joint pain and a fever or rash.  
  · You have such bad pain that you cannot use a joint.  
 Watch closely for changes in your health, and be sure to contact your doctor if: 
  · You have mild joint symptoms that continue even with more than 6 weeks of care at home.  
  · You have stomach pain or other problems with your medicine. Where can you learn more? Go to http://mikael-oskar.info/. Enter Z796 in the search box to learn more about \"Arthritis: Care Instructions. \" Current as of: Stacey 10, 2018 Content Version: 11.9 © 1358-6081 [employer]. Care instructions adapted under license by [employer] (which disclaims liability or warranty for this information). If you have questions about a medical condition or this instruction, always ask your healthcare professional. [place] [address] any warranty or liability for your use of this information.

## 2019-06-24 RX ORDER — OLMESARTAN MEDOXOMIL AND HYDROCHLOROTHIAZIDE 40/25 40; 25 MG/1; MG/1
TABLET ORAL
Qty: 90 TAB | Refills: 0 | Status: SHIPPED | OUTPATIENT
Start: 2019-06-24 | End: 2019-07-05 | Stop reason: ALTCHOICE

## 2019-06-24 NOTE — TELEPHONE ENCOUNTER
PCP: Jeferson Summers MD    Last appt: 6/12/2019  Future Appointments   Date Time Provider Diana Kelley   9/20/2019  9:40 AM Jeferson Summers MD [facility] MYLES ANGELITA       Requested Prescriptions     Pending Prescriptions Disp Refills    olmesartan-hydroCHLOROthiazide (BENICAR HCT) 40-25 mg per tablet [Pharmacy Med Name: OLMESARTAN MEDOX/HCTZ 40-25MG TAB] 90 Tab 0     Sig: TAKE 1 TABLET BY MOUTH ONCE DAILY

## 2019-07-05 ENCOUNTER — OFFICE VISIT (OUTPATIENT)
Dept: INTERNAL MEDICINE CLINIC | Age: 64
End: 2019-07-05

## 2019-07-05 VITALS
RESPIRATION RATE: 18 BRPM | WEIGHT: 206.4 LBS | SYSTOLIC BLOOD PRESSURE: 118 MMHG | HEART RATE: 69 BPM | TEMPERATURE: 98.6 F | BODY MASS INDEX: 33.17 KG/M2 | HEIGHT: 66 IN | DIASTOLIC BLOOD PRESSURE: 80 MMHG | OXYGEN SATURATION: 95 %

## 2019-07-05 DIAGNOSIS — R68.2 DRY MOUTH: ICD-10-CM

## 2019-07-05 DIAGNOSIS — I12.9 HYPERTENSION WITH RENAL DISEASE: Primary | ICD-10-CM

## 2019-07-05 RX ORDER — ATORVASTATIN CALCIUM 20 MG/1
1 TABLET, FILM COATED ORAL DAILY
Refills: 0 | COMMUNITY
Start: 2019-06-26 | End: 2019-08-21 | Stop reason: SDUPTHER

## 2019-07-05 RX ORDER — OLMESARTAN MEDOXOMIL 40 MG/1
40 TABLET ORAL DAILY
Qty: 30 TAB | Status: SHIPPED | OUTPATIENT
Start: 2019-07-05 | End: 2020-02-28 | Stop reason: SDUPTHER

## 2019-07-05 NOTE — PATIENT INSTRUCTIONS
Body Mass Index: Care Instructions Your Care Instructions Body mass index (BMI) can help you see if your weight is raising your risk for health problems. It uses a formula to compare how much you weigh with how tall you are. · A BMI lower than 18.5 is considered underweight. · A BMI between 18.5 and 24.9 is considered healthy. · A BMI between 25 and 29.9 is considered overweight. A BMI of 30 or higher is considered obese. If your BMI is in the normal range, it means that you have a lower risk for weight-related health problems. If your BMI is in the overweight or obese range, you may be at increased risk for weight-related health problems, such as high blood pressure, heart disease, stroke, arthritis or joint pain, and diabetes. If your BMI is in the underweight range, you may be at increased risk for health problems such as fatigue, lower protection (immunity) against illness, muscle loss, bone loss, hair loss, and hormone problems. BMI is just one measure of your risk for weight-related health problems. You may be at higher risk for health problems if you are not active, you eat an unhealthy diet, or you drink too much alcohol or use tobacco products. Follow-up care is a key part of your treatment and safety. Be sure to make and go to all appointments, and call your doctor if you are having problems. It's also a good idea to know your test results and keep a list of the medicines you take. How can you care for yourself at home? · Practice healthy eating habits. This includes eating plenty of fruits, vegetables, whole grains, lean protein, and low-fat dairy. · If your doctor recommends it, get more exercise. Walking is a good choice. Bit by bit, increase the amount you walk every day. Try for at least 30 minutes on most days of the week. · Do not smoke. Smoking can increase your risk for health problems.  If you need help quitting, talk to your doctor about stop-smoking programs and medicines. These can increase your chances of quitting for good. · Limit alcohol to 2 drinks a day for men and 1 drink a day for women. Too much alcohol can cause health problems. If you have a BMI higher than 25 · Your doctor may do other tests to check your risk for weight-related health problems. This may include measuring the distance around your waist. A waist measurement of more than 40 inches in men or 35 inches in women can increase the risk of weight-related health problems. · Talk with your doctor about steps you can take to stay healthy or improve your health. You may need to make lifestyle changes to lose weight and stay healthy, such as changing your diet and getting regular exercise. If you have a BMI lower than 18.5 · Your doctor may do other tests to check your risk for health problems. · Talk with your doctor about steps you can take to stay healthy or improve your health. You may need to make lifestyle changes to gain or maintain weight and stay healthy, such as getting more healthy foods in your diet and doing exercises to build muscle. Where can you learn more? Go to http://mikael-oskar.info/. Enter S176 in the search box to learn more about \"Body Mass Index: Care Instructions. \" Current as of: June 25, 2018 Content Version: 11.9 © 3759-8795 [employer]. Care instructions adapted under license by [employer] (which disclaims liability or warranty for this information). If you have questions about a medical condition or this instruction, always ask your healthcare professional. [address] any warranty or liability for your use of this information.

## 2019-07-05 NOTE — PROGRESS NOTES
Subjective:   Isadora Gaucher is a 58 y.o. female      Chief Complaint   Patient presents with    Mouth Pain     right side of tongue is hurting after having bridge placed; tried calling dentist, but they are closed for the holiday        History of present illness: She presents today for evaluation of a sore on her right side of her tongue that she attributes to having a problem with a chipped tooth and having a bridge of the sores on the inside of the tongue and the bridge was placed in the upper jaw. She tried calling the dentist but they referred her here since they are closed for the holiday. She also notes her lips seem to be dry and she attributes that to happening when she chipped a tooth as well. She denies any lightheadedness or dizziness. She denies any chest pain, shortness of breath or cardiorespiratory complaints. She has no other complaints noted.     Patient Active Problem List   Diagnosis Code    Right inguinal hernia K40.90    Ventral hernia without obstruction or gangrene K43.9    Chronic non-seasonal allergic rhinitis J30.89    Crohn disease ([facility] [address] K50.90    OAB (overactive bladder) N32.81    Varicose vein of leg N21.58    Umbilical hernia G37.3    Class 1 obesity due to excess calories without serious comorbidity with body mass index (BMI) of 33.0 to 33.9 in adult E66.09, Z68.33    Lumbar herniated disc M51.26    Inguinal hernia K40.90    Acquired hypothyroidism E03.9    Hypertension with renal disease I12.9    Mixed hyperlipidemia E78.2    Hormone replacement therapy (HRT) Z79.890    Gastroesophageal reflux disease without esophagitis K21.9    Primary osteoarthritis involving multiple joints M15.0    COPD (chronic obstructive pulmonary disease) (HCC) J44.9    Stage 3 chronic kidney disease (HCC) N18.3    Back pain M54.9    Anxiety F41.9    Anemia D64.9    Annual physical exam Z00.00    Precordial pain R07.2    Acute bronchitis J20.9    Labyrinthitis of both ears H83.03    Acute non-recurrent maxillary sinusitis J01.00    Fungal dermatitis B36.9    Dry mouth R68.2      Past Medical History:   Diagnosis Date    Allergic rhinitis 8/9/2017    Anemia 8/9/2017    Anxiety 8/9/2017    Arrhythmia     irregular heart beat hx and beating fast per patient/no cardiologist    Arthritis     JOINTS  WORSE WAIST DOWN     Back pain 8/9/2017    CKD (chronic kidney disease) 8/9/2017    COPD (chronic obstructive pulmonary disease) ([facility] [address] 8/9/2017    Crohn disease ([facility] [address] 8/9/2017    DJD (degenerative joint disease) 8/9/2017    GERD (gastroesophageal reflux disease) 8/9/2017    Hormone replacement therapy (HRT) 8/9/2017    Hyperlipidemia 8/9/2017    Hypertension     Hypertension with renal disease 8/9/2017    Hypokalemia 8/9/2017    Hypothyroid 8/9/2017    Ill-defined condition     neuropathy feet    Inguinal hernia 8/9/2017    Leg numbness 8/9/2017    Lumbar herniated disc 8/9/2017    OAB (overactive bladder) 8/9/2017    Obesity 8/9/2017    On statin therapy 8/9/2017    Osteoarthritis 8/9/2017    Pelvic pain in female 8/5/3115    Umbilical hernia 8/6/8973    Varicose vein of leg 8/9/2017      Allergies   Allergen Reactions    Lisinopril Cough      Family History   Problem Relation Age of Onset    No Known Problems Mother     No Known Problems Father     Stroke Sister       Social History     Socioeconomic History    Marital status:      Spouse name: Not on file    Number of children: Not on file    Years of education: Not on file    Highest education level: Not on file   Occupational History    Not on file   Social Needs    Financial resource strain: Not on file    Food insecurity:     Worry: Not on file     Inability: Not on file    Transportation needs:     Medical: Not on file     Non-medical: Not on file   Tobacco Use    Smoking status: Never Smoker    Smokeless tobacco: Never Used   Substance and Sexual Activity    Alcohol use:  Yes Comment: once a year    Drug use: No    Sexual activity: Yes     Partners: Male   Lifestyle    Physical activity:     Days per week: Not on file     Minutes per session: Not on file    Stress: Not on file   Relationships    Social connections:     Talks on phone: Not on file     Gets together: Not on file     Attends Taoist service: Not on file     Active member of club or organization: Not on file     Attends meetings of clubs or organizations: Not on file     Relationship status: Not on file    Intimate partner violence:     Fear of current or ex partner: Not on file     Emotionally abused: Not on file     Physically abused: Not on file     Forced sexual activity: Not on file   Other Topics Concern    Not on file   Social History Narrative    Not on file     Prior to Admission medications    Medication Sig Start Date End Date Taking? Authorizing Provider   atorvastatin (LIPITOR) 20 mg tablet Take 1 Tab by mouth daily. 6/26/19  Yes Provider, Historical   olmesartan (BENICAR) 40 mg tablet Take 1 Tab by mouth daily. 7/5/19  Yes Tere Epstein MD   niacin ER (NIASPAN) 500 mg tablet TAKE 1 TABLET BY MOUTH AT BEDTIME 4/24/19  Yes Tere Epstein MD   diclofenac EC (VOLTAREN) 75 mg EC tablet take 1 tablet by mouth twice a day 12/21/18  Yes Tere Epstein MD   potassium chloride (K-DUR, KLOR-CON) 20 mEq tablet take 1 tablet by mouth twice a day 10/19/18  Yes Tere Epstein MD   meloxicam (MOBIC) 15 mg tablet Take 1 Tab by mouth daily. 9/14/18  Yes Tere Epstein MD   estradiol (VIVELLE) 0.05 mg/24 hr APPLY ONE PATCH TWICE WEEKLY  Patient taking differently: APPLY ONE PATCH WEEKLY 7/16/18  Yes Tere Epstein MD   OTHER Indications: Melaleuca Phytomega - Take 2 softgels twice a day. Yes Provider, Historical   OTHER Indications: Melaleuca Vitality - Takes 1 tablet twice a day.    Yes Provider, Historical   ferrous sulfate 325 mg (65 mg iron) tablet Take  by mouth three (3) times daily (with meals). Yes Provider, Historical   aspirin delayed-release 325 mg tablet Take 325 mg by mouth daily. At bedtime with Niacin   Yes Provider, Historical   vitamin E (AQUA GEMS) 400 unit capsule Take 400 Units by mouth daily. 3/16/17  Yes Edin Browning NP   cholecalciferol, vitamin D3, 2,000 unit tab Take 1 Tab by mouth daily. Yes Provider, Historical   lansoprazole (PREVACID) 15 mg capsule Take 15 mg by mouth every Monday, Wednesday, Friday. Takes as needed   Yes Provider, Historical   ascorbic acid (VITAMIN C) 250 mg tablet Take 500 mg by mouth daily. Yes Provider, Historical        Review of Systems              Constitutional:  She denies fever, weight loss, sweats or fatigue. EYES: No blurred or double vision,               ENT: no nasal congestion, no headache or dizziness. No difficulty with swallowing, taste, speech or smell. Dry mouth and so on the right side of her tongue that she has noted since she chipped a tooth and had a bridge placed  Respiratory:  No cough, wheezing or shortness of breath. No sputum production. Cardiac:  Denies chest pain, palpitations, unexplained indigestion, syncope, edema, PND or orthopnea. GI:  No changes in bowel movements, no abdominal pain, no bloating, anorexia, nausea, vomiting or heartburn. :  No frequency or dysuria. Denies incontinence or sexual dysfunction. Extremities:  No joint pain, stiffness or swelling  Back:.no pain or soreness  Skin:  No recent rashes or mole changes. Neurological:  No numbness, tingling, burning paresthesias or loss of motor strength. No syncope, dizziness, frequent headaches or memory loss.   Hematologic:  No easy bruising  Lymphatic: No lymph node enlargement    Objective:     Vitals:    07/05/19 1620   BP: 118/80   Pulse: 69   Resp: 18   Temp: 98.6 °F (37 °C)   TempSrc: Oral   SpO2: 95%   Weight: 206 lb 6.4 oz (93.6 kg)   Height: 5' 6\" (1.676 m)   PainSc:   9   PainLoc: Mouth       Body mass index is 33.31 kg/m². Physical Examination:              General Appearance:  Well-developed, well-nourished, no acute distress. HEENT:      Ears:  The TMs and ear canals were clear. Eyes:  The pupillary responses were normal.  Extraocular muscle function intact. Lids and conjunctiva not injected. Funduscopic exam revealed sharp disc margins. Nares: Clear w/o edema or erythema  Pharynx:  Clear with teeth in good repair. No masses were noted. There is a irritated area over the right lower aspect of her tongue consistent with possible irritation although with the bridge in the upper jaw and there is slightly under the tongue a question of this related plus it would not account for the dry cracking she is in the corners of the lips. Neck:  Supple without thyromegaly or adenopathy. No JVD noted. No carotid                bruits. Lungs:  Clear to auscultation and percussion. Cardiac:  Regular rate and rhythm without murmur. PMI not displaced. No gallop, rub or click. Abdominal: Soft, non-tender, no hepata-spleenomegally or masses  Extremities:  No clubbing, cyanosis or edema. Skin:  No rash or unusual mole changes noted. Lymph Nodes:  None felt in the cervical, supraclavicular, axillary or inguinal region. Neurological: . DTRs 2+ and symmetric. Station and gait normal.   Hematologic:   No purpura or petechiae        Assessment/Plan:         1. Hypertension with renal disease    2. Dry mouth        Impressions/Plan:  Impression  1. Dry mouth question whether hydrochlorothiazide is playing a role in this ominous type her Benicar HCT and replaced with Benicar 40 mg daily. 2.  Hypertension and will recheck in about 3 weeks at which time I reevaluate her blood pressure and see if we need to make any other adjustments since I am getting rid of the hydrochlorothiazide component  If her symptoms do not improve with the above change then we may have to look for connective tissue etiologies.   When she returns in 3 weeks she also will be due for a welcome to Medicare visit and that will be done at that point. Recheck sooner if there is a problem. Orders Placed This Encounter    atorvastatin (LIPITOR) 20 mg tablet    olmesartan (BENICAR) 40 mg tablet       Follow-up and Dispositions    · Return in about 3 weeks (around 7/26/2019). No results found for any visits on 07/05/19. Melania Morales MD    The patient was given after the visit summary the patient verbalized an understanding of the plans and problems as explained.

## 2019-07-05 NOTE — PROGRESS NOTES
Edilson Garcia  Identified pt with two pt identifiers(name and ). Chief Complaint   Patient presents with    Mouth Pain     right side of tongue is hurting after having bridge placed; tried calling dentist, but they are closed for the holiday       1. Have you been to the ER, urgent care clinic since your last visit? Hospitalized since your last visit? No    2. Have you seen or consulted any other health care providers outside of the [address] [place] [address] since your last visit? Include any pap smears or colon screening. No      Would you like to sign up for MyChart today, if you have not already done so? No  If not, would you like information on MyChart, and how to sign up at a later time? No      Medication reconciliation up to date and corrected with patient at this time. Today's provider has been notified of reason for visit, vitals and flowsheets obtained on patients. Reviewed record in preparation for visit, huddled with provider and have obtained necessary documentation.       Health Maintenance Due   Topic    DTaP/Tdap/Td series (1 - Tdap)    Shingrix Vaccine Age 49> (1 of 2)    BREAST CANCER SCRN MAMMOGRAM        Wt Readings from Last 3 Encounters:   19 206 lb 6.4 oz (93.6 kg)   19 207 lb 12.8 oz (94.3 kg)   19 210 lb 6.4 oz (95.4 kg)     Temp Readings from Last 3 Encounters:   19 98.6 °F (37 °C) (Oral)   19 97.6 °F (36.4 °C) (Oral)   19 97.4 °F (36.3 °C) (Oral)     BP Readings from Last 3 Encounters:   19 118/80   19 110/82   19 122/82     Pulse Readings from Last 3 Encounters:   19 69   19 80   19 65     Vitals:    19 1620   BP: 118/80   Pulse: 69   Resp: 18   Temp: 98.6 °F (37 °C)   TempSrc: Oral   SpO2: 95%   Weight: 206 lb 6.4 oz (93.6 kg)   Height: 5' 6\" (1.676 m)   PainSc:   9   PainLoc: Mouth         Learning Assessment:  :     Learning Assessment 2017   PRIMARY LEARNER Patient Patient HIGHEST LEVEL OF EDUCATION - PRIMARY LEARNER  GRADUATED HIGH SCHOOL OR GED -   PRIMARY LANGUAGE ENGLISH ENGLISH   LEARNER PREFERENCE PRIMARY LISTENING LISTENING   ANSWERED BY patient patient   RELATIONSHIP SELF SELF       Depression Screening:  :     3 most recent PHQ Screens 6/12/2019   Little interest or pleasure in doing things Not at all   Feeling down, depressed, irritable, or hopeless Not at all   Total Score PHQ 2 0       Fall Risk Assessment:  :     No flowsheet data found. Abuse Screening:  :     Abuse Screening Questionnaire 3/8/2019 8/21/2017   Do you ever feel afraid of your partner? N N   Are you in a relationship with someone who physically or mentally threatens you? N N   Is it safe for you to go home? Y Y       ADL Screening:  :     No flowsheet data found.

## 2019-07-25 NOTE — PROGRESS NOTES
PROGRESS NOTES    This is a \"Welcome to [employer]" Initial Preventive Physical Examination (IPPE)    I have reviewed the patient's medical history in detail and updated the computerized patient record. She presents today for Medicare's welcome to Medicare screening questionnaire and examination visit. She is also here in follow-up of her multiple medical problems include hypertension, hyperlipidemia, GERD, hypothyroidism, allergic rhinitis, COPD, DJD, CKD stage III, obesity, anemia and other medical problems. She currently denies any chest pain, shortness of breath, palpitations, PND, orthopnea or other cardiorespiratory complaints. She denies any GI or  complaints. She denies any headaches, dizziness or neurologic complaints. She denies any current arthritic complaints and there are no other complaints on complete review of systems except she still continues to dry mouth even after stopping hydrochlorothiazide. .    Depression risk factor summary:      Patient Health Questionnaire (PHQ-9)   Over the last 2 weeks, how often have you been bothered by any of the following problems? · Little interest or pleasure in doing things? · Not at all. [0]  · Feeling down, depressed, or hopeless? · Not at all. [0]  · Trouble falling or staying asleep, or sleeping too much? · Not at all. [0]  · Feeling tired or having little energy? · Not at all. [0]  · Poor appetite or overeating? · Not at all. [0]  · Feeling bad about yourself - or that you are a failure or have let yourself or your family down? · Not at all. [0]  · Trouble concentrating on things, such as reading the newspaper or watching television? · Not at all. [0]  · Moving or speaking so slowly that other people could have noticed? Or the opposite - being so fidgety or restless that you have been moving around a lot more than usual?  · Not at all. [0]  · Thoughts that you would be better off dead, or of hurting yourself in some way? · Not at all. [0]    Total Score: 0/27  Depression Severity:   0-4 None, 5-9 mild, 10-14 moderate, 15-19 moderately severe, 20-27 severe. Functional Ability and Level of Safety:    Hearing Loss    Hearing is good. Activities of Daily Living   Self-care. Requires assistance with: no ADLs    Fall Risk   No fall risk factors    Abuse Screen   None    Adult Nutrition Screen   No risk factors noted. Review of Systems   ROS:    Constitutional: She denies fevers, weight loss, sweats. Eyes: No blurred or double vision. ENT: No difficulty with swallowing, taste, speech or smell. Positive for dry mouth  NECK: no stiffness swelling or lymph node enlargement  Respiratory: No cough wheezing or shortness of breath. Cardiovascular: Denies chest pain, palpitations, unexplained indigestion or syncope. Breast: She has noted no masses or lumps and no discharge or axillary swelling  Gastrointestinal:  No changes in bowel movements, no abdominal pain, no bloating. Genitourinary: No discharge or abnormal bleeding or pain  Extremities: No joint pain, stiffness or swelling. Neurological:  No numbness, tingling, burring paresthesias or loss of motor strength. No syncope, dizziness or frequent headache  Skin:  No recent rashes or mole changes. Psychiatric/Behavioral:  Negative for depression. The patient is not nervous/anxious. HEMATOLOGIC: no easy bruising or bleeding gums  Endocrine: no sweats of urinary frequency or excessive thirst    Physical Exam     Visit Vitals  /82 (BP 1 Location: Right arm, BP Patient Position: Sitting)   Pulse 61   Temp 98.4 °F (36.9 °C) (Oral)   Resp 18   Ht 5' 6\" (1.676 m)   Wt 208 lb 9.6 oz (94.6 kg)   LMP  (LMP Unknown)   SpO2 97%   BMI 33.67 kg/m²      Body mass index is 33.67 kg/m².   Visual Acuity:    Visual Acuity Screening    Right eye Left eye Both eyes   Without correction: 20/40 20/40 20/30   With correction:          Vitals:    07/26/19 1342   BP: 126/82   Pulse: 61   Resp: 18   Temp: 98.4 °F (36.9 °C)   TempSrc: Oral   SpO2: 97%   Weight: 208 lb 9.6 oz (94.6 kg)   Height: 5' 6\" (1.676 m)   PainSc:   0 - No pain        PHYSICAL EXAM:    General appearance - alert, well appearing, and in no distress, mildly obese black female  Mental status - alert, oriented to person, place, and time  HEENT:  Ears - bilateral TM's and external ear canals clear  Eyes - pupillary responses were normal.  Extraocular muscle function intact. Lids and conjunctiva not injected. Fundoscopic exam revealed sharp disc margins. eye movements intact  Pharynx- clear with teeth in good repair. No masses were noted. Oral cavity appears to be normal however she gets some drying and cracking in the corners of mouth  Neck - supple without thyromegaly or burit. No JVD noted  Lungs - clear to auscultation and percussion  Cardiac- normal rate, regular rhythm without murmurs. PMI not displaced. No gallop, rub or click  Breast: deferred to GYN  Abdomen - flat, soft, non-tender without palpable organomegaly or mass. No pulsatile mass was felt, and not bruit was heard. Bowel sounds were active   Female - deferred to GYN  Rectal - deferred to GYN  Extremities -  no clubbing cyanosis or edema  Lymphatics - no palpable lymphadenopathy, no hepatosplenomegaly  Peripheral vascular - Dorsalis pedis and posterior tibial pulses felt without difficulty  Skin - no rash or unusual mole change noted  Neurological - Cranial nerves II-XII grossly intact. Motor strength 5/5. DTR's 2+ and symmetric. Station and gait normal  Back exam - full range of motion, no tenderness, palpable spasm or pain on motion  Musculoskeletal - no joint tenderness, deformity or swelling  Hematologic: no purpura, petechiae or bruising  EKG: normal EKG, normal sinus rhythm, unchanged from previous tracings. Assessment/Plan:   Education and counseling provided:  Are appropriate based on today's review and evaluation  ASSESSMENT:   1.  Hypertension with renal disease    2. Mixed hyperlipidemia    3. Gastroesophageal reflux disease without esophagitis    4. Chronic obstructive pulmonary disease, unspecified COPD type (HCC)    5. Class 1 obesity due to excess calories without serious comorbidity with body mass index (BMI) of 33.0 to 33.9 in adult    6. Primary osteoarthritis involving multiple joints    7. Stage 3 chronic kidney disease ([facility] [address]    8. Chronic non-seasonal allergic rhinitis    9. Acquired hypothyroidism    10. Crohn's disease without complication, unspecified gastrointestinal tract location ([facility]    11. Welcome to Medicare preventive visit    12. Alcohol screening    13. Encounter for immunization    14. Arthralgia, unspecified joint    15. Dry mouth      Impression  1. Hypertension that is controlled so continue current therapy reviewed with her. 2.  Hyperlipidemia prior lab reviewed and repeat status pending I will adjust if needed. 3.  GERD that is stable   4   COPD currently stable  5. Obesity we did discuss diet, exercise and weight reduction for overall health benefit. 6.  DJD that is stable  7. CKD stage III repeat status pending  8. Allergic rhinitis currently stable  9. Hypothyroidism repeat status pending  10. Crohn's disease currently inactive  11. Annual alcohol screening done and she said that she drinks once a year and we caution regarding detrimental effects of significant drinking and she understands. Her questions were answered. 12 drying her mouth with some cracking in the corners of the lips based upon this I would not evaluate this further by checking some rheumatologic studies and those are positive and I will send her for rheumatology evaluation to consider Sjogren's however those are negative then ENT evaluation is probably warranted    Welcome to Medicare annual preventative screening visit and examination completed today. The results were discussed with her and her questions were answered.   Lifestyle recommendations and modifications discussed and made. I will call with lab results and make further recommendations or adjustments if necessary. Follow-up as scheduled again for 3 months or sooner if there is a problem. PLAN:  .  Orders Placed This Encounter    CBC WITH AUTOMATED DIFF ([facility])    METABOLIC PANEL, COMPREHENSIVE ([facility])    LIPID PANEL ([facility])    CK ([facility])    T4, FREE ([facility])    TSH 3RD GENERATION ([facility])    URINALYSIS W/O MICRO ([facility])    C REACTIVE PROTEIN, QT    SED RATE (ESR)    LANE BY IFA REFLEX    RHEUMATOID FACTOR, QL    AMB POC EKG ROUTINE W/ 12 LEADS, INTER & REP         ATTENTION:   This medical record was transcribed using an electronic medical records system. Although proofread, it may and can contain electronic and spelling errors. Other human spelling and other errors may be present. Corrections may be executed at a later time. Please feel free to contact us for any clarifications as needed. Follow-up and Dispositions    · Return in about 3 months (around 10/26/2019). Elsie Covarrubias MD.    The patient verbalized an understanding of the problems and plans as explained.

## 2019-07-26 ENCOUNTER — OFFICE VISIT (OUTPATIENT)
Dept: INTERNAL MEDICINE CLINIC | Age: 64
End: 2019-07-26

## 2019-07-26 VITALS
HEIGHT: 66 IN | DIASTOLIC BLOOD PRESSURE: 82 MMHG | WEIGHT: 208.6 LBS | RESPIRATION RATE: 18 BRPM | BODY MASS INDEX: 33.52 KG/M2 | OXYGEN SATURATION: 97 % | HEART RATE: 61 BPM | SYSTOLIC BLOOD PRESSURE: 126 MMHG | TEMPERATURE: 98.4 F

## 2019-07-26 DIAGNOSIS — J30.89 CHRONIC NON-SEASONAL ALLERGIC RHINITIS: ICD-10-CM

## 2019-07-26 DIAGNOSIS — Z13.39 ALCOHOL SCREENING: ICD-10-CM

## 2019-07-26 DIAGNOSIS — K21.9 GASTROESOPHAGEAL REFLUX DISEASE WITHOUT ESOPHAGITIS: ICD-10-CM

## 2019-07-26 DIAGNOSIS — J44.9 CHRONIC OBSTRUCTIVE PULMONARY DISEASE, UNSPECIFIED COPD TYPE (HCC): ICD-10-CM

## 2019-07-26 DIAGNOSIS — I12.9 HYPERTENSION WITH RENAL DISEASE: Primary | ICD-10-CM

## 2019-07-26 DIAGNOSIS — E78.2 MIXED HYPERLIPIDEMIA: ICD-10-CM

## 2019-07-26 DIAGNOSIS — Z00.00 WELCOME TO MEDICARE PREVENTIVE VISIT: ICD-10-CM

## 2019-07-26 DIAGNOSIS — M15.9 PRIMARY OSTEOARTHRITIS INVOLVING MULTIPLE JOINTS: ICD-10-CM

## 2019-07-26 DIAGNOSIS — M25.50 ARTHRALGIA, UNSPECIFIED JOINT: ICD-10-CM

## 2019-07-26 DIAGNOSIS — R68.2 DRY MOUTH: ICD-10-CM

## 2019-07-26 DIAGNOSIS — N18.30 STAGE 3 CHRONIC KIDNEY DISEASE (HCC): ICD-10-CM

## 2019-07-26 DIAGNOSIS — E03.9 ACQUIRED HYPOTHYROIDISM: ICD-10-CM

## 2019-07-26 DIAGNOSIS — K50.90 CROHN'S DISEASE WITHOUT COMPLICATION, UNSPECIFIED GASTROINTESTINAL TRACT LOCATION (HCC): ICD-10-CM

## 2019-07-26 DIAGNOSIS — E66.09 CLASS 1 OBESITY DUE TO EXCESS CALORIES WITHOUT SERIOUS COMORBIDITY WITH BODY MASS INDEX (BMI) OF 33.0 TO 33.9 IN ADULT: ICD-10-CM

## 2019-07-26 DIAGNOSIS — Z23 ENCOUNTER FOR IMMUNIZATION: ICD-10-CM

## 2019-07-26 LAB
A-G RATIO,AGRAT: 1.6 RATIO
ALBUMIN SERPL-MCNC: 4.4 G/DL (ref 3.9–5.4)
ALP SERPL-CCNC: 110 U/L (ref 38–126)
ALT SERPL-CCNC: 48 U/L (ref 9–52)
ANION GAP SERPL CALC-SCNC: 11 MMOL/L
AST SERPL W P-5'-P-CCNC: 25 U/L (ref 14–36)
BILIRUB SERPL-MCNC: 0.5 MG/DL (ref 0.2–1.3)
BILIRUB UR QL: NEGATIVE
BUN SERPL-MCNC: 18 MG/DL (ref 7–17)
BUN/CREATININE RATIO,BUCR: 20 RATIO
CALCIUM SERPL-MCNC: 10.4 MG/DL (ref 8.4–10.2)
CHLORIDE SERPL-SCNC: 101 MMOL/L (ref 98–107)
CHOL/HDL RATIO,CHHD: 3 RATIO (ref 0–4)
CHOLEST SERPL-MCNC: 128 MG/DL (ref 0–200)
CK SERPL-CCNC: 90 U/L (ref 30–135)
CLARITY: CLEAR
CO2 SERPL-SCNC: 29 MMOL/L (ref 22–32)
COLOR UR: ABNORMAL
CREAT SERPL-MCNC: 0.9 MG/DL (ref 0.7–1.2)
ERYTHROCYTE [DISTWIDTH] IN BLOOD BY AUTOMATED COUNT: 14.3 %
GLOBULIN,GLOB: 2.7
GLUCOSE 24H UR-MRATE: NEGATIVE G/(24.H)
GLUCOSE SERPL-MCNC: 83 MG/DL (ref 65–105)
HCT VFR BLD AUTO: 37.4 % (ref 37–51)
HDLC SERPL-MCNC: 45 MG/DL (ref 35–130)
HGB BLD-MCNC: 11.8 G/DL (ref 12–18)
HGB UR QL STRIP: NEGATIVE
KETONES UR QL STRIP.AUTO: NEGATIVE
LDL/HDL RATIO,LDHD: 2 RATIO
LDLC SERPL CALC-MCNC: 68 MG/DL (ref 0–130)
LEUKOCYTE ESTERASE: NEGATIVE
LYMPHOCYTES ABSOLUTE: 1 K/UL (ref 0.6–4.1)
LYMPHOCYTES NFR BLD: 22 % (ref 10–58.5)
MCH RBC QN AUTO: 30.1 PG (ref 26–32)
MCHC RBC AUTO-ENTMCNC: 31.6 G/DL (ref 30–36)
MCV RBC AUTO: 95.3 FL (ref 80–97)
MONOCYTES ABS-DIF,2141: 0.4 K/UL (ref 0–1.8)
MONOCYTES NFR BLD: 8.7 % (ref 0.1–24)
NEUTROPHILS # BLD: 69.3 % (ref 37–92)
NEUTROPHILS ABS,2156: 3.2 K/UL (ref 2–7.8)
NITRITE UR QL STRIP.AUTO: NEGATIVE
PH UR STRIP: 5 [PH] (ref 5–7)
PLATELET # BLD AUTO: 270 K/UL (ref 140–440)
PMV BLD AUTO: 7.8 FL
POTASSIUM SERPL-SCNC: 4.6 MMOL/L (ref 3.6–5)
PROT SERPL-MCNC: 7.1 G/DL (ref 6.3–8.2)
PROT UR STRIP-MCNC: ABNORMAL MG/DL
RBC # BLD AUTO: 3.92 M/UL (ref 4.2–6.3)
RBC #/AREA URNS HPF: 0 #/HPF
SODIUM SERPL-SCNC: 141 MMOL/L (ref 137–145)
SP GR UR REFRACTOMETRY: 1.02 (ref 1–1.03)
SQUAMOUS EPITHELIAL CELLS: ABNORMAL
TRIGL SERPL-MCNC: 73 MG/DL (ref 0–200)
UROBILINOGEN UR QL STRIP.AUTO: NEGATIVE
VLDLC SERPL CALC-MCNC: 15 MG/DL
WBC # BLD AUTO: 4.6 K/UL (ref 4.1–10.9)
WBC URNS QL MICRO: 0 #/HPF

## 2019-07-26 NOTE — PROGRESS NOTES
Chief Complaint   Patient presents with   [facility] Annual Wellness Visit     Visit Vitals  /82 (BP 1 Location: Right arm, BP Patient Position: Sitting)   Pulse 61   Temp 98.4 °F (36.9 °C) (Oral)   Resp 18   Ht 5' 6\" (1.676 m)   Wt 208 lb 9.6 oz (94.6 kg)   LMP  (LMP Unknown)   SpO2 97%   BMI 33.67 kg/m²      Visual Acuity Screening    Right eye Left eye Both eyes   Without correction: 20/40 20/40 20/30   With correction:          1. Have you been to the ER, urgent care clinic since your last visit? Hospitalized since your last visit? No    2. Have you seen or consulted any other health care providers outside of the [address] [place] [address] since your last visit? Include any pap smears or colon screening.  No

## 2019-07-26 NOTE — ACP (ADVANCE CARE PLANNING)
Discussed Advanced Care Directives. No information on file. No  Information given for patient to review. Patient declined information at this time.

## 2019-07-26 NOTE — PATIENT INSTRUCTIONS
Anemia: Care Instructions  Your Care Instructions    Anemia is a low level of red blood cells, which carry oxygen throughout your body. Many things can cause anemia. Lack of iron is one of the most common causes. Your body needs iron to make hemoglobin, a substance in red blood cells that carries oxygen from the lungs to your body's cells. Without enough iron, the body produces fewer and smaller red blood cells. As a result, your body's cells do not get enough oxygen, and you feel tired and weak. And you may have trouble concentrating. Bleeding is the most common cause of a lack of iron. You may have heavy menstrual bleeding or bleeding caused by conditions such as ulcers, hemorrhoids, or cancer. Regular use of aspirin or other anti-inflammatory medicines (such as ibuprofen) also can cause bleeding in some people. A lack of iron in your diet also can cause anemia, especially at times when the body needs more iron, such as during pregnancy, infancy, and the teen years. Your doctor may have prescribed iron pills. It may take several months of treatment for your iron levels to return to normal. Your doctor also may suggest that you eat foods that are rich in iron, such as meat and beans. There are many other causes of anemia. It is not always due to a lack of iron. Finding the specific cause of your anemia will help your doctor find the right treatment for you. Follow-up care is a key part of your treatment and safety. Be sure to make and go to all appointments, and call your doctor if you are having problems. It's also a good idea to know your test results and keep a list of the medicines you take. How can you care for yourself at home? · Take your medicines exactly as prescribed. Call your doctor if you think you are having a problem with your medicine. · If your doctor recommends iron pills, take them as directed:  ? Try to take the pills on an empty stomach about 1 hour before or 2 hours after meals. But you may need to take iron with food to avoid an upset stomach. ? Do not take antacids or drink milk or caffeine drinks (such as coffee, tea, or cola) at the same time or within 2 hours of the time that you take your iron. They can make it hard for your body to absorb the iron. ? Vitamin C (from food or supplements) helps your body absorb iron. Try taking iron pills with a glass of orange juice or some other food that is high in vitamin C, such as citrus fruits. ? Iron pills may cause stomach problems, such as heartburn, nausea, diarrhea, constipation, and cramps. Be sure to drink plenty of fluids, and include fruits, vegetables, and fiber in your diet each day. Iron pills often make your bowel movements dark or green. ? If you forget to take an iron pill, do not take a double dose of iron the next time you take a pill. ? Keep iron pills out of the reach of small children. An overdose of iron can be very dangerous. · Follow your doctor's advice about eating iron-rich foods. These include red meat, shellfish, poultry, eggs, beans, raisins, whole-grain bread, and leafy green vegetables. · Steam vegetables to help them keep their iron content. When should you call for help? Call 911 anytime you think you may need emergency care. For example, call if:    · You have symptoms of a heart attack. These may include:  ? Chest pain or pressure, or a strange feeling in the chest.  ? Sweating. ? Shortness of breath. ? Nausea or vomiting. ? Pain, pressure, or a strange feeling in the back, neck, jaw, or upper belly or in one or both shoulders or arms. ? Lightheadedness or sudden weakness. ? A fast or irregular heartbeat. After you call 911, the  may tell you to chew 1 adult-strength or 2 to 4 low-dose aspirin. Wait for an ambulance.  Do not try to drive yourself.     · You passed out (lost consciousness).    Call your doctor now or seek immediate medical care if:    · You have new or increased shortness of breath.     · You are dizzy or lightheaded, or you feel like you may faint.     · Your fatigue and weakness continue or get worse.     · You have any abnormal bleeding, such as:  ? Nosebleeds. ? Vaginal bleeding that is different (heavier, more frequent, at a different time of the month) than what you are used to.  ? Bloody or black stools, or rectal bleeding. ? Bloody or pink urine.    Watch closely for changes in your health, and be sure to contact your doctor if:    · You do not get better as expected. Where can you learn more? Go to http://mikael-oskar.info/. Enter R301 in the search box to learn more about \"Anemia: Care Instructions. \"  Current as of: March 28, 2019  Content Version: 12.1  © 6120-3000 [employer]. Care instructions adapted under license by [employer] (which disclaims liability or warranty for this information). If you have questions about a medical condition or this instruction, always ask your healthcare professional. [address] any warranty or liability for your use of this information.

## 2019-07-29 RX ORDER — NIACIN 500 MG/1
TABLET, EXTENDED RELEASE ORAL
Qty: 90 TAB | Refills: 3 | Status: SHIPPED | OUTPATIENT
Start: 2019-07-29 | End: 2020-02-28 | Stop reason: SDUPTHER

## 2019-07-29 RX ORDER — ESTRADIOL 0.05 MG/D
FILM, EXTENDED RELEASE TRANSDERMAL
Qty: 24 PATCH | Refills: 3 | Status: SHIPPED | OUTPATIENT
Start: 2019-07-29 | End: 2019-09-20 | Stop reason: ALTCHOICE

## 2019-07-29 NOTE — TELEPHONE ENCOUNTER
RX refill request from the patient/pharmacy. Patient last seen 07- with labs, and next appt. scheduled for 09-  Requested Prescriptions     Pending Prescriptions Disp Refills    estradiol (VIVELLE) 0.05 mg/24 hr 24 Patch 3     Sig: APPLY ONE PATCH TWICE WEEKLY    niacin ER (NIASPAN) 500 mg tablet 90 Tab 3     Sig: TAKE 1 TABLET BY MOUTH AT BEDTIME   .

## 2019-07-30 LAB
ANA TITR SER IF: NEGATIVE {TITER}
CRP SERPL-MCNC: 2 MG/L (ref 0–10)
ERYTHROCYTE [SEDIMENTATION RATE] IN BLOOD BY WESTERGREN METHOD: 15 MM/HR (ref 0–40)
RHEUMATOID FACT SERPL-ACNC: <10 IU/ML (ref 0–13.9)
T4 FREE SERPL-MCNC: 1 NG/DL (ref 0.58–2.3)
TSH SERPL DL<=0.05 MIU/L-ACNC: 1.05 UIU/ML (ref 0.34–5.6)

## 2019-07-30 NOTE — PROGRESS NOTES
All labs returned normal except for still mildly anemic but that is improved. No treatment changes needed based upon lab studies.

## 2019-08-20 ENCOUNTER — TELEPHONE (OUTPATIENT)
Dept: INTERNAL MEDICINE CLINIC | Age: 64
End: 2019-08-20

## 2019-08-22 RX ORDER — ATORVASTATIN CALCIUM 20 MG/1
TABLET, FILM COATED ORAL
Qty: 90 TAB | Refills: 3 | Status: SHIPPED | OUTPATIENT
Start: 2019-08-22 | End: 2020-02-28 | Stop reason: SDUPTHER

## 2019-08-22 NOTE — TELEPHONE ENCOUNTER
PCP: Sánchez Dickerson MD    Last appt: 7/26/2019  Future Appointments   Date Time Provider Diana Kelley   9/20/2019  9:40 AM Sánchez Dickerson MD [facility] MYLES G[facility]STEIN       Requested Prescriptions     Pending Prescriptions Disp Refills    atorvastatin (LIPITOR) 20 mg tablet [Pharmacy Med Name: ATORVASTATIN 20MG TABLETS] 90 Tab 3     Sig: TAKE 1 TABLET BY MOUTH NIGHTLY.        Prior labs and Blood pressures:  BP Readings from Last 3 Encounters:   07/26/19 126/82   07/05/19 118/80   06/12/19 110/82     Lab Results   Component Value Date/Time    Sodium 141 07/26/2019 02:34 PM    Potassium 4.6 07/26/2019 02:34 PM    Chloride 101 07/26/2019 02:34 PM    CO2 29.0 07/26/2019 02:34 PM    Anion gap 11 07/26/2019 02:34 PM    Glucose 83 07/26/2019 02:34 PM    BUN 18.0 (H) 07/26/2019 02:34 PM    Creatinine 0.9 07/26/2019 02:34 PM    BUN/Creatinine ratio 20 07/26/2019 02:34 PM    GFR est AA >60 07/26/2019 02:34 PM    GFR est non-AA >60 07/26/2019 02:34 PM    Calcium 10.4 (H) 07/26/2019 02:34 PM     Lab Results   Component Value Date/Time    Hemoglobin A1c 5.6 03/08/2017 03:00 PM     Lab Results   Component Value Date/Time    Cholesterol, total 128 07/26/2019 02:34 PM    Cholesterol (POC) 146.0 06/15/2018 11:40 AM    HDL Cholesterol 45 07/26/2019 02:34 PM    HDL Cholesterol (POC) 48.0 06/15/2018 11:40 AM    LDL Cholesterol (POC) 77.6 06/15/2018 11:40 AM    LDL, calculated 68 07/26/2019 02:34 PM    VLDL, calculated 15 12/14/2018 11:14 AM    VLDL 15 07/26/2019 02:34 PM    Triglyceride 73 07/26/2019 02:34 PM    Triglycerides (POC) 102.0 06/15/2018 11:40 AM    CHOL/HDL Ratio 3 07/26/2019 02:34 PM     No results found for: MAYCO Ryan    Lab Results   Component Value Date/Time    TSH 1.930 09/14/2018 11:26 AM    TSH, 3rd generation 1.05 07/26/2019 02:34 PM

## 2019-09-19 PROBLEM — Z00.00 WELCOME TO MEDICARE PREVENTIVE VISIT: Status: RESOLVED | Noted: 2017-08-21 | Resolved: 2019-09-19

## 2019-09-19 NOTE — PROGRESS NOTES
Chief Complaint   Patient presents with    Hypertension     3 month f/up    Chronic Kidney Disease    GERD    Mouth Pain     treating currently for dry mouth by Dr. Lidya Ken (ENT)    Cholesterol Problem       SUBJECTIVE:    Malu Parrish is a 71 y.o. female who returns in follow-up for medical problems include hypertension, hyperlipidemia, CKD, GERD, DJD, obesity and other medical problems. She is taking her medications and trying to follow her diet and trying to get some exercise regular. She currently denies any chest pain, shortness breath, palpitations, PND, orthopnea or other cardiorespiratory complaints. She notes no GI or  complaints. She notes no headaches, dizziness or neurologic complaints. There are no current arthritic complaints and there are no other complaints on complete review of systems. Current Outpatient Medications   Medication Sig Dispense Refill    estradiol (ESTRACE) 1 mg tablet Take 1 Tab by mouth daily. 30 Tab prn    atorvastatin (LIPITOR) 20 mg tablet TAKE 1 TABLET BY MOUTH NIGHTLY. 90 Tab 3    niacin ER (NIASPAN) 500 mg tablet TAKE 1 TABLET BY MOUTH AT BEDTIME 90 Tab 3    olmesartan (BENICAR) 40 mg tablet Take 1 Tab by mouth daily. 30 Tab prn    diclofenac EC (VOLTAREN) 75 mg EC tablet take 1 tablet by mouth twice a day 60 Tab 11    potassium chloride (K-DUR, KLOR-CON) 20 mEq tablet take 1 tablet by mouth twice a day 180 Tab 3    meloxicam (MOBIC) 15 mg tablet Take 1 Tab by mouth daily. 30 Tab prn    OTHER Indications: Melaleuca Phytomega - Take 2 softgels twice a day.  OTHER Indications: Melaleuca Vitality - Takes 1 tablet twice a day.  ferrous sulfate 325 mg (65 mg iron) tablet Take  by mouth three (3) times daily (with meals).  aspirin delayed-release 325 mg tablet Take 325 mg by mouth daily. At bedtime with Niacin      vitamin E (AQUA GEMS) 400 unit capsule Take 400 Units by mouth daily.       cholecalciferol, vitamin D3, 2,000 unit tab Take 1 Tab by mouth daily.  lansoprazole (PREVACID) 15 mg capsule Take 15 mg by mouth every Monday, Wednesday, Friday. Takes as needed      ascorbic acid (VITAMIN C) 250 mg tablet Take 500 mg by mouth daily.        Past Medical History:   Diagnosis Date    Allergic rhinitis 8/9/2017    Anemia 8/9/2017    Anxiety 8/9/2017    Arrhythmia     irregular heart beat hx and beating fast per patient/no cardiologist    Arthritis     JOINTS  WORSE WAIST DOWN     Back pain 8/9/2017    CKD (chronic kidney disease) 8/9/2017    COPD (chronic obstructive pulmonary disease) ([facility] [address] 8/9/2017    Crohn disease ([facility] [address] 8/9/2017    DJD (degenerative joint disease) 8/9/2017    Dry mouth     evaluated by Dr. Terrell Thomas (ENT)    GERD (gastroesophageal reflux disease) 8/9/2017    Hormone replacement therapy (HRT) 8/9/2017    Hyperlipidemia 8/9/2017    Hypertension     Hypertension with renal disease 8/9/2017    Hypokalemia 8/9/2017    Hypothyroid 8/9/2017    Ill-defined condition     neuropathy feet    Inguinal hernia 8/9/2017    Leg numbness 8/9/2017    Lumbar herniated disc 8/9/2017    OAB (overactive bladder) 8/9/2017    Obesity 8/9/2017    On statin therapy 8/9/2017    Osteoarthritis 8/9/2017    Pelvic pain in female 6/9/2208    Umbilical hernia 8/9/4539    Varicose vein of leg 8/9/2017     Past Surgical History:   Procedure Laterality Date    BREAST SURGERY PROCEDURE UNLISTED      mass removed left breast x2 benign    HX COLONOSCOPY  12/2010    normal    HX COLONOSCOPY  12/30/2014    normal     HX HEENT      cyst removed left eye    HX HERNIA REPAIR  06/02/2017    right inguinal and umbilical (Dr. Tasneem Bassett)    HX HYSTERECTOMY      HX ORTHOPAEDIC      bilat knee injections    HX ORTHOPAEDIC      laser treatment left knee and foot    HX ORTHOPAEDIC      hx of gel shots bilat knee    HX ORTHOPAEDIC  12/27/2016    left knee coritizon shot    HX OTHER SURGICAL      cyst removed front left scalp    HX OTHER SURGICAL      colonoscopy    HX OTHER SURGICAL      tooth implant    HX TUBAL LIGATION      VASCULAR SURGERY PROCEDURE UNLIST      vein striping     Allergies   Allergen Reactions    Lisinopril Cough       REVIEW OF SYSTEMS:  General: negative for - chills or fever, or weight loss or gain  ENT: negative for - headaches, nasal congestion or tinnitus  Eyes: no blurred or visual changes  Neck: No stiffness or swollen nodes  Respiratory: negative for - cough, hemoptysis, shortness of breath or wheezing  Cardiovascular : negative for - chest pain, edema, palpitations or shortness of breath  Gastrointestinal: negative for - abdominal pain, blood in stools, heartburn or nausea/vomiting  Genito-Urinary: no dysuria, trouble voiding, or hematuria  Musculoskeletal: negative for - gait disturbance, joint pain, joint stiffness or joint swelling  Neurological: no TIA or stroke symptoms  Hematologic: no bruises, no bleeding  Lymphatic: no swollen glands  Integument: no lumps, mole changes, nail changes or rash  Endocrine:no malaise/lethargy poly uria or polydipsia or unexpected weight changes        Social History     Socioeconomic History    Marital status:      Spouse name: Not on file    Number of children: Not on file    Years of education: Not on file    Highest education level: Not on file   Tobacco Use    Smoking status: Never Smoker    Smokeless tobacco: Never Used   Substance and Sexual Activity    Alcohol use: Yes     Comment: once a year    Drug use: No    Sexual activity: Yes     Partners: Male     Family History   Problem Relation Age of Onset    No Known Problems Mother     No Known Problems Father     Stroke Sister        OBJECTIVE:     Visit Vitals  /80 (BP 1 Location: Left arm, BP Patient Position: Sitting)   Pulse 62   Temp 98.1 °F (36.7 °C)   Resp 16   Ht 5' 6\" (1.676 m)   Wt 207 lb 9.6 oz (94.2 kg)   LMP  (LMP Unknown)   SpO2 97%   BMI 33.51 kg/m²     CONSTITUTIONAL:   well nourished, appears age appropriate  EYES: sclera anicteric, PERRL, EOMI  ENMT:nares clear, moist mucous membranes, pharynx clear  NECK: supple. Thyroid normal, No JVD or bruits  RESPIRATORY: Chest: clear to ascultation and percussion, normal inspiratory effort  CARDIOVASCULAR: Heart: regular rate and rhythm no murmurs, rubs or gallops, PMI not displaced, No thrills  GASTROINTESTINAL: Abdomen: non distended, soft, non tender, bowel sounds normal  HEMATOLOGIC: no purpura, petechiae or bruising  LYMPHATIC: No lymph node enlargemant  MUSCULOSKELETAL: Extremities: no edema or active synovitis, pulse 1+   INTEGUMENT: No unusual rashes or suspicious skin lesions noted. Nails appear normal.  PERIPHERAL VASCULAR: normal pulses femoral, PT and DP  NEUROLOGIC: non-focal exam, A & O X 3  PSYCHIATRIC:, appropriate affect     ASSESSMENT:   1. Hypertension with renal disease    2. Mixed hyperlipidemia    3. Gastroesophageal reflux disease without esophagitis    4. Primary osteoarthritis involving multiple joints    5. Chronic obstructive pulmonary disease, unspecified COPD type (HCC)    6. Class 1 obesity due to excess calories without serious comorbidity with body mass index (BMI) of 33.0 to 33.9 in adult    7. Stage 3 chronic kidney disease ([facility] [address]    8. Encounter for immunization      Impression  1. Hypertension that is controlled so continue current therapy reviewed with her. 2.  Hyperlipidemia prior lab reviewed and repeat status pending I will adjust if needed. 3.  GERD that is stable  4. DJD currently stable  5. COPD stable  6. Obesity we did discuss diet, exercise and weight reduction for overall health benefit. 7.  CKD stage III repeat status pending  8. Immunization updated today with Prevnar 13  I will call with lab results and make further recommendations or adjustments if necessary. Follow-up in 3 months or sooner if there is a problem.     PLAN:  .  Orders Placed This Encounter    Pneumococcal Conjugate vaccine - 13 valent (50 years and older)    METABOLIC PANEL, COMPREHENSIVE ([facility])    LIPID PANEL ([facility])    estradiol (ESTRACE) 1 mg tablet         ATTENTION:   This medical record was transcribed using an electronic medical records system. Although proofread, it may and can contain electronic and spelling errors. Other human spelling and other errors may be present. Corrections may be executed at a later time. Please feel free to contact us for any clarifications as needed. Follow-up and Dispositions    · Return in about 3 months (around 12/20/2019). No results found for any visits on 09/20/19. Kade Ballesteros MD    The patient verbalized understanding of the problems and plans as explained.

## 2019-09-20 ENCOUNTER — OFFICE VISIT (OUTPATIENT)
Dept: INTERNAL MEDICINE CLINIC | Age: 64
End: 2019-09-20

## 2019-09-20 VITALS
DIASTOLIC BLOOD PRESSURE: 80 MMHG | WEIGHT: 207.6 LBS | OXYGEN SATURATION: 97 % | SYSTOLIC BLOOD PRESSURE: 132 MMHG | HEART RATE: 62 BPM | BODY MASS INDEX: 33.37 KG/M2 | TEMPERATURE: 98.1 F | HEIGHT: 66 IN | RESPIRATION RATE: 16 BRPM

## 2019-09-20 DIAGNOSIS — Z23 ENCOUNTER FOR IMMUNIZATION: ICD-10-CM

## 2019-09-20 DIAGNOSIS — E66.09 CLASS 1 OBESITY DUE TO EXCESS CALORIES WITHOUT SERIOUS COMORBIDITY WITH BODY MASS INDEX (BMI) OF 33.0 TO 33.9 IN ADULT: ICD-10-CM

## 2019-09-20 DIAGNOSIS — I12.9 HYPERTENSION WITH RENAL DISEASE: Primary | ICD-10-CM

## 2019-09-20 DIAGNOSIS — M15.9 PRIMARY OSTEOARTHRITIS INVOLVING MULTIPLE JOINTS: ICD-10-CM

## 2019-09-20 DIAGNOSIS — E78.2 MIXED HYPERLIPIDEMIA: ICD-10-CM

## 2019-09-20 DIAGNOSIS — K21.9 GASTROESOPHAGEAL REFLUX DISEASE WITHOUT ESOPHAGITIS: ICD-10-CM

## 2019-09-20 DIAGNOSIS — J44.9 CHRONIC OBSTRUCTIVE PULMONARY DISEASE, UNSPECIFIED COPD TYPE (HCC): ICD-10-CM

## 2019-09-20 DIAGNOSIS — N18.30 STAGE 3 CHRONIC KIDNEY DISEASE (HCC): ICD-10-CM

## 2019-09-20 LAB
A-G RATIO,AGRAT: 1.4 RATIO
ALBUMIN SERPL-MCNC: 4 G/DL (ref 3.9–5.4)
ALP SERPL-CCNC: 96 U/L (ref 38–126)
ALT SERPL-CCNC: 51 U/L (ref 0–35)
ANION GAP SERPL CALC-SCNC: 9 MMOL/L
AST SERPL W P-5'-P-CCNC: 30 U/L (ref 14–36)
BILIRUB SERPL-MCNC: 0.4 MG/DL (ref 0.2–1.3)
BUN SERPL-MCNC: 19 MG/DL (ref 7–17)
BUN/CREATININE RATIO,BUCR: 24 RATIO
CALCIUM SERPL-MCNC: 9.4 MG/DL (ref 8.4–10.2)
CHLORIDE SERPL-SCNC: 103 MMOL/L (ref 98–107)
CHOL/HDL RATIO,CHHD: 3 RATIO (ref 0–4)
CHOLEST SERPL-MCNC: 149 MG/DL (ref 0–200)
CO2 SERPL-SCNC: 28 MMOL/L (ref 22–32)
CREAT SERPL-MCNC: 0.8 MG/DL (ref 0.7–1.2)
GLOBULIN,GLOB: 2.8
GLUCOSE SERPL-MCNC: 93 MG/DL (ref 65–105)
HDLC SERPL-MCNC: 44 MG/DL (ref 35–130)
LDL/HDL RATIO,LDHD: 2 RATIO
LDLC SERPL CALC-MCNC: 84 MG/DL (ref 0–130)
POTASSIUM SERPL-SCNC: 4.5 MMOL/L (ref 3.6–5)
PROT SERPL-MCNC: 6.8 G/DL (ref 6.3–8.2)
SODIUM SERPL-SCNC: 140 MMOL/L (ref 137–145)
TRIGL SERPL-MCNC: 104 MG/DL (ref 0–200)
VLDLC SERPL CALC-MCNC: 21 MG/DL

## 2019-09-20 RX ORDER — ESTRADIOL 1 MG/1
1 TABLET ORAL DAILY
Qty: 30 TAB | Status: SHIPPED | OUTPATIENT
Start: 2019-09-20 | End: 2020-01-28 | Stop reason: ALTCHOICE

## 2019-09-20 NOTE — PROGRESS NOTES
Per Karolyn Garcia from Dr. Gisell Velazquez and after obtaining consent from patient  and vaccine being verified by Whitney Lawson RN, Prevnar 13 0.5 mL was given IM in left deltoid. Patient tolerated and remained in clinic for 15 minutes with no reactions noted.

## 2019-09-20 NOTE — PROGRESS NOTES
Chief Complaint   Patient presents with    Hypertension     3 month f/up    Chronic Kidney Disease    GERD    Mouth Pain     treating currently for dry mouth by Dr. Xin Newell (ENT)    Cholesterol Problem     1. Have you been to the ER, urgent care clinic since your last visit? Hospitalized since your last visit?no    2. Have you seen or consulted any other health care providers outside of the [address] [place] [address] since your last visit? Include any pap smears or colon screening. Has seen Dr. Xin Newell (ENT) for dry mouth syndrome.

## 2019-09-20 NOTE — PATIENT INSTRUCTIONS
Learning About High Blood Pressure What is high blood pressure? Blood pressure is a measure of how hard the blood pushes against the walls of your arteries. It's normal for blood pressure to go up and down throughout the day, but if it stays up, you have high blood pressure. Another name for high blood pressure is hypertension. Two numbers tell you your blood pressure. The first number is the systolic pressure. It shows how hard the blood pushes when your heart is pumping. The second number is the diastolic pressure. It shows how hard the blood pushes between heartbeats, when your heart is relaxed and filling with blood. Your doctor will give you a goal for your blood pressure. Your goal will be based on your health and your age. High blood pressure (hypertension) means that the top number stays high, or the bottom number stays high, or both. High blood pressure increases the risk of stroke, heart attack, and other problems. You and your doctor will talk about your risks of these problems based on your blood pressure. What happens when you have high blood pressure? · Blood flows through your arteries with too much force. Over time, this damages the walls of your arteries. But you can't feel it. High blood pressure usually doesn't cause symptoms. · Fat and calcium start to build up in your arteries. This buildup is called plaque. Plaque makes your arteries narrower and stiffer. Blood can't flow through them as easily. · This lack of good blood flow starts to damage some of the organs in your body. This can lead to problems such as coronary artery disease and heart attack, heart failure, stroke, kidney failure, and eye damage. How can you prevent high blood pressure? · Stay at a healthy weight. · Try to limit how much sodium you eat to less than 2,300 milligrams (mg) a day. If you limit your sodium to 1,500 mg a day, you can lower your blood pressure even more. ? Buy foods that are labeled \"unsalted,\" \"sodium-free,\" or \"low-sodium. \" Foods labeled \"reduced-sodium\" and \"light sodium\" may still have too much sodium. ? Flavor your food with garlic, lemon juice, onion, vinegar, herbs, and spices instead of salt. Do not use soy sauce, steak sauce, onion salt, garlic salt, mustard, or ketchup on your food. ? Use less salt (or none) when recipes call for it. You can often use half the salt a recipe calls for without losing flavor. · Be physically active. Get at least 30 minutes of exercise on most days of the week. Walking is a good choice. You also may want to do other activities, such as running, swimming, cycling, or playing tennis or team sports. · Limit alcohol to 2 drinks a day for men and 1 drink a day for women. · Eat plenty of fruits, vegetables, and low-fat dairy products. Eat less saturated and total fats. How is high blood pressure treated? · Your doctor will suggest making lifestyle changes to help your heart. For example, your doctor may ask you to eat healthy foods, quit smoking, lose extra weight, and be more active. · If lifestyle changes don't help enough, your doctor may recommend that you take medicine. · When blood pressure is very high, medicines are needed to lower it. Follow-up care is a key part of your treatment and safety. Be sure to make and go to all appointments, and call your doctor if you are having problems. It's also a good idea to know your test results and keep a list of the medicines you take. Where can you learn more? Go to http://mikael-oskar.info/. Enter P501 in the search box to learn more about \"Learning About High Blood Pressure. \" Current as of: April 9, 2019 Content Version: 12.2 © 8167-0049 [employer]. Care instructions adapted under license by [employer] (which disclaims liability or warranty for this information).  If you have questions about a medical condition or this instruction, always ask your healthcare professional. [place] [address] any warranty or liability for your use of this information.

## 2019-10-21 RX ORDER — POTASSIUM CHLORIDE 1500 MG/1
TABLET, FILM COATED, EXTENDED RELEASE ORAL
Qty: 180 TAB | Refills: 3 | Status: SHIPPED | OUTPATIENT
Start: 2019-10-21 | End: 2019-11-05 | Stop reason: ALTCHOICE

## 2019-10-21 NOTE — TELEPHONE ENCOUNTER
RX refill request from the patient/pharmacy. Patient last seen 09- with labs, and next appt. scheduled for 01-  Requested Prescriptions     Pending Prescriptions Disp Refills    potassium chloride SR (K-TAB) 20 mEq tablet [Pharmacy Med Name: POTASSIUM CHLORIDE 20MEQ ER TABLETS] 180 Tab 3     Sig: TAKE 1 TABLET BY MOUTH TWICE A DAY   .

## 2019-11-05 ENCOUNTER — OFFICE VISIT (OUTPATIENT)
Dept: INTERNAL MEDICINE CLINIC | Age: 64
End: 2019-11-05

## 2019-11-05 VITALS
SYSTOLIC BLOOD PRESSURE: 112 MMHG | BODY MASS INDEX: 32.75 KG/M2 | RESPIRATION RATE: 18 BRPM | TEMPERATURE: 98.1 F | OXYGEN SATURATION: 97 % | WEIGHT: 203.8 LBS | DIASTOLIC BLOOD PRESSURE: 88 MMHG | HEIGHT: 66 IN | HEART RATE: 81 BPM

## 2019-11-05 DIAGNOSIS — Z23 ENCOUNTER FOR IMMUNIZATION: ICD-10-CM

## 2019-11-05 DIAGNOSIS — R35.0 URINARY FREQUENCY: Primary | ICD-10-CM

## 2019-11-05 DIAGNOSIS — R10.2 PERINEAL PAIN IN FEMALE: ICD-10-CM

## 2019-11-05 DIAGNOSIS — N81.10 CYSTOCELE WITHOUT UTERINE PROLAPSE: ICD-10-CM

## 2019-11-05 LAB
BILIRUB UR QL: NEGATIVE
CLARITY: CLEAR
COLOR UR: ABNORMAL
GLUCOSE 24H UR-MRATE: NEGATIVE G/(24.H)
HGB UR QL STRIP: NEGATIVE
KETONES UR QL STRIP.AUTO: NEGATIVE
LEUKOCYTE ESTERASE: NEGATIVE
NITRITE UR QL STRIP.AUTO: NEGATIVE
PH UR STRIP: 5 [PH] (ref 5–7)
PROT UR STRIP-MCNC: NEGATIVE MG/DL
RBC #/AREA URNS HPF: ABNORMAL #/HPF
SP GR UR REFRACTOMETRY: 1.01 (ref 1–1.03)
SQUAMOUS EPITHELIAL CELLS: ABNORMAL
UROBILINOGEN UR QL STRIP.AUTO: NEGATIVE
WBC URNS QL MICRO: ABNORMAL #/HPF

## 2019-11-05 NOTE — PATIENT INSTRUCTIONS
Allergies: Care Instructions Your Care Instructions Allergies occur when your body's defense system (immune system) overreacts to certain substances. The immune system treats a harmless substance as if it were a harmful germ or virus. Many things can cause this overreaction, including pollens, medicine, food, dust, animal dander, and mold. Allergies can be mild or severe. Mild allergies can be managed with home treatment. But medicine may be needed to prevent problems. Managing your allergies is an important part of staying healthy. Your doctor may suggest that you have allergy testing to help find out what is causing your allergies. When you know what things trigger your symptoms, you can avoid them. This can prevent allergy symptoms and other health problems. For severe allergies that cause reactions that affect your whole body (anaphylactic reactions), your doctor may prescribe a shot of epinephrine to carry with you in case you have a severe reaction. Learn how to give yourself the shot and keep it with you at all times. Make sure it is not . Follow-up care is a key part of your treatment and safety. Be sure to make and go to all appointments, and call your doctor if you are having problems. It's also a good idea to know your test results and keep a list of the medicines you take. How can you care for yourself at home? · If you have been told by your doctor that dust or dust mites are causing your allergy, decrease the dust around your bed: 
? Wash sheets, pillowcases, and other bedding in hot water every week. ? Use dust-proof covers for pillows, duvets, and mattresses. Avoid plastic covers because they tear easily and do not \"breathe. \" Wash as instructed on the label. ? Do not use any blankets and pillows that you do not need. ? Use blankets that you can wash in your washing machine. ? Consider removing drapes and carpets, which attract and hold dust, from your bedroom. · If you are allergic to house dust and mites, do not use home humidifiers. Your doctor can suggest ways you can control dust and mites. · Look for signs of cockroaches. Cockroaches cause allergic reactions. Use cockroach baits to get rid of them. Then, clean your home well. Cockroaches like areas where grocery bags, newspapers, empty bottles, or cardboard boxes are stored. Do not keep these inside your home, and keep trash and food containers sealed. Seal off any spots where cockroaches might enter your home. · If you are allergic to mold, get rid of furniture, rugs, and drapes that smell musty. Check for mold in the bathroom. · If you are allergic to outdoor pollen or mold spores, use air-conditioning. Change or clean all filters every month. Keep windows closed. · If you are allergic to pollen, stay inside when pollen counts are high. Use a vacuum  with a HEPA filter or a double-thickness filter at least two times each week. · Stay inside when air pollution is bad. Avoid paint fumes, perfumes, and other strong odors. · Avoid conditions that make your allergies worse. Stay away from smoke. Do not smoke or let anyone else smoke in your house. Do not use fireplaces or wood-burning stoves. · If you are allergic to your pets, change the air filter in your furnace every month. Use high-efficiency filters. · If you are allergic to pet dander, keep pets outside or out of your bedroom. Old carpet and cloth furniture can hold a lot of animal dander. You may need to replace them. When should you call for help? Give an epinephrine shot if: 
  · You think you are having a severe allergic reaction.  
  · You have symptoms in more than one body area, such as mild nausea and an itchy mouth.  
 After giving an epinephrine shot call 911, even if you feel better. 
 Call 911 if: 
  · You have symptoms of a severe allergic reaction. These may include: 
? Sudden raised, red areas (hives) all over your body. ? Swelling of the throat, mouth, lips, or tongue. ? Trouble breathing. ? Passing out (losing consciousness). Or you may feel very lightheaded or suddenly feel weak, confused, or restless.  
  · You have been given an epinephrine shot, even if you feel better.  
 Call your doctor now or seek immediate medical care if: 
  · You have symptoms of an allergic reaction, such as: ? A rash or hives (raised, red areas on the skin). ? Itching. ? Swelling. ? Belly pain, nausea, or vomiting.  
 Watch closely for changes in your health, and be sure to contact your doctor if: 
  · You do not get better as expected. Where can you learn more? Go to http://mikael-oskar.info/. Enter J836 in the search box to learn more about \"Allergies: Care Instructions. \" Current as of: April 7, 2019 Content Version: 12.2 © 8380-7534 [employer]. Care instructions adapted under license by [employer] (which disclaims liability or warranty for this information). If you have questions about a medical condition or this instruction, always ask your healthcare professional. [place] [address] any warranty or liability for your use of this information.

## 2019-11-05 NOTE — PROGRESS NOTES
After obtaining written consent and per orders of Dr. Lori Diaz, injection of flu vaccine given by Shelbie Alfaro RN. Order and injection/medication verified by second nurse/ma review by Keke Khalil LPN. Patient tolerated procedure well. VIS was given to them. No reactions noted.

## 2019-11-05 NOTE — PROGRESS NOTES
Chief Complaint   Patient presents with    Urinary Pain     pt states she is having urinary pressure    Anal Pain     Visit Vitals  /88 (BP 1 Location: Left arm, BP Patient Position: Sitting)   Pulse 81   Temp 98.1 °F (36.7 °C) (Oral)   Resp 18   Ht 5' 6\" (1.676 m)   Wt 203 lb 12.8 oz (92.4 kg)   LMP  (LMP Unknown)   SpO2 97%   BMI 32.89 kg/m²     1. Have you been to the ER, urgent care clinic since your last visit? Hospitalized since your last visit? No    2. Have you seen or consulted any other health care providers outside of the [address] [place] [address] since your last visit? Include any pap smears or colon screening.  No

## 2019-11-05 NOTE — PROGRESS NOTES
Subjective:   Reed Hollis is a 71 y.o. female      Chief Complaint   Patient presents with    Urinary Pain     pt states she is having urinary pressure    Anal Pain        History of present illness: She presents complaints of urinary frequency with particular note of nocturia. She notes no swelling in her ankles. She notes no increased thirst.  She notes no dysuria. She does note that she seems to have a bulge in the perineal region and she is not sure if your bladder or rectum is a problem. She does not have any problems with bowel movements however. She notes no fevers or chills. She notes no abdominal pain.     Patient Active Problem List   Diagnosis Code    Right inguinal hernia K40.90    Ventral hernia without obstruction or gangrene K43.9    Chronic non-seasonal allergic rhinitis J30.89    Crohn disease ([address] K50.90    OAB (overactive bladder) N32.81    Varicose vein of leg U78.72    Umbilical hernia V55.6    Class 1 obesity due to excess calories without serious comorbidity with body mass index (BMI) of 33.0 to 33.9 in adult E66.09, Z68.33    Lumbar herniated disc M51.26    Inguinal hernia K40.90    Acquired hypothyroidism E03.9    Hypertension with renal disease I12.9    Mixed hyperlipidemia E78.2    Hormone replacement therapy (HRT) Z79.890    Gastroesophageal reflux disease without esophagitis K21.9    Primary osteoarthritis involving multiple joints M15.0    COPD (chronic obstructive pulmonary disease) (HCC) J44.9    Stage 3 chronic kidney disease (HCC) N18.3    Back pain M54.9    Anxiety F41.9    Anemia D64.9    Precordial pain R07.2    Acute bronchitis J20.9    Labyrinthitis of both ears H83.03    Acute non-recurrent maxillary sinusitis J01.00    Fungal dermatitis B36.9    Dry mouth R68.2    Alcohol screening Z13.39    Urinary frequency R35.0      Past Medical History:   Diagnosis Date    Allergic rhinitis 8/9/2017    Anemia 8/9/2017    Anxiety 8/9/2017    Arrhythmia     irregular heart beat hx and beating fast per patient/no cardiologist    Arthritis     JOINTS  WORSE WAIST DOWN     Back pain 8/9/2017    CKD (chronic kidney disease) 8/9/2017    COPD (chronic obstructive pulmonary disease) ([facility] 8/9/2017    Crohn disease ([facility] 8/9/2017    DJD (degenerative joint disease) 8/9/2017    Dry mouth     evaluated by Dr. Leticia Dunn (ENT)    GERD (gastroesophageal reflux disease) 8/9/2017    Hormone replacement therapy (HRT) 8/9/2017    Hyperlipidemia 8/9/2017    Hypertension     Hypertension with renal disease 8/9/2017    Hypokalemia 8/9/2017    Hypothyroid 8/9/2017    Ill-defined condition     neuropathy feet    Inguinal hernia 8/9/2017    Leg numbness 8/9/2017    Lumbar herniated disc 8/9/2017    OAB (overactive bladder) 8/9/2017    Obesity 8/9/2017    On statin therapy 8/9/2017    Osteoarthritis 8/9/2017    Pelvic pain in female 6/7/7715    Umbilical hernia 0/8/3867    Varicose vein of leg 8/9/2017      Allergies   Allergen Reactions    Lisinopril Cough      Family History   Problem Relation Age of Onset    No Known Problems Mother     No Known Problems Father     Stroke Sister       Social History     Socioeconomic History    Marital status:      Spouse name: Not on file    Number of children: Not on file    Years of education: Not on file    Highest education level: Not on file   Occupational History    Not on file   Social Needs    Financial resource strain: Not on file    Food insecurity:     Worry: Not on file     Inability: Not on file    Transportation needs:     Medical: Not on file     Non-medical: Not on file   Tobacco Use    Smoking status: Never Smoker    Smokeless tobacco: Never Used   Substance and Sexual Activity    Alcohol use: Yes     Comment: once a year    Drug use: No    Sexual activity: Yes     Partners: Male   Lifestyle    Physical activity:     Days per week: Not on file     Minutes per session: Not on file    Stress: Not on file   Relationships    Social connections:     Talks on phone: Not on file     Gets together: Not on file     Attends Taoist service: Not on file     Active member of club or organization: Not on file     Attends meetings of clubs or organizations: Not on file     Relationship status: Not on file    Intimate partner violence:     Fear of current or ex partner: Not on file     Emotionally abused: Not on file     Physically abused: Not on file     Forced sexual activity: Not on file   Other Topics Concern    Not on file   Social History Narrative    Not on file     Prior to Admission medications    Medication Sig Start Date End Date Taking? Authorizing Provider   estradiol (ESTRACE) 1 mg tablet Take 1 Tab by mouth daily. 9/20/19  Yes Darren Snyder MD   atorvastatin (LIPITOR) 20 mg tablet TAKE 1 TABLET BY MOUTH NIGHTLY. 8/22/19  Yes Niesha Jackson MD   niacin ER (NIASPAN) 500 mg tablet TAKE 1 TABLET BY MOUTH AT BEDTIME 7/29/19  Yes Darren Snyder MD   olmesartan (BENICAR) 40 mg tablet Take 1 Tab by mouth daily. 7/5/19  Yes Darren Snyder MD   diclofenac EC (VOLTAREN) 75 mg EC tablet take 1 tablet by mouth twice a day 12/21/18  Yes Darren Snyder MD   potassium chloride (K-DUR, KLOR-CON) 20 mEq tablet take 1 tablet by mouth twice a day 10/19/18  Yes Darren Snyder MD   meloxicam (MOBIC) 15 mg tablet Take 1 Tab by mouth daily. 9/14/18  Yes Darren Snyder MD   OTHER Indications: Melaleuca Phytomega - Take 2 softgels twice a day. Yes Provider, Historical   OTHER Indications: Melaleuca Vitality - Takes 1 tablet twice a day. Yes Provider, Historical   ferrous sulfate 325 mg (65 mg iron) tablet Take  by mouth three (3) times daily (with meals). Yes Provider, Historical   aspirin delayed-release 325 mg tablet Take 325 mg by mouth daily.  At bedtime with Niacin   Yes Provider, Historical   vitamin E (AQUA GEMS) 400 unit capsule Take 400 Units by mouth daily. 3/16/17  Yes Andre Mao, TORRES   cholecalciferol, vitamin D3, 2,000 unit tab Take 1 Tab by mouth daily. Yes Provider, Historical   lansoprazole (PREVACID) 15 mg capsule Take 15 mg by mouth every Monday, Wednesday, Friday. Takes as needed   Yes Provider, Historical   ascorbic acid (VITAMIN C) 250 mg tablet Take 500 mg by mouth daily. Yes Provider, Historical        Review of Systems              Constitutional:  She denies fever, weight loss, sweats or fatigue. EYES: No blurred or double vision,               ENT: no nasal congestion, no headache or dizziness. No difficulty with               swallowing, taste, speech or smell. Respiratory:  No cough, wheezing or shortness of breath. No sputum production. Cardiac:  Denies chest pain, palpitations, unexplained indigestion, syncope, edema, PND or orthopnea. GI:  No changes in bowel movements, no abdominal pain, no bloating, anorexia, nausea, vomiting or heartburn. : Positive for perineal bulge with urinary frequency without dysuria. Denies incontinence or sexual dysfunction. Extremities:  No joint pain, stiffness or swelling  Back:.no pain or soreness  Skin:  No recent rashes or mole changes. Neurological:  No numbness, tingling, burning paresthesias or loss of motor strength. No syncope, dizziness, frequent headaches or memory loss. Hematologic:  No easy bruising  Lymphatic: No lymph node enlargement    Objective:     Vitals:    11/05/19 1427   BP: 112/88   Pulse: 81   Resp: 18   Temp: 98.1 °F (36.7 °C)   TempSrc: Oral   SpO2: 97%   Weight: 203 lb 12.8 oz (92.4 kg)   Height: 5' 6\" (1.676 m)   PainSc:   0 - No pain       Body mass index is 32.89 kg/m². Physical Examination:              General Appearance:  Well-developed, well-nourished, no acute distress. HEENT:      Ears:  The TMs and ear canals were clear. Eyes:  The pupillary responses were normal.  Extraocular muscle function intact.   Lids and conjunctiva not injected. Funduscopic exam revealed sharp disc margins. Nares: Clear w/o edema or erythema  Pharynx:  Clear with teeth in good repair. No masses were noted. Neck:  Supple without thyromegaly or adenopathy. No JVD noted. No carotid                bruits. Lungs:  Clear to auscultation and percussion. Cardiac:  Regular rate and rhythm without murmur. PMI not displaced. No gallop, rub or click. Abdominal: Soft, non-tender, no hepata-spleenomegally or masses  GYN: There is a small cystocele and rectocele. She is status post hysterectomy. Extremities:  No clubbing, cyanosis or edema. Skin:  No rash or unusual mole changes noted. Lymph Nodes:  None felt in the cervical, supraclavicular, axillary or inguinal region. Neurological: . DTRs 2+ and symmetric. Station and gait normal.   Hematologic:   No purpura or petechiae        Assessment/Plan:         1. Urinary frequency    2. Perineal pain in female    3. Encounter for immunization    4. Cystocele without uterine prolapse        Impressions/Plan:  Impression  1. Urinary frequency I will check a urine to make sure this not infected but I think this is related to a cystocele. 2.  Perineal pain related to cystocele/rectocele  3. Cystocele GYN uro-appointment with Dr. Rayo Thorpe  Flu shot given today. Recheck with me as previously scheduled. Orders Placed This Encounter    CULTURE, URINE    Influenza Vaccine Inactivated (IIV)(FLUAD), Subunit, Adjuvanted, IM, (35703)    URINALYSIS W/MICROSCOPIC ([facility])    REFERRAL TO GYNECOLOGY       Follow-up and Dispositions    · Return At prior appt. No results found for any visits on 11/05/19. Celina Wallace MD    The patient was given after the visit summary the patient verbalized an understanding of the plans and problems as explained.

## 2019-11-07 LAB — BACTERIA UR CULT: NO GROWTH

## 2019-12-20 DIAGNOSIS — M13.0 ARTHRITIS OF MULTIPLE SITES: ICD-10-CM

## 2019-12-20 RX ORDER — DICLOFENAC SODIUM 75 MG/1
TABLET, DELAYED RELEASE ORAL
Qty: 60 TAB | Refills: 11 | Status: SHIPPED | OUTPATIENT
Start: 2019-12-20 | End: 2020-01-24 | Stop reason: ALTCHOICE

## 2019-12-20 NOTE — TELEPHONE ENCOUNTER
PCP: Eros Amaral MD    Last appt: 11/5/2019  Future Appointments   Date Time Provider Diana Kelley   1/3/2020  9:20 AM Eros Amaral MD [address]   1/13/2020  1:00 PM [facility] PAT ROOM P3 [facility] PAT RI OR PRE AS       Requested Prescriptions     Pending Prescriptions Disp Refills    diclofenac EC (VOLTAREN) 75 mg EC tablet [Pharmacy Med Name: DICLOFENAC SODIUM 75MG DR TABLETS] 60 Tab 11     Sig: TAKE 1 TABLET BY MOUTH TWICE DAILY

## 2020-01-02 NOTE — PROGRESS NOTES
Chief Complaint   Patient presents with    Hypertension     3 month f/up    Chronic Kidney Disease    Cholesterol Problem    Thyroid Problem       SUBJECTIVE:    Naina Higginbotham is a 71 y.o. female who returns in follow-up for hypertension, hyperlipidemia, GERD, DJD, COPD, CKD stage III and other medical problems. She is taking her medications and trying to follow her diet and remain physically active. She currently denies any chest pain, shortness of breath, palpitations, PND, orthopnea or other cardiorespiratory complaints. There are no GI or  complaints. She notes no headaches, dizziness or neurologic complaints. There are no current arthritic complaints and no other complaints on complete review of systems. Current Outpatient Medications   Medication Sig Dispense Refill    diclofenac EC (VOLTAREN) 75 mg EC tablet TAKE 1 TABLET BY MOUTH TWICE DAILY 60 Tab 11    estradiol (ESTRACE) 1 mg tablet Take 1 Tab by mouth daily. (Patient taking differently: Take 1 mg by mouth daily. Taking two times a week) 30 Tab prn    atorvastatin (LIPITOR) 20 mg tablet TAKE 1 TABLET BY MOUTH NIGHTLY. 90 Tab 3    niacin ER (NIASPAN) 500 mg tablet TAKE 1 TABLET BY MOUTH AT BEDTIME 90 Tab 3    olmesartan (BENICAR) 40 mg tablet Take 1 Tab by mouth daily. 30 Tab prn    potassium chloride (K-DUR, KLOR-CON) 20 mEq tablet take 1 tablet by mouth twice a day 180 Tab 3    OTHER Indications: Melaleuca Phytomega - Take 2 softgels twice a day.  OTHER Indications: Melaleuca Vitality - Takes 1 tablet twice a day.  ferrous sulfate 325 mg (65 mg iron) tablet Take  by mouth three (3) times daily (with meals).  aspirin delayed-release 325 mg tablet Take 325 mg by mouth daily. At bedtime with Niacin      vitamin E (AQUA GEMS) 400 unit capsule Take 400 Units by mouth daily.  cholecalciferol, vitamin D3, 2,000 unit tab Take 1 Tab by mouth daily.       ascorbic acid (VITAMIN C) 250 mg tablet Take 500 mg by mouth daily.        Past Medical History:   Diagnosis Date    Allergic rhinitis 8/9/2017    Anemia 8/9/2017    Anxiety 8/9/2017    Arrhythmia     irregular heart beat hx and beating fast per patient/no cardiologist    Arthritis     JOINTS  WORSE WAIST DOWN     Back pain 8/9/2017    CKD (chronic kidney disease) 8/9/2017    COPD (chronic obstructive pulmonary disease) ([facility] [address] 8/9/2017    Crohn disease ([facility] [address] 8/9/2017    DJD (degenerative joint disease) 8/9/2017    Dry mouth     evaluated by Dr. Yana Shah (ENT)    GERD (gastroesophageal reflux disease) 8/9/2017    Hormone replacement therapy (HRT) 8/9/2017    Hyperlipidemia 8/9/2017    Hypertension     Hypertension with renal disease 8/9/2017    Hypokalemia 8/9/2017    Hypothyroid 8/9/2017    Ill-defined condition     neuropathy feet    Inguinal hernia 8/9/2017    Leg numbness 8/9/2017    Lumbar herniated disc 8/9/2017    OAB (overactive bladder) 8/9/2017    Obesity 8/9/2017    On statin therapy 8/9/2017    Osteoarthritis 8/9/2017    Pelvic pain in female 6/7/1597    Umbilical hernia 0/2/7420    Varicose vein of leg 8/9/2017     Past Surgical History:   Procedure Laterality Date    BREAST SURGERY PROCEDURE UNLISTED      mass removed left breast x2 benign    HX COLONOSCOPY  12/2010    normal    HX COLONOSCOPY  12/30/2014    normal     HX HEENT      cyst removed left eye    HX HERNIA REPAIR  06/02/2017    right inguinal and umbilical (Dr. Dennis Loyd)    HX HYSTERECTOMY      HX ORTHOPAEDIC      bilat knee injections    HX ORTHOPAEDIC      laser treatment left knee and foot    HX ORTHOPAEDIC      hx of gel shots bilat knee    HX ORTHOPAEDIC  12/27/2016    left knee coritizon shot    HX OTHER SURGICAL      cyst removed front left scalp    HX OTHER SURGICAL      colonoscopy    HX OTHER SURGICAL      tooth implant    HX TUBAL LIGATION      VASCULAR SURGERY PROCEDURE UNLIST      vein striping     Allergies   Allergen Reactions    Lisinopril Cough       REVIEW OF SYSTEMS:  General: negative for - chills or fever, or weight loss or gain  ENT: negative for - headaches, nasal congestion or tinnitus  Eyes: no blurred or visual changes  Neck: No stiffness or swollen nodes  Respiratory: negative for - cough, hemoptysis, shortness of breath or wheezing  Cardiovascular : negative for - chest pain, edema, palpitations or shortness of breath  Gastrointestinal: negative for - abdominal pain, blood in stools, heartburn or nausea/vomiting  Genito-Urinary: no dysuria, trouble voiding, or hematuria  Musculoskeletal: negative for - gait disturbance, joint pain, joint stiffness or joint swelling  Neurological: no TIA or stroke symptoms  Hematologic: no bruises, no bleeding  Lymphatic: no swollen glands  Integument: no lumps, mole changes, nail changes or rash  Endocrine:no malaise/lethargy poly uria or polydipsia or unexpected weight changes        Social History     Socioeconomic History    Marital status:      Spouse name: Not on file    Number of children: Not on file    Years of education: Not on file    Highest education level: Not on file   Tobacco Use    Smoking status: Never Smoker    Smokeless tobacco: Never Used   Substance and Sexual Activity    Alcohol use: Yes     Comment: once a year    Drug use: No    Sexual activity: Yes     Partners: Male     Family History   Problem Relation Age of Onset    No Known Problems Mother     No Known Problems Father     Stroke Sister        OBJECTIVE:     Visit Vitals  /86   Pulse 60   Temp 97.8 °F (36.6 °C)   Resp 16   Ht 5' 6\" (1.676 m)   Wt 199 lb 12.8 oz (90.6 kg)   LMP  (LMP Unknown)   SpO2 98%   BMI 32.25 kg/m²     CONSTITUTIONAL:   well nourished, appears age appropriate  EYES: sclera anicteric, PERRL, EOMI  ENMT:nares clear, moist mucous membranes, pharynx clear  NECK: supple.  Thyroid normal, No JVD or bruits  RESPIRATORY: Chest: clear to ascultation and percussion, normal inspiratory effort  CARDIOVASCULAR: Heart: regular rate and rhythm no murmurs, rubs or gallops, PMI not displaced, No thrills  GASTROINTESTINAL: Abdomen: non distended, soft, non tender, bowel sounds normal  HEMATOLOGIC: no purpura, petechiae or bruising  LYMPHATIC: No lymph node enlargemant  MUSCULOSKELETAL: Extremities: no edema or active synovitis, pulse 1+   INTEGUMENT: No unusual rashes or suspicious skin lesions noted. Nails appear normal.  PERIPHERAL VASCULAR: normal pulses femoral, PT and DP  NEUROLOGIC: non-focal exam, A & O X 3  PSYCHIATRIC:, appropriate affect     ASSESSMENT:   1. Hypertension with renal disease    2. Mixed hyperlipidemia    3. Gastroesophageal reflux disease without esophagitis    4. Primary osteoarthritis involving multiple joints    5. Stage 3 chronic kidney disease ([facility] [address]    6. Chronic obstructive pulmonary disease, unspecified COPD type (HCC)    7. Class 1 obesity due to excess calories without serious comorbidity with body mass index (BMI) of 33.0 to 33.9 in adult      Impression  1. Hypertension that is controlled so continue current therapy reviewed with her. 2.  Hyperlipidemia prior lab reviewed and repeat status pending I will adjust if needed. 3   GERD that is stable  4. DJD that is stable  5   CKD stage III repeat status pending  6   COPD that is stable  7   Obesity we did discuss diet, exercise and weight reduction for overall health benefit and note her weight is down a little since her last visit  I will call with lab and make further recommendations or adjustments if necessary. Follow-up in 3 months or sooner if there is a problem. PLAN:  .  Orders Placed This Encounter    METABOLIC PANEL, COMPREHENSIVE ([facility])    LIPID PANEL ([facility])    CK ([facility])         ATTENTION:   This medical record was transcribed using an electronic medical records system. Although proofread, it may and can contain electronic and spelling errors.   Other human spelling and other errors may be present. Corrections may be executed at a later time. Please feel free to contact us for any clarifications as needed. Follow-up and Dispositions    · Return in about 3 months (around 4/3/2020). No results found for any visits on 01/03/20. Isabela Kent MD    The patient verbalized understanding of the problems and plans as explained.

## 2020-01-03 ENCOUNTER — OFFICE VISIT (OUTPATIENT)
Dept: INTERNAL MEDICINE CLINIC | Age: 65
End: 2020-01-03

## 2020-01-03 VITALS
SYSTOLIC BLOOD PRESSURE: 134 MMHG | TEMPERATURE: 97.8 F | HEIGHT: 66 IN | RESPIRATION RATE: 16 BRPM | OXYGEN SATURATION: 98 % | DIASTOLIC BLOOD PRESSURE: 86 MMHG | WEIGHT: 199.8 LBS | HEART RATE: 60 BPM | BODY MASS INDEX: 32.11 KG/M2

## 2020-01-03 DIAGNOSIS — K21.9 GASTROESOPHAGEAL REFLUX DISEASE WITHOUT ESOPHAGITIS: ICD-10-CM

## 2020-01-03 DIAGNOSIS — E66.09 CLASS 1 OBESITY DUE TO EXCESS CALORIES WITHOUT SERIOUS COMORBIDITY WITH BODY MASS INDEX (BMI) OF 33.0 TO 33.9 IN ADULT: ICD-10-CM

## 2020-01-03 DIAGNOSIS — I12.9 HYPERTENSION WITH RENAL DISEASE: Primary | ICD-10-CM

## 2020-01-03 DIAGNOSIS — N18.30 STAGE 3 CHRONIC KIDNEY DISEASE (HCC): ICD-10-CM

## 2020-01-03 DIAGNOSIS — E78.2 MIXED HYPERLIPIDEMIA: ICD-10-CM

## 2020-01-03 DIAGNOSIS — J44.9 CHRONIC OBSTRUCTIVE PULMONARY DISEASE, UNSPECIFIED COPD TYPE (HCC): ICD-10-CM

## 2020-01-03 DIAGNOSIS — M15.9 PRIMARY OSTEOARTHRITIS INVOLVING MULTIPLE JOINTS: ICD-10-CM

## 2020-01-03 LAB
A-G RATIO,AGRAT: 1.5 RATIO
ALBUMIN SERPL-MCNC: 4.3 G/DL (ref 3.9–5.4)
ALP SERPL-CCNC: 104 U/L (ref 38–126)
ALT SERPL-CCNC: 42 U/L (ref 0–35)
ANION GAP SERPL CALC-SCNC: 11 MMOL/L
AST SERPL W P-5'-P-CCNC: 27 U/L (ref 14–36)
BILIRUB SERPL-MCNC: 0.6 MG/DL (ref 0.2–1.3)
BUN SERPL-MCNC: 19 MG/DL (ref 7–17)
BUN/CREATININE RATIO,BUCR: 21 RATIO
CALCIUM SERPL-MCNC: 10.1 MG/DL (ref 8.4–10.2)
CHLORIDE SERPL-SCNC: 102 MMOL/L (ref 98–107)
CHOL/HDL RATIO,CHHD: 3 RATIO (ref 0–4)
CHOLEST SERPL-MCNC: 158 MG/DL (ref 0–200)
CK SERPL-CCNC: 76 U/L (ref 30–135)
CO2 SERPL-SCNC: 27 MMOL/L (ref 22–32)
CREAT SERPL-MCNC: 0.9 MG/DL (ref 0.7–1.2)
GLOBULIN,GLOB: 2.9
GLUCOSE SERPL-MCNC: 83 MG/DL (ref 65–105)
HDLC SERPL-MCNC: 48 MG/DL (ref 35–130)
LDL/HDL RATIO,LDHD: 2 RATIO
LDLC SERPL CALC-MCNC: 93 MG/DL (ref 0–130)
POTASSIUM SERPL-SCNC: 4.6 MMOL/L (ref 3.6–5)
PROT SERPL-MCNC: 7.2 G/DL (ref 6.3–8.2)
SODIUM SERPL-SCNC: 140 MMOL/L (ref 137–145)
TRIGL SERPL-MCNC: 85 MG/DL (ref 0–200)
VLDLC SERPL CALC-MCNC: 17 MG/DL

## 2020-01-03 NOTE — PROGRESS NOTES
Chief Complaint   Patient presents with    Hypertension     3 month f/up    Chronic Kidney Disease    Cholesterol Problem    Thyroid Problem     1. Have you been to the ER, urgent care clinic since your last visit? Hospitalized since your last visit? No    2. Have you seen or consulted any other health care providers outside of the [address] [place] [address] since your last visit? Include any pap smears or colon screening.  No

## 2020-01-03 NOTE — PATIENT INSTRUCTIONS
Anemia: Care Instructions  Your Care Instructions    Anemia is a low level of red blood cells, which carry oxygen throughout your body. Many things can cause anemia. Lack of iron is one of the most common causes. Your body needs iron to make hemoglobin, a substance in red blood cells that carries oxygen from the lungs to your body's cells. Without enough iron, the body produces fewer and smaller red blood cells. As a result, your body's cells do not get enough oxygen, and you feel tired and weak. And you may have trouble concentrating. Bleeding is the most common cause of a lack of iron. You may have heavy menstrual bleeding or bleeding caused by conditions such as ulcers, hemorrhoids, or cancer. Regular use of aspirin or other anti-inflammatory medicines (such as ibuprofen) also can cause bleeding in some people. A lack of iron in your diet also can cause anemia, especially at times when the body needs more iron, such as during pregnancy, infancy, and the teen years. Your doctor may have prescribed iron pills. It may take several months of treatment for your iron levels to return to normal. Your doctor also may suggest that you eat foods that are rich in iron, such as meat and beans. There are many other causes of anemia. It is not always due to a lack of iron. Finding the specific cause of your anemia will help your doctor find the right treatment for you. Follow-up care is a key part of your treatment and safety. Be sure to make and go to all appointments, and call your doctor if you are having problems. It's also a good idea to know your test results and keep a list of the medicines you take. How can you care for yourself at home? · Take your medicines exactly as prescribed. Call your doctor if you think you are having a problem with your medicine. · If your doctor recommends iron pills, take them as directed:  ? Try to take the pills on an empty stomach about 1 hour before or 2 hours after meals. But you may need to take iron with food to avoid an upset stomach. ? Do not take antacids or drink milk or caffeine drinks (such as coffee, tea, or cola) at the same time or within 2 hours of the time that you take your iron. They can make it hard for your body to absorb the iron. ? Vitamin C (from food or supplements) helps your body absorb iron. Try taking iron pills with a glass of orange juice or some other food that is high in vitamin C, such as citrus fruits. ? Iron pills may cause stomach problems, such as heartburn, nausea, diarrhea, constipation, and cramps. Be sure to drink plenty of fluids, and include fruits, vegetables, and fiber in your diet each day. Iron pills often make your bowel movements dark or green. ? If you forget to take an iron pill, do not take a double dose of iron the next time you take a pill. ? Keep iron pills out of the reach of small children. An overdose of iron can be very dangerous. · Follow your doctor's advice about eating iron-rich foods. These include red meat, shellfish, poultry, eggs, beans, raisins, whole-grain bread, and leafy green vegetables. · Steam vegetables to help them keep their iron content. When should you call for help? Call 911 anytime you think you may need emergency care. For example, call if:    · You have symptoms of a heart attack. These may include:  ? Chest pain or pressure, or a strange feeling in the chest.  ? Sweating. ? Shortness of breath. ? Nausea or vomiting. ? Pain, pressure, or a strange feeling in the back, neck, jaw, or upper belly or in one or both shoulders or arms. ? Lightheadedness or sudden weakness. ? A fast or irregular heartbeat. After you call 911, the  may tell you to chew 1 adult-strength or 2 to 4 low-dose aspirin. Wait for an ambulance.  Do not try to drive yourself.     · You passed out (lost consciousness).    Call your doctor now or seek immediate medical care if:    · You have new or increased shortness of breath.     · You are dizzy or lightheaded, or you feel like you may faint.     · Your fatigue and weakness continue or get worse.     · You have any abnormal bleeding, such as:  ? Nosebleeds. ? Vaginal bleeding that is different (heavier, more frequent, at a different time of the month) than what you are used to.  ? Bloody or black stools, or rectal bleeding. ? Bloody or pink urine.    Watch closely for changes in your health, and be sure to contact your doctor if:    · You do not get better as expected. Where can you learn more? Go to http://mikael-oskar.info/. Enter R301 in the search box to learn more about \"Anemia: Care Instructions. \"  Current as of: March 28, 2019  Content Version: 12.2  © 0853-3925 [employer]. Care instructions adapted under license by [employer] (which disclaims liability or warranty for this information). If you have questions about a medical condition or this instruction, always ask your healthcare professional. [address] any warranty or liability for your use of this information.

## 2020-01-13 ENCOUNTER — HOSPITAL ENCOUNTER (OUTPATIENT)
Dept: GENERAL RADIOLOGY | Age: 65
Discharge: HOME | End: 2020-01-13
Attending: OBSTETRICS & GYNECOLOGY
Payer: MEDICARE

## 2020-01-13 ENCOUNTER — HOSPITAL ENCOUNTER (OUTPATIENT)
Dept: PREADMISSION TESTING | Age: 65
Discharge: HOME | End: 2020-01-13
Attending: OBSTETRICS & GYNECOLOGY
Payer: MEDICARE

## 2020-01-13 VITALS
HEART RATE: 66 BPM | OXYGEN SATURATION: 99 % | DIASTOLIC BLOOD PRESSURE: 58 MMHG | HEIGHT: 66 IN | TEMPERATURE: 98.1 F | SYSTOLIC BLOOD PRESSURE: 127 MMHG | BODY MASS INDEX: 32.45 KG/M2 | WEIGHT: 201.94 LBS | RESPIRATION RATE: 18 BRPM

## 2020-01-13 LAB
APPEARANCE UR: CLEAR
BACTERIA URNS QL MICRO: NEGATIVE /HPF
BILIRUB UR QL: NEGATIVE
COLOR UR: NORMAL
EPITH CASTS URNS QL MICRO: NORMAL /LPF
ERYTHROCYTE [DISTWIDTH] IN BLOOD BY AUTOMATED COUNT: 14.1 % (ref 11.5–14.5)
GLUCOSE UR STRIP.AUTO-MCNC: NEGATIVE MG/DL
HCT VFR BLD AUTO: 40.2 % (ref 35–47)
HGB BLD-MCNC: 12.4 G/DL (ref 11.5–16)
HGB UR QL STRIP: NEGATIVE
HYALINE CASTS URNS QL MICRO: NORMAL /LPF (ref 0–5)
KETONES UR QL STRIP.AUTO: NEGATIVE MG/DL
LEUKOCYTE ESTERASE UR QL STRIP.AUTO: NEGATIVE
MCH RBC QN AUTO: 29.7 PG (ref 26–34)
MCHC RBC AUTO-ENTMCNC: 30.8 G/DL (ref 30–36.5)
MCV RBC AUTO: 96.2 FL (ref 80–99)
NITRITE UR QL STRIP.AUTO: NEGATIVE
NRBC # BLD: 0 K/UL (ref 0–0.01)
NRBC BLD-RTO: 0 PER 100 WBC
PH UR STRIP: 6 [PH] (ref 5–8)
PLATELET # BLD AUTO: 227 K/UL (ref 150–400)
PMV BLD AUTO: 10.8 FL (ref 8.9–12.9)
PROT UR STRIP-MCNC: NEGATIVE MG/DL
RBC # BLD AUTO: 4.18 M/UL (ref 3.8–5.2)
RBC #/AREA URNS HPF: NORMAL /HPF (ref 0–5)
SP GR UR REFRACTOMETRY: 1.02 (ref 1–1.03)
UA: UC IF INDICATED,UAUC: NORMAL
UROBILINOGEN UR QL STRIP.AUTO: 1 EU/DL (ref 0.2–1)
WBC # BLD AUTO: 5.3 K/UL (ref 3.6–11)
WBC URNS QL MICRO: NORMAL /HPF (ref 0–4)

## 2020-01-13 PROCEDURE — 71046 X-RAY EXAM CHEST 2 VIEWS: CPT

## 2020-01-13 PROCEDURE — 36415 COLL VENOUS BLD VENIPUNCTURE: CPT

## 2020-01-13 PROCEDURE — 85027 COMPLETE CBC AUTOMATED: CPT

## 2020-01-13 PROCEDURE — 81001 URINALYSIS AUTO W/SCOPE: CPT

## 2020-01-13 RX ORDER — IBUPROFEN 200 MG
200 TABLET ORAL EVERY EVENING
COMMUNITY
End: 2020-01-24 | Stop reason: ALTCHOICE

## 2020-01-13 NOTE — PERIOP NOTES
[facility]  Preoperative Instructions        Surgery Date 1/27/2020       Time of Arrival 8:00 AM    1. On the day of your surgery, please report to the Surgical Services Registration Desk and sign in at your designated time. The Surgery Center is located to the right of the Emergency Room. 2. You must have someone with you to drive you home. You should not drive a car for 24 hours following surgery. Please make arrangements for a friend or family member to stay with you for the first 24 hours after your surgery. 3. Do not have anything to eat or drink (including water, gum, mints, coffee, juice) after midnight 1/26/2020?? Sudie Mar ? This may not apply to medications prescribed by your physician. ?(Please note below the special instructions with medications to take the morning of your procedure.)    4. We recommend you do not drink any alcoholic beverages for 24 hours before and after your surgery. 5. Contact your surgeons office for instructions on the following medications: non-steroidal anti-inflammatory drugs (i.e. Advil, Aleve), vitamins, and supplements. (Some surgeons will want you to stop these medications prior to surgery and others may allow you to take them)  **If you are currently taking Plavix, Coumadin, Aspirin and/or other blood-thinning agents, contact your surgeon for instructions. ** Your surgeon will partner with the physician prescribing these medications to determine if it is safe to stop or if you need to continue taking. Please do not stop taking these medications without instructions from your surgeon    6. Wear comfortable clothes. Wear glasses instead of contacts. Do not bring any money or jewelry. Please bring picture ID, insurance card, and any prearranged co-payment or hospital payment. Do not wear make-up, particularly mascara the morning of your surgery. Do not wear nail polish, particularly if you are having foot /hand surgery.   Wear your hair loose or down, no ponytails, buns, erin pins or clips. All body piercings must be removed. Please shower with antibacterial soap for three consecutive days before and on the morning of surgery, but do not apply any lotions, powders or deodorants after the shower on the day of surgery. Please use a fresh towels after each shower. Please sleep in clean clothes and change bed linens the night before surgery. Please do not shave for 48 hours prior to surgery. Shaving of the face is acceptable. 7. You should understand that if you do not follow these instructions your surgery may be cancelled. If your physical condition changes (I.e. fever, cold or flu) please contact your surgeon as soon as possible. 8. It is important that you be on time. If a situation occurs where you may be late, please call [phone] (OR Holding Area). 9. If you have any questions and or problems, please call [phone] (Pre-admission Testing). 10. Your surgery time may be subject to change. You will receive a phone call the evening prior if your time changes. 11.  If having outpatient surgery, you must have someone to drive you here, stay with you during the duration of your stay, and to drive you home at time of discharge. 12.   In an effort to improve the efficiency, privacy, and safety for all of our Pre-op patients visitors are not allowed in the Holding area. Once you arrive and are registered your family/visitors will be asked to remain in the waiting room. The Pre-op staff will get you from the Surgical Waiting Area and will explain to you and your family/visitors that the Pre-op phase is beginning. The staff will answer any questions and provide instructions for tracking of the patient, by use of the existing tracking number and color-coded status board in the waiting room.   At this time the staff will also ask for your designated spokesperson information in the event that the physician or staff need to provide an update or obtain any pertinent information. The designated spokesperson will be notified if the physician needs to speak to family during the pre-operative phase. If at any time your family/visitors has questions or concerns they may approach the volunteer desk in the waiting area for assistance. Special Instructions:    Confirm with surgeon when to stop Aspirin, Ibuprofen, Diclofenac, and Vitamin Supplements prior to surgery        TAKE ALL MEDICATIONS DAY OF SURGERY EXCEPT:     Olmesartan, follow surgeons instruction regarding Vitamin Supplements, Ibuprofen, and Diclofenac, and Aspirin           I understand a pre-operative phone call will be made to verify my surgery time. In the event that I am not available, I give permission for a message to be left on my answering service and/or with another person?  Yes [phone]         ___________________      __________   _________    (Signature of Patient)             (Witness)                (Date and Time)

## 2020-01-24 ENCOUNTER — OFFICE VISIT (OUTPATIENT)
Dept: INTERNAL MEDICINE CLINIC | Age: 65
End: 2020-01-24

## 2020-01-24 VITALS
DIASTOLIC BLOOD PRESSURE: 86 MMHG | OXYGEN SATURATION: 97 % | RESPIRATION RATE: 18 BRPM | TEMPERATURE: 98.2 F | SYSTOLIC BLOOD PRESSURE: 128 MMHG | WEIGHT: 202.9 LBS | BODY MASS INDEX: 32.75 KG/M2 | HEART RATE: 82 BPM

## 2020-01-24 DIAGNOSIS — R23.3 EASY BRUISING: Primary | ICD-10-CM

## 2020-01-24 DIAGNOSIS — M15.9 PRIMARY OSTEOARTHRITIS INVOLVING MULTIPLE JOINTS: ICD-10-CM

## 2020-01-24 NOTE — PROGRESS NOTES
Subjective:   Jillian Booker is a 71 y.o. female      Chief Complaint   Patient presents with    Bleeding/Bruising     left leg        History of present illness: She presents complaint a lot of bruising particular in the left leg but no other area. She notes no tenderness and has not had any trauma to the leg. She does take diclofenac but thinks it stopped working with arthritis and she takes an aspirin a night before her niacin. She has in the past taken ibuprofen but does not appear she is taking any recently. She said she stopped the diclofenac about a week ago although the bruising is been present about 2 weeks.     Patient Active Problem List   Diagnosis Code    Right inguinal hernia K40.90    Ventral hernia without obstruction or gangrene K43.9    Chronic non-seasonal allergic rhinitis J30.89    Crohn disease ([address] K50.90    OAB (overactive bladder) N32.81    Varicose vein of leg U82.98    Umbilical hernia W32.7    Class 1 obesity due to excess calories without serious comorbidity with body mass index (BMI) of 33.0 to 33.9 in adult E66.09, Z68.33    Lumbar herniated disc M51.26    Inguinal hernia K40.90    Acquired hypothyroidism E03.9    Hypertension with renal disease I12.9    Mixed hyperlipidemia E78.2    Hormone replacement therapy (HRT) Z79.890    Gastroesophageal reflux disease without esophagitis K21.9    Primary osteoarthritis involving multiple joints M15.0    COPD (chronic obstructive pulmonary disease) (HCC) J44.9    Stage 3 chronic kidney disease (HCC) N18.3    Back pain M54.9    Anxiety F41.9    Anemia D64.9    Precordial pain R07.2    Acute bronchitis J20.9    Labyrinthitis of both ears H83.03    Acute non-recurrent maxillary sinusitis J01.00    Fungal dermatitis B36.9    Dry mouth R68.2    Alcohol screening Z13.39    Urinary frequency R35.0    Easy bruising R23.8      Past Medical History:   Diagnosis Date    Allergic rhinitis 8/9/2017    Anemia 8/9/2017  Anxiety 8/9/2017    Arrhythmia     irregular heart beat hx and beating fast per patient/no cardiologist    Arthritis     JOINTS  WORSE WAIST DOWN     Back pain 8/9/2017    Chronic kidney disease     Stage III    CKD (chronic kidney disease) 8/9/2017    COPD (chronic obstructive pulmonary disease) ([address] 8/9/2017    Crohn disease ([address] 8/9/2017    DJD (degenerative joint disease) 8/9/2017    Dry mouth     evaluated by Dr. Rafaela Cowden (ENT)    GERD (gastroesophageal reflux disease) 8/9/2017    Hormone replacement therapy (HRT) 8/9/2017    Hyperlipidemia 8/9/2017    Hypertension     Hypertension with renal disease 8/9/2017    Hypokalemia 8/9/2017    Hypothyroid 8/9/2017    Ill-defined condition     neuropathy feet    Inguinal hernia 8/9/2017    Leg numbness 8/9/2017    Lumbar herniated disc 8/9/2017    OAB (overactive bladder) 8/9/2017    Obesity 8/9/2017    On statin therapy 8/9/2017    Osteoarthritis 8/9/2017    Pelvic pain in female 2/9/3013    Umbilical hernia 8/4/0951    Varicose vein of leg 8/9/2017      Allergies   Allergen Reactions    Lisinopril Cough      Family History   Problem Relation Age of Onset    No Known Problems Mother     No Known Problems Father     Stroke Sister       Social History     Socioeconomic History    Marital status:      Spouse name: Not on file    Number of children: Not on file    Years of education: Not on file    Highest education level: Not on file   Occupational History    Not on file   Social Needs    Financial resource strain: Not on file    Food insecurity:     Worry: Not on file     Inability: Not on file    Transportation needs:     Medical: Not on file     Non-medical: Not on file   Tobacco Use    Smoking status: Never Smoker    Smokeless tobacco: Never Used   Substance and Sexual Activity    Alcohol use: Yes     Comment: once a year    Drug use: No    Sexual activity: Yes     Partners: Male   Lifestyle    Physical activity:     Days per week: Not on file     Minutes per session: Not on file    Stress: Not on file   Relationships    Social connections:     Talks on phone: Not on file     Gets together: Not on file     Attends [employer] service: Not on file     Active member of club or organization: Not on file     Attends meetings of clubs or organizations: Not on file     Relationship status: Not on file    Intimate partner violence:     Fear of current or ex partner: Not on file     Emotionally abused: Not on file     Physically abused: Not on file     Forced sexual activity: Not on file   Other Topics Concern    Not on file   Social History Narrative    Not on file     Prior to Admission medications    Medication Sig Start Date End Date Taking? Authorizing Provider   ibuprofen (MOTRIN) 200 mg tablet Take 200 mg by mouth every evening. Yes Provider, Historical   diclofenac EC (VOLTAREN) 75 mg EC tablet TAKE 1 TABLET BY MOUTH TWICE DAILY 12/20/19  Yes Daxa Desouza MD   estradiol (ESTRACE) 1 mg tablet Take 1 Tab by mouth daily. Patient taking differently: Take 1 mg by mouth daily. Taking two times a week 9/20/19  Yes Daxa Desouza MD   atorvastatin (LIPITOR) 20 mg tablet TAKE 1 TABLET BY MOUTH NIGHTLY. 8/22/19  Yes Gibran Curry MD   niacin ER (NIASPAN) 500 mg tablet TAKE 1 TABLET BY MOUTH AT BEDTIME 7/29/19  Yes Daxa Desouza MD   olmesartan (BENICAR) 40 mg tablet Take 1 Tab by mouth daily. 7/5/19  Yes Daxa Desouza MD   potassium chloride (K-DUR, KLOR-CON) 20 mEq tablet take 1 tablet by mouth twice a day 10/19/18  Yes Daxa Desouza MD   OTHER Indications: Melaleuca Phytomega - Take 2 softgels twice a day. Yes Provider, Historical   OTHER Indications: Melaleuca Vitality - Takes 1 tablet twice a day. Yes Provider, Historical   ferrous sulfate 325 mg (65 mg iron) tablet Take  by mouth three (3) times daily (with meals).    Yes Provider, Historical   aspirin delayed-release 325 mg tablet Take 325 mg by mouth daily. At bedtime with Niacin   Yes Provider, Historical   vitamin E (AQUA GEMS) 400 unit capsule Take 400 Units by mouth daily. 3/16/17  Yes Priyank Vela NP   cholecalciferol, vitamin D3, 2,000 unit tab Take 1 Tab by mouth daily. Yes Provider, Historical   ascorbic acid (VITAMIN C) 250 mg tablet Take 500 mg by mouth two (2) times a day. Yes Provider, Historical        Review of Systems              Constitutional:  She denies fever, weight loss, sweats or fatigue. EYES: No blurred or double vision,               ENT: no nasal congestion, no headache or dizziness. No difficulty with               swallowing, taste, speech or smell. Respiratory:  No cough, wheezing or shortness of breath. No sputum production. Cardiac:  Denies chest pain, palpitations, unexplained indigestion, syncope, edema, PND or orthopnea. GI:  No changes in bowel movements, no abdominal pain, no bloating, anorexia, nausea, vomiting or heartburn. :  No frequency or dysuria. Denies incontinence or sexual dysfunction. Extremities:  No joint pain, stiffness or swelling  Back:.no pain or soreness  Skin:  No recent rashes or mole changes. Easy bruising left leg  Neurological:  No numbness, tingling, burning paresthesias or loss of motor strength. No syncope, dizziness, frequent headaches or memory loss. Hematologic:  No easy bruising  Lymphatic: No lymph node enlargement    Objective:     Vitals:    01/24/20 1529   BP: 128/86   Pulse: 82   Resp: 18   Temp: 98.2 °F (36.8 °C)   SpO2: 97%   Weight: 202 lb 14.4 oz (92 kg)   PainSc:   0 - No pain       Body mass index is 32.75 kg/m². Physical Examination:              General Appearance:  Well-developed, well-nourished, no acute distress. HEENT:      Ears:  The TMs and ear canals were clear. Eyes:  The pupillary responses were normal.  Extraocular muscle function intact. Lids and conjunctiva not injected.   Funduscopic exam revealed sharp disc margins. Nares: Clear w/o edema or erythema  Pharynx:  Clear with teeth in good repair. No masses were noted. Neck:  Supple without thyromegaly or adenopathy. No JVD noted. No carotid                bruits. Lungs:  Clear to auscultation and percussion. Cardiac:  Regular rate and rhythm without murmur. PMI not displaced. No gallop, rub or click. Abdominal: Soft, non-tender, no hepata-spleenomegally or masses  Extremities:  No clubbing, cyanosis or edema. Skin:  No rash or unusual mole changes noted. Several areas of bruising on the left leg consistent with purpura  Lymph Nodes:  None felt in the cervical, supraclavicular, axillary or inguinal region. Neurological: . DTRs 2+ and symmetric. Station and gait normal.   Hematologic:   No purpura or petechiae        Assessment/Plan:         1. Easy bruising    2. Primary osteoarthritis involving multiple joints        Impressions/Plan:  Impression  1. Easy bruising I think this is probably related to the combination of aspirin and nonsteroidals although interestingly is only one leg we could be related to some trauma there. I vascular stopped aspirin ibuprofen and diclofenac  2. Arthritis I have asked her to go with Tylenol arthritis for that until she can take up to 6/day but no more than 2 at a time and every 8 hours and no closer than 8 hours. Recheck per previous schedule or sooner should to be problems. Orders Placed This Encounter    CBC WITH AUTOMATED DIFF           No results found for any visits on 01/24/20. Carlos Junior MD    The patient was given after the visit summary the patient verbalized an understanding of the plans and problems as explained.

## 2020-01-24 NOTE — PATIENT INSTRUCTIONS
Arthritis: Care Instructions Your Care Instructions Arthritis, also called osteoarthritis, is a breakdown of the cartilage that cushions your joints. When the cartilage wears down, your bones rub against each other. This causes pain and stiffness. Many people have some arthritis as they age. Arthritis most often affects the joints of the spine, hands, hips, knees, or feet. You can take simple measures to protect your joints, ease your pain, and help you stay active. Follow-up care is a key part of your treatment and safety. Be sure to make and go to all appointments, and call your doctor if you are having problems. It's also a good idea to know your test results and keep a list of the medicines you take. How can you care for yourself at home? · Stay at a healthy weight. Being overweight puts extra strain on your joints. · Talk to your doctor or physical therapist about exercises that will help ease joint pain. ? Stretch. You may enjoy gentle forms of yoga to help keep your joints and muscles flexible. ? Walk instead of jog. Other types of exercise that are less stressful on the joints include riding a bicycle, swimming, yadi chi, or water exercise. ? Lift weights. Strong muscles help reduce stress on your joints. Stronger thigh muscles, for example, take some of the stress off of the knees and hips. Learn the right way to lift weights so you do not make joint pain worse. · Take your medicines exactly as prescribed. Call your doctor if you think you are having a problem with your medicine. · Take pain medicines exactly as directed. ? If the doctor gave you a prescription medicine for pain, take it as prescribed. ? If you are not taking a prescription pain medicine, ask your doctor if you can take an over-the-counter medicine. · Use a cane, crutch, walker, or another device if you need help to get around. These can help rest your joints.  You also can use other things to make life easier, such as a higher toilet seat and padded handles on kitchen utensils. · Do not sit in low chairs, which can make it hard to get up. · Put heat or cold on your sore joints as needed. Use whichever helps you most. You also can take turns with hot and cold packs. ? Apply heat 2 or 3 times a day for 20 to 30 minutesusing a heating pad, hot shower, or hot packto relieve pain and stiffness. ? Put ice or a cold pack on your sore joint for 10 to 20 minutes at a time. Put a thin cloth between the ice and your skin. When should you call for help? Call your doctor now or seek immediate medical care if: 
  · You have sudden swelling, warmth, or pain in any joint.  
  · You have joint pain and a fever or rash.  
  · You have such bad pain that you cannot use a joint.  
 Watch closely for changes in your health, and be sure to contact your doctor if: 
  · You have mild joint symptoms that continue even with more than 6 weeks of care at home.  
  · You have stomach pain or other problems with your medicine. Where can you learn more? Go to http://mikael-oskar.info/. Enter E237 in the search box to learn more about \"Arthritis: Care Instructions. \" Current as of: April 1, 2019 Content Version: 12.2 © 2041-6853 [employer]. Care instructions adapted under license by [employer] (which disclaims liability or warranty for this information). If you have questions about a medical condition or this instruction, always ask your healthcare professional. [place] [address] any warranty or liability for your use of this information.

## 2020-01-24 NOTE — PROGRESS NOTES
Chief Complaint   Patient presents with    Bleeding/Bruising     left leg     1. Have you been to the ER, urgent care clinic since your last visit? Hospitalized since your last visit? No    2. Have you seen or consulted any other health care providers outside of the [address] [place] [address] since your last visit? Include any pap smears or colon screening.  No

## 2020-01-25 LAB
BASOPHILS # BLD AUTO: 0 X10E3/UL (ref 0–0.2)
BASOPHILS NFR BLD AUTO: 0 %
EOSINOPHIL # BLD AUTO: 0.1 X10E3/UL (ref 0–0.4)
EOSINOPHIL NFR BLD AUTO: 2 %
ERYTHROCYTE [DISTWIDTH] IN BLOOD BY AUTOMATED COUNT: 12.6 % (ref 11.7–15.4)
HCT VFR BLD AUTO: 39.5 % (ref 34–46.6)
HGB BLD-MCNC: 12.7 G/DL (ref 11.1–15.9)
IMM GRANULOCYTES # BLD AUTO: 0 X10E3/UL (ref 0–0.1)
IMM GRANULOCYTES NFR BLD AUTO: 0 %
LYMPHOCYTES # BLD AUTO: 0.9 X10E3/UL (ref 0.7–3.1)
LYMPHOCYTES NFR BLD AUTO: 17 %
MCH RBC QN AUTO: 29.5 PG (ref 26.6–33)
MCHC RBC AUTO-ENTMCNC: 32.2 G/DL (ref 31.5–35.7)
MCV RBC AUTO: 92 FL (ref 79–97)
MONOCYTES # BLD AUTO: 0.6 X10E3/UL (ref 0.1–0.9)
MONOCYTES NFR BLD AUTO: 10 %
NEUTROPHILS # BLD AUTO: 3.7 X10E3/UL (ref 1.4–7)
NEUTROPHILS NFR BLD AUTO: 71 %
PLATELET # BLD AUTO: 244 X10E3/UL (ref 150–450)
RBC # BLD AUTO: 4.31 X10E6/UL (ref 3.77–5.28)
WBC # BLD AUTO: 5.4 X10E3/UL (ref 3.4–10.8)

## 2020-01-26 ENCOUNTER — ANESTHESIA EVENT (OUTPATIENT)
Dept: SURGERY | Age: 65
End: 2020-01-26
Payer: MEDICARE

## 2020-01-27 ENCOUNTER — HOSPITAL ENCOUNTER (OUTPATIENT)
Age: 65
Setting detail: OBSERVATION
Discharge: HOME | End: 2020-01-28
Attending: OBSTETRICS & GYNECOLOGY | Admitting: OBSTETRICS & GYNECOLOGY
Payer: MEDICARE

## 2020-01-27 ENCOUNTER — ANESTHESIA (OUTPATIENT)
Dept: SURGERY | Age: 65
End: 2020-01-27
Payer: MEDICARE

## 2020-01-27 DIAGNOSIS — N99.3 INCOMPLETE PROLAPSE OF VAGINAL VAULT: Primary | ICD-10-CM

## 2020-01-27 PROBLEM — N81.10 BADEN-WALKER GRADE 3 CYSTOCELE: Status: ACTIVE | Noted: 2020-01-27

## 2020-01-27 PROBLEM — N81.10 VAGINAL PROLAPSE: Status: ACTIVE | Noted: 2020-01-27

## 2020-01-27 PROCEDURE — 77030021678 HC GLIDESCP STAT DISP VERT -B: Performed by: NURSE ANESTHETIST, CERTIFIED REGISTERED

## 2020-01-27 PROCEDURE — 77030040361 HC SLV COMPR DVT MDII -B: Performed by: OBSTETRICS & GYNECOLOGY

## 2020-01-27 PROCEDURE — 74011000272 HC RX REV CODE- 272: Performed by: OBSTETRICS & GYNECOLOGY

## 2020-01-27 PROCEDURE — 77030021052 HC RNG RETRCTR STAY COOP -A: Performed by: OBSTETRICS & GYNECOLOGY

## 2020-01-27 PROCEDURE — 77030027138 HC INCENT SPIROMETER -A

## 2020-01-27 PROCEDURE — 77030002933 HC SUT MCRYL J&J -A: Performed by: OBSTETRICS & GYNECOLOGY

## 2020-01-27 PROCEDURE — 76210000006 HC OR PH I REC 0.5 TO 1 HR: Performed by: OBSTETRICS & GYNECOLOGY

## 2020-01-27 PROCEDURE — 77030002880 HC SUT CAPIO BSC -B: Performed by: OBSTETRICS & GYNECOLOGY

## 2020-01-27 PROCEDURE — 77030005513 HC CATH URETH FOL11 MDII -B: Performed by: OBSTETRICS & GYNECOLOGY

## 2020-01-27 PROCEDURE — 99218 HC RM OBSERVATION: CPT

## 2020-01-27 PROCEDURE — 74011000250 HC RX REV CODE- 250: Performed by: OBSTETRICS & GYNECOLOGY

## 2020-01-27 PROCEDURE — 74011250636 HC RX REV CODE- 250/636: Performed by: NURSE ANESTHETIST, CERTIFIED REGISTERED

## 2020-01-27 PROCEDURE — 74011250636 HC RX REV CODE- 250/636: Performed by: OBSTETRICS & GYNECOLOGY

## 2020-01-27 PROCEDURE — 74011000250 HC RX REV CODE- 250: Performed by: NURSE ANESTHETIST, CERTIFIED REGISTERED

## 2020-01-27 PROCEDURE — 77030008064 HC RNG RETRCTR COOP -B: Performed by: OBSTETRICS & GYNECOLOGY

## 2020-01-27 PROCEDURE — 77030018832 HC SOL IRR H20 ICUM -A: Performed by: OBSTETRICS & GYNECOLOGY

## 2020-01-27 PROCEDURE — 77030008684 HC TU ET CUF COVD -B: Performed by: NURSE ANESTHETIST, CERTIFIED REGISTERED

## 2020-01-27 PROCEDURE — 76010000153 HC OR TIME 1.5 TO 2 HR: Performed by: OBSTETRICS & GYNECOLOGY

## 2020-01-27 PROCEDURE — 77030011640 HC PAD GRND REM COVD -A: Performed by: OBSTETRICS & GYNECOLOGY

## 2020-01-27 PROCEDURE — 77030019908 HC STETH ESOPH SIMS -A: Performed by: NURSE ANESTHETIST, CERTIFIED REGISTERED

## 2020-01-27 PROCEDURE — 88302 TISSUE EXAM BY PATHOLOGIST: CPT

## 2020-01-27 PROCEDURE — 74011000258 HC RX REV CODE- 258: Performed by: OBSTETRICS & GYNECOLOGY

## 2020-01-27 PROCEDURE — 88305 TISSUE EXAM BY PATHOLOGIST: CPT

## 2020-01-27 PROCEDURE — 77030040922 HC BLNKT HYPOTHRM STRY -A

## 2020-01-27 PROCEDURE — 77030003029 HC SUT VCRL J&J -B: Performed by: OBSTETRICS & GYNECOLOGY

## 2020-01-27 PROCEDURE — 74011250637 HC RX REV CODE- 250/637: Performed by: OBSTETRICS & GYNECOLOGY

## 2020-01-27 PROCEDURE — 76060000034 HC ANESTHESIA 1.5 TO 2 HR: Performed by: OBSTETRICS & GYNECOLOGY

## 2020-01-27 PROCEDURE — 74011250636 HC RX REV CODE- 250/636: Performed by: ANESTHESIOLOGY

## 2020-01-27 PROCEDURE — 77030026438 HC STYL ET INTUB CARD -A: Performed by: NURSE ANESTHETIST, CERTIFIED REGISTERED

## 2020-01-27 RX ORDER — DEXAMETHASONE SODIUM PHOSPHATE 4 MG/ML
INJECTION, SOLUTION INTRA-ARTICULAR; INTRALESIONAL; INTRAMUSCULAR; INTRAVENOUS; SOFT TISSUE AS NEEDED
Status: DISCONTINUED | OUTPATIENT
Start: 2020-01-27 | End: 2020-01-27 | Stop reason: HOSPADM

## 2020-01-27 RX ORDER — LIDOCAINE HYDROCHLORIDE 10 MG/ML
0.1 INJECTION, SOLUTION EPIDURAL; INFILTRATION; INTRACAUDAL; PERINEURAL AS NEEDED
Status: DISCONTINUED | OUTPATIENT
Start: 2020-01-27 | End: 2020-01-27 | Stop reason: HOSPADM

## 2020-01-27 RX ORDER — FENTANYL CITRATE 50 UG/ML
25 INJECTION, SOLUTION INTRAMUSCULAR; INTRAVENOUS
Status: DISCONTINUED | OUTPATIENT
Start: 2020-01-27 | End: 2020-01-27 | Stop reason: HOSPADM

## 2020-01-27 RX ORDER — ROCURONIUM BROMIDE 10 MG/ML
INJECTION, SOLUTION INTRAVENOUS AS NEEDED
Status: DISCONTINUED | OUTPATIENT
Start: 2020-01-27 | End: 2020-01-27 | Stop reason: HOSPADM

## 2020-01-27 RX ORDER — IBUPROFEN 400 MG/1
400 TABLET ORAL
Status: DISCONTINUED | OUTPATIENT
Start: 2020-01-27 | End: 2020-01-27 | Stop reason: SDUPTHER

## 2020-01-27 RX ORDER — ZOLPIDEM TARTRATE 5 MG/1
5 TABLET ORAL
Status: DISCONTINUED | OUTPATIENT
Start: 2020-01-27 | End: 2020-01-27 | Stop reason: SDUPTHER

## 2020-01-27 RX ORDER — SODIUM CHLORIDE 0.9 % (FLUSH) 0.9 %
5-40 SYRINGE (ML) INJECTION AS NEEDED
Status: DISCONTINUED | OUTPATIENT
Start: 2020-01-27 | End: 2020-01-27 | Stop reason: SDUPTHER

## 2020-01-27 RX ORDER — SODIUM CHLORIDE 0.9 % (FLUSH) 0.9 %
5-40 SYRINGE (ML) INJECTION EVERY 8 HOURS
Status: DISCONTINUED | OUTPATIENT
Start: 2020-01-27 | End: 2020-01-28 | Stop reason: HOSPADM

## 2020-01-27 RX ORDER — HYDROCODONE BITARTRATE AND ACETAMINOPHEN 5; 325 MG/1; MG/1
1 TABLET ORAL AS NEEDED
Status: DISCONTINUED | OUTPATIENT
Start: 2020-01-27 | End: 2020-01-27 | Stop reason: HOSPADM

## 2020-01-27 RX ORDER — IBUPROFEN 400 MG/1
400 TABLET ORAL
Status: DISCONTINUED | OUTPATIENT
Start: 2020-01-27 | End: 2020-01-28 | Stop reason: HOSPADM

## 2020-01-27 RX ORDER — GLYCOPYRROLATE 0.2 MG/ML
INJECTION INTRAMUSCULAR; INTRAVENOUS AS NEEDED
Status: DISCONTINUED | OUTPATIENT
Start: 2020-01-27 | End: 2020-01-27 | Stop reason: HOSPADM

## 2020-01-27 RX ORDER — DEXTROSE MONOHYDRATE AND SODIUM CHLORIDE 5; .9 G/100ML; G/100ML
75 INJECTION, SOLUTION INTRAVENOUS CONTINUOUS
Status: DISCONTINUED | OUTPATIENT
Start: 2020-01-27 | End: 2020-01-28 | Stop reason: HOSPADM

## 2020-01-27 RX ORDER — SODIUM CHLORIDE, SODIUM LACTATE, POTASSIUM CHLORIDE, CALCIUM CHLORIDE 600; 310; 30; 20 MG/100ML; MG/100ML; MG/100ML; MG/100ML
25 INJECTION, SOLUTION INTRAVENOUS CONTINUOUS
Status: DISCONTINUED | OUTPATIENT
Start: 2020-01-27 | End: 2020-01-27 | Stop reason: HOSPADM

## 2020-01-27 RX ORDER — FENTANYL CITRATE 50 UG/ML
INJECTION, SOLUTION INTRAMUSCULAR; INTRAVENOUS AS NEEDED
Status: DISCONTINUED | OUTPATIENT
Start: 2020-01-27 | End: 2020-01-27 | Stop reason: HOSPADM

## 2020-01-27 RX ORDER — FENTANYL CITRATE 50 UG/ML
50 INJECTION, SOLUTION INTRAMUSCULAR; INTRAVENOUS AS NEEDED
Status: DISCONTINUED | OUTPATIENT
Start: 2020-01-27 | End: 2020-01-27 | Stop reason: HOSPADM

## 2020-01-27 RX ORDER — MIDAZOLAM HYDROCHLORIDE 1 MG/ML
INJECTION, SOLUTION INTRAMUSCULAR; INTRAVENOUS AS NEEDED
Status: DISCONTINUED | OUTPATIENT
Start: 2020-01-27 | End: 2020-01-27 | Stop reason: HOSPADM

## 2020-01-27 RX ORDER — MIDAZOLAM HYDROCHLORIDE 1 MG/ML
1 INJECTION, SOLUTION INTRAMUSCULAR; INTRAVENOUS AS NEEDED
Status: DISCONTINUED | OUTPATIENT
Start: 2020-01-27 | End: 2020-01-27 | Stop reason: HOSPADM

## 2020-01-27 RX ORDER — DIPHENHYDRAMINE HYDROCHLORIDE 50 MG/ML
12.5 INJECTION, SOLUTION INTRAMUSCULAR; INTRAVENOUS AS NEEDED
Status: DISCONTINUED | OUTPATIENT
Start: 2020-01-27 | End: 2020-01-27 | Stop reason: HOSPADM

## 2020-01-27 RX ORDER — ONDANSETRON 2 MG/ML
4 INJECTION INTRAMUSCULAR; INTRAVENOUS
Status: DISCONTINUED | OUTPATIENT
Start: 2020-01-27 | End: 2020-01-28 | Stop reason: HOSPADM

## 2020-01-27 RX ORDER — MORPHINE SULFATE 2 MG/ML
1 INJECTION, SOLUTION INTRAMUSCULAR; INTRAVENOUS
Status: DISCONTINUED | OUTPATIENT
Start: 2020-01-27 | End: 2020-01-27 | Stop reason: SDUPTHER

## 2020-01-27 RX ORDER — MIDAZOLAM HYDROCHLORIDE 1 MG/ML
0.5 INJECTION, SOLUTION INTRAMUSCULAR; INTRAVENOUS
Status: DISCONTINUED | OUTPATIENT
Start: 2020-01-27 | End: 2020-01-27 | Stop reason: HOSPADM

## 2020-01-27 RX ORDER — NEOSTIGMINE METHYLSULFATE 1 MG/ML
INJECTION INTRAVENOUS AS NEEDED
Status: DISCONTINUED | OUTPATIENT
Start: 2020-01-27 | End: 2020-01-27 | Stop reason: HOSPADM

## 2020-01-27 RX ORDER — HYDROMORPHONE HYDROCHLORIDE 1 MG/ML
0.5 INJECTION, SOLUTION INTRAMUSCULAR; INTRAVENOUS; SUBCUTANEOUS
Status: DISCONTINUED | OUTPATIENT
Start: 2020-01-27 | End: 2020-01-27 | Stop reason: HOSPADM

## 2020-01-27 RX ORDER — MORPHINE SULFATE 10 MG/ML
1 INJECTION, SOLUTION INTRAMUSCULAR; INTRAVENOUS
Status: DISCONTINUED | OUTPATIENT
Start: 2020-01-27 | End: 2020-01-28 | Stop reason: HOSPADM

## 2020-01-27 RX ORDER — LIDOCAINE HYDROCHLORIDE 20 MG/ML
INJECTION, SOLUTION EPIDURAL; INFILTRATION; INTRACAUDAL; PERINEURAL AS NEEDED
Status: DISCONTINUED | OUTPATIENT
Start: 2020-01-27 | End: 2020-01-27 | Stop reason: HOSPADM

## 2020-01-27 RX ORDER — PROPOFOL 10 MG/ML
INJECTION, EMULSION INTRAVENOUS AS NEEDED
Status: DISCONTINUED | OUTPATIENT
Start: 2020-01-27 | End: 2020-01-27 | Stop reason: HOSPADM

## 2020-01-27 RX ORDER — BUPIVACAINE HYDROCHLORIDE 5 MG/ML
30 INJECTION, SOLUTION EPIDURAL; INTRACAUDAL ONCE
Status: COMPLETED | OUTPATIENT
Start: 2020-01-27 | End: 2020-01-27

## 2020-01-27 RX ORDER — OXYCODONE AND ACETAMINOPHEN 5; 325 MG/1; MG/1
1 TABLET ORAL
Status: DISCONTINUED | OUTPATIENT
Start: 2020-01-27 | End: 2020-01-28 | Stop reason: HOSPADM

## 2020-01-27 RX ORDER — ONDANSETRON 2 MG/ML
4 INJECTION INTRAMUSCULAR; INTRAVENOUS
Status: DISCONTINUED | OUTPATIENT
Start: 2020-01-27 | End: 2020-01-27 | Stop reason: SDUPTHER

## 2020-01-27 RX ORDER — DEXTROSE MONOHYDRATE AND SODIUM CHLORIDE 5; .9 G/100ML; G/100ML
75 INJECTION, SOLUTION INTRAVENOUS CONTINUOUS
Status: DISCONTINUED | OUTPATIENT
Start: 2020-01-27 | End: 2020-01-27 | Stop reason: SDUPTHER

## 2020-01-27 RX ORDER — SUCCINYLCHOLINE CHLORIDE 20 MG/ML
INJECTION INTRAMUSCULAR; INTRAVENOUS AS NEEDED
Status: DISCONTINUED | OUTPATIENT
Start: 2020-01-27 | End: 2020-01-27 | Stop reason: HOSPADM

## 2020-01-27 RX ORDER — EPHEDRINE SULFATE/0.9% NACL/PF 50 MG/5 ML
SYRINGE (ML) INTRAVENOUS AS NEEDED
Status: DISCONTINUED | OUTPATIENT
Start: 2020-01-27 | End: 2020-01-27 | Stop reason: HOSPADM

## 2020-01-27 RX ORDER — PHENYLEPHRINE HCL IN 0.9% NACL 0.4MG/10ML
SYRINGE (ML) INTRAVENOUS AS NEEDED
Status: DISCONTINUED | OUTPATIENT
Start: 2020-01-27 | End: 2020-01-27 | Stop reason: HOSPADM

## 2020-01-27 RX ORDER — ONDANSETRON 2 MG/ML
INJECTION INTRAMUSCULAR; INTRAVENOUS AS NEEDED
Status: DISCONTINUED | OUTPATIENT
Start: 2020-01-27 | End: 2020-01-27 | Stop reason: HOSPADM

## 2020-01-27 RX ORDER — ONDANSETRON 2 MG/ML
4 INJECTION INTRAMUSCULAR; INTRAVENOUS AS NEEDED
Status: DISCONTINUED | OUTPATIENT
Start: 2020-01-27 | End: 2020-01-27 | Stop reason: HOSPADM

## 2020-01-27 RX ORDER — SODIUM CHLORIDE 0.9 % (FLUSH) 0.9 %
5-40 SYRINGE (ML) INJECTION EVERY 8 HOURS
Status: DISCONTINUED | OUTPATIENT
Start: 2020-01-27 | End: 2020-01-27 | Stop reason: SDUPTHER

## 2020-01-27 RX ORDER — SODIUM CHLORIDE 0.9 % (FLUSH) 0.9 %
5-40 SYRINGE (ML) INJECTION AS NEEDED
Status: DISCONTINUED | OUTPATIENT
Start: 2020-01-27 | End: 2020-01-28 | Stop reason: HOSPADM

## 2020-01-27 RX ORDER — ZOLPIDEM TARTRATE 5 MG/1
5 TABLET ORAL
Status: DISCONTINUED | OUTPATIENT
Start: 2020-01-27 | End: 2020-01-28 | Stop reason: HOSPADM

## 2020-01-27 RX ORDER — OXYCODONE AND ACETAMINOPHEN 5; 325 MG/1; MG/1
1 TABLET ORAL
Status: DISCONTINUED | OUTPATIENT
Start: 2020-01-27 | End: 2020-01-27 | Stop reason: SDUPTHER

## 2020-01-27 RX ADMIN — FENTANYL CITRATE 50 MCG: 50 INJECTION, SOLUTION INTRAMUSCULAR; INTRAVENOUS at 10:23

## 2020-01-27 RX ADMIN — DEXTROSE MONOHYDRATE AND SODIUM CHLORIDE 75 ML/HR: 5; .9 INJECTION, SOLUTION INTRAVENOUS at 13:42

## 2020-01-27 RX ADMIN — Medication 80 MCG: at 09:55

## 2020-01-27 RX ADMIN — PROPOFOL 50 MG: 10 INJECTION, EMULSION INTRAVENOUS at 09:34

## 2020-01-27 RX ADMIN — Medication 1 AMPULE: at 21:00

## 2020-01-27 RX ADMIN — PROPOFOL 30 MG: 10 INJECTION, EMULSION INTRAVENOUS at 10:23

## 2020-01-27 RX ADMIN — FENTANYL CITRATE 50 MCG: 50 INJECTION, SOLUTION INTRAMUSCULAR; INTRAVENOUS at 09:33

## 2020-01-27 RX ADMIN — MORPHINE SULFATE 1 MG: 10 INJECTION INTRAVENOUS at 13:36

## 2020-01-27 RX ADMIN — Medication 3 AMPULE: at 08:44

## 2020-01-27 RX ADMIN — LIDOCAINE HYDROCHLORIDE 80 MG: 20 INJECTION, SOLUTION EPIDURAL; INFILTRATION; INTRACAUDAL; PERINEURAL at 09:33

## 2020-01-27 RX ADMIN — WATER 2 G: 1 INJECTION INTRAMUSCULAR; INTRAVENOUS; SUBCUTANEOUS at 09:37

## 2020-01-27 RX ADMIN — NEOSTIGMINE METHYLSULFATE 3 MG: 1 INJECTION INTRAVENOUS at 11:03

## 2020-01-27 RX ADMIN — Medication 10 MG: at 10:02

## 2020-01-27 RX ADMIN — IBUPROFEN 400 MG: 400 TABLET, FILM COATED ORAL at 21:15

## 2020-01-27 RX ADMIN — Medication 10 MG: at 10:11

## 2020-01-27 RX ADMIN — ONDANSETRON HYDROCHLORIDE 4 MG: 2 INJECTION, SOLUTION INTRAMUSCULAR; INTRAVENOUS at 10:55

## 2020-01-27 RX ADMIN — ROCURONIUM BROMIDE 30 MG: 10 INJECTION INTRAVENOUS at 09:41

## 2020-01-27 RX ADMIN — GLYCOPYRROLATE 0.4 MG: 0.2 INJECTION, SOLUTION INTRAMUSCULAR; INTRAVENOUS at 11:03

## 2020-01-27 RX ADMIN — PROPOFOL 150 MG: 10 INJECTION, EMULSION INTRAVENOUS at 09:33

## 2020-01-27 RX ADMIN — ROCURONIUM BROMIDE 10 MG: 10 INJECTION INTRAVENOUS at 09:33

## 2020-01-27 RX ADMIN — Medication 10 MG: at 10:32

## 2020-01-27 RX ADMIN — OXYCODONE HYDROCHLORIDE AND ACETAMINOPHEN 1 TABLET: 5; 325 TABLET ORAL at 21:16

## 2020-01-27 RX ADMIN — SODIUM CHLORIDE, SODIUM LACTATE, POTASSIUM CHLORIDE, AND CALCIUM CHLORIDE 25 ML/HR: 600; 310; 30; 20 INJECTION, SOLUTION INTRAVENOUS at 08:40

## 2020-01-27 RX ADMIN — OXYCODONE HYDROCHLORIDE AND ACETAMINOPHEN 1 TABLET: 5; 325 TABLET ORAL at 17:05

## 2020-01-27 RX ADMIN — FENTANYL CITRATE 50 MCG: 50 INJECTION, SOLUTION INTRAMUSCULAR; INTRAVENOUS at 09:53

## 2020-01-27 RX ADMIN — SUCCINYLCHOLINE CHLORIDE 120 MG: 20 INJECTION, SOLUTION INTRAMUSCULAR; INTRAVENOUS at 09:33

## 2020-01-27 RX ADMIN — MIDAZOLAM HYDROCHLORIDE 2 MG: 1 INJECTION INTRAMUSCULAR; INTRAVENOUS at 09:25

## 2020-01-27 RX ADMIN — FENTANYL CITRATE 25 MCG: 50 INJECTION, SOLUTION INTRAMUSCULAR; INTRAVENOUS at 11:07

## 2020-01-27 RX ADMIN — DEXAMETHASONE SODIUM PHOSPHATE 8 MG: 4 INJECTION, SOLUTION INTRAMUSCULAR; INTRAVENOUS at 09:40

## 2020-01-27 NOTE — PERIOP NOTES
Handoff Report from Operating Room to PACU    Report received from MARIA VICTORIA Diaz RN and Jenniffer Severin CRNA regarding Aneita Ahumada. Surgeon(s):  Calvin Mckinley MD  And Procedure(s) (LRB):  CYSTOCELE REPAIR, PERINEOPLASTY,SACROSPINOUS LIGAMENT FIXATION (N/A)  SACROSPINOUS LIGAMENT FIXATION to the right (N/A)  confirmed   with allergies, dressings and local anesthetic discussed. Anesthesia type, drugs, patient history, complications, estimated blood loss, vital signs, intake and output, and last pain medication, lines, reversal medications and temperature were reviewed.

## 2020-01-27 NOTE — ANESTHESIA PREPROCEDURE EVALUATION
Anesthetic History   No history of anesthetic complications            Review of Systems / Medical History  Patient summary reviewed, nursing notes reviewed and pertinent labs reviewed    Pulmonary    COPD               Neuro/Psych   Within defined limits           Cardiovascular    Hypertension: well controlled        Dysrhythmias : PVC      Exercise tolerance: >4 METS     GI/Hepatic/Renal  Within defined limits              Endo/Other        Obesity, arthritis and anemia     Other Findings              Physical Exam    Airway  Mallampati: II  TM Distance: 4 - 6 cm  Neck ROM: normal range of motion   Mouth opening: Normal     Cardiovascular  Regular rate and rhythm,  S1 and S2 normal,  no murmur, click, rub, or gallop             Dental  No notable dental hx       Pulmonary  Breath sounds clear to auscultation               Abdominal  GI exam deferred       Other Findings            Anesthetic Plan    ASA: 2  Anesthesia type: general    Monitoring Plan: BIS      Induction: Intravenous  Anesthetic plan and risks discussed with: Patient

## 2020-01-27 NOTE — BRIEF OP NOTE
BRIEF OPERATIVE NOTE    Date of Procedure: 1/27/2020   Preoperative Diagnosis: CYSTOCELE, RECTOCELE  Postoperative Diagnosis: CYSTOCELE, RECTOCELE    Procedure(s):  CYSTOCELE REPAIR, PERINEOPLASTY,SACROSPINOUS LIGAMENT FIXATION  SACROSPINOUS LIGAMENT FIXATION  Surgeon(s) and Role:     Sonny Perez MD - Primary         Surgical Assistant:Jeannie    Surgical Staff:  Circ-1: Marilin Reed  Circ-Intern: Andrea Corrales  Scrub Tech-1: Melanie Montiel  Surg Asst-1: [facility]  Event Time In Time Out   Incision Start 3980    Incision Close 1100      Anesthesia: General   Estimated Blood Loss:30cc  Specimens:   ID Type Source Tests Collected by Time Destination   1 : Perineal body Preservative Perineal  Vero Hopkins MD 1/27/2020 1046 Pathology      Findings: grade 3  Cystocele, 2 vaginal vault   Complications: none known  Implants: * No implants in log *

## 2020-01-27 NOTE — PERIOP NOTES
TRANSFER - OUT REPORT:    Verbal report given to [facility] KRIS RN(name) on Laura Castellanos  being transferred to [facility](unit) for routine post - op       Report consisted of patients Situation, Background, Assessment and   Recommendations(SBAR). Information from the following report(s) SBAR, OR Summary, Intake/Output, MAR and Cardiac Rhythm NSR/SB was reviewed with the receiving nurse. Opportunity for questions and clarification was provided.       Patient transported with:   O2 @ 2 liters  Tech      notified of transfer by volunteer

## 2020-01-27 NOTE — ROUTINE PROCESS
Patient: Deanna Somers MRN: [id number]  SSN: [id number] YOB: 1955  Age: 71 y.o. Sex: female Patient is status post Procedure(s): 
CYSTOCELE REPAIR, PERINEOPLASTY,SACROSPINOUS LIGAMENT FIXATION 
SACROSPINOUS LIGAMENT FIXATION. Surgeon(s) and Role: Christa Lundy MD - Primary Local/Dose/Irrigation: 5mL 0.5% Marcaine plaine Peripheral IV 01/27/20 Left Hand (Active) Site Assessment Clean, dry, & intact 1/27/2020  8:46 AM  
Phlebitis Assessment 0 1/27/2020  8:46 AM  
Infiltration Assessment 0 1/27/2020  8:46 AM  
Dressing Status Clean, dry, & intact 1/27/2020  8:46 AM  
Dressing Type Tape;Transparent 1/27/2020  8:46 AM  
Hub Color/Line Status Pink; Infusing 1/27/2020  8:46 AM  
        
Airway - Endotracheal Tube 01/27/20 Oral (Active) Line Shine Lips 1/27/2020 12:00 AM  
         
 
 
 
Dressing/Packing:  Wound Perineum-Dressing Type: Flora-pad (01/27/20 1102) Splint/Cast:  ] Other:  16Fr Sifuentes in place.

## 2020-01-27 NOTE — BRIEF OP NOTE
BRIEF OPERATIVE NOTE    Date of Procedure: 1/27/2020   Preoperative Diagnosis: CYSTOCELE, RECTOCELE  Postoperative Diagnosis: * No post-op diagnosis entered *    Procedure(s):  CYSTOCELE REPAIR, PERINEOPLASTY,SACROSPINOUS LIGAMENT FIXATION  SACROSPINOUS LIGAMENT FIXATION  Surgeon(s) and Role:     Maira Waller MD - Primary         Surgical Assistant: Navdeep Wooten    Surgical Staff:  Circ-1: Geraldine Justin  Circ-Intern: Bird Greenwood  Scrub Tech-1: Shellie Colvin  Surg Asst-1: Bradly Pena  No case tracking events are documented in the log.   Anesthesia: General   Estimated Blood Loss: 10cc  Specimens: * No specimens in log *   Findings: grade 3 cystocele,2 vault prolapse   Complications: none known  Implants: * No implants in log *

## 2020-01-27 NOTE — H&P
Gynecology History and Physical    Name: Chloe Thomas MRN: [id number] [id number]: [id number]    YOB: 1955  Age: 71 y.o. Sex: female       Subjective:      Chief complaint:  Pelvic relaxation    Kaylynn Frazier is a 71 y.o.  female with a history of cystocele and pelvic relaxation. No previous workup. Previous treatment measures included none. She is admitted for Procedure(s) (LRB):  CYSTOCELE REPAIR, PERINEOPLASTY,SACROSPINOUS LIGAMENT FIXATION (N/A)  SACROSPINOUS LIGAMENT FIXATION (N/A). The current method of family planning is status post hysterectomy. OB History    No obstetric history on file.        Past Medical History:   Diagnosis Date    Allergic rhinitis 8/9/2017    Anemia 8/9/2017    Anxiety 8/9/2017    Arrhythmia     irregular heart beat hx and beating fast per patient/no cardiologist    Arthritis     JOINTS  WORSE WAIST DOWN     Back pain 8/9/2017    Chronic kidney disease     Stage III    CKD (chronic kidney disease) 8/9/2017    COPD (chronic obstructive pulmonary disease) ([facility] [address] 8/9/2017    Crohn disease ([facility] [address] 8/9/2017    DJD (degenerative joint disease) 8/9/2017    Dry mouth     evaluated by Dr. Clayton Sexton (ENT)    GERD (gastroesophageal reflux disease) 8/9/2017    Hormone replacement therapy (HRT) 8/9/2017    Hyperlipidemia 8/9/2017    Hypertension     Hypertension with renal disease 8/9/2017    Hypokalemia 8/9/2017    Hypothyroid 8/9/2017    Ill-defined condition     neuropathy feet    Inguinal hernia 8/9/2017    Leg numbness 8/9/2017    Lumbar herniated disc 8/9/2017    OAB (overactive bladder) 8/9/2017    Obesity 8/9/2017    On statin therapy 8/9/2017    Osteoarthritis 8/9/2017    Pelvic pain in female 5/2/2448    Umbilical hernia 6/9/8430    Varicose vein of leg 8/9/2017     Past Surgical History:   Procedure Laterality Date    BREAST SURGERY PROCEDURE UNLISTED      mass removed left breast x2 benign    HX COLONOSCOPY  12/2010    normal    HX COLONOSCOPY  12/30/2014    normal     HX HEENT      cyst removed left eye    HX HERNIA REPAIR  06/02/2017    right inguinal and umbilical (Dr. Mickey Reyes)    HX HYSTERECTOMY      HX ORTHOPAEDIC      bilat knee injections    HX ORTHOPAEDIC      laser treatment left knee and foot    HX ORTHOPAEDIC      hx of gel shots bilat knee    HX ORTHOPAEDIC  12/27/2016    left knee coritizon shot    HX OTHER SURGICAL      cyst removed front left scalp    HX OTHER SURGICAL      colonoscopy    HX OTHER SURGICAL      tooth implant    HX TUBAL LIGATION      NEUROLOGICAL PROCEDURE UNLISTED      Lumbar surgery    VASCULAR SURGERY PROCEDURE UNLIST      vein striping     Social History     Occupational History    Not on file   Tobacco Use    Smoking status: Never Smoker    Smokeless tobacco: Never Used   Substance and Sexual Activity    Alcohol use: Yes     Comment: once a year    Drug use: No    Sexual activity: Yes     Partners: Male     Family History   Problem Relation Age of Onset    No Known Problems Mother     No Known Problems Father     Stroke Sister         Allergies   Allergen Reactions    Lisinopril Cough     Prior to Admission medications    Medication Sig Start Date End Date Taking? Authorizing Provider   olmesartan (BENICAR) 40 mg tablet Take 1 Tab by mouth daily. 7/5/19  Yes Cameron Chen MD   estradiol (ESTRACE) 1 mg tablet Take 1 Tab by mouth daily. Patient taking differently: Take 1 mg by mouth daily. Taking two times a week 9/20/19   Cameron Chen MD   atorvastatin (LIPITOR) 20 mg tablet TAKE 1 TABLET BY MOUTH NIGHTLY. 8/22/19   Ivory Hahn MD   niacin ER (NIASPAN) 500 mg tablet TAKE 1 TABLET BY MOUTH AT BEDTIME 7/29/19   Cameron Chen MD   potassium chloride (K-DUR, KLOR-CON) 20 mEq tablet take 1 tablet by mouth twice a day 10/19/18   Cameron Chen MD   OTHER Indications: Melaleuca Phytomega - Take 2 softgels twice a day.     Provider, Historical   OTHER Indications: Melaleuca Vitality - Takes 1 tablet twice a day. Provider, Historical   ferrous sulfate 325 mg (65 mg iron) tablet Take  by mouth three (3) times daily (with meals). Provider, Historical   vitamin E (AQUA GEMS) 400 unit capsule Take 400 Units by mouth daily. 3/16/17   Melvin Glaser NP   cholecalciferol, vitamin D3, 2,000 unit tab Take 1 Tab by mouth daily. Provider, Historical   ascorbic acid (VITAMIN C) 250 mg tablet Take 500 mg by mouth two (2) times a day. Provider, Historical        Review of Systems:  A comprehensive review of systems was negative except for that written in the History of Present Illness. Objective:     Vitals:    01/27/20 0834   BP: 129/89   Pulse: 67   Resp: 15   Temp: 98 °F (36.7 °C)   SpO2: 97%       Physical Exam:  Heart: Regular rate and rhythm  Lung: clear to auscultation throughout lung fields, no wheezes, no rales, no rhonchi and normal respiratory effort  Back: costovertebral angle tenderness absent  Abdomen: soft, nontender  External Genitalia: normal general appearance  Urinary system: urethral meatus normal  Vagina: cystocele present, grade 3 and vaginal vault, grade 2  Cervix: removed surgically  Adnexa: normal bimanual exam  Uterus: removed surgically    Assessment:     Active Problems:    * No active hospital problems. *     Pelvic relaxation with cystocele    Plan:     Procedure(s) (LRB):  CYSTOCELE REPAIR, PERINEOPLASTY,SACROSPINOUS LIGAMENT FIXATION (N/A)  SACROSPINOUS LIGAMENT FIXATION (N/A)  Discussed the risks of surgery including the risks of bleeding, infection, deep vein thrombosis, and surgical injuries to internal organs including but not limited to the bowels, bladder, rectum, and female reproductive organs. The patient understands the risks; any and all questions were answered to the patient's satisfaction.

## 2020-01-27 NOTE — ANESTHESIA POSTPROCEDURE EVALUATION
Procedure(s):  CYSTOCELE REPAIR, PERINEOPLASTY,SACROSPINOUS LIGAMENT FIXATION  SACROSPINOUS LIGAMENT FIXATION to the right. general    Anesthesia Post Evaluation        Patient location during evaluation: PACU  Note status: Adequate. Level of consciousness: responsive to verbal stimuli and sleepy but conscious  Pain management: satisfactory to patient  Airway patency: patent  Anesthetic complications: no  Cardiovascular status: acceptable  Respiratory status: acceptable  Hydration status: acceptable  Comments: +Post-Anesthesia Evaluation and Assessment    Patient: Lolly Andrea MRN: [id number]  SSN: [id number]   YOB: 1955  Age: 71 y.o. Sex: female      Cardiovascular Function/Vital Signs    /52   Pulse 60   Temp 36.8 °C (98.2 °F)   Resp 19   SpO2 99%     Patient is status post Procedure(s):  CYSTOCELE REPAIR, PERINEOPLASTY,SACROSPINOUS LIGAMENT FIXATION  SACROSPINOUS LIGAMENT FIXATION to the right. Nausea/Vomiting: Controlled. Postoperative hydration reviewed and adequate. Pain:  Pain Scale 1: Numeric (0 - 10) (01/27/20 1130)  Pain Intensity 1: 0 (01/27/20 1130)   Managed. Neurological Status:   Neuro (WDL): Exceptions to WDL (01/27/20 1117)   At baseline. Mental Status and Level of Consciousness: Arousable. Pulmonary Status:   O2 Device: Nasal cannula (01/27/20 1135)   Adequate oxygenation and airway patent. Complications related to anesthesia: None    Post-anesthesia assessment completed. No concerns. Signed By: Efrain Berkowitz MD    1/27/2020  Post anesthesia nausea and vomiting:  controlled      Vitals Value Taken Time   /52 1/27/2020 11:45 AM   Temp 36.8 °C (98.2 °F) 1/27/2020 11:19 AM   Pulse 58 1/27/2020 11:54 AM   Resp 19 1/27/2020 11:54 AM   SpO2 98 % 1/27/2020 11:54 AM   Vitals shown include unvalidated device data.

## 2020-01-27 NOTE — OP NOTES
[address]  OPERATIVE REPORT    Name:  Damaso Yañez  MR#:  [id number]  :  1955  ACCOUNT #:  [de-identified]  DATE OF SERVICE:  2020    PREOPERATIVE DIAGNOSES:  1. Cystocele. 2.  Vaginal cuff prolapse. 3.  Perineal laxity. POSTOPERATIVE DIAGNOSES:  1. Cystocele. 2.  Vaginal cuff prolapse. 3.  Perineal laxity. PROCEDURES PERFORMED:  1. Sacrospinous ligament fixation to the patient's right. 2.  Cystocele repair. 3.  Perineoplasty. SURGEON:  Arnold Montemayor MD    ASSISTANTTawny Drain. ANESTHESIA:  General anesthesia with endotracheal intubation. COMPLICATIONS:  None known. SPECIMENS REMOVED:  Perineal body. IMPLANTS:  none. ESTIMATED BLOOD LOSS:  30 mL. INSERTS:  None. PRESENTATION:  The patient is a 79-year-old with a grade III cystocele and grade II vaginal cuff descent. PROCEDURE:  She was taken to the operating room where she was prepped and draped in a standard sterile fashion with sequential compression stockings and Gabriele stirrups. A Sifuentes catheter was placed and a [place] retraction system was used. A cystocele was grasped, noted to be grade III and apical descent was grade II. A midline incision was made under the cystocele and it was dissected laterally on each side to allow 0 Vicryl sutures to plicate the cystocele to the midline. After this was completed, a small strip of redundant mucosa was excised to freshen the edges and the mucosa was closed with 0 Vicryl interrupted sutures as well. The apex was identified and a posterior incision was made near those sharp and blunt dissection to the spine and the patient's right side and the connective tissue denuded off of the sacrospinous ligament. Two sutures of permanent material were placed with Capio device. A tug on each assured that it was well fixed in the ligament. These were secured full thickness near the vaginal cuff and arranged over that the under the mucosa. Overlying that mucosa was closed with interrupted 0 Vicryl sutures. After securing the Capio sutures, the apex was nicely elevated and the cystocele was well supported as well. Attention was then turned to the perineal body where a small wedge of the perineum was excised. Several layers of 0 Vicryl used to bring the perineal body together. The posterior vaginal mucosa was closed with 0 Vicryl and the skin closed with 4-0 Monocryl and a running subcuticular stitch. The skin was infiltrated with 0.5% Marcaine for her comfort. She returned to the recovery room in good condition.       MD SARAH [email or web address]  D:  01/27/2020 11:28  T:  01/27/2020 14:54  JOB #:  [id number]

## 2020-01-28 VITALS
DIASTOLIC BLOOD PRESSURE: 54 MMHG | OXYGEN SATURATION: 97 % | HEART RATE: 62 BPM | RESPIRATION RATE: 16 BRPM | SYSTOLIC BLOOD PRESSURE: 99 MMHG | TEMPERATURE: 97.7 F

## 2020-01-28 PROCEDURE — 74011000258 HC RX REV CODE- 258: Performed by: OBSTETRICS & GYNECOLOGY

## 2020-01-28 PROCEDURE — 74011250637 HC RX REV CODE- 250/637: Performed by: OBSTETRICS & GYNECOLOGY

## 2020-01-28 PROCEDURE — 99218 HC RM OBSERVATION: CPT

## 2020-01-28 RX ORDER — ESTRADIOL 0.05 MG/D
FILM, EXTENDED RELEASE TRANSDERMAL
Qty: 8 PATCH | Refills: 5 | Status: SHIPPED | OUTPATIENT
Start: 2020-01-28 | End: 2020-02-28 | Stop reason: SDUPTHER

## 2020-01-28 RX ORDER — OXYCODONE AND ACETAMINOPHEN 5; 325 MG/1; MG/1
1 TABLET ORAL
Qty: 20 TAB | Refills: 0 | Status: SHIPPED | OUTPATIENT
Start: 2020-01-28 | End: 2020-02-02

## 2020-01-28 RX ADMIN — DEXTROSE MONOHYDRATE AND SODIUM CHLORIDE 75 ML/HR: 5; .9 INJECTION, SOLUTION INTRAVENOUS at 02:41

## 2020-01-28 RX ADMIN — OXYCODONE HYDROCHLORIDE AND ACETAMINOPHEN 1 TABLET: 5; 325 TABLET ORAL at 08:15

## 2020-01-28 RX ADMIN — IBUPROFEN 400 MG: 400 TABLET, FILM COATED ORAL at 08:15

## 2020-01-28 RX ADMIN — OXYCODONE HYDROCHLORIDE AND ACETAMINOPHEN 1 TABLET: 5; 325 TABLET ORAL at 13:30

## 2020-01-28 RX ADMIN — OXYCODONE HYDROCHLORIDE AND ACETAMINOPHEN 1 TABLET: 5; 325 TABLET ORAL at 01:22

## 2020-01-28 RX ADMIN — IBUPROFEN 400 MG: 400 TABLET, FILM COATED ORAL at 13:30

## 2020-01-28 RX ADMIN — IBUPROFEN 400 MG: 400 TABLET, FILM COATED ORAL at 01:21

## 2020-01-28 NOTE — DISCHARGE SUMMARY
Malachi Campos was admitted for surgical management of her pelvic organ prolapse. Surgical repair was undertaken. Please see her operative report for full details. She had an uneventful overnight stay. Her vital signs were within stable limits, she tolerated a regular diet, had good urine output, ambulated, had pain controlled with an oral regimen. On the morning of postoperative day one, her tate catheter and packing were removed and and a voiding trial done. She was given a full set of preprinted discharge instructions detailing restrictions, postoperative appointments, and general care. She indicated an understanding of these instructions and was discharged without further issue.

## 2020-01-28 NOTE — ROUTINE PROCESS
Bedside and Verbal shift change report given to Tavares Anguiano RN (oncoming nurse) by Rosaura Collins RN (offgoing nurse). Report included the following information SBAR, Procedure Summary, Intake/Output and MAR.

## 2020-01-28 NOTE — PROGRESS NOTES
1600: Ice franky pad changed. Scant red bleeding. 1845: Ice franky pad changed. Scant pink bleeding. Up to chair with assist of 2 nurses. Call bell in reach. 1920: Report to BERNY Chilel RN using SBAR.

## 2020-01-28 NOTE — TELEPHONE ENCOUNTER
RX refill request from the patient/pharmacy. Patient last seen 01- with labs, and next appt. scheduled for 04-  Requested Prescriptions     Pending Prescriptions Disp Refills    estradioL (VIVELLE) 0.05 mg/24 hr 8 Patch 5     Sig: Apply to the skin two times a week. Otis Aggarwal

## 2020-01-28 NOTE — PROGRESS NOTES
1250 Pt voided 400 mLs. Up ad shawn moving well. Moving forward with discharge orders. 1350 Patient discharge prescriptions & instructions given. Verbalized understanding. All questions answered. No distress noted. Signed copy of discharge instructions on paper chart.

## 2020-01-28 NOTE — ROUTINE PROCESS
Bedside and Verbal shift change report given to SIMEON Russo RN (oncoming nurse) by BERNY Hart (offgoing nurse). Report included the following information SBAR, Procedure Summary, Intake/Output and MAR.

## 2020-01-29 ENCOUNTER — PATIENT OUTREACH (OUTPATIENT)
Dept: FAMILY MEDICINE CLINIC | Age: 65
End: 2020-01-29

## 2020-01-29 NOTE — PROGRESS NOTES
Hospital Discharge Follow-Up      Date/Time:  2020 8:46 AM  Leeann Molina RN  Care Transition Nurse  [address] [place] [address] Ambulatory Care Coordination Team  Office number: [phone]      Patient was admitted to [facility] on 2020 and discharged on 2020 for Cystocele repair. The physician discharge summary was not available at the time of outreach. Patient was contacted within 1 business days of discharge. Top Challenges reviewed with the provider   Uncontrolled pain   Advance Care Planning:   Does patient have an Advance Directive:  not on file        Method of communication with provider :phone    Was this a readmission? no   Patient stated reason for the readmission: n/a    Care Transition Nurse (CTN) contacted the patient by telephone to perform post hospital discharge assessment. Verified name and  with patient as identifiers. Provided introduction to self, and explanation of the CTN role. Patient received hospital discharge instructions. CTN reviewed discharge instructions and red flags with patient who verbalized understanding. Patient given an opportunity to ask questions and does not have any further questions or concerns at this time. The patient agrees to contact the PCP office for questions related to their healthcare. CTN provided contact information for future reference.     Disease Specific:   N/A    Patients top risk factors for readmission:  functional physical ability, lack of knowledge about disease, medical condition, medication management    Home Health orders at discharge: [address]: n/a  Date of initial visit: [facility] ordered at discharge: none  [address] received: n/a    Medication(s):   New Medications at Discharge: START taking:  oxyCODONE-acetaminophen 5-325 mg per  tablet (PERCOCET)     Changed Medications at Discharge: none    Discontinued Medications at Discharge: none    Medication reconciliation was performed with patient, who verbalizes understanding of administration of home medications. There were no barriers to obtaining medications identified at this time. Referral to Pharm D needed: no     Current Outpatient Medications   Medication Sig    oxyCODONE-acetaminophen (PERCOCET) 5-325 mg per tablet Take 1 Tab by mouth every four (4) hours as needed for Pain for up to 5 days. Max Daily Amount: 6 Tabs. Indications: pain    estradioL (VIVELLE) 0.05 mg/24 hr Apply to the skin two times a week.  atorvastatin (LIPITOR) 20 mg tablet TAKE 1 TABLET BY MOUTH NIGHTLY.  niacin ER (NIASPAN) 500 mg tablet TAKE 1 TABLET BY MOUTH AT BEDTIME    olmesartan (BENICAR) 40 mg tablet Take 1 Tab by mouth daily.  potassium chloride (K-DUR, KLOR-CON) 20 mEq tablet take 1 tablet by mouth twice a day    OTHER Indications: Melaleuca Phytomega - Take 2 softgels twice a day.  OTHER Indications: Melaleuca Vitality - Takes 1 tablet twice a day.  ferrous sulfate 325 mg (65 mg iron) tablet Take  by mouth three (3) times daily (with meals).  vitamin E (AQUA GEMS) 400 unit capsule Take 400 Units by mouth daily.  cholecalciferol, vitamin D3, 2,000 unit tab Take 1 Tab by mouth daily.  ascorbic acid (VITAMIN C) 250 mg tablet Take 500 mg by mouth two (2) times a day. No current facility-administered medications for this visit. There are no discontinued medications. [facility] follow up appointment(s):   Future Appointments   Date Time Provider Diana Kelley   4/8/2020 10:30 AM Nazia Griffiths MD [address]      Non-[facility] follow up appointment(s): Dr. Jhon Crespo 2/24/2020 @ Diana Martinezand:  n/a     Goals      Prevent complications post hospitalization. 1/29/2020 Patient reports taking medication however pain in buttock area is not controlled taking pain medication Q4 hhours as prescribed.  She has attempted cold pack, repositioning, and states the stronger medications never help. . Advised patient to contact surgeon office. CTN contacted [facility] to advised provider/nurse regarding patient uncontrolled pain. Patient reports speaking with Dr. Teresa Daniel office and she is to take Motrin in addition to Percocet. Patient will monitor pain level and take pain medication as prescribed and report to CTN on Friday, 1/31/2020.  JESSICA

## 2020-01-31 ENCOUNTER — PATIENT OUTREACH (OUTPATIENT)
Dept: FAMILY MEDICINE CLINIC | Age: 65
End: 2020-01-31

## 2020-01-31 NOTE — PROGRESS NOTES
Goals      Prevent complications post hospitalization. 1/29/2020 Patient reports taking medication however pain in buttock area is not controlled taking pain medication Q4 hhours as prescribed. She has attempted cold pack, repositioning, and states the stronger medications never help. . Advised patient to contact surgeon office. CTN contacted [facility] to advised provider/nurse regarding patient uncontrolled pain. Patient reports speaking with Dr. Humza Garcia office and she is to take Motrin in addition to Percocet. Patient will monitor pain level and take pain medication as prescribed and report to CTN on Friday, 1/31/2020. JESSICA    1/31/2020 Spouse/patient reports unable to have BM and in pain. Patient advised she is able to take OTC stool softener per discharge instructions.  also provided with [facility] contact information. Writer will follow up on Monday, 2/3/2020.  JESSICA

## 2020-02-03 ENCOUNTER — PATIENT OUTREACH (OUTPATIENT)
Dept: FAMILY MEDICINE CLINIC | Age: 65
End: 2020-02-03

## 2020-02-03 NOTE — PROGRESS NOTES
Goals      Prevent complications post hospitalization. 1/29/2020 Patient reports taking medication however pain in buttock area is not controlled taking pain medication Q4 hhours as prescribed. She has attempted cold pack, repositioning, and states the stronger medications never help. . Advised patient to contact surgeon office. CTN contacted [facility] to advised provider/nurse regarding patient uncontrolled pain. Patient reports speaking with Dr. Aliza Henriquez office and she is to take Motrin in addition to Percocet. Patient will monitor pain level and take pain medication as prescribed and report to CTN on Friday, 1/31/2020. JESSICA    1/31/2020 Spouse/patient reports unable to have BM and in pain. Patient advised she is able to take OTC stool softener per discharge instructions.  also provided with [facility] contact information. Writer will follow up on Monday, 2/3/2020. JESSICA    2/3/2020 Patient reports BM over the weekend and no pain at this time. She will continue to use Miralax at this time. No issues or concerns at this time. Will monitor pain level and schedule urology appointment to report at next outreach.  JESSICA

## 2020-02-27 ENCOUNTER — PATIENT OUTREACH (OUTPATIENT)
Dept: FAMILY MEDICINE CLINIC | Age: 65
End: 2020-02-27

## 2020-02-27 NOTE — PROGRESS NOTES
Goals      Prevent complications post hospitalization. 1/29/2020 Patient reports taking medication however pain in buttock area is not controlled taking pain medication Q4 hhours as prescribed. She has attempted cold pack, repositioning, and states the stronger medications never help. . Advised patient to contact surgeon office. CTN contacted [facility] to advised provider/nurse regarding patient uncontrolled pain. Patient reports speaking with Dr. Tamar Brian office and she is to take Motrin in addition to Percocet. Patient will monitor pain level and take pain medication as prescribed and report to CTN on Friday, 1/31/2020. [facility]    1/31/2020 Spouse/patient reports unable to have BM and in pain. Patient advised she is able to take OTC stool softener per discharge instructions.  also provided with [facility] contact information. Writer will follow up on Monday, 2/3/2020. [facility]    2/3/2020 Patient reports BM over the weekend and no pain at this time. She will continue to use Miralax at this time. No issues or concerns at this time. Will monitor pain level and schedule urology appointment to report at next outreach. [facility]    2/27/2020 Patient reports attendance of urology appointment. Denies chest pain, fever, constipation, SOB. Voiced no issues or concerns at this time.  [facility]

## 2020-02-28 DIAGNOSIS — E87.6 HYPOKALEMIA: ICD-10-CM

## 2020-02-28 RX ORDER — NIACIN 500 MG/1
TABLET, EXTENDED RELEASE ORAL
Qty: 90 TAB | Refills: 3 | Status: SHIPPED | OUTPATIENT
Start: 2020-02-28 | End: 2020-03-02 | Stop reason: SDUPTHER

## 2020-02-28 RX ORDER — OLMESARTAN MEDOXOMIL 40 MG/1
40 TABLET ORAL DAILY
Qty: 30 TAB | Status: SHIPPED | OUTPATIENT
Start: 2020-02-28 | End: 2020-03-02 | Stop reason: SDUPTHER

## 2020-02-28 RX ORDER — ATORVASTATIN CALCIUM 20 MG/1
TABLET, FILM COATED ORAL
Qty: 90 TAB | Refills: 3 | Status: SHIPPED | OUTPATIENT
Start: 2020-02-28 | End: 2020-03-02 | Stop reason: SDUPTHER

## 2020-02-28 RX ORDER — POTASSIUM CHLORIDE 20 MEQ/1
TABLET, EXTENDED RELEASE ORAL
Qty: 180 TAB | Refills: 3 | Status: SHIPPED | OUTPATIENT
Start: 2020-02-28 | End: 2020-03-02 | Stop reason: SDUPTHER

## 2020-02-28 RX ORDER — ESTRADIOL 0.05 MG/D
FILM, EXTENDED RELEASE TRANSDERMAL
Qty: 8 PATCH | Refills: 5 | Status: SHIPPED | OUTPATIENT
Start: 2020-02-28 | End: 2020-03-02 | Stop reason: SDUPTHER

## 2020-02-28 NOTE — TELEPHONE ENCOUNTER
Pt is requesting prescription refills to go to new pharmacy-[facility]  Fax prescription to [phone]        PCP: Fortino Mary MD    Last appt: 1/24/2020  Future Appointments   Date Time Provider Diana Kelley   4/8/2020 10:30 AM [facility]       Requested Prescriptions     Pending Prescriptions Disp Refills    atorvastatin (LIPITOR) 20 mg tablet 90 Tab 3     Sig: TAKE 1 TABLET BY MOUTH NIGHTLY.  estradioL (VIVELLE) 0.05 mg/24 hr 8 Patch 5     Sig: Apply to the skin two times a week.  niacin ER (NIASPAN) 500 mg tablet 90 Tab 3     Sig: TAKE 1 TABLET BY MOUTH AT BEDTIME    olmesartan (BENICAR) 40 mg tablet 30 Tab prn     Sig: Take 1 Tab by mouth daily.     potassium chloride (K-DUR, KLOR-CON) 20 mEq tablet 180 Tab 3     Sig: take 1 tablet by mouth twice a day

## 2020-03-02 DIAGNOSIS — E87.6 HYPOKALEMIA: ICD-10-CM

## 2020-03-02 RX ORDER — ESTRADIOL 0.05 MG/D
FILM, EXTENDED RELEASE TRANSDERMAL
Qty: 8 PATCH | Refills: 5 | Status: SHIPPED | OUTPATIENT
Start: 2020-03-02 | End: 2020-04-08 | Stop reason: SDUPTHER

## 2020-03-02 RX ORDER — NIACIN 500 MG/1
TABLET, EXTENDED RELEASE ORAL
Qty: 90 TAB | Refills: 3 | Status: SHIPPED | OUTPATIENT
Start: 2020-03-02 | End: 2020-03-12 | Stop reason: SDUPTHER

## 2020-03-02 RX ORDER — ATORVASTATIN CALCIUM 20 MG/1
TABLET, FILM COATED ORAL
Qty: 90 TAB | Refills: 3 | Status: SHIPPED | OUTPATIENT
Start: 2020-03-02 | End: 2021-03-12 | Stop reason: SDUPTHER

## 2020-03-02 RX ORDER — OLMESARTAN MEDOXOMIL 40 MG/1
40 TABLET ORAL DAILY
Qty: 90 TAB | Refills: 0 | Status: SHIPPED | OUTPATIENT
Start: 2020-03-02 | End: 2020-06-18 | Stop reason: SDUPTHER

## 2020-03-02 RX ORDER — POTASSIUM CHLORIDE 20 MEQ/1
TABLET, EXTENDED RELEASE ORAL
Qty: 180 TAB | Refills: 3 | Status: SHIPPED | OUTPATIENT
Start: 2020-03-02 | End: 2021-04-23 | Stop reason: SDUPTHER

## 2020-03-02 NOTE — TELEPHONE ENCOUNTER
Pt is requesting new prescriptions to be faxed to [phone]    PCP: Hue Mac MD    Last appt: 1/24/2020  Future Appointments   Date Time Provider Diana Kelley   4/8/2020 10:30 AM [facility]       Requested Prescriptions     Pending Prescriptions Disp Refills    atorvastatin (LIPITOR) 20 mg tablet 90 Tab 3     Sig: TAKE 1 TABLET BY MOUTH NIGHTLY.  estradioL (VIVELLE) 0.05 mg/24 hr 8 Patch 5     Sig: Apply to the skin two times a week.  niacin ER (NIASPAN) 500 mg tablet 90 Tab 3     Sig: TAKE 1 TABLET BY MOUTH AT BEDTIME    olmesartan (BENICAR) 40 mg tablet 90 Tab 0     Sig: Take 1 Tab by mouth daily.     potassium chloride (K-DUR, KLOR-CON) 20 mEq tablet 180 Tab 3     Sig: take 1 tablet by mouth twice a day

## 2020-03-12 RX ORDER — NIACIN 500 MG/1
TABLET, EXTENDED RELEASE ORAL
Qty: 90 TAB | Refills: 3 | Status: SHIPPED | OUTPATIENT
Start: 2020-03-12 | End: 2020-06-18 | Stop reason: SDUPTHER

## 2020-04-07 NOTE — PROGRESS NOTES
Chief Complaint   Patient presents with    Hypertension     3 months    Thyroid Problem       SUBJECTIVE:    Oliver Mason is a 71 y.o. female who returns in follow-up for medical problems include hypertension, hyperlipidemia, GERD, allergic rhinitis, COPD, DJD, obesity and other medical problems. She is taking her medications and trying to follow her diet and remain physically active. She still notes some problems with soreness in her mouth which she has been evaluated by Dr. Rosette Krishna from ENT. She also notes that she has bad gas and she is not sure which causing that she said that did not start until after she had recent surgery secondary to vaginal repair of cystocele/rectocele. She denies any chest pain, shortness of breath, palpitations, PND, orthopnea or other cardiorespiratory complaints. There are no GI or  complaints. There are no headaches, dizziness or neurologic complaints. There are no current active arthritic complaints and there are no other complaints on complete review of systems other than the soreness in her mouth and the increased gas. Current Outpatient Medications   Medication Sig Dispense Refill    diclofenac EC (VOLTAREN) 75 mg EC tablet       triamcinolone acetonide (KENALOG) 0.1 % topical cream       co-enzyme Q-10 (Co Q-10) 100 mg capsule Take 100 mg by mouth daily.  estradioL (VIVELLE) 0.05 mg/24 hr Apply to the skin two times a week. 26 Patch 3    niacin ER (NIASPAN) 500 mg tablet TAKE 1 TABLET BY MOUTH AT BEDTIME 90 Tab 3    atorvastatin (LIPITOR) 20 mg tablet TAKE 1 TABLET BY MOUTH NIGHTLY. 90 Tab 3    olmesartan (BENICAR) 40 mg tablet Take 1 Tab by mouth daily. 90 Tab 0    potassium chloride (K-DUR, KLOR-CON) 20 mEq tablet take 1 tablet by mouth twice a day 180 Tab 3    OTHER Indications: Melaleuca Phytomega - Take 2 softgels twice a day.  OTHER Indications: Melaleuca Vitality - Takes 1 tablet twice a day.       ferrous sulfate 325 mg (65 mg iron) tablet Take  by mouth three (3) times daily (with meals).  vitamin E (AQUA GEMS) 400 unit capsule Take 400 Units by mouth daily.  cholecalciferol, vitamin D3, 2,000 unit tab Take 1 Tab by mouth daily.  ascorbic acid (VITAMIN C) 250 mg tablet Take 500 mg by mouth two (2) times a day.        Past Medical History:   Diagnosis Date    Allergic rhinitis 8/9/2017    Anemia 8/9/2017    Anxiety 8/9/2017    Arrhythmia     irregular heart beat hx and beating fast per patient/no cardiologist    Arthritis     JOINTS  WORSE WAIST DOWN     Back pain 8/9/2017    Chronic kidney disease     Stage III    CKD (chronic kidney disease) 8/9/2017    COPD (chronic obstructive pulmonary disease) ([facility] [address] [address] 8/9/2017    Crohn disease ([facility] [address] 8/9/2017    DJD (degenerative joint disease) 8/9/2017    Dry mouth     evaluated by Dr. Cooper Quinn (ENT)    GERD (gastroesophageal reflux disease) 8/9/2017    Hormone replacement therapy (HRT) 8/9/2017    Hyperlipidemia 8/9/2017    Hypertension     Hypertension with renal disease 8/9/2017    Hypokalemia 8/9/2017    Hypothyroid 8/9/2017    Ill-defined condition     neuropathy feet    Inguinal hernia 8/9/2017    Leg numbness 8/9/2017    Lumbar herniated disc 8/9/2017    OAB (overactive bladder) 8/9/2017    Obesity 8/9/2017    On statin therapy 8/9/2017    Osteoarthritis 8/9/2017    Pelvic pain in female 8/1/6415    Umbilical hernia 2/0/0326    Varicose vein of leg 8/9/2017     Past Surgical History:   Procedure Laterality Date    BREAST SURGERY PROCEDURE UNLISTED      mass removed left breast x2 benign    HX COLONOSCOPY  12/2010    normal    HX COLONOSCOPY  12/30/2014    normal     HX HEENT      cyst removed left eye    HX HERNIA REPAIR  06/02/2017    right inguinal and umbilical (Dr. Ethan Litten)    HX HYSTERECTOMY      HX ORTHOPAEDIC      bilat knee injections    HX ORTHOPAEDIC      laser treatment left knee and foot    HX ORTHOPAEDIC      hx of gel shots bilat knee    HX ORTHOPAEDIC  12/27/2016    left knee coritizon shot    HX OTHER SURGICAL      cyst removed front left scalp    HX OTHER SURGICAL      colonoscopy    HX OTHER SURGICAL      tooth implant    HX TUBAL LIGATION      NEUROLOGICAL PROCEDURE UNLISTED      Lumbar surgery    VASCULAR SURGERY PROCEDURE UNLIST      vein striping     Allergies   Allergen Reactions    Lisinopril Cough       REVIEW OF SYSTEMS:  General: negative for - chills or fever, or weight loss or gain  ENT: negative for - headaches, nasal congestion or tinnitus. Soreness in mouth a recent evaluation by ENT and does have follow-up  Eyes: no blurred or visual changes  Neck: No stiffness or swollen nodes  Respiratory: negative for - cough, hemoptysis, shortness of breath or wheezing  Cardiovascular : negative for - chest pain, edema, palpitations or shortness of breath  Gastrointestinal: negative for - abdominal pain, blood in stools, heartburn or nausea/vomiting.   Increased flatus status post cystocele/rectocele repair  Genito-Urinary: no dysuria, trouble voiding, or hematuria  Musculoskeletal: negative for - gait disturbance, joint pain, joint stiffness or joint swelling  Neurological: no TIA or stroke symptoms  Hematologic: no bruises, no bleeding  Lymphatic: no swollen glands  Integument: no lumps, mole changes, nail changes or rash  Endocrine:no malaise/lethargy poly uria or polydipsia or unexpected weight changes        Social History     Socioeconomic History    Marital status:      Spouse name: Not on file    Number of children: Not on file    Years of education: Not on file    Highest education level: Not on file   Tobacco Use    Smoking status: Never Smoker    Smokeless tobacco: Never Used   Substance and Sexual Activity    Alcohol use: Yes     Comment: once a year    Drug use: No    Sexual activity: Yes     Partners: Male     Family History   Problem Relation Age of Onset    No Known Problems Mother     No Known Problems Father     Stroke Sister        OBJECTIVE:     Visit Vitals  BP (!) 152/94   Pulse 69   Temp 98.2 °F (36.8 °C) (Oral)   Ht 5' 6\" (1.676 m)   Wt 203 lb 11.2 oz (92.4 kg)   LMP  (LMP Unknown)   SpO2 98%   BMI 32.88 kg/m²     CONSTITUTIONAL:   well nourished, appears age appropriate  EYES: sclera anicteric, PERRL, EOMI  ENMT:nares clear, moist mucous membranes, pharynx clear  NECK: supple. Thyroid normal, No JVD or bruits  RESPIRATORY: Chest: clear to ascultation and percussion, normal inspiratory effort  CARDIOVASCULAR: Heart: regular rate and rhythm no murmurs, rubs or gallops, PMI not displaced, No thrills  GASTROINTESTINAL: Abdomen: non distended, soft, non tender, bowel sounds normal  HEMATOLOGIC: no purpura, petechiae or bruising  LYMPHATIC: No lymph node enlargemant  MUSCULOSKELETAL: Extremities: no edema or active synovitis, pulse 1+   INTEGUMENT: No unusual rashes or suspicious skin lesions noted. Nails appear normal.  PERIPHERAL VASCULAR: normal pulses femoral, PT and DP  NEUROLOGIC: non-focal exam, A & O X 3  PSYCHIATRIC:, appropriate affect     ASSESSMENT:   1. Hypertension with renal disease    2. Mixed hyperlipidemia    3. Gastroesophageal reflux disease without esophagitis    4. Chronic obstructive pulmonary disease, unspecified COPD type (HCC)    5. Class 1 obesity due to excess calories without serious comorbidity with body mass index (BMI) of 33.0 to 33.9 in adult    6. Primary osteoarthritis involving multiple joints    7. Stage 3 chronic kidney disease (HCC)      Impression  1. Hypertension that is not controlled although it is been well controlled before. She is on Benicar 40 mg daily but rather than increase or change in medicines or add additional medicines I will get her back in about a month and have asked in the interim to cut out the salt in her diet. 2.  Hyperlipidemia prior lab reviewed and repeat status pending I will adjust if needed. 3.  GERD that is stable  4.   COPD stable  5. Obesity we did discuss diet, exercise and weight reduction for overall health benefit. 6.  DJD that is stable  7. CKD stage III repeat status pending  8. Increased flatus certainly possibility of relation to Lipitor exist and I did ask her to stop that for 2 or 3 weeks and see if her symptoms resolve  9. Soreness in her mouth ENT evaluated and she said there was some lab studies that were abnormal on those results  Tentative follow-up set up for 1 month for hypertension in 3 months of other medical problems. I will call with lab results. I will see her sooner if there are other problems. PLAN:  .  Orders Placed This Encounter    METABOLIC PANEL, COMPREHENSIVE ([facility])    LIPID PANEL ([facility])    CK ([facility])    diclofenac EC (VOLTAREN) 75 mg EC tablet    triamcinolone acetonide (KENALOG) 0.1 % topical cream    co-enzyme Q-10 (Co Q-10) 100 mg capsule    estradioL (VIVELLE) 0.05 mg/24 hr         ATTENTION:   This medical record was transcribed using an electronic medical records system. Although proofread, it may and can contain electronic and spelling errors. Other human spelling and other errors may be present. Corrections may be executed at a later time. Please feel free to contact us for any clarifications as needed. Follow-up and Dispositions    · Return in about 3 months (around 7/8/2020). No results found for any visits on 04/08/20. Flakita Shafer MD    The patient verbalized understanding of the problems and plans as explained.

## 2020-04-08 ENCOUNTER — OFFICE VISIT (OUTPATIENT)
Dept: INTERNAL MEDICINE CLINIC | Age: 65
End: 2020-04-08

## 2020-04-08 VITALS
TEMPERATURE: 98.2 F | HEIGHT: 66 IN | OXYGEN SATURATION: 98 % | WEIGHT: 203.7 LBS | SYSTOLIC BLOOD PRESSURE: 152 MMHG | HEART RATE: 69 BPM | BODY MASS INDEX: 32.74 KG/M2 | DIASTOLIC BLOOD PRESSURE: 94 MMHG

## 2020-04-08 DIAGNOSIS — I12.9 HYPERTENSION WITH RENAL DISEASE: Primary | ICD-10-CM

## 2020-04-08 DIAGNOSIS — M15.9 PRIMARY OSTEOARTHRITIS INVOLVING MULTIPLE JOINTS: ICD-10-CM

## 2020-04-08 DIAGNOSIS — J44.9 CHRONIC OBSTRUCTIVE PULMONARY DISEASE, UNSPECIFIED COPD TYPE (HCC): ICD-10-CM

## 2020-04-08 DIAGNOSIS — E78.2 MIXED HYPERLIPIDEMIA: ICD-10-CM

## 2020-04-08 DIAGNOSIS — K21.9 GASTROESOPHAGEAL REFLUX DISEASE WITHOUT ESOPHAGITIS: ICD-10-CM

## 2020-04-08 DIAGNOSIS — N18.30 STAGE 3 CHRONIC KIDNEY DISEASE (HCC): ICD-10-CM

## 2020-04-08 DIAGNOSIS — E66.09 CLASS 1 OBESITY DUE TO EXCESS CALORIES WITHOUT SERIOUS COMORBIDITY WITH BODY MASS INDEX (BMI) OF 33.0 TO 33.9 IN ADULT: ICD-10-CM

## 2020-04-08 LAB
A-G RATIO,AGRAT: 1.6 RATIO
ALBUMIN SERPL-MCNC: 4.4 G/DL (ref 3.9–5.4)
ALP SERPL-CCNC: 111 U/L (ref 38–126)
ALT SERPL-CCNC: 46 U/L (ref 0–35)
ANION GAP SERPL CALC-SCNC: 10 MMOL/L
AST SERPL W P-5'-P-CCNC: 27 U/L (ref 14–36)
BILIRUB SERPL-MCNC: 0.6 MG/DL (ref 0.2–1.3)
BUN SERPL-MCNC: 26 MG/DL (ref 7–17)
BUN/CREATININE RATIO,BUCR: 33 RATIO
CALCIUM SERPL-MCNC: 10.1 MG/DL (ref 8.4–10.2)
CHLORIDE SERPL-SCNC: 102 MMOL/L (ref 98–107)
CHOL/HDL RATIO,CHHD: 3 RATIO (ref 0–4)
CHOLEST SERPL-MCNC: 156 MG/DL (ref 0–200)
CK SERPL-CCNC: 41 U/L (ref 30–135)
CO2 SERPL-SCNC: 30 MMOL/L (ref 22–32)
CREAT SERPL-MCNC: 0.8 MG/DL (ref 0.7–1.2)
GLOBULIN,GLOB: 2.7
GLUCOSE SERPL-MCNC: 93 MG/DL (ref 65–105)
HDLC SERPL-MCNC: 51 MG/DL (ref 35–130)
LDL/HDL RATIO,LDHD: 2 RATIO
LDLC SERPL CALC-MCNC: 90 MG/DL (ref 0–130)
POTASSIUM SERPL-SCNC: 5.2 MMOL/L (ref 3.6–5)
PROT SERPL-MCNC: 7.1 G/DL (ref 6.3–8.2)
SODIUM SERPL-SCNC: 142 MMOL/L (ref 137–145)
TRIGL SERPL-MCNC: 77 MG/DL (ref 0–200)
VLDLC SERPL CALC-MCNC: 15 MG/DL

## 2020-04-08 RX ORDER — TRIAMCINOLONE ACETONIDE 1 MG/G
CREAM TOPICAL
COMMUNITY
Start: 2020-03-17

## 2020-04-08 RX ORDER — ESTRADIOL 0.05 MG/D
FILM, EXTENDED RELEASE TRANSDERMAL
Qty: 26 PATCH | Refills: 3 | Status: SHIPPED | OUTPATIENT
Start: 2020-04-08 | End: 2021-07-16

## 2020-04-08 RX ORDER — CHOLECALCIFEROL (VITAMIN D3) 125 MCG
100 CAPSULE ORAL DAILY
COMMUNITY

## 2020-04-08 RX ORDER — DICLOFENAC SODIUM 75 MG/1
TABLET, DELAYED RELEASE ORAL
COMMUNITY
Start: 2020-04-02 | End: 2020-12-30

## 2020-04-08 NOTE — PATIENT INSTRUCTIONS
Anemia: Care Instructions Your Care Instructions Anemia is a low level of red blood cells, which carry oxygen throughout your body. Many things can cause anemia. Lack of iron is one of the most common causes. Your body needs iron to make hemoglobin, a substance in red blood cells that carries oxygen from the lungs to your body's cells. Without enough iron, the body produces fewer and smaller red blood cells. As a result, your body's cells do not get enough oxygen, and you feel tired and weak. And you may have trouble concentrating. Bleeding is the most common cause of a lack of iron. You may have heavy menstrual bleeding or bleeding caused by conditions such as ulcers, hemorrhoids, or cancer. Regular use of aspirin or other anti-inflammatory medicines (such as ibuprofen) also can cause bleeding in some people. A lack of iron in your diet also can cause anemia, especially at times when the body needs more iron, such as during pregnancy, infancy, and the teen years. Your doctor may have prescribed iron pills. It may take several months of treatment for your iron levels to return to normal. Your doctor also may suggest that you eat foods that are rich in iron, such as meat and beans. There are many other causes of anemia. It is not always due to a lack of iron. Finding the specific cause of your anemia will help your doctor find the right treatment for you. Follow-up care is a key part of your treatment and safety. Be sure to make and go to all appointments, and call your doctor if you are having problems. It's also a good idea to know your test results and keep a list of the medicines you take. How can you care for yourself at home? · Take your medicines exactly as prescribed. Call your doctor if you think you are having a problem with your medicine. · If your doctor recommends iron pills, take them as directed: ? Try to take the pills on an empty stomach about 1 hour before or 2 hours after meals. But you may need to take iron with food to avoid an upset stomach. ? Do not take antacids or drink milk or caffeine drinks (such as coffee, tea, or cola) at the same time or within 2 hours of the time that you take your iron. They can make it hard for your body to absorb the iron. ? Vitamin C (from food or supplements) helps your body absorb iron. Try taking iron pills with a glass of orange juice or some other food that is high in vitamin C, such as citrus fruits. ? Iron pills may cause stomach problems, such as heartburn, nausea, diarrhea, constipation, and cramps. Be sure to drink plenty of fluids, and include fruits, vegetables, and fiber in your diet each day. Iron pills often make your bowel movements dark or green. ? If you forget to take an iron pill, do not take a double dose of iron the next time you take a pill. ? Keep iron pills out of the reach of small children. An overdose of iron can be very dangerous. · Follow your doctor's advice about eating iron-rich foods. These include red meat, shellfish, poultry, eggs, beans, raisins, whole-grain bread, and leafy green vegetables. · Steam vegetables to help them keep their iron content. When should you call for help? Call 911 anytime you think you may need emergency care. For example, call if: 
  · You have symptoms of a heart attack. These may include: 
? Chest pain or pressure, or a strange feeling in the chest. 
? Sweating. ? Shortness of breath. ? Nausea or vomiting. ? Pain, pressure, or a strange feeling in the back, neck, jaw, or upper belly or in one or both shoulders or arms. ? Lightheadedness or sudden weakness. ? A fast or irregular heartbeat. After you call  911, the  may tell you to chew 1 adult-strength or 2 to 4 low-dose aspirin. Wait for an ambulance. Do not try to drive yourself.  
  · You passed out (lost consciousness).  
 Call your doctor now or seek immediate medical care if:   · You have new or increased shortness of breath.  
  · You are dizzy or lightheaded, or you feel like you may faint.  
  · Your fatigue and weakness continue or get worse.  
  · You have any abnormal bleeding, such as: 
? Nosebleeds. ? Vaginal bleeding that is different (heavier, more frequent, at a different time of the month) than what you are used to. 
? Bloody or black stools, or rectal bleeding. ? Bloody or pink urine.  
 Watch closely for changes in your health, and be sure to contact your doctor if: 
  · You do not get better as expected. Where can you learn more? Go to http://mikael-oskar.info/ Enter R301 in the search box to learn more about \"Anemia: Care Instructions. \" Current as of: November 7, 2019Content Version: 12.4 © 7036-6135 [employer]. Care instructions adapted under license by [employer] (which disclaims liability or warranty for this information). If you have questions about a medical condition or this instruction, always ask your healthcare professional. [address] any warranty or liability for your use of this information.

## 2020-04-08 NOTE — PROGRESS NOTES
Chief Complaint   Patient presents with    Hypertension     3 months    Thyroid Problem     1. Have you been to the ER, urgent care clinic since your last visit? Hospitalized since your last visit? No    2. Have you seen or consulted any other health care providers outside of the [address] [place] [address] since your last visit? Include any pap smears or colon screening. Is seening a doctors office for nerves, to restore nerves back in feet last visited them Monday. Unsure of office name.

## 2020-05-07 NOTE — PROGRESS NOTES
Chief Complaint   Patient presents with    Hypertension     1 mo f/u       SUBJECTIVE:    Aline Harvey is a 71 y.o. female who returns in follow-up for hypertension after having been seen here recently at which time her blood pressure was 152/94 and we did not make a medicine change we decided to go with diet. She returns today having try to follow her diet more closely although I note that her weight is actually up 3 pounds from her last visit. She denies any chest pain, shortness of breath, palpitations, PND, orthopnea or other cardiorespiratory complaints. She notes no GI or  complaints. She notes no headaches, dizziness or neurologic complaints. She has no current arthritic complaints and no other complaints on complete review of systems. Current Outpatient Medications   Medication Sig Dispense Refill    cyanocobalamin (VITAMIN B-12) 1,000 mcg sublingual tablet Take 1,000 mcg by mouth daily.  amLODIPine (NORVASC) 5 mg tablet Take 1 Tab by mouth daily. 30 Tab prn    diclofenac EC (VOLTAREN) 75 mg EC tablet       triamcinolone acetonide (KENALOG) 0.1 % topical cream       co-enzyme Q-10 (Co Q-10) 100 mg capsule Take 100 mg by mouth daily.  estradioL (VIVELLE) 0.05 mg/24 hr Apply to the skin two times a week. 26 Patch 3    niacin ER (NIASPAN) 500 mg tablet TAKE 1 TABLET BY MOUTH AT BEDTIME 90 Tab 3    atorvastatin (LIPITOR) 20 mg tablet TAKE 1 TABLET BY MOUTH NIGHTLY. 90 Tab 3    olmesartan (BENICAR) 40 mg tablet Take 1 Tab by mouth daily. 90 Tab 0    potassium chloride (K-DUR, KLOR-CON) 20 mEq tablet take 1 tablet by mouth twice a day 180 Tab 3    OTHER Indications: Melaleuca Phytomega - Take 2 softgels twice a day.  OTHER Indications: Melaleuca Vitality - Takes 1 tablet twice a day.  ferrous sulfate 325 mg (65 mg iron) tablet Take  by mouth three (3) times daily (with meals).  vitamin E (AQUA GEMS) 400 unit capsule Take 400 Units by mouth daily.       cholecalciferol, vitamin D3, 2,000 unit tab Take 1 Tab by mouth daily.  ascorbic acid (VITAMIN C) 250 mg tablet Take 500 mg by mouth two (2) times a day.        Past Medical History:   Diagnosis Date    Allergic rhinitis 8/9/2017    Anemia 8/9/2017    Anxiety 8/9/2017    Arrhythmia     irregular heart beat hx and beating fast per patient/no cardiologist    Arthritis     JOINTS  WORSE WAIST DOWN     Back pain 8/9/2017    Chronic kidney disease     Stage III    CKD (chronic kidney disease) 8/9/2017    COPD (chronic obstructive pulmonary disease) ([facility] 8/9/2017    Crohn disease ([facility] 8/9/2017    DJD (degenerative joint disease) 8/9/2017    Dry mouth     evaluated by Dr. Robles Mckeon (ENT)    GERD (gastroesophageal reflux disease) 8/9/2017    Hormone replacement therapy (HRT) 8/9/2017    Hyperlipidemia 8/9/2017    Hypertension     Hypertension with renal disease 8/9/2017    Hypokalemia 8/9/2017    Hypothyroid 8/9/2017    Ill-defined condition     neuropathy feet    Inguinal hernia 8/9/2017    Leg numbness 8/9/2017    Lumbar herniated disc 8/9/2017    OAB (overactive bladder) 8/9/2017    Obesity 8/9/2017    On statin therapy 8/9/2017    Osteoarthritis 8/9/2017    Pelvic pain in female 1/9/0577    Umbilical hernia 1/2/0009    Varicose vein of leg 8/9/2017     Past Surgical History:   Procedure Laterality Date    BREAST SURGERY PROCEDURE UNLISTED      mass removed left breast x2 benign    HX COLONOSCOPY  12/2010    normal    HX COLONOSCOPY  12/30/2014    normal     HX HEENT      cyst removed left eye    HX HERNIA REPAIR  06/02/2017    right inguinal and umbilical (Dr. Lesley Espana)    HX HYSTERECTOMY      HX ORTHOPAEDIC      bilat knee injections    HX ORTHOPAEDIC      laser treatment left knee and foot    HX ORTHOPAEDIC      hx of gel shots bilat knee    HX ORTHOPAEDIC  12/27/2016    left knee coritizon shot    HX OTHER SURGICAL      cyst removed front left scalp    HX OTHER SURGICAL colonoscopy    HX OTHER SURGICAL      tooth implant    HX TUBAL LIGATION      NEUROLOGICAL PROCEDURE UNLISTED      Lumbar surgery    VASCULAR SURGERY PROCEDURE UNLIST      vein striping     Allergies   Allergen Reactions    Lisinopril Cough       REVIEW OF SYSTEMS:  General: negative for - chills or fever, or weight loss or gain  ENT: negative for - headaches, nasal congestion or tinnitus  Eyes: no blurred or visual changes  Neck: No stiffness or swollen nodes  Respiratory: negative for - cough, hemoptysis, shortness of breath or wheezing  Cardiovascular : negative for - chest pain, edema, palpitations or shortness of breath  Gastrointestinal: negative for - abdominal pain, blood in stools, heartburn or nausea/vomiting  Genito-Urinary: no dysuria, trouble voiding, or hematuria  Musculoskeletal: negative for - gait disturbance, joint pain, joint stiffness or joint swelling  Neurological: no TIA or stroke symptoms  Hematologic: no bruises, no bleeding  Lymphatic: no swollen glands  Integument: no lumps, mole changes, nail changes or rash  Endocrine:no malaise/lethargy poly uria or polydipsia or unexpected weight changes        Social History     Socioeconomic History    Marital status:      Spouse name: Not on file    Number of children: Not on file    Years of education: Not on file    Highest education level: Not on file   Tobacco Use    Smoking status: Never Smoker    Smokeless tobacco: Never Used   Substance and Sexual Activity    Alcohol use: Yes     Comment: once a year    Drug use: No    Sexual activity: Yes     Partners: Male     Family History   Problem Relation Age of Onset    No Known Problems Mother     No Known Problems Father     Stroke Sister        OBJECTIVE:     Visit Vitals  /74   Pulse 76   Temp 98.2 °F (36.8 °C) (Oral)   Ht 5' 6\" (1.676 m)   Wt 206 lb 12.8 oz (93.8 kg)   LMP  (LMP Unknown)   SpO2 98%   BMI 33.38 kg/m²     CONSTITUTIONAL:   well nourished, appears age appropriate  EYES: sclera anicteric, PERRL, EOMI  ENMT:nares clear, moist mucous membranes, pharynx clear  NECK: supple. Thyroid normal, No JVD or bruits  RESPIRATORY: Chest: clear to ascultation and percussion, normal inspiratory effort  CARDIOVASCULAR: Heart: regular rate and rhythm no murmurs, rubs or gallops, PMI not displaced, No thrills, no peripheral edema  GASTROINTESTINAL: Abdomen: non distended, soft, non tender, bowel sounds normal  HEMATOLOGIC: no purpura, petechiae or bruising  LYMPHATIC: No lymph node enlargemant  MUSCULOSKELETAL: Extremities: no active synovitis, pulse 1+   INTEGUMENT: No unusual rashes or suspicious skin lesions noted. Nails appear normal.  PERIPHERAL VASCULAR: normal pulses femoral, PT and DP  NEUROLOGIC: non-focal exam, A & O X 3  PSYCHIATRIC:, appropriate affect     ASSESSMENT:   1. Hypertension with renal disease    2. Class 1 obesity due to excess calories without serious comorbidity with body mass index (BMI) of 33.0 to 33.9 in adult      Impression  1. Hypertension that is not controlled so at this point add Norvasc 5 mg daily. 2.  Obesity weight unfortunately up 3 pounds and we again stressed diet, exercise and weight reduction  Recheck a myself again in 1 month or sooner should to be a problem. PLAN:  .  Orders Placed This Encounter    cyanocobalamin (VITAMIN B-12) 1,000 mcg sublingual tablet    amLODIPine (NORVASC) 5 mg tablet         ATTENTION:   This medical record was transcribed using an electronic medical records system. Although proofread, it may and can contain electronic and spelling errors. Other human spelling and other errors may be present. Corrections may be executed at a later time. Please feel free to contact us for any clarifications as needed. Follow-up and Dispositions    · Return in about 4 weeks (around 6/5/2020). No results found for any visits on 05/08/20.     Lo Arauz MD    The patient verbalized understanding of the problems and plans as explained.

## 2020-05-08 ENCOUNTER — OFFICE VISIT (OUTPATIENT)
Dept: INTERNAL MEDICINE CLINIC | Age: 65
End: 2020-05-08

## 2020-05-08 VITALS
WEIGHT: 206.8 LBS | HEIGHT: 66 IN | BODY MASS INDEX: 33.23 KG/M2 | DIASTOLIC BLOOD PRESSURE: 74 MMHG | TEMPERATURE: 98.2 F | OXYGEN SATURATION: 98 % | HEART RATE: 76 BPM | SYSTOLIC BLOOD PRESSURE: 152 MMHG

## 2020-05-08 DIAGNOSIS — E66.09 CLASS 1 OBESITY DUE TO EXCESS CALORIES WITHOUT SERIOUS COMORBIDITY WITH BODY MASS INDEX (BMI) OF 33.0 TO 33.9 IN ADULT: ICD-10-CM

## 2020-05-08 DIAGNOSIS — I12.9 HYPERTENSION WITH RENAL DISEASE: Primary | ICD-10-CM

## 2020-05-08 RX ORDER — AMLODIPINE BESYLATE 5 MG/1
5 TABLET ORAL DAILY
Qty: 30 TAB | Status: SHIPPED | OUTPATIENT
Start: 2020-05-08 | End: 2021-07-16

## 2020-05-08 RX ORDER — MAGNESIUM 200 MG
1000 TABLET ORAL DAILY
COMMUNITY

## 2020-05-08 NOTE — PROGRESS NOTES
Diego Rosas is a 71 y.o. female    HIPAA verified by two patient identifiers. Health Maintenance Due   Topic Date Due    DTaP/Tdap/Td series (1 - Tdap) 07/12/1975    Shingrix Vaccine Age 50> (1 of 2) 07/12/2004    Breast Cancer Screen Mammogram  01/08/2018    GLAUCOMA SCREENING Q2Y  07/12/2019    Bone Densitometry  04/12/2020       Chief Complaint   Patient presents with    Hypertension     1 mo f/u         Visit Vitals  /88 (BP 1 Location: Right arm, BP Patient Position: Sitting)   Pulse 76   Temp 98.2 °F (36.8 °C) (Oral)   Ht 5' 6\" (1.676 m)   Wt 206 lb 12.8 oz (93.8 kg)   LMP  (LMP Unknown)   SpO2 98%   BMI 33.38 kg/m²       Pain Scale: 0 - No pain/10  Pain Location:     1. Have you been to the ER, urgent care clinic since your last visit? Hospitalized since your last visit? No    2. Have you seen or consulted any other health care providers outside of the [address] [place] [address] since your last visit? Include any pap smears or colon screening. Chula Martinez dc [facility],  [facility] for her feet.

## 2020-05-08 NOTE — PATIENT INSTRUCTIONS
Learning About COPD What is COPD? COPD is a lung disease that makes it hard to breathe. COPD stands for chronic obstructive pulmonary disease. It is caused by damage to the lungs over many years, usually from smoking. COPD is a mix of two diseases: chronic bronchitis and emphysema. Other things that may put you at risk for COPD include breathing chemical fumes, dust, or air pollution over a long period of time. Secondhand smoke is also bad. In chronic bronchitis, the airways that carry air to the lungs (bronchial tubes) get inflamed and make a lot of mucus. This can narrow or block the airways, making it hard for you to breathe. In emphysema, the air sacs in your lungs are damaged and lose their stretch. Less air gets in and out of your lungs, which makes you feel short of breath. What can you expect when you have COPD? COPD gets worse over time. You cannot undo the damage to your lungs. Over time, you may find that: 
· You get short of breath even when you do simple things like get dressed or fix a meal. 
· It is hard to eat or exercise. · You lose weight and feel weaker. But there are things you can do to prevent more damage and feel better. What are the symptoms? The main symptoms are: · A cough that will not go away. · Mucus that comes up when you cough. · Shortness of breath that gets worse with activity. At times, your symptoms may suddenly flare up and get much worse. This is a called a COPD exacerbation (say \"egg-THOMPSON-tuan-BAY-tomasz\"). When this happens, your usual symptoms quickly get worse and stay bad. This can be dangerous. You may have to go to the hospital. 
How can you keep COPD from getting worse? Don't smoke. That is the best way to keep COPD from getting worse. If you already smoke, it is never too late to stop. If you need help quitting, talk to your doctor about stop-smoking programs and medicines. These can increase your chances of quitting for good. You can do other things to keep COPD from getting worse: · Avoid bad air. Air pollution, chemical fumes, and dust also can make COPD worse. · Get a flu shot every year. A shot may keep the flu from turning into something more serious, like pneumonia. A flu shot also may lower your chances of having a COPD flare-up. · Get a pneumococcal vaccine shot. If you have had one before, ask your doctor if you need another dose. How is COPD treated? COPD is treated with medicines and oxygen. You also can take steps at home to stay healthy and keep your COPD from getting worse. Medicines and oxygen therapy · You may be taking medicines such as: 
? Bronchodilators. These help open your airways and make breathing easier. Bronchodilators are either short-acting (work for 6 to 9 hours) or long-acting (work for 24 hours). You inhale most bronchodilators, so they start to act quickly. Always carry your quick-relief inhaler with you in case you need it while you are away from home. ? Corticosteroids. These reduce airway inflammation. They come in pill or inhaled form. You must take these medicines every day for them to work well. · Take your medicines exactly as prescribed. Call your doctor if you think you are having a problem with your medicine. · Oxygen therapy boosts the amount of oxygen in your blood and helps you breathe easier. Use the flow rate your doctor has recommended, and do not change it without talking to your doctor first. 
Other care at home · If your doctor recommends it, get more exercise. Walking is a good choice. Bit by bit, increase the amount you walk every day. Try for at least 30 minutes on most days of the week. · Learn breathing methodssuch as breathing through pursed lipsto help you become less short of breath. · If your doctor has not set you up with a pulmonary rehabilitation program, talk to him or her about whether rehab is right for you.  Rehab includes exercise programs, education about your disease and how to manage it, help with diet and other changes, and emotional support. · Eat regular, healthy meals. Use bronchodilators about 1 hour before you eat to make it easier to eat. Eat several small meals instead of three large ones. Drink beverages at the end of the meal. Avoid foods that are hard to chew. Follow-up care is a key part of your treatment and safety. Be sure to make and go to all appointments, and call your doctor if you are having problems. It's also a good idea to know your test results and keep a list of the medicines you take. Where can you learn more? Go to http://mikael-oskar.info/ Enter V314 in the search box to learn more about \"Learning About COPD. \" 
Current as of: June 9, 2019Content Version: 12.4 © 3331-4165 [employer]. Care instructions adapted under license by [employer] (which disclaims liability or warranty for this information). If you have questions about a medical condition or this instruction, always ask your healthcare professional. [address] any warranty or liability for your use of this information.

## 2020-06-05 NOTE — PROGRESS NOTES
Chief Complaint   Patient presents with    Hypertension     1 month f/up       SUBJECTIVE:    Taz Gallego is a 71 y.o. female who returns in follow-up for hypertension she was recently seen here significantly with a blood pressure 152/74. At that point we added Norvasc to her medical regimen which she has been tolerating quite well. She does take it in the evening as well as her other blood pressure medicine in the morning. She currently denies any chest pain, shortness of breath, palpitations, PND, orthopnea or other cardiorespiratory complaints. There are no GI or  complaints. She notes no headaches, dizziness or neurologic complaints. There are no other complaints noted. Current Outpatient Medications   Medication Sig Dispense Refill    cyanocobalamin (VITAMIN B-12) 1,000 mcg sublingual tablet Take 1,000 mcg by mouth daily.  amLODIPine (NORVASC) 5 mg tablet Take 1 Tab by mouth daily. 30 Tab prn    diclofenac EC (VOLTAREN) 75 mg EC tablet       triamcinolone acetonide (KENALOG) 0.1 % topical cream       co-enzyme Q-10 (Co Q-10) 100 mg capsule Take 100 mg by mouth daily.  estradioL (VIVELLE) 0.05 mg/24 hr Apply to the skin two times a week. 26 Patch 3    niacin ER (NIASPAN) 500 mg tablet TAKE 1 TABLET BY MOUTH AT BEDTIME 90 Tab 3    atorvastatin (LIPITOR) 20 mg tablet TAKE 1 TABLET BY MOUTH NIGHTLY. 90 Tab 3    olmesartan (BENICAR) 40 mg tablet Take 1 Tab by mouth daily. 90 Tab 0    potassium chloride (K-DUR, KLOR-CON) 20 mEq tablet take 1 tablet by mouth twice a day 180 Tab 3    OTHER Indications: Melaleuca Phytomega - Take 2 softgels twice a day.  OTHER Indications: Melaleuca Vitality - Takes 1 tablet twice a day.  ferrous sulfate 325 mg (65 mg iron) tablet Take  by mouth three (3) times daily (with meals).  vitamin E (AQUA GEMS) 400 unit capsule Take 400 Units by mouth daily.  cholecalciferol, vitamin D3, 2,000 unit tab Take 1 Tab by mouth daily.  ascorbic acid (VITAMIN C) 250 mg tablet Take 500 mg by mouth two (2) times a day.        Past Medical History:   Diagnosis Date    Allergic rhinitis 8/9/2017    Anemia 8/9/2017    Anxiety 8/9/2017    Arrhythmia     irregular heart beat hx and beating fast per patient/no cardiologist    Arthritis     JOINTS  WORSE WAIST DOWN     Back pain 8/9/2017    Chronic kidney disease     Stage III    CKD (chronic kidney disease) 8/9/2017    COPD (chronic obstructive pulmonary disease) ([facility] [address] 8/9/2017    Crohn disease ([facility] [address] 8/9/2017    DJD (degenerative joint disease) 8/9/2017    Dry mouth     evaluated by Dr. Joleen Bangura (ENT)    GERD (gastroesophageal reflux disease) 8/9/2017    Hormone replacement therapy (HRT) 8/9/2017    Hyperlipidemia 8/9/2017    Hypertension     Hypertension with renal disease 8/9/2017    Hypokalemia 8/9/2017    Hypothyroid 8/9/2017    Ill-defined condition     neuropathy feet    Inguinal hernia 8/9/2017    Leg numbness 8/9/2017    Lumbar herniated disc 8/9/2017    OAB (overactive bladder) 8/9/2017    Obesity 8/9/2017    On statin therapy 8/9/2017    Osteoarthritis 8/9/2017    Pelvic pain in female 3/7/2184    Umbilical hernia 3/7/3082    Varicose vein of leg 8/9/2017     Past Surgical History:   Procedure Laterality Date    BREAST SURGERY PROCEDURE UNLISTED      mass removed left breast x2 benign    HX COLONOSCOPY  12/2010    normal    HX COLONOSCOPY  12/30/2014    normal     HX HEENT      cyst removed left eye    HX HERNIA REPAIR  06/02/2017    right inguinal and umbilical (Dr. Yulissa Montelongo)    HX HYSTERECTOMY      HX ORTHOPAEDIC      bilat knee injections    HX ORTHOPAEDIC      laser treatment left knee and foot    HX ORTHOPAEDIC      hx of gel shots bilat knee    HX ORTHOPAEDIC  12/27/2016    left knee coritizon shot    HX OTHER SURGICAL      cyst removed front left scalp    HX OTHER SURGICAL      colonoscopy    HX OTHER SURGICAL      tooth implant    HX TUBAL LIGATION      NEUROLOGICAL PROCEDURE UNLISTED      Lumbar surgery    VASCULAR SURGERY PROCEDURE UNLIST      vein striping     Allergies   Allergen Reactions    Lisinopril Cough       REVIEW OF SYSTEMS:  General: negative for - chills or fever, or weight loss or gain  ENT: negative for - headaches, nasal congestion or tinnitus  Eyes: no blurred or visual changes  Neck: No stiffness or swollen nodes  Respiratory: negative for - cough, hemoptysis, shortness of breath or wheezing  Cardiovascular : negative for - chest pain, edema, palpitations or shortness of breath  Gastrointestinal: negative for - abdominal pain, blood in stools, heartburn or nausea/vomiting  Genito-Urinary: no dysuria, trouble voiding, or hematuria  Musculoskeletal: negative for - gait disturbance, joint pain, joint stiffness or joint swelling  Neurological: no TIA or stroke symptoms  Hematologic: no bruises, no bleeding  Lymphatic: no swollen glands  Integument: no lumps, mole changes, nail changes or rash  Endocrine:no malaise/lethargy poly uria or polydipsia or unexpected weight changes        Social History     Socioeconomic History    Marital status:      Spouse name: Not on file    Number of children: Not on file    Years of education: Not on file    Highest education level: Not on file   Tobacco Use    Smoking status: Never Smoker    Smokeless tobacco: Never Used   Substance and Sexual Activity    Alcohol use: Yes     Comment: once a year    Drug use: No    Sexual activity: Yes     Partners: Male     Family History   Problem Relation Age of Onset    No Known Problems Mother     No Known Problems Father     Stroke Sister        OBJECTIVE:     Visit Vitals  /84 (BP 1 Location: Right arm, BP Patient Position: Sitting)   Pulse 82   Temp 97.6 °F (36.4 °C)   Resp 18   Ht 5' 6\" (1.676 m)   Wt 208 lb 9.6 oz (94.6 kg)   LMP  (LMP Unknown)   SpO2 95%   BMI 33.67 kg/m²     CONSTITUTIONAL:   well nourished, appears age appropriate  EYES: sclera anicteric, PERRL, EOMI  ENMT:nares clear, moist mucous membranes, pharynx clear  NECK: supple. Thyroid normal, No JVD or bruits  RESPIRATORY: Chest: clear to ascultation and percussion, normal inspiratory effort  CARDIOVASCULAR: Heart: regular rate and rhythm no murmurs, rubs or gallops, PMI not displaced, No thrills, no peripheral edema  GASTROINTESTINAL: Abdomen: non distended, soft, non tender, bowel sounds normal  HEMATOLOGIC: no purpura, petechiae or bruising  LYMPHATIC: No lymph node enlargemant  MUSCULOSKELETAL: Extremities: no active synovitis, pulse 1+   INTEGUMENT: No unusual rashes or suspicious skin lesions noted. Nails appear normal.  PERIPHERAL VASCULAR: normal pulses femoral, PT and DP  NEUROLOGIC: non-focal exam, A & O X 3  PSYCHIATRIC:, appropriate affect     ASSESSMENT:   1. Hypertension with renal disease    2. Class 1 obesity due to excess calories without serious comorbidity with body mass index (BMI) of 33.0 to 33.9 in adult      Impression  1. Hypertension that is controlled now so continue current therapy reviewed with her. Obesity unfortunate weight has gone up 2 pounds and will have to keep a close eye on diet and stress diet, exercise and weight reduction  Recheck a myself again in July at her regular appointment or sooner should it be a problem. PLAN:  . No orders of the defined types were placed in this encounter. ATTENTION:   This medical record was transcribed using an electronic medical records system. Although proofread, it may and can contain electronic and spelling errors. Other human spelling and other errors may be present. Corrections may be executed at a later time. Please feel free to contact us for any clarifications as needed. No results found for any visits on 06/08/20. Tianna Powell MD    The patient verbalized understanding of the problems and plans as explained.

## 2020-06-08 ENCOUNTER — OFFICE VISIT (OUTPATIENT)
Dept: INTERNAL MEDICINE CLINIC | Age: 65
End: 2020-06-08

## 2020-06-08 VITALS
RESPIRATION RATE: 18 BRPM | SYSTOLIC BLOOD PRESSURE: 120 MMHG | HEIGHT: 66 IN | TEMPERATURE: 97.6 F | WEIGHT: 208.6 LBS | BODY MASS INDEX: 33.52 KG/M2 | HEART RATE: 82 BPM | DIASTOLIC BLOOD PRESSURE: 84 MMHG | OXYGEN SATURATION: 95 %

## 2020-06-08 DIAGNOSIS — E66.09 CLASS 1 OBESITY DUE TO EXCESS CALORIES WITHOUT SERIOUS COMORBIDITY WITH BODY MASS INDEX (BMI) OF 33.0 TO 33.9 IN ADULT: ICD-10-CM

## 2020-06-08 DIAGNOSIS — I12.9 HYPERTENSION WITH RENAL DISEASE: Primary | ICD-10-CM

## 2020-06-08 NOTE — PATIENT INSTRUCTIONS

## 2020-06-08 NOTE — PROGRESS NOTES
Chief Complaint   Patient presents with    Hypertension     1 month f/up     1. Have you been to the ER, urgent care clinic since your last visit? Hospitalized since your last visit? Seeing Dr. Brandan Mirza for her her feet. 2. Have you seen or consulted any other health care providers outside of the [address] [place] [address] since your last visit? Include any pap smears or colon screening.  No

## 2020-06-18 RX ORDER — OLMESARTAN MEDOXOMIL 40 MG/1
40 TABLET ORAL DAILY
Qty: 90 TAB | Refills: 3 | Status: SHIPPED | OUTPATIENT
Start: 2020-06-18 | End: 2021-03-12 | Stop reason: SDUPTHER

## 2020-06-18 RX ORDER — NIACIN 500 MG/1
TABLET, EXTENDED RELEASE ORAL
Qty: 90 TAB | Refills: 3 | Status: SHIPPED | OUTPATIENT
Start: 2020-06-18 | End: 2021-03-12 | Stop reason: SDUPTHER

## 2020-06-18 NOTE — TELEPHONE ENCOUNTER
RX refill request from the patient/pharmacy. Patient last seen 06- with labs, and next appt. scheduled for 07-  Requested Prescriptions     Pending Prescriptions Disp Refills    olmesartan (BENICAR) 40 mg tablet 90 Tab 3     Sig: Take 1 Tab by mouth daily.  niacin ER (NIASPAN) 500 mg tablet 90 Tab 3     Sig: TAKE 1 TABLET BY MOUTH AT BEDTIME   .

## 2020-07-23 PROBLEM — Z00.00 INITIAL MEDICARE ANNUAL WELLNESS VISIT: Status: ACTIVE | Noted: 2020-07-23

## 2020-07-23 NOTE — PROGRESS NOTES
This is an Initial Medicare Annual Wellness Exam (AWV) (Performed 12 months after IPPE or effective date of Medicare Part B enrollment, Once in a lifetime)    I have reviewed the patient's medical history in detail and updated the computerized patient record. She presents today for her initial annual Medicare wellness examination and screening questionnaire. She is accompanied by her . She is also here in follow-up of her multiple medical problems to include hypertension, hyperlipidemia, GERD, Crohn's disease, hypothyroidism, allergic rhinitis, COPD, DJD, overactive bladder, obesity and other multiple medical problems. She is taking her medications and trying to follow her diet but knows she certainly could do better with diet and exercise as she has not been exercising much now secondary to the fact that the  is closed secondary to the pandemic. She denies any chest pain, shortness of breath, palpitations, PND, orthopnea or cardiorespiratory complaints. She notes no GI or  complaints. She notes no headaches, dizziness or neurologic complaints. She has no current active arthritic complaints and there are no other complaints on complete review of systems.     History     Patient Active Problem List   Diagnosis Code    Right inguinal hernia K40.90    Ventral hernia without obstruction or gangrene K43.9    Chronic non-seasonal allergic rhinitis J30.89    Crohn disease ([facility] [address] K50.90    OAB (overactive bladder) N32.81    Varicose vein of leg V21.06    Umbilical hernia T17.9    Class 1 obesity due to excess calories without serious comorbidity with body mass index (BMI) of 33.0 to 33.9 in adult E66.09, Z68.33    Lumbar herniated disc M51.26    Inguinal hernia K40.90    Acquired hypothyroidism E03.9    Hypertension with renal disease I12.9    Mixed hyperlipidemia E78.2    Hormone replacement therapy (HRT) Z79.890    Gastroesophageal reflux disease without esophagitis K21.9    Primary osteoarthritis involving multiple joints M89.49    COPD (chronic obstructive pulmonary disease) (HCC) J44.9    Stage 3 chronic kidney disease (HCC) N18.3    Back pain M54.9    Anxiety F41.9    Anemia D64.9    Precordial pain R07.2    Acute bronchitis J20.9    Labyrinthitis of both ears H83.03    Acute non-recurrent maxillary sinusitis J01.00    Fungal dermatitis B36.9    Dry mouth R68.2    Alcohol screening Z13.39    Urinary frequency R35.0    Easy bruising R23.8    [place] grade 3 cystocele N81.10    Incomplete prolapse of vaginal vault N99.3    Vaginal prolapse N81.10    Initial Medicare annual wellness visit Z00.00     Past Medical History:   Diagnosis Date    Allergic rhinitis 8/9/2017    Anemia 8/9/2017    Anxiety 8/9/2017    Arrhythmia     irregular heart beat hx and beating fast per patient/no cardiologist    Arthritis     JOINTS  WORSE WAIST DOWN     Back pain 8/9/2017    Chronic kidney disease     Stage III    CKD (chronic kidney disease) 8/9/2017    COPD (chronic obstructive pulmonary disease) ([facility] [address] 8/9/2017    Crohn disease ([facility] [address] 8/9/2017    DJD (degenerative joint disease) 8/9/2017    Dry mouth     evaluated by Dr. David Katz (ENT)    GERD (gastroesophageal reflux disease) 8/9/2017    Hormone replacement therapy (HRT) 8/9/2017    Hyperlipidemia 8/9/2017    Hypertension     Hypertension with renal disease 8/9/2017    Hypokalemia 8/9/2017    Hypothyroid 8/9/2017    Ill-defined condition     neuropathy feet    Inguinal hernia 8/9/2017    Leg numbness 8/9/2017    Lumbar herniated disc 8/9/2017    OAB (overactive bladder) 8/9/2017    Obesity 8/9/2017    On statin therapy 8/9/2017    Osteoarthritis 8/9/2017    Pelvic pain in female 1/6/9467    Umbilical hernia 0/5/1688    Varicose vein of leg 8/9/2017      Past Surgical History:   Procedure Laterality Date    BREAST SURGERY PROCEDURE UNLISTED      mass removed left breast x2 benign    HX COLONOSCOPY  12/2010 normal    HX COLONOSCOPY  12/30/2014    normal     HX HEENT      cyst removed left eye    HX HERNIA REPAIR  06/02/2017    right inguinal and umbilical (Dr. Tomlinson Reason)    HX HYSTERECTOMY      HX ORTHOPAEDIC      bilat knee injections    HX ORTHOPAEDIC      laser treatment left knee and foot    HX ORTHOPAEDIC      hx of gel shots bilat knee    HX ORTHOPAEDIC  12/27/2016    left knee coritizon shot    HX OTHER SURGICAL      cyst removed front left scalp    HX OTHER SURGICAL      colonoscopy    HX OTHER SURGICAL      tooth implant    HX TUBAL LIGATION      NEUROLOGICAL PROCEDURE UNLISTED      Lumbar surgery    VASCULAR SURGERY PROCEDURE UNLIST      vein striping     Current Outpatient Medications   Medication Sig Dispense Refill    olmesartan (BENICAR) 40 mg tablet Take 1 Tab by mouth daily. 90 Tab 3    niacin ER (NIASPAN) 500 mg tablet TAKE 1 TABLET BY MOUTH AT BEDTIME 90 Tab 3    cyanocobalamin (VITAMIN B-12) 1,000 mcg sublingual tablet Take 1,000 mcg by mouth daily.  amLODIPine (NORVASC) 5 mg tablet Take 1 Tab by mouth daily. 30 Tab prn    diclofenac EC (VOLTAREN) 75 mg EC tablet       triamcinolone acetonide (KENALOG) 0.1 % topical cream       co-enzyme Q-10 (Co Q-10) 100 mg capsule Take 100 mg by mouth daily.  estradioL (VIVELLE) 0.05 mg/24 hr Apply to the skin two times a week. 26 Patch 3    atorvastatin (LIPITOR) 20 mg tablet TAKE 1 TABLET BY MOUTH NIGHTLY. 90 Tab 3    potassium chloride (K-DUR, KLOR-CON) 20 mEq tablet take 1 tablet by mouth twice a day 180 Tab 3    OTHER Indications: Melaleuca Phytomega - Take 2 softgels twice a day.  OTHER Indications: Melaleuca Vitality - Takes 1 tablet twice a day.  ferrous sulfate 325 mg (65 mg iron) tablet Take  by mouth three (3) times daily (with meals).  vitamin E (AQUA GEMS) 400 unit capsule Take 400 Units by mouth daily.  cholecalciferol, vitamin D3, 2,000 unit tab Take 1 Tab by mouth daily.       ascorbic acid (VITAMIN C) 250 mg tablet Take 500 mg by mouth two (2) times a day. Allergies   Allergen Reactions    Lisinopril Cough       Family History   Problem Relation Age of Onset    No Known Problems Mother     No Known Problems Father     Stroke Sister      Social History     Tobacco Use    Smoking status: Never Smoker    Smokeless tobacco: Never Used   Substance Use Topics    Alcohol use: Yes     Comment: once a year       Depression Risk Factor Screening:     3 most recent PHQ Screens 7/24/2020   Little interest or pleasure in doing things Not at all   Feeling down, depressed, irritable, or hopeless Not at all   Total Score PHQ 2 0   Trouble falling or staying asleep, or sleeping too much Not at all   Feeling tired or having little energy Not at all   Poor appetite, weight loss, or overeating Not at all   Feeling bad about yourself - or that you are a failure or have let yourself or your family down Not at all   Trouble concentrating on things such as school, work, reading, or watching TV Not at all   Moving or speaking so slowly that other people could have noticed; or the opposite being so fidgety that others notice Not at all   Thoughts of being better off dead, or hurting yourself in some way Not at all   PHQ 9 Score 0       Alcohol Risk Factor Screening:   Do you average 1 drink per night or more than 7 drinks a week:  No    On any one occasion in the past three months have you have had more than 3 drinks containing alcohol:  No      Functional Ability and Level of Safety:   Hearing: Hearing is good. Activities of Daily Living: The home contains: no safety equipment. Patient does total self care    Ambulation: with no difficulty      Fall Risk:  Fall Risk Assessment, last 12 mths 7/24/2020   Able to walk? Yes   Fall in past 12 months?  No     Abuse Screen:  Patient is not abused       Cognitive Screening   Has your family/caregiver stated any concerns about your memory: no     Cognitive Screening: Normal - MMSE (Mini Mental Status Exam)    ROS:    Constitutional: She denies fevers, weight loss, sweats. Eyes: No blurred or double vision. ENT: No difficulty with swallowing, taste, speech or smell. NECK: no stiffness swelling or lymph node enlargement  Respiratory: No cough wheezing or shortness of breath. Cardiovascular: Denies chest pain, palpitations, unexplained indigestion or syncope. Breast: She has noted no masses or lumps and no discharge or axillary swelling  Gastrointestinal:  No changes in bowel movements, no abdominal pain, no bloating. Genitourinary: No discharge or abnormal bleeding or pain  Extremities: No joint pain, stiffness or swelling. Neurological:  No numbness, tingling, burring paresthesias or loss of motor strength. No syncope, dizziness or frequent headache  Skin:  No recent rashes or mole changes. Psychiatric/Behavioral:  Negative for depression. The patient is not nervous/anxious. HEMATOLOGIC: no easy bruising or bleeding gums  Endocrine: no sweats of urinary frequency or excessive thirst    Vitals:    07/24/20 1020 07/24/20 1116   BP: 151/76 150/88   Pulse: 65    Temp: 97.1 °F (36.2 °C)    TempSrc: Oral    SpO2: 98%    Weight: 208 lb 6.4 oz (94.5 kg)    Height: 5' 6\" (1.676 m)    PainSc:   0 - No pain         PHYSICAL EXAM:    General appearance - alert, well appearing, and in no distress  Mental status - alert, oriented to person, place, and time  HEENT:  Ears - bilateral TM's and external ear canals clear  Eyes - pupillary responses were normal.  Extraocular muscle function intact. Lids and conjunctiva not injected. Fundoscopic exam revealed sharp disc margins. eye movements intact  Pharynx- clear with teeth in good repair. No masses were noted  Neck - supple without thyromegaly or burit. No JVD noted  Lungs - clear to auscultation and percussion  Cardiac- normal rate, regular rhythm without murmurs. PMI not displaced.   No gallop, rub or click  Breast: deferred to GYN  Abdomen - flat, soft, non-tender without palpable organomegaly or mass. No pulsatile mass was felt, and not bruit was heard. Bowel sounds were active   Female - deferred to GYN  Rectal - deferred to GYN  Extremities -  no clubbing cyanosis or edema  Lymphatics - no palpable lymphadenopathy, no hepatosplenomegaly  Peripheral vascular - Dorsalis pedis and posterior tibial pulses felt without difficulty  Skin - no rash or unusual mole change noted  Neurological - Cranial nerves II-XII grossly intact. Motor strength 5/5. DTR's 2+ and symmetric. Station and gait normal  Back exam - full range of motion, no tenderness, palpable spasm or pain on motion  Musculoskeletal - no joint tenderness, deformity or swelling  Hematologic: no purpura, petechiae or bruising    Patient Care Team   Patient Care Team:  Aleksandra Contreras MD as PCP - General (Internal Medicine)  Aleksandra Contreras MD as PCP - [facility]  Alberto Cooper MD (Gynecology)    Assessment/Plan   Education and counseling provided:  Are appropriate based on today's review and evaluation    ASSESSMENT:   1. Hypertension with renal disease    2. Mixed hyperlipidemia    3. Gastroesophageal reflux disease without esophagitis    4. Primary osteoarthritis involving multiple joints    5. Stage 3 chronic kidney disease ([address]    6. Chronic obstructive pulmonary disease, unspecified COPD type (HCC)    7. Class 1 obesity due to excess calories without serious comorbidity with body mass index (BMI) of 33.0 to 33.9 in adult    8. Chronic non-seasonal allergic rhinitis    9. Acquired hypothyroidism    10. Crohn's disease without complication, unspecified gastrointestinal tract location ([address]    11. Alcohol screening    12. Initial Medicare annual wellness visit      Impression  1.   Hypertension blood pressure initially up by the nurse and repeat by me still elevated and we discussed dietary changes versus adding medication and she would like to try to cut out the salt and get dietary changes and exercise and get her weight back down so we will see if we can do that and recheck again in 3 months. 2.  Hyperlipidemia prior lab reviewed and repeat status pending and I will adjust if needed. 3.  GERD that is stable  4. DJD that is stable  5   CKD stage III repeat status pending  6. COPD currently stable  7. Obesity we did discuss diet, exercise and weight reduction for overall health benefit. 8.  Allergic rhinitis stable  9. Hypothyroidism repeat status pending  10. Crohn's disease currently without symptoms  11. Annual alcohol screening is done and she does drink some socially but very rarely. She currently denies any significant alcohol intake. We did discuss more than 1 drink per day in females with increased cardiovascular risk and increased risk of liver disease and other GI effects. Initial annual Medicare wellness examination and screening questionnaire is completed today. The results were reviewed with her and her questions were answered. Lifestyle recommendations and modifications discussed and made. I will call with lab results and make further recommendations or adjustments if necessary. Follow-up in 3 months or sooner if there is a problem. PLAN:  .  Orders Placed This Encounter    CBC WITH AUTOMATED DIFF ([facility])    METABOLIC PANEL, COMPREHENSIVE ([facility])    CK ([facility])    T4, FREE ([facility])    TSH 3RD GENERATION ([facility])    URINALYSIS W/O MICRO ([facility])    LIPID PANEL         ATTENTION:   This medical record was transcribed using an electronic medical records system. Although proofread, it may and can contain electronic and spelling errors. Other human spelling and other errors may be present. Corrections may be executed at a later time. Please feel free to contact us for any clarifications as needed.       Follow-up and Dispositions    · Return in about 3 months (around 10/24/2020).            Hollie Parkinson MD     Health Maintenance Due   Topic Date Due    DTaP/Tdap/Td series (1 - Tdap) 07/12/1975    Shingrix Vaccine Age 50> (1 of 2) 07/12/2004    Breast Cancer Screen Mammogram  01/08/2018    GLAUCOMA SCREENING Q2Y  07/12/2019    Bone Densitometry  04/12/2020

## 2020-07-24 ENCOUNTER — OFFICE VISIT (OUTPATIENT)
Dept: INTERNAL MEDICINE CLINIC | Age: 65
End: 2020-07-24

## 2020-07-24 VITALS
DIASTOLIC BLOOD PRESSURE: 88 MMHG | WEIGHT: 208.4 LBS | OXYGEN SATURATION: 98 % | BODY MASS INDEX: 33.49 KG/M2 | HEIGHT: 66 IN | SYSTOLIC BLOOD PRESSURE: 150 MMHG | TEMPERATURE: 97.1 F | HEART RATE: 65 BPM

## 2020-07-24 DIAGNOSIS — J44.9 CHRONIC OBSTRUCTIVE PULMONARY DISEASE, UNSPECIFIED COPD TYPE (HCC): ICD-10-CM

## 2020-07-24 DIAGNOSIS — Z13.39 ALCOHOL SCREENING: ICD-10-CM

## 2020-07-24 DIAGNOSIS — N18.30 STAGE 3 CHRONIC KIDNEY DISEASE (HCC): ICD-10-CM

## 2020-07-24 DIAGNOSIS — E78.2 MIXED HYPERLIPIDEMIA: ICD-10-CM

## 2020-07-24 DIAGNOSIS — K21.9 GASTROESOPHAGEAL REFLUX DISEASE WITHOUT ESOPHAGITIS: ICD-10-CM

## 2020-07-24 DIAGNOSIS — K50.90 CROHN'S DISEASE WITHOUT COMPLICATION, UNSPECIFIED GASTROINTESTINAL TRACT LOCATION (HCC): ICD-10-CM

## 2020-07-24 DIAGNOSIS — M15.9 PRIMARY OSTEOARTHRITIS INVOLVING MULTIPLE JOINTS: ICD-10-CM

## 2020-07-24 DIAGNOSIS — Z00.00 INITIAL MEDICARE ANNUAL WELLNESS VISIT: ICD-10-CM

## 2020-07-24 DIAGNOSIS — E03.9 ACQUIRED HYPOTHYROIDISM: ICD-10-CM

## 2020-07-24 DIAGNOSIS — N39.0 URINARY TRACT INFECTION WITHOUT HEMATURIA, SITE UNSPECIFIED: ICD-10-CM

## 2020-07-24 DIAGNOSIS — J30.89 CHRONIC NON-SEASONAL ALLERGIC RHINITIS: ICD-10-CM

## 2020-07-24 DIAGNOSIS — E66.09 CLASS 1 OBESITY DUE TO EXCESS CALORIES WITHOUT SERIOUS COMORBIDITY WITH BODY MASS INDEX (BMI) OF 33.0 TO 33.9 IN ADULT: ICD-10-CM

## 2020-07-24 DIAGNOSIS — I12.9 HYPERTENSION WITH RENAL DISEASE: Primary | ICD-10-CM

## 2020-07-24 LAB
A-G RATIO,AGRAT: 1.7 RATIO
ALBUMIN SERPL-MCNC: 4.3 G/DL (ref 3.9–5.4)
ALP SERPL-CCNC: 113 U/L (ref 38–126)
ALT SERPL-CCNC: 49 U/L (ref 0–35)
ANION GAP SERPL CALC-SCNC: 8 MMOL/L
AST SERPL W P-5'-P-CCNC: 29 U/L (ref 14–36)
BACTERIA,BACTU: ABNORMAL
BILIRUB SERPL-MCNC: 0.4 MG/DL (ref 0.2–1.3)
BILIRUB UR QL: NEGATIVE
BUN SERPL-MCNC: 20 MG/DL (ref 7–17)
BUN/CREATININE RATIO,BUCR: 25 RATIO
CALCIUM SERPL-MCNC: 9.7 MG/DL (ref 8.4–10.2)
CHLORIDE SERPL-SCNC: 103 MMOL/L (ref 98–107)
CK SERPL-CCNC: 69 U/L (ref 30–135)
CLARITY: ABNORMAL
CO2 SERPL-SCNC: 27 MMOL/L (ref 22–32)
COLOR UR: ABNORMAL
CREAT SERPL-MCNC: 0.8 MG/DL (ref 0.7–1.2)
ERYTHROCYTE [DISTWIDTH] IN BLOOD BY AUTOMATED COUNT: 12.8 %
GLOBULIN,GLOB: 2.6
GLUCOSE 24H UR-MRATE: NEGATIVE G/(24.H)
GLUCOSE SERPL-MCNC: 85 MG/DL (ref 65–105)
HCT VFR BLD AUTO: 38.7 % (ref 37–51)
HGB BLD-MCNC: 12.4 G/DL (ref 12–18)
HGB UR QL STRIP: NEGATIVE
KETONES UR QL STRIP.AUTO: NEGATIVE
LEUKOCYTE ESTERASE: ABNORMAL
LYMPHOCYTES ABSOLUTE: 0.9 K/UL (ref 0.6–4.1)
LYMPHOCYTES NFR BLD: 19 % (ref 10–58.5)
MCH RBC QN AUTO: 30.8 PG (ref 26–32)
MCHC RBC AUTO-ENTMCNC: 32 G/DL (ref 30–36)
MCV RBC AUTO: 96 FL (ref 80–97)
MONOCYTES ABS-DIF,2141: 0.5 K/UL (ref 0–1.8)
MONOCYTES NFR BLD: 10.1 % (ref 0.1–24)
NEUTROPHILS # BLD: 70.9 % (ref 37–92)
NEUTROPHILS ABS,2156: 3.5 K/UL (ref 2–7.8)
NITRITE UR QL STRIP.AUTO: NEGATIVE
PH UR STRIP: 5 [PH] (ref 5–7)
PLATELET # BLD AUTO: 232 K/UL (ref 140–440)
POTASSIUM SERPL-SCNC: 5.1 MMOL/L (ref 3.6–5)
PROT SERPL-MCNC: 6.9 G/DL (ref 6.3–8.2)
PROT UR STRIP-MCNC: ABNORMAL MG/DL
RBC # BLD AUTO: 4.03 M/UL (ref 4.2–6.3)
RBC #/AREA URNS HPF: 0 #/HPF
SODIUM SERPL-SCNC: 138 MMOL/L (ref 137–145)
SP GR UR REFRACTOMETRY: 1.01 (ref 1–1.03)
SQUAMOUS EPITHELIAL CELLS: ABNORMAL
UROBILINOGEN UR QL STRIP.AUTO: NEGATIVE
WBC # BLD AUTO: 4.9 K/UL (ref 4.1–10.9)
WBC URNS QL MICRO: ABNORMAL #/HPF

## 2020-07-24 NOTE — PROGRESS NOTES
Chief Complaint   Patient presents with    Hypertension     3  month f/u    Cholesterol Problem       1. Have you been to the ER, urgent care clinic since your last visit? Hospitalized since your last visit? No    2. Have you seen or consulted any other health care providers outside of the [address] [place] [address] since your last visit? Include any pap smears or colon screening. Saw Dr. Alexandria Gonzalez urologist in June.

## 2020-07-24 NOTE — PATIENT INSTRUCTIONS

## 2020-07-25 LAB
CHOLEST SERPL-MCNC: 152 MG/DL (ref 100–199)
HDLC SERPL-MCNC: 48 MG/DL
LDLC SERPL CALC-MCNC: 90 MG/DL (ref 0–99)
TRIGL SERPL-MCNC: 71 MG/DL (ref 0–149)
VLDLC SERPL CALC-MCNC: 14 MG/DL (ref 5–40)

## 2020-07-27 LAB
BACTERIA UR CULT: NORMAL
T4 FREE SERPL-MCNC: 0.92 NG/DL (ref 0.58–2.3)
TSH SERPL DL<=0.05 MIU/L-ACNC: 1.25 UIU/ML (ref 0.34–5.6)

## 2020-08-22 PROBLEM — Z00.00 INITIAL MEDICARE ANNUAL WELLNESS VISIT: Status: RESOLVED | Noted: 2020-07-23 | Resolved: 2020-08-22

## 2020-10-29 NOTE — PROGRESS NOTES
Chief Complaint   Patient presents with    Hypertension     3 month follow up    COPD    Cholesterol Problem       SUBJECTIVE:    Raheem Goldman is a 76 y.o. female who returns in follow-up of her medical problems include hypertension, hyperlipidemia, GERD, DJD, hypothyroidism, COPD, obesity, anxiety and other medical problems. She is taking her medications and found to follow her diet but knows she could do better probably with diet and exercise. She currently denies any chest pain, shortness of breath, palpitations, PND, orthopnea or other cardiac or respiratory complaints. She notes no current GI or  complaints. She has no headaches, dizziness or neurologic complaints. No current active arthritic complaints and no other complaints on complete review of systems      Current Outpatient Medications   Medication Sig Dispense Refill    olmesartan (BENICAR) 40 mg tablet Take 1 Tab by mouth daily. 90 Tab 3    niacin ER (NIASPAN) 500 mg tablet TAKE 1 TABLET BY MOUTH AT BEDTIME 90 Tab 3    cyanocobalamin (VITAMIN B-12) 1,000 mcg sublingual tablet Take 1,000 mcg by mouth daily.  amLODIPine (NORVASC) 5 mg tablet Take 1 Tab by mouth daily. 30 Tab prn    diclofenac EC (VOLTAREN) 75 mg EC tablet       triamcinolone acetonide (KENALOG) 0.1 % topical cream       co-enzyme Q-10 (Co Q-10) 100 mg capsule Take 100 mg by mouth daily.  atorvastatin (LIPITOR) 20 mg tablet TAKE 1 TABLET BY MOUTH NIGHTLY. 90 Tab 3    potassium chloride (K-DUR, KLOR-CON) 20 mEq tablet take 1 tablet by mouth twice a day 180 Tab 3    OTHER Indications: Melaleuca Phytomega - Take 2 softgels twice a day.  OTHER Indications: Melaleuca Vitality - Takes 1 tablet twice a day.  ferrous sulfate 325 mg (65 mg iron) tablet Take  by mouth three (3) times daily (with meals).  vitamin E (AQUA GEMS) 400 unit capsule Take 400 Units by mouth daily.  cholecalciferol, vitamin D3, 2,000 unit tab Take 1 Tab by mouth daily.  ascorbic acid (VITAMIN C) 250 mg tablet Take 500 mg by mouth two (2) times a day.  estradioL (VIVELLE) 0.05 mg/24 hr Apply to the skin two times a week.  26 Patch 3     Past Medical History:   Diagnosis Date    Allergic rhinitis 8/9/2017    Anemia 8/9/2017    Anxiety 8/9/2017    Arrhythmia     irregular heart beat hx and beating fast per patient/no cardiologist    Arthritis     JOINTS  WORSE WAIST DOWN     Back pain 8/9/2017    Chronic kidney disease     Stage III    CKD (chronic kidney disease) 8/9/2017    COPD (chronic obstructive pulmonary disease) ([facility] [address] 8/9/2017    Crohn disease ([facility] [address] 8/9/2017    DJD (degenerative joint disease) 8/9/2017    Dry mouth     evaluated by Dr. Owen Camargo (ENT)    GERD (gastroesophageal reflux disease) 8/9/2017    Hormone replacement therapy (HRT) 8/9/2017    Hyperlipidemia 8/9/2017    Hypertension     Hypertension with renal disease 8/9/2017    Hypokalemia 8/9/2017    Hypothyroid 8/9/2017    Ill-defined condition     neuropathy feet    Inguinal hernia 8/9/2017    Leg numbness 8/9/2017    Lumbar herniated disc 8/9/2017    OAB (overactive bladder) 8/9/2017    Obesity 8/9/2017    On statin therapy 8/9/2017    Osteoarthritis 8/9/2017    Pelvic pain in female 7/6/8269    Umbilical hernia 4/4/5005    Varicose vein of leg 8/9/2017     Past Surgical History:   Procedure Laterality Date    BREAST SURGERY PROCEDURE UNLISTED      mass removed left breast x2 benign    HX COLONOSCOPY  12/2010    normal    HX COLONOSCOPY  12/30/2014    normal     HX HEENT      cyst removed left eye    HX HERNIA REPAIR  06/02/2017    right inguinal and umbilical (Dr. Charlyne Leventhal)    HX HYSTERECTOMY      HX ORTHOPAEDIC      bilat knee injections    HX ORTHOPAEDIC      laser treatment left knee and foot    HX ORTHOPAEDIC      hx of gel shots bilat knee    HX ORTHOPAEDIC  12/27/2016    left knee coritizon shot    HX OTHER SURGICAL      cyst removed front left scalp    HX OTHER SURGICAL      colonoscopy    HX OTHER SURGICAL      tooth implant    HX TUBAL LIGATION      NEUROLOGICAL PROCEDURE UNLISTED      Lumbar surgery    VASCULAR SURGERY PROCEDURE UNLIST      vein striping     Allergies   Allergen Reactions    Lisinopril Cough       REVIEW OF SYSTEMS:  General: negative for - chills or fever, or weight loss or gain  ENT: negative for - headaches, nasal congestion or tinnitus  Eyes: no blurred or visual changes  Neck: No stiffness or swollen nodes  Respiratory: negative for - cough, hemoptysis, shortness of breath or wheezing  Cardiovascular : negative for - chest pain, edema, palpitations or shortness of breath  Gastrointestinal: negative for - abdominal pain, blood in stools, heartburn or nausea/vomiting  Genito-Urinary: no dysuria, trouble voiding, or hematuria  Musculoskeletal: negative for - gait disturbance, joint pain, joint stiffness or joint swelling  Neurological: no TIA or stroke symptoms  Hematologic: no bruises, no bleeding  Lymphatic: no swollen glands  Integument: no lumps, mole changes, nail changes or rash  Endocrine:no malaise/lethargy poly uria or polydipsia or unexpected weight changes        Social History     Socioeconomic History    Marital status:      Spouse name: Not on file    Number of children: Not on file    Years of education: Not on file    Highest education level: Not on file   Tobacco Use    Smoking status: Never Smoker    Smokeless tobacco: Never Used   Substance and Sexual Activity    Alcohol use: Yes     Comment: once a year    Drug use: No    Sexual activity: Yes     Partners: Male     Family History   Problem Relation Age of Onset    No Known Problems Mother     No Known Problems Father     Stroke Sister        OBJECTIVE:     Visit Vitals  /86   Pulse 71   Temp 98.1 °F (36.7 °C) (Oral)   Resp 18   Ht 5' 6\" (1.676 m)   Wt 212 lb 12.8 oz (96.5 kg)   LMP  (LMP Unknown)   SpO2 95%   BMI 34.35 kg/m²     CONSTITUTIONAL: well nourished, appears age appropriate  EYES: sclera anicteric, PERRL, EOMI  ENMT:nares clear, moist mucous membranes, pharynx clear  NECK: supple. Thyroid normal, No JVD or bruits  RESPIRATORY: Chest: clear to ascultation and percussion, normal inspiratory effort  CARDIOVASCULAR: Heart: regular rate and rhythm no murmurs, rubs or gallops, PMI not displaced, No thrills, no peripheral edema  GASTROINTESTINAL: Abdomen: non distended, soft, non tender, bowel sounds normal  HEMATOLOGIC: no purpura, petechiae or bruising  LYMPHATIC: No lymph node enlargemant  MUSCULOSKELETAL: Extremities: no active synovitis, pulse 1+   INTEGUMENT: No unusual rashes or suspicious skin lesions noted. Nails appear normal.  PERIPHERAL VASCULAR: normal pulses femoral, PT and DP  NEUROLOGIC: non-focal exam, A & O X 3  PSYCHIATRIC:, appropriate affect     ASSESSMENT:   1. Hypertension with renal disease    2. Mixed hyperlipidemia    3. Gastroesophageal reflux disease without esophagitis    4. Primary osteoarthritis involving multiple joints    5. Chronic obstructive pulmonary disease, unspecified COPD type ([facility] [address]    6. Stage 3 chronic kidney disease, unspecified whether stage 3a or 3b CKD    7. Class 1 obesity due to excess calories without serious comorbidity with body mass index (BMI) of 33.0 to 33.9 in adult    8. Needs flu shot      Impression  1. Hypertension that is controlled so continue current therapy reviewed with her. 2.  Hyperlipidemia prior lab reviewed repeat status pending and I will adjust if needed. 3.  GERD that is currently stable  4. DJD currently stable  5. COPD stable with O2 sat of 95% on room air  6. CKD stage III repeat status pending  7. Obesity we did discuss diet, exercise and weight reduction for overall health benefit and I note unfortunate her weight has gone up today. Flu shot given today. I will call with lab results and make further recommendations or adjustments if necessary.   Follow-up in 3 months or sooner if there is a problem. PLAN:  .  Orders Placed This Encounter    Influenza Vaccine, QUAD, 65 Yrs +  IM  ([facility] [id number] )    METABOLIC PANEL, COMPREHENSIVE ([facility])    LIPID PANEL ([facility])    CK ([facility])         ATTENTION:   This medical record was transcribed using an electronic medical records system. Although proofread, it may and can contain electronic and spelling errors. Other human spelling and other errors may be present. Corrections may be executed at a later time. Please feel free to contact us for any clarifications as needed. Follow-up and Dispositions    · Return in about 3 months (around 1/30/2021). No results found for any visits on 10/30/20. Gabbi Tang MD    The patient verbalized understanding of the problems and plans as explained.

## 2020-10-30 ENCOUNTER — OFFICE VISIT (OUTPATIENT)
Dept: INTERNAL MEDICINE CLINIC | Age: 65
End: 2020-10-30
Payer: MEDICARE

## 2020-10-30 VITALS
HEART RATE: 71 BPM | OXYGEN SATURATION: 95 % | SYSTOLIC BLOOD PRESSURE: 132 MMHG | DIASTOLIC BLOOD PRESSURE: 86 MMHG | HEIGHT: 66 IN | BODY MASS INDEX: 34.2 KG/M2 | RESPIRATION RATE: 18 BRPM | WEIGHT: 212.8 LBS | TEMPERATURE: 98.1 F

## 2020-10-30 DIAGNOSIS — I12.9 HYPERTENSION WITH RENAL DISEASE: Primary | ICD-10-CM

## 2020-10-30 DIAGNOSIS — N18.30 STAGE 3 CHRONIC KIDNEY DISEASE, UNSPECIFIED WHETHER STAGE 3A OR 3B CKD (HCC): ICD-10-CM

## 2020-10-30 DIAGNOSIS — K21.9 GASTROESOPHAGEAL REFLUX DISEASE WITHOUT ESOPHAGITIS: ICD-10-CM

## 2020-10-30 DIAGNOSIS — E78.2 MIXED HYPERLIPIDEMIA: ICD-10-CM

## 2020-10-30 DIAGNOSIS — M15.9 PRIMARY OSTEOARTHRITIS INVOLVING MULTIPLE JOINTS: ICD-10-CM

## 2020-10-30 DIAGNOSIS — Z23 NEEDS FLU SHOT: ICD-10-CM

## 2020-10-30 DIAGNOSIS — J44.9 CHRONIC OBSTRUCTIVE PULMONARY DISEASE, UNSPECIFIED COPD TYPE (HCC): ICD-10-CM

## 2020-10-30 DIAGNOSIS — E66.09 CLASS 1 OBESITY DUE TO EXCESS CALORIES WITHOUT SERIOUS COMORBIDITY WITH BODY MASS INDEX (BMI) OF 33.0 TO 33.9 IN ADULT: ICD-10-CM

## 2020-10-30 LAB
A-G RATIO,AGRAT: 1.7 RATIO
ALBUMIN SERPL-MCNC: 4.6 G/DL (ref 3.9–5.4)
ALP SERPL-CCNC: 115 U/L (ref 38–126)
ALT SERPL-CCNC: 45 U/L (ref 0–35)
ANION GAP SERPL CALC-SCNC: 10 MMOL/L
AST SERPL W P-5'-P-CCNC: 28 U/L (ref 14–36)
BILIRUB SERPL-MCNC: 0.5 MG/DL (ref 0.2–1.3)
BUN SERPL-MCNC: 26 MG/DL (ref 7–17)
BUN/CREATININE RATIO,BUCR: 26 RATIO
CALCIUM SERPL-MCNC: 10.2 MG/DL (ref 8.4–10.2)
CHLORIDE SERPL-SCNC: 104 MMOL/L (ref 98–107)
CHOL/HDL RATIO,CHHD: 3 RATIO (ref 0–4)
CHOLEST SERPL-MCNC: 169 MG/DL (ref 0–200)
CK SERPL-CCNC: 53 U/L (ref 30–135)
CO2 SERPL-SCNC: 28 MMOL/L (ref 22–32)
CREAT SERPL-MCNC: 1 MG/DL (ref 0.7–1.2)
GLOBULIN,GLOB: 2.7
GLUCOSE SERPL-MCNC: 90 MG/DL (ref 65–105)
HDLC SERPL-MCNC: 52 MG/DL (ref 35–130)
LDL/HDL RATIO,LDHD: 2 RATIO
LDLC SERPL CALC-MCNC: 96 MG/DL (ref 0–130)
POTASSIUM SERPL-SCNC: 5.1 MMOL/L (ref 3.6–5)
PROT SERPL-MCNC: 7.3 G/DL (ref 6.3–8.2)
SODIUM SERPL-SCNC: 142 MMOL/L (ref 137–145)
TRIGL SERPL-MCNC: 103 MG/DL (ref 0–200)
VLDLC SERPL CALC-MCNC: 21 MG/DL

## 2020-10-30 PROCEDURE — 80053 COMPREHEN METABOLIC PANEL: CPT | Performed by: INTERNAL MEDICINE

## 2020-10-30 PROCEDURE — 82550 ASSAY OF CK (CPK): CPT | Performed by: INTERNAL MEDICINE

## 2020-10-30 PROCEDURE — 99214 OFFICE O/P EST MOD 30 MIN: CPT | Performed by: INTERNAL MEDICINE

## 2020-10-30 PROCEDURE — G8417 CALC BMI ABV UP PARAM F/U: HCPCS | Performed by: INTERNAL MEDICINE

## 2020-10-30 PROCEDURE — 3017F COLORECTAL CA SCREEN DOC REV: CPT | Performed by: INTERNAL MEDICINE

## 2020-10-30 PROCEDURE — 1090F PRES/ABSN URINE INCON ASSESS: CPT | Performed by: INTERNAL MEDICINE

## 2020-10-30 PROCEDURE — 80061 LIPID PANEL: CPT | Performed by: INTERNAL MEDICINE

## 2020-10-30 PROCEDURE — G8510 SCR DEP NEG, NO PLAN REQD: HCPCS | Performed by: INTERNAL MEDICINE

## 2020-10-30 PROCEDURE — 1101F PT FALLS ASSESS-DOCD LE1/YR: CPT | Performed by: INTERNAL MEDICINE

## 2020-10-30 PROCEDURE — G8399 PT W/DXA RESULTS DOCUMENT: HCPCS | Performed by: INTERNAL MEDICINE

## 2020-10-30 PROCEDURE — G8427 DOCREV CUR MEDS BY ELIG CLIN: HCPCS | Performed by: INTERNAL MEDICINE

## 2020-10-30 PROCEDURE — 90694 VACC AIIV4 NO PRSRV 0.5ML IM: CPT | Performed by: INTERNAL MEDICINE

## 2020-10-30 PROCEDURE — G0008 ADMIN INFLUENZA VIRUS VAC: HCPCS | Performed by: INTERNAL MEDICINE

## 2020-10-30 PROCEDURE — G8536 NO DOC ELDER MAL SCRN: HCPCS | Performed by: INTERNAL MEDICINE

## 2020-10-30 NOTE — PROGRESS NOTES
Chief Complaint   Patient presents with    Hypertension     3 month follow up    COPD    Cholesterol Problem     Visit Vitals  BP (!) 156/92 (BP 1 Location: Left arm, BP Patient Position: Sitting)   Pulse 71   Temp 98.1 °F (36.7 °C) (Oral)   Resp 18   Ht 5' 6\" (1.676 m)   Wt 212 lb 12.8 oz (96.5 kg)   LMP  (LMP Unknown)   SpO2 95%   BMI 34.35 kg/m²     1. Have you been to the ER, urgent care clinic since your last visit? Hospitalized since your last visit? No    2. Have you seen or consulted any other health care providers outside of the [address] [place] [address] since your last visit? Include any pap smears or colon screening. Dr. Gonzalez-urology      After obtaining consent and per verbal order from Dr. Viridiana Aguayo, patient received influenza vaccine given by Tori Diaz and verified by Ely Minaya. Fluad Influenza 0.5ml was given IM in left deltoid. Patient tolerated injection and was observed for 10 minutes post injection. VIS was given.

## 2020-12-30 NOTE — TELEPHONE ENCOUNTER
PCP: Guanako Wright MD    Last appt: 10/30/2020  Future Appointments   Date Time Provider Diana Kelley   2/5/2021  9:20 AM Guanako Wright MD [facility]       Requested Prescriptions     Pending Prescriptions Disp Refills    diclofenac EC (VOLTAREN) 75 mg EC tablet [Pharmacy Med Name: DICLOFENAC SOD DR 75 MG TAB] 60 Tab 2     Sig: TAKE ONE TABLET BY MOUTH TWICE A DAY

## 2020-12-31 RX ORDER — DICLOFENAC SODIUM 75 MG/1
TABLET, DELAYED RELEASE ORAL
Qty: 60 TAB | Refills: 2 | Status: SHIPPED | OUTPATIENT
Start: 2020-12-31 | End: 2021-03-12 | Stop reason: SDUPTHER

## 2021-02-04 NOTE — PROGRESS NOTES
Chief Complaint   Patient presents with   • Hypertension     3 month follow up   • COPD   • Cholesterol Problem       SUBJECTIVE:    Alysa Youssef is a 65 y.o. female who returns in follow-up for medical problems include hypertension, hyperlipidemia, GERD, Crohn's disease, hypothyroidism, COPD, former tobacco abuse, obesity, CKD stage III and other multiple medical problems.  She is taking her medications and trying to follow her diet and remain physically active.  She currently denies any chest pain, shortness of breath, palpitations, PND, orthopnea or other cardiac or respiratory complaints.  She notes no GI or  complaints.  She notes no headaches, dizziness or neurologic complaints.  She has no current active arthritic complaints and she has no other complaints on complete review of systems.      Current Outpatient Medications   Medication Sig Dispense Refill   • diclofenac EC (VOLTAREN) 75 mg EC tablet TAKE ONE TABLET BY MOUTH TWICE A DAY 60 Tab 2   • olmesartan (BENICAR) 40 mg tablet Take 1 Tab by mouth daily. 90 Tab 3   • niacin ER (NIASPAN) 500 mg tablet TAKE 1 TABLET BY MOUTH AT BEDTIME 90 Tab 3   • cyanocobalamin (VITAMIN B-12) 1,000 mcg sublingual tablet Take 1,000 mcg by mouth daily.     • amLODIPine (NORVASC) 5 mg tablet Take 1 Tab by mouth daily. 30 Tab prn   • triamcinolone acetonide (KENALOG) 0.1 % topical cream      • co-enzyme Q-10 (Co Q-10) 100 mg capsule Take 100 mg by mouth daily.     • estradioL (VIVELLE) 0.05 mg/24 hr Apply to the skin two times a week. 26 Patch 3   • atorvastatin (LIPITOR) 20 mg tablet TAKE 1 TABLET BY MOUTH NIGHTLY. 90 Tab 3   • potassium chloride (K-DUR, KLOR-CON) 20 mEq tablet take 1 tablet by mouth twice a day 180 Tab 3   • OTHER Indications: Melaleuca Phytomega - Take 2 softgels twice a day.     • OTHER Indications: Melaleuca Vitality - Takes 1 tablet twice a day.     • ferrous sulfate 325 mg (65 mg iron) tablet Take  by mouth three (3) times daily (with meals).    vitamin E (AQUA GEMS) 400 unit capsule Take 400 Units by mouth daily.  cholecalciferol, vitamin D3, 2,000 unit tab Take 1 Tab by mouth daily.  ascorbic acid (VITAMIN C) 250 mg tablet Take 500 mg by mouth two (2) times a day.        Past Medical History:   Diagnosis Date    Allergic rhinitis 8/9/2017    Anemia 8/9/2017    Anxiety 8/9/2017    Arrhythmia     irregular heart beat hx and beating fast per patient/no cardiologist    Arthritis     JOINTS  WORSE WAIST DOWN     Back pain 8/9/2017    Chronic kidney disease     Stage III    CKD (chronic kidney disease) 8/9/2017    COPD (chronic obstructive pulmonary disease) ([facility] [address] 8/9/2017    Crohn disease ([facility] [address] 8/9/2017    DJD (degenerative joint disease) 8/9/2017    Dry mouth     evaluated by Dr. Rochelle Grant (ENT)    GERD (gastroesophageal reflux disease) 8/9/2017    Hormone replacement therapy (HRT) 8/9/2017    Hyperlipidemia 8/9/2017    Hypertension     Hypertension with renal disease 8/9/2017    Hypokalemia 8/9/2017    Hypothyroid 8/9/2017    Ill-defined condition     neuropathy feet    Inguinal hernia 8/9/2017    Leg numbness 8/9/2017    Lumbar herniated disc 8/9/2017    OAB (overactive bladder) 8/9/2017    Obesity 8/9/2017    On statin therapy 8/9/2017    Osteoarthritis 8/9/2017    Pelvic pain in female 5/7/1712    Umbilical hernia 4/3/4531    Varicose vein of leg 8/9/2017     Past Surgical History:   Procedure Laterality Date    HX COLONOSCOPY  12/2010    normal    HX COLONOSCOPY  12/30/2014    normal     HX HEENT      cyst removed left eye    HX HERNIA REPAIR  06/02/2017    right inguinal and umbilical (Dr. Brandon Grant)    HX HYSTERECTOMY      HX ORTHOPAEDIC      bilat knee injections    HX ORTHOPAEDIC      laser treatment left knee and foot    HX ORTHOPAEDIC      hx of gel shots bilat knee    HX ORTHOPAEDIC  12/27/2016    left knee coritizon shot    HX OTHER SURGICAL      cyst removed front left scalp    HX OTHER SURGICAL      colonoscopy    HX OTHER SURGICAL      tooth implant    HX TUBAL LIGATION      NEUROLOGICAL PROCEDURE UNLISTED      Lumbar surgery    CT BREAST SURGERY PROCEDURE UNLISTED      mass removed left breast x2 benign    VASCULAR SURGERY PROCEDURE UNLIST      vein striping     Allergies   Allergen Reactions    Lisinopril Cough       REVIEW OF SYSTEMS:  General: negative for - chills or fever, or weight loss or gain  ENT: negative for - headaches, nasal congestion or tinnitus  Eyes: no blurred or visual changes  Neck: No stiffness or swollen nodes  Respiratory: negative for - cough, hemoptysis, shortness of breath or wheezing  Cardiovascular : negative for - chest pain, edema, palpitations or shortness of breath  Gastrointestinal: negative for - abdominal pain, blood in stools, heartburn or nausea/vomiting  Genito-Urinary: no dysuria, trouble voiding, or hematuria  Musculoskeletal: negative for - gait disturbance, joint pain, joint stiffness or joint swelling  Neurological: no TIA or stroke symptoms  Hematologic: no bruises, no bleeding  Lymphatic: no swollen glands  Integument: no lumps, mole changes, nail changes or rash  Endocrine:no malaise/lethargy poly uria or polydipsia or unexpected weight changes        Social History     Socioeconomic History    Marital status:      Spouse name: Not on file    Number of children: Not on file    Years of education: Not on file    Highest education level: Not on file   Tobacco Use    Smoking status: Never Smoker    Smokeless tobacco: Never Used   Substance and Sexual Activity    Alcohol use: Yes     Comment: once a year    Drug use: No    Sexual activity: Yes     Partners: Male     Family History   Problem Relation Age of Onset    No Known Problems Mother     No Known Problems Father     Stroke Sister        OBJECTIVE:     Visit Vitals  /78 (BP 1 Location: Left upper arm, BP Patient Position: Sitting, BP Cuff Size: Large adult)   Pulse 75 Temp 97.1 °F (36.2 °C) (Temporal)   Resp 17   Ht 5' 6\" (1.676 m)   Wt 212 lb (96.2 kg)   LMP  (LMP Unknown)   SpO2 98%   BMI 34.22 kg/m²     CONSTITUTIONAL:   well nourished, appears age appropriate  EYES: sclera anicteric, PERRL, EOMI  ENMT:nares clear, moist mucous membranes, pharynx clear  NECK: supple. Thyroid normal, No JVD or bruits  RESPIRATORY: Chest: clear to ascultation and percussion, normal inspiratory effort  CARDIOVASCULAR: Heart: regular rate and rhythm no murmurs, rubs or gallops, PMI not displaced, No thrills, no peripheral edema  GASTROINTESTINAL: Abdomen: non distended, soft, non tender, bowel sounds normal  HEMATOLOGIC: no purpura, petechiae or bruising  LYMPHATIC: No lymph node enlargemant  MUSCULOSKELETAL: Extremities: no active synovitis, pulse 1+   INTEGUMENT: No unusual rashes or suspicious skin lesions noted. Nails appear normal.  PERIPHERAL VASCULAR: normal pulses femoral, PT and DP  NEUROLOGIC: non-focal exam, A & O X 3  PSYCHIATRIC:, appropriate affect     ASSESSMENT:   1. Hypertension with renal disease    2. Mixed hyperlipidemia    3. Gastroesophageal reflux disease without esophagitis    4. Primary osteoarthritis involving multiple joints    5. Chronic obstructive pulmonary disease, unspecified COPD type (HCC)    6. Class 1 obesity due to excess calories without serious comorbidity with body mass index (BMI) of 33.0 to 33.9 in adult    7. Stage 3 chronic kidney disease, unspecified whether stage 3a or 3b CKD    8. Crohn's disease without complication, unspecified gastrointestinal tract location [facility])      Impression  1. Hypertension that is controlled so continue current therapy reviewed with her. 2.  Hyperlipidemia prior lab reviewed repeat status pending I will adjust if needed. 3.  GERD that is stable  4. DJD that is stable  5. COPD currently stable  6. Obesity we did discuss diet, exercise and weight reduction for overall health benefit.   Note her weight has not increased any from last visit but has not gone down either. 7.  CKD stage III repeat status pending  8. Crohn's disease stable  I will call with lab and make further recommendations or adjustments if necessary. Follow-up in 3 months or sooner if there is a problem. PLAN:  .  Orders Placed This Encounter    METABOLIC PANEL, COMPREHENSIVE ([facility])    LIPID PANEL ([facility])    CK ([facility])         ATTENTION:   This medical record was transcribed using an electronic medical records system. Although proofread, it may and can contain electronic and spelling errors. Other human spelling and other errors may be present. Corrections may be executed at a later time. Please feel free to contact us for any clarifications as needed. Follow-up and Dispositions    · Return in about 3 months (around 5/5/2021). No results found for any visits on 02/05/21. Live Collins MD    The patient verbalized understanding of the problems and plans as explained.

## 2021-02-05 ENCOUNTER — OFFICE VISIT (OUTPATIENT)
Dept: INTERNAL MEDICINE CLINIC | Age: 66
End: 2021-02-05
Payer: MEDICARE

## 2021-02-05 VITALS
HEART RATE: 75 BPM | OXYGEN SATURATION: 98 % | DIASTOLIC BLOOD PRESSURE: 78 MMHG | BODY MASS INDEX: 34.07 KG/M2 | TEMPERATURE: 97.1 F | HEIGHT: 66 IN | RESPIRATION RATE: 17 BRPM | WEIGHT: 212 LBS | SYSTOLIC BLOOD PRESSURE: 126 MMHG

## 2021-02-05 DIAGNOSIS — J44.9 CHRONIC OBSTRUCTIVE PULMONARY DISEASE, UNSPECIFIED COPD TYPE (HCC): ICD-10-CM

## 2021-02-05 DIAGNOSIS — K21.9 GASTROESOPHAGEAL REFLUX DISEASE WITHOUT ESOPHAGITIS: ICD-10-CM

## 2021-02-05 DIAGNOSIS — I12.9 HYPERTENSION WITH RENAL DISEASE: Primary | ICD-10-CM

## 2021-02-05 DIAGNOSIS — K50.90 CROHN'S DISEASE WITHOUT COMPLICATION, UNSPECIFIED GASTROINTESTINAL TRACT LOCATION (HCC): ICD-10-CM

## 2021-02-05 DIAGNOSIS — E66.09 CLASS 1 OBESITY DUE TO EXCESS CALORIES WITHOUT SERIOUS COMORBIDITY WITH BODY MASS INDEX (BMI) OF 33.0 TO 33.9 IN ADULT: ICD-10-CM

## 2021-02-05 DIAGNOSIS — M15.9 PRIMARY OSTEOARTHRITIS INVOLVING MULTIPLE JOINTS: ICD-10-CM

## 2021-02-05 DIAGNOSIS — E78.2 MIXED HYPERLIPIDEMIA: ICD-10-CM

## 2021-02-05 DIAGNOSIS — N18.30 STAGE 3 CHRONIC KIDNEY DISEASE, UNSPECIFIED WHETHER STAGE 3A OR 3B CKD (HCC): ICD-10-CM

## 2021-02-05 LAB
A-G RATIO,AGRAT: 1.8 RATIO
ALBUMIN SERPL-MCNC: 4.4 G/DL (ref 3.9–5.4)
ALP SERPL-CCNC: 120 U/L (ref 38–126)
ALT SERPL-CCNC: 48 U/L (ref 0–35)
ANION GAP SERPL CALC-SCNC: 5 MMOL/L
AST SERPL W P-5'-P-CCNC: 30 U/L (ref 14–36)
BILIRUB SERPL-MCNC: 0.6 MG/DL (ref 0.2–1.3)
BUN SERPL-MCNC: 14 MG/DL (ref 7–17)
BUN/CREATININE RATIO,BUCR: 20 RATIO
CALCIUM SERPL-MCNC: 9.4 MG/DL (ref 8.4–10.2)
CHLORIDE SERPL-SCNC: 103 MMOL/L (ref 98–107)
CHOL/HDL RATIO,CHHD: 3 RATIO (ref 0–4)
CHOLEST SERPL-MCNC: 170 MG/DL (ref 0–200)
CK SERPL-CCNC: 84 U/L (ref 30–135)
CO2 SERPL-SCNC: 31 MMOL/L (ref 22–32)
CREAT SERPL-MCNC: 0.7 MG/DL (ref 0.7–1.2)
GLOBULIN,GLOB: 2.4
GLUCOSE SERPL-MCNC: 91 MG/DL (ref 65–105)
HDLC SERPL-MCNC: 52 MG/DL (ref 35–130)
LDL/HDL RATIO,LDHD: 2 RATIO
LDLC SERPL CALC-MCNC: 96 MG/DL (ref 0–130)
POTASSIUM SERPL-SCNC: 4.4 MMOL/L (ref 3.6–5)
PROT SERPL-MCNC: 6.8 G/DL (ref 6.3–8.2)
SODIUM SERPL-SCNC: 139 MMOL/L (ref 137–145)
TRIGL SERPL-MCNC: 111 MG/DL (ref 0–200)
VLDLC SERPL CALC-MCNC: 22 MG/DL

## 2021-02-05 PROCEDURE — 99214 OFFICE O/P EST MOD 30 MIN: CPT | Performed by: INTERNAL MEDICINE

## 2021-02-05 PROCEDURE — G8536 NO DOC ELDER MAL SCRN: HCPCS | Performed by: INTERNAL MEDICINE

## 2021-02-05 PROCEDURE — 82550 ASSAY OF CK (CPK): CPT | Performed by: INTERNAL MEDICINE

## 2021-02-05 PROCEDURE — G8399 PT W/DXA RESULTS DOCUMENT: HCPCS | Performed by: INTERNAL MEDICINE

## 2021-02-05 PROCEDURE — 3017F COLORECTAL CA SCREEN DOC REV: CPT | Performed by: INTERNAL MEDICINE

## 2021-02-05 PROCEDURE — G8427 DOCREV CUR MEDS BY ELIG CLIN: HCPCS | Performed by: INTERNAL MEDICINE

## 2021-02-05 PROCEDURE — G8510 SCR DEP NEG, NO PLAN REQD: HCPCS | Performed by: INTERNAL MEDICINE

## 2021-02-05 PROCEDURE — 1090F PRES/ABSN URINE INCON ASSESS: CPT | Performed by: INTERNAL MEDICINE

## 2021-02-05 PROCEDURE — 80053 COMPREHEN METABOLIC PANEL: CPT | Performed by: INTERNAL MEDICINE

## 2021-02-05 PROCEDURE — G8417 CALC BMI ABV UP PARAM F/U: HCPCS | Performed by: INTERNAL MEDICINE

## 2021-02-05 PROCEDURE — 80061 LIPID PANEL: CPT | Performed by: INTERNAL MEDICINE

## 2021-02-05 PROCEDURE — 1101F PT FALLS ASSESS-DOCD LE1/YR: CPT | Performed by: INTERNAL MEDICINE

## 2021-02-05 NOTE — PROGRESS NOTES
Chief Complaint   Patient presents with    Hypertension     3 month follow up    COPD    Cholesterol Problem     Visit Vitals  /78 (BP 1 Location: Left upper arm, BP Patient Position: Sitting, BP Cuff Size: Large adult)   Pulse 75   Temp 97.1 °F (36.2 °C) (Temporal)   Resp 17   Ht 5' 6\" (1.676 m)   Wt 212 lb (96.2 kg)   LMP  (LMP Unknown)   SpO2 98%   BMI 34.22 kg/m²     1. Have you been to the ER, urgent care clinic since your last visit? Hospitalized since your last visit? Nikolay Saucedo for bladder surgery    2. Have you seen or consulted any other health care providers outside of the [address] [place] [address] since your last visit? Include any pap smears or colon screening.  Dr. Marya Eid

## 2021-02-05 NOTE — PATIENT INSTRUCTIONS
Anemia: Care Instructions Your Care Instructions Anemia is a low level of red blood cells, which carry oxygen throughout your body. Many things can cause anemia. Lack of iron is one of the most common causes. Your body needs iron to make hemoglobin, a substance in red blood cells that carries oxygen from the lungs to your body's cells. Without enough iron, the body produces fewer and smaller red blood cells. As a result, your body's cells do not get enough oxygen, and you feel tired and weak. And you may have trouble concentrating. Bleeding is the most common cause of a lack of iron. You may have heavy menstrual bleeding or bleeding caused by conditions such as ulcers, hemorrhoids, or cancer. Regular use of aspirin or other anti-inflammatory medicines (such as ibuprofen) also can cause bleeding in some people. A lack of iron in your diet also can cause anemia, especially at times when the body needs more iron, such as during pregnancy, infancy, and the teen years. Your doctor may have prescribed iron pills. It may take several months of treatment for your iron levels to return to normal. Your doctor also may suggest that you eat foods that are rich in iron, such as meat and beans. There are many other causes of anemia. It is not always due to a lack of iron. Finding the specific cause of your anemia will help your doctor find the right treatment for you. Follow-up care is a key part of your treatment and safety. Be sure to make and go to all appointments, and call your doctor if you are having problems. It's also a good idea to know your test results and keep a list of the medicines you take. How can you care for yourself at home? · Take your medicines exactly as prescribed. Call your doctor if you think you are having a problem with your medicine. · If your doctor recommends iron pills, take them as directed: ? Try to take the pills on an empty stomach about 1 hour before or 2 hours after meals. But you may need to take iron with food to avoid an upset stomach. ? Do not take antacids or drink milk or caffeine drinks (such as coffee, tea, or cola) at the same time or within 2 hours of the time that you take your iron. They can make it hard for your body to absorb the iron. ? Vitamin C (from food or supplements) helps your body absorb iron. Try taking iron pills with a glass of orange juice or some other food that is high in vitamin C, such as citrus fruits. ? Iron pills may cause stomach problems, such as heartburn, nausea, diarrhea, constipation, and cramps. Be sure to drink plenty of fluids, and include fruits, vegetables, and fiber in your diet each day. Iron pills often make your bowel movements dark or green. ? If you forget to take an iron pill, do not take a double dose of iron the next time you take a pill. ? Keep iron pills out of the reach of small children. An overdose of iron can be very dangerous. · Follow your doctor's advice about eating iron-rich foods. These include red meat, shellfish, poultry, eggs, beans, raisins, whole-grain bread, and leafy green vegetables. · Steam vegetables to help them keep their iron content. When should you call for help? Call 911 anytime you think you may need emergency care. For example, call if: 
  · You have symptoms of a heart attack. These may include: 
? Chest pain or pressure, or a strange feeling in the chest. 
? Sweating. ? Shortness of breath. ? Nausea or vomiting. ? Pain, pressure, or a strange feeling in the back, neck, jaw, or upper belly or in one or both shoulders or arms. ? Lightheadedness or sudden weakness. ? A fast or irregular heartbeat. After you call 911, the  may tell you to chew 1 adult-strength or 2 to 4 low-dose aspirin. Wait for an ambulance. Do not try to drive yourself.  
  · You passed out (lost consciousness). Call your doctor now or seek immediate medical care if: 
  · You have new or increased shortness of breath.  
  · You are dizzy or lightheaded, or you feel like you may faint.  
  · Your fatigue and weakness continue or get worse.  
  · You have any abnormal bleeding, such as: 
? Nosebleeds. ? Vaginal bleeding that is different (heavier, more frequent, at a different time of the month) than what you are used to. 
? Bloody or black stools, or rectal bleeding. ? Bloody or pink urine. Watch closely for changes in your health, and be sure to contact your doctor if: 
  · You do not get better as expected. Where can you learn more? Go to http://www.[employer].com/ Enter R301 in the search box to learn more about \"Anemia: Care Instructions. \" Current as of: November 8, 2019               Content Version: 12.6 © 0316-8701 [employer]. Care instructions adapted under license by [employer] (which disclaims liability or warranty for this information). If you have questions about a medical condition or this instruction, always ask your healthcare professional. [address] any warranty or liability for your use of this information.

## 2021-03-12 ENCOUNTER — TELEPHONE (OUTPATIENT)
Dept: INTERNAL MEDICINE CLINIC | Age: 66
End: 2021-03-12

## 2021-03-12 RX ORDER — NIACIN 500 MG/1
TABLET, EXTENDED RELEASE ORAL
Qty: 90 TAB | Refills: 3 | Status: SHIPPED | OUTPATIENT
Start: 2021-03-12 | End: 2021-03-16 | Stop reason: SDUPTHER

## 2021-03-12 RX ORDER — ATORVASTATIN CALCIUM 20 MG/1
TABLET, FILM COATED ORAL
Qty: 90 TAB | Refills: 3 | Status: SHIPPED | OUTPATIENT
Start: 2021-03-12 | End: 2021-03-16 | Stop reason: SDUPTHER

## 2021-03-12 RX ORDER — OLMESARTAN MEDOXOMIL 40 MG/1
40 TABLET ORAL DAILY
Qty: 90 TAB | Refills: 3 | Status: SHIPPED | OUTPATIENT
Start: 2021-03-12 | End: 2021-03-16 | Stop reason: SDUPTHER

## 2021-03-12 RX ORDER — DICLOFENAC SODIUM 75 MG/1
TABLET, DELAYED RELEASE ORAL
Qty: 60 TAB | Refills: 2 | Status: SHIPPED | OUTPATIENT
Start: 2021-03-12 | End: 2021-03-16 | Stop reason: SDUPTHER

## 2021-03-12 NOTE — TELEPHONE ENCOUNTER
Apparently the Estradiol 0.05 mg bi weekly patch is on back order until late march. Any suggestions?

## 2021-03-12 NOTE — TELEPHONE ENCOUNTER
PCP: Shaun Aly MD    Last appt: 2/5/2021  Future Appointments   Date Time Provider Diana Kelley   5/7/2021  9:10 AM Shaun Aly MD [facility] AM[facility]       Last refilled:12/31/20    Requested Prescriptions     Pending Prescriptions Disp Refills    diclofenac EC (VOLTAREN) 75 mg EC tablet 60 Tab 2     Sig: TAKE ONE TABLET BY MOUTH TWICE A DAY

## 2021-03-12 NOTE — TELEPHONE ENCOUNTER
RX refill request from the patient/pharmacy. Patient last seen 02- with labs, and next appt. scheduled for 05-  Requested Prescriptions     Pending Prescriptions Disp Refills    estradioL (ESTRACE) 1 mg tablet 10 Tab 0     Sig: Take 1 Tab by mouth daily. Altagracia Morgan

## 2021-03-12 NOTE — TELEPHONE ENCOUNTER
RX refill request from the patient/pharmacy. Patient last seen 02- with labs, and next appt. scheduled for 05-  Requested Prescriptions     Pending Prescriptions Disp Refills    atorvastatin (LIPITOR) 20 mg tablet 90 Tab 3     Sig: TAKE 1 TABLET BY MOUTH NIGHTLY.  olmesartan (BENICAR) 40 mg tablet 90 Tab 3     Sig: Take 1 Tab by mouth daily.  niacin ER (NIASPAN) 500 mg tablet 90 Tab 3     Sig: TAKE 1 TABLET BY MOUTH AT BEDTIME   .

## 2021-03-14 RX ORDER — ESTRADIOL 1 MG/1
1 TABLET ORAL DAILY
Qty: 10 TAB | Refills: 0 | Status: SHIPPED | OUTPATIENT
Start: 2021-03-14 | End: 2021-05-07 | Stop reason: ALTCHOICE

## 2021-03-16 RX ORDER — OLMESARTAN MEDOXOMIL 40 MG/1
40 TABLET ORAL DAILY
Qty: 90 TAB | Refills: 3 | Status: SHIPPED | OUTPATIENT
Start: 2021-03-16 | End: 2021-03-24 | Stop reason: SDUPTHER

## 2021-03-16 RX ORDER — NIACIN 500 MG/1
TABLET, EXTENDED RELEASE ORAL
Qty: 90 TAB | Refills: 3 | Status: SHIPPED | OUTPATIENT
Start: 2021-03-16 | End: 2021-03-24 | Stop reason: SDUPTHER

## 2021-03-16 RX ORDER — DICLOFENAC SODIUM 75 MG/1
TABLET, DELAYED RELEASE ORAL
Qty: 180 TAB | Refills: 3 | Status: SHIPPED | OUTPATIENT
Start: 2021-03-16 | End: 2021-04-08

## 2021-03-16 RX ORDER — ATORVASTATIN CALCIUM 20 MG/1
TABLET, FILM COATED ORAL
Qty: 90 TAB | Refills: 3 | Status: SHIPPED | OUTPATIENT
Start: 2021-03-16 | End: 2021-03-24 | Stop reason: SDUPTHER

## 2021-03-16 NOTE — TELEPHONE ENCOUNTER
RX refill request from the patient/pharmacy. Patient last seen 02- with labs, and next appt. scheduled for 05-  . Requested Prescriptions     Pending Prescriptions Disp Refills    diclofenac EC (VOLTAREN) 75 mg EC tablet 180 Tab 3     Sig: TAKE ONE TABLET BY MOUTH TWICE A DAY   .

## 2021-03-18 ENCOUNTER — TELEPHONE (OUTPATIENT)
Dept: INTERNAL MEDICINE CLINIC | Age: 66
End: 2021-03-18

## 2021-03-18 NOTE — TELEPHONE ENCOUNTER
[employer] called and stated PA for Niacin was denied. Pt is currently taking Niacin 500mg once daily. Pt is requesting an alternative.

## 2021-03-19 NOTE — TELEPHONE ENCOUNTER
Called and spoke to patient  Two pt identifiers confirmed  Informed patient per Dr. Russ Dodson the only alternative is over the counter niacin. Advised patient to make sure it was equivalent to 500mg. Patient verbalized understanding of information discussed  with no further questions at this time.

## 2021-03-24 RX ORDER — NIACIN 500 MG/1
TABLET, EXTENDED RELEASE ORAL
Qty: 90 TAB | Refills: 3 | Status: SHIPPED | OUTPATIENT
Start: 2021-03-24 | End: 2021-04-08

## 2021-03-24 RX ORDER — ATORVASTATIN CALCIUM 20 MG/1
TABLET, FILM COATED ORAL
Qty: 90 TAB | Refills: 3 | Status: SHIPPED | OUTPATIENT
Start: 2021-03-24 | End: 2022-03-13

## 2021-03-24 RX ORDER — OLMESARTAN MEDOXOMIL 40 MG/1
40 TABLET ORAL DAILY
Qty: 90 TAB | Refills: 3 | Status: SHIPPED | OUTPATIENT
Start: 2021-03-24 | End: 2022-05-31

## 2021-03-24 NOTE — TELEPHONE ENCOUNTER
RX refill request from the patient/pharmacy. Patient last seen 02- with labs, and next appt. scheduled for 05-  Requested Prescriptions     Pending Prescriptions Disp Refills    olmesartan (BENICAR) 40 mg tablet 90 Tab 3     Sig: Take 1 Tab by mouth daily.  niacin ER (NIASPAN) 500 mg tablet 90 Tab 3     Sig: TAKE 1 TABLET BY MOUTH AT BEDTIME    atorvastatin (LIPITOR) 20 mg tablet 90 Tab 3     Sig: TAKE 1 TABLET BY MOUTH NIGHTLY. Glenna Wright

## 2021-04-08 RX ORDER — DICLOFENAC SODIUM 75 MG/1
TABLET, DELAYED RELEASE ORAL
Qty: 180 TAB | Refills: 3 | Status: SHIPPED | OUTPATIENT
Start: 2021-04-08 | End: 2021-11-18 | Stop reason: ALTCHOICE

## 2021-04-08 RX ORDER — NIACIN 500 MG/1
TABLET, EXTENDED RELEASE ORAL
Qty: 90 TAB | Refills: 3 | Status: SHIPPED | OUTPATIENT
Start: 2021-04-08 | End: 2021-06-16 | Stop reason: SDUPTHER

## 2021-04-08 NOTE — TELEPHONE ENCOUNTER
RX refill request from the patient/pharmacy. Patient last seen 02- with labs, and next appt. scheduled for 05-  Requested Prescriptions     Pending Prescriptions Disp Refills    diclofenac EC (VOLTAREN) 75 mg EC tablet [Pharmacy Med Name: DICLOFENAC SOD DR 75 MG TAB] 180 Tab 3     Sig: TAKE ONE TABLET BY MOUTH TWICE A DAY    niacin ER (NIASPAN) 500 mg tablet [Pharmacy Med Name: NIACIN  MG TABLET] 90 Tab 3     Sig: TAKE ONE TABLET BY MOUTH AT BEDTIME   .

## 2021-04-23 DIAGNOSIS — E87.6 HYPOKALEMIA: ICD-10-CM

## 2021-04-23 RX ORDER — POTASSIUM CHLORIDE 20 MEQ/1
TABLET, EXTENDED RELEASE ORAL
Qty: 60 TAB | Refills: 0 | Status: SHIPPED | OUTPATIENT
Start: 2021-04-23 | End: 2022-04-25 | Stop reason: SDUPTHER

## 2021-04-23 RX ORDER — POTASSIUM CHLORIDE 20 MEQ/1
TABLET, EXTENDED RELEASE ORAL
Qty: 180 TAB | Refills: 3 | Status: SHIPPED | OUTPATIENT
Start: 2021-04-23 | End: 2021-05-07 | Stop reason: ALTCHOICE

## 2021-04-23 NOTE — TELEPHONE ENCOUNTER
RX refill request from the patient/pharmacy. Patient last seen 02- with labs, and next appt. scheduled for 05-  Requested Prescriptions     Pending Prescriptions Disp Refills    potassium chloride (K-DUR, KLOR-CON) 20 mEq tablet 180 Tab 3     Sig: take 1 tablet by mouth twice a day   .

## 2021-05-06 NOTE — PROGRESS NOTES
Follow-up hypertension, hyperlipidemia, COPD, obesity and other medical problems. SUBJECTIVE:    Kourtney Badillo is a 76 y.o. female who returns in follow-up for medical problems include hypertension, hyperlipidemia, CKD stage III, GERD, Crohn's disease, COPD, obesity, DJD and other medical problems. She has been working on diet and exercise trying to get her weight down and has had some success getting her weight down 12 pounds since her last visit. She currently denies any chest pain, shortness of breath, palpitations, PND, orthopnea or other cardiac or respiratory complaints. She notes no GI or  complaints. She notes no headaches, dizziness or neurologic complaints. There are no current active arthritic complaints and she has no other complaints on complete review of systems other than she does note some easy bruising which I explained is probably related to the diclofenac that she takes for arthritis. Current Outpatient Medications   Medication Sig Dispense Refill    dexAMETHasone (DECADRON) 0.5 mg/5 mL elixir RINSE WITH 1 TEASPOON FOR TWO MINUTES AND SPIT 4 TIMES DAILY      polyethylene glycol (MIRALAX) 17 gram/dose powder Take 17 g by mouth daily.  potassium chloride (K-DUR, KLOR-CON) 20 mEq tablet take 1 tablet by mouth twice a day 60 Tab 0    diclofenac EC (VOLTAREN) 75 mg EC tablet TAKE ONE TABLET BY MOUTH TWICE A  Tab 3    niacin ER (NIASPAN) 500 mg tablet TAKE ONE TABLET BY MOUTH AT BEDTIME 90 Tab 3    olmesartan (BENICAR) 40 mg tablet Take 1 Tab by mouth daily. 90 Tab 3    atorvastatin (LIPITOR) 20 mg tablet TAKE 1 TABLET BY MOUTH NIGHTLY. 90 Tab 3    cyanocobalamin (VITAMIN B-12) 1,000 mcg sublingual tablet Take 1,000 mcg by mouth daily.  amLODIPine (NORVASC) 5 mg tablet Take 1 Tab by mouth daily. 30 Tab prn    triamcinolone acetonide (KENALOG) 0.1 % topical cream       co-enzyme Q-10 (Co Q-10) 100 mg capsule Take 100 mg by mouth daily.       estradioL (VIVELLE) 0.05 mg/24 hr Apply to the skin two times a week. 26 Patch 3    OTHER Indications: Melaleuca Phytomega - Take 2 softgels twice a day.  OTHER Indications: Melaleuca Vitality - Takes 1 tablet twice a day.  ferrous sulfate 325 mg (65 mg iron) tablet Take  by mouth three (3) times daily (with meals).  vitamin E (AQUA GEMS) 400 unit capsule Take 400 Units by mouth daily.  cholecalciferol, vitamin D3, 2,000 unit tab Take 1 Tab by mouth daily.  ascorbic acid (VITAMIN C) 250 mg tablet Take 500 mg by mouth two (2) times a day.        Past Medical History:   Diagnosis Date    Allergic rhinitis 8/9/2017    Anemia 8/9/2017    Anxiety 8/9/2017    Arrhythmia     irregular heart beat hx and beating fast per patient/no cardiologist    Arthritis     JOINTS  WORSE WAIST DOWN     Back pain 8/9/2017    Chronic kidney disease     Stage III    CKD (chronic kidney disease) 8/9/2017    COPD (chronic obstructive pulmonary disease) ([facility] [address] 8/9/2017    Crohn disease ([facility] [address] 8/9/2017    DJD (degenerative joint disease) 8/9/2017    Dry mouth     evaluated by Dr. Tracie Alva (ENT)    GERD (gastroesophageal reflux disease) 8/9/2017    Hormone replacement therapy (HRT) 8/9/2017    Hyperlipidemia 8/9/2017    Hypertension     Hypertension with renal disease 8/9/2017    Hypokalemia 8/9/2017    Hypothyroid 8/9/2017    Ill-defined condition     neuropathy feet    Inguinal hernia 8/9/2017    Leg numbness 8/9/2017    Lumbar herniated disc 8/9/2017    OAB (overactive bladder) 8/9/2017    Obesity 8/9/2017    On statin therapy 8/9/2017    Osteoarthritis 8/9/2017    Pelvic pain in female 9/8/1710    Umbilical hernia 0/1/1987    Varicose vein of leg 8/9/2017     Past Surgical History:   Procedure Laterality Date    HX COLONOSCOPY  12/2010    normal    HX COLONOSCOPY  12/30/2014    normal     HX HEENT      cyst removed left eye    HX HERNIA REPAIR  06/02/2017    right inguinal and umbilical ( [place])    HX HYSTERECTOMY      HX ORTHOPAEDIC      bilat knee injections    HX ORTHOPAEDIC      laser treatment left knee and foot    HX ORTHOPAEDIC      hx of gel shots bilat knee    HX ORTHOPAEDIC  12/27/2016    left knee coritizon shot    HX OTHER SURGICAL      cyst removed front left scalp    HX OTHER SURGICAL      colonoscopy    HX OTHER SURGICAL      tooth implant    HX TUBAL LIGATION      NEUROLOGICAL PROCEDURE UNLISTED      Lumbar surgery    ID BREAST SURGERY PROCEDURE UNLISTED      mass removed left breast x2 benign    VASCULAR SURGERY PROCEDURE UNLIST      vein striping     Allergies   Allergen Reactions    Lisinopril Cough       REVIEW OF SYSTEMS:  General: negative for - chills or fever, or weight loss or gain  ENT: negative for - headaches, nasal congestion or tinnitus  Eyes: no blurred or visual changes  Neck: No stiffness or swollen nodes  Respiratory: negative for - cough, hemoptysis, shortness of breath or wheezing  Cardiovascular : negative for - chest pain, edema, palpitations or shortness of breath  Gastrointestinal: negative for - abdominal pain, blood in stools, heartburn or nausea/vomiting  Genito-Urinary: no dysuria, trouble voiding, or hematuria  Musculoskeletal: negative for - gait disturbance, joint pain, joint stiffness or joint swelling  Neurological: no TIA or stroke symptoms  Hematologic: no bruises, no bleeding  Lymphatic: no swollen glands  Integument: no lumps, mole changes, nail changes or rash  Endocrine:no malaise/lethargy poly uria or polydipsia or unexpected weight changes        Social History     Socioeconomic History    Marital status:      Spouse name: Not on file    Number of children: Not on file    Years of education: Not on file    Highest education level: Not on file   Tobacco Use    Smoking status: Never Smoker    Smokeless tobacco: Never Used   Substance and Sexual Activity    Alcohol use: Yes     Comment: once a year    Drug use:  No  Sexual activity: Yes     Partners: Male     Family History   Problem Relation Age of Onset    No Known Problems Mother     No Known Problems Father     Stroke Sister        OBJECTIVE:     Visit Vitals  /84 (BP 1 Location: Left upper arm, BP Patient Position: Sitting, BP Cuff Size: Large adult)   Pulse 66   Temp 97.4 °F (36.3 °C) (Oral)   Resp 16   Ht 5' 6\" (1.676 m)   Wt 198 lb 1.6 oz (89.9 kg)   LMP  (LMP Unknown)   SpO2 95%   BMI 31.97 kg/m²     CONSTITUTIONAL:   well nourished, appears age appropriate  EYES: sclera anicteric, PERRL, EOMI  ENMT:nares clear, moist mucous membranes, pharynx clear  NECK: supple. Thyroid normal, No JVD or bruits  RESPIRATORY: Chest: clear to ascultation and percussion, normal inspiratory effort  CARDIOVASCULAR: Heart: regular rate and rhythm no murmurs, rubs or gallops, PMI not displaced, No thrills, no peripheral edema  GASTROINTESTINAL: Abdomen: non distended, soft, non tender, bowel sounds normal  HEMATOLOGIC: no purpura, petechiae or bruising  LYMPHATIC: No lymph node enlargemant  MUSCULOSKELETAL: Extremities: no active synovitis, pulse 1+   INTEGUMENT: No unusual rashes or suspicious skin lesions noted. Nails appear normal.  PERIPHERAL VASCULAR: normal pulses femoral, PT and DP  NEUROLOGIC: non-focal exam, A & O X 3  PSYCHIATRIC:, appropriate affect     ASSESSMENT:   1. Hypertension with renal disease    2. Mixed hyperlipidemia    3. Gastroesophageal reflux disease without esophagitis    4. Primary osteoarthritis involving multiple joints    5. Chronic obstructive pulmonary disease, unspecified COPD type ([address]    6. Stage 3 chronic kidney disease, unspecified whether stage 3a or 3b CKD (HCC)    7. Class 1 obesity due to excess calories without serious comorbidity with body mass index (BMI) of 33.0 to 33.9 in adult    8. Easy bruising      Impression  1. Hypertension that is controlled so continue current therapy reviewed with her.   2.  Hyperlipidemia prior lab reviewed repeat status pending I will adjust if needed. 3.  GERD that is stable  4. DJD stable on diclofenac  5. COPD stable with O2 sat today of 95% on room air  6. CKD stage III repeat status pending  7. Obesity we did discuss diet, exercise and weight reduction for overall health benefit and her weight is down as noted 14 pounds from last visit. 8.  Easy bruising I think the diclofenac is causing the problem and I will check her platelets  I will call the lab and make further recommendations or adjustments if necessary. Follow-up in 3 months or sooner if there is a problem. PLAN:  .  Orders Placed This Encounter    METABOLIC PANEL, COMPREHENSIVE    LIPID PANEL    CK    CBC WITH AUTOMATED DIFF    dexAMETHasone (DECADRON) 0.5 mg/5 mL elixir    polyethylene glycol (MIRALAX) 17 gram/dose powder         ATTENTION:   This medical record was transcribed using an electronic medical records system. Although proofread, it may and can contain electronic and spelling errors. Other human spelling and other errors may be present. Corrections may be executed at a later time. Please feel free to contact us for any clarifications as needed. Follow-up and Dispositions    · Return in about 3 months (around 8/7/2021). No results found for any visits on 05/07/21. Emi Merritt MD    The patient verbalized understanding of the problems and plans as explained.

## 2021-05-07 ENCOUNTER — OFFICE VISIT (OUTPATIENT)
Dept: INTERNAL MEDICINE CLINIC | Age: 66
End: 2021-05-07
Payer: MEDICARE

## 2021-05-07 VITALS
HEART RATE: 66 BPM | WEIGHT: 198.1 LBS | TEMPERATURE: 97.4 F | RESPIRATION RATE: 16 BRPM | SYSTOLIC BLOOD PRESSURE: 133 MMHG | DIASTOLIC BLOOD PRESSURE: 84 MMHG | OXYGEN SATURATION: 95 % | BODY MASS INDEX: 31.84 KG/M2 | HEIGHT: 66 IN

## 2021-05-07 DIAGNOSIS — K21.9 GASTROESOPHAGEAL REFLUX DISEASE WITHOUT ESOPHAGITIS: ICD-10-CM

## 2021-05-07 DIAGNOSIS — E66.09 CLASS 1 OBESITY DUE TO EXCESS CALORIES WITHOUT SERIOUS COMORBIDITY WITH BODY MASS INDEX (BMI) OF 33.0 TO 33.9 IN ADULT: ICD-10-CM

## 2021-05-07 DIAGNOSIS — R23.3 EASY BRUISING: ICD-10-CM

## 2021-05-07 DIAGNOSIS — I12.9 HYPERTENSION WITH RENAL DISEASE: Primary | ICD-10-CM

## 2021-05-07 DIAGNOSIS — N18.30 STAGE 3 CHRONIC KIDNEY DISEASE, UNSPECIFIED WHETHER STAGE 3A OR 3B CKD (HCC): ICD-10-CM

## 2021-05-07 DIAGNOSIS — E78.2 MIXED HYPERLIPIDEMIA: ICD-10-CM

## 2021-05-07 DIAGNOSIS — M15.9 PRIMARY OSTEOARTHRITIS INVOLVING MULTIPLE JOINTS: ICD-10-CM

## 2021-05-07 DIAGNOSIS — J44.9 CHRONIC OBSTRUCTIVE PULMONARY DISEASE, UNSPECIFIED COPD TYPE (HCC): ICD-10-CM

## 2021-05-07 LAB
ALBUMIN SERPL-MCNC: 4.4 G/DL (ref 3.5–5)
ALBUMIN/GLOB SERPL: 1.4 {RATIO} (ref 1.1–2.2)
ALP SERPL-CCNC: 119 U/L (ref 45–117)
ALT SERPL-CCNC: 43 U/L (ref 12–78)
ANION GAP SERPL CALC-SCNC: 8 MMOL/L (ref 5–15)
AST SERPL-CCNC: 17 U/L (ref 15–37)
BASOPHILS # BLD: 0 K/UL (ref 0–0.1)
BASOPHILS NFR BLD: 0 % (ref 0–1)
BILIRUB SERPL-MCNC: 0.6 MG/DL (ref 0.2–1)
BUN SERPL-MCNC: 21 MG/DL (ref 6–20)
BUN/CREAT SERPL: 24 (ref 12–20)
CALCIUM SERPL-MCNC: 10 MG/DL (ref 8.5–10.1)
CHLORIDE SERPL-SCNC: 103 MMOL/L (ref 97–108)
CHOLEST SERPL-MCNC: 159 MG/DL
CK SERPL-CCNC: 86 U/L (ref 26–192)
CO2 SERPL-SCNC: 26 MMOL/L (ref 21–32)
CREAT SERPL-MCNC: 0.89 MG/DL (ref 0.55–1.02)
DIFFERENTIAL METHOD BLD: ABNORMAL
EOSINOPHIL # BLD: 0.1 K/UL (ref 0–0.4)
EOSINOPHIL NFR BLD: 2 % (ref 0–7)
ERYTHROCYTE [DISTWIDTH] IN BLOOD BY AUTOMATED COUNT: 14.2 % (ref 11.5–14.5)
GLOBULIN SER CALC-MCNC: 3.2 G/DL (ref 2–4)
GLUCOSE SERPL-MCNC: 89 MG/DL (ref 65–100)
HCT VFR BLD AUTO: 42.3 % (ref 35–47)
HDLC SERPL-MCNC: 55 MG/DL
HDLC SERPL: 2.9 {RATIO} (ref 0–5)
HGB BLD-MCNC: 13.1 G/DL (ref 11.5–16)
IMM GRANULOCYTES # BLD AUTO: 0 K/UL (ref 0–0.04)
IMM GRANULOCYTES NFR BLD AUTO: 0 % (ref 0–0.5)
LDLC SERPL CALC-MCNC: 83.2 MG/DL (ref 0–100)
LIPID PROFILE,FLP: NORMAL
LYMPHOCYTES # BLD: 0.6 K/UL (ref 0.8–3.5)
LYMPHOCYTES NFR BLD: 14 % (ref 12–49)
MCH RBC QN AUTO: 30.5 PG (ref 26–34)
MCHC RBC AUTO-ENTMCNC: 31 G/DL (ref 30–36.5)
MCV RBC AUTO: 98.6 FL (ref 80–99)
MONOCYTES # BLD: 0.7 K/UL (ref 0–1)
MONOCYTES NFR BLD: 15 % (ref 5–13)
NEUTS SEG # BLD: 3.1 K/UL (ref 1.8–8)
NEUTS SEG NFR BLD: 69 % (ref 32–75)
NRBC # BLD: 0 K/UL (ref 0–0.01)
NRBC BLD-RTO: 0 PER 100 WBC
PLATELET # BLD AUTO: 236 K/UL (ref 150–400)
PMV BLD AUTO: 11.3 FL (ref 8.9–12.9)
POTASSIUM SERPL-SCNC: 4.6 MMOL/L (ref 3.5–5.1)
PROT SERPL-MCNC: 7.6 G/DL (ref 6.4–8.2)
RBC # BLD AUTO: 4.29 M/UL (ref 3.8–5.2)
RBC MORPH BLD: ABNORMAL
RBC MORPH BLD: ABNORMAL
SODIUM SERPL-SCNC: 137 MMOL/L (ref 136–145)
TRIGL SERPL-MCNC: 104 MG/DL (ref ?–150)
VLDLC SERPL CALC-MCNC: 20.8 MG/DL
WBC # BLD AUTO: 4.5 K/UL (ref 3.6–11)

## 2021-05-07 PROCEDURE — G8510 SCR DEP NEG, NO PLAN REQD: HCPCS | Performed by: INTERNAL MEDICINE

## 2021-05-07 PROCEDURE — 1101F PT FALLS ASSESS-DOCD LE1/YR: CPT | Performed by: INTERNAL MEDICINE

## 2021-05-07 PROCEDURE — 99214 OFFICE O/P EST MOD 30 MIN: CPT | Performed by: INTERNAL MEDICINE

## 2021-05-07 PROCEDURE — 3017F COLORECTAL CA SCREEN DOC REV: CPT | Performed by: INTERNAL MEDICINE

## 2021-05-07 PROCEDURE — G8427 DOCREV CUR MEDS BY ELIG CLIN: HCPCS | Performed by: INTERNAL MEDICINE

## 2021-05-07 PROCEDURE — G8417 CALC BMI ABV UP PARAM F/U: HCPCS | Performed by: INTERNAL MEDICINE

## 2021-05-07 PROCEDURE — G8399 PT W/DXA RESULTS DOCUMENT: HCPCS | Performed by: INTERNAL MEDICINE

## 2021-05-07 PROCEDURE — G9899 SCRN MAM PERF RSLTS DOC: HCPCS | Performed by: INTERNAL MEDICINE

## 2021-05-07 PROCEDURE — 1090F PRES/ABSN URINE INCON ASSESS: CPT | Performed by: INTERNAL MEDICINE

## 2021-05-07 PROCEDURE — G8536 NO DOC ELDER MAL SCRN: HCPCS | Performed by: INTERNAL MEDICINE

## 2021-05-07 RX ORDER — DEXAMETHASONE 0.5 MG/5ML
ELIXIR ORAL
COMMUNITY
Start: 2021-04-28

## 2021-05-07 RX ORDER — POLYETHYLENE GLYCOL 3350 17 G/17G
17 POWDER, FOR SOLUTION ORAL DAILY
COMMUNITY

## 2021-05-07 NOTE — PROGRESS NOTES
HIPAA verified by two patient identifiers. Pola Norris is a 76 y.o. female    No chief complaint on file. Visit Vitals  /84 (BP 1 Location: Left upper arm, BP Patient Position: Sitting, BP Cuff Size: Large adult)   Pulse 66   Temp 97.4 °F (36.3 °C) (Oral)   Resp 16   Ht 5' 6\" (1.676 m)   Wt 198 lb 1.6 oz (89.9 kg)   LMP  (LMP Unknown)   SpO2 95%   BMI 31.97 kg/m²       Pain Scale: 0 - No pain/10  Pain Location:       Health Maintenance Due   Topic Date Due    COVID-19 Vaccine (1) Never done    DTaP/Tdap/Td series (1 - Tdap) Never done    Shingrix Vaccine Age 50> (1 of 2) Never done    Bone Densitometry  04/12/2020         Coordination of Care Questionnaire:  :   1) Have you been to an emergency room, urgent care, or hospitalized since your last visit? If yes, where when, and reason for visit? no       2. Have seen or consulted any other health care provider since your last visit? If yes, where when, and reason for visit? NO      Patient is accompanied by self I have received verbal consent from Pola Norris to discuss any/all medical information while they are present in the room.

## 2021-05-07 NOTE — PATIENT INSTRUCTIONS
Anemia: Care Instructions Your Care Instructions Anemia is a low level of red blood cells, which carry oxygen throughout your body. Many things can cause anemia. Lack of iron is one of the most common causes. Your body needs iron to make hemoglobin, a substance in red blood cells that carries oxygen from the lungs to your body's cells. Without enough iron, the body produces fewer and smaller red blood cells. As a result, your body's cells do not get enough oxygen, and you feel tired and weak. And you may have trouble concentrating. Bleeding is the most common cause of a lack of iron. You may have heavy menstrual bleeding or bleeding caused by conditions such as ulcers, hemorrhoids, or cancer. Regular use of aspirin or other anti-inflammatory medicines (such as ibuprofen) also can cause bleeding in some people. A lack of iron in your diet also can cause anemia, especially at times when the body needs more iron, such as during pregnancy, infancy, and the teen years. Your doctor may have prescribed iron pills. It may take several months of treatment for your iron levels to return to normal. Your doctor also may suggest that you eat foods that are rich in iron, such as meat and beans. There are many other causes of anemia. It is not always due to a lack of iron. Finding the specific cause of your anemia will help your doctor find the right treatment for you. Follow-up care is a key part of your treatment and safety. Be sure to make and go to all appointments, and call your doctor if you are having problems. It's also a good idea to know your test results and keep a list of the medicines you take. How can you care for yourself at home? · Take your medicines exactly as prescribed. Call your doctor if you think you are having a problem with your medicine. · If your doctor recommends iron pills, take them as directed: ? Try to take the pills on an empty stomach about 1 hour before or 2 hours after meals. But you may need to take iron with food to avoid an upset stomach. ? Do not take antacids or drink milk or caffeine drinks (such as coffee, tea, or cola) at the same time or within 2 hours of the time that you take your iron. They can make it hard for your body to absorb the iron. ? Vitamin C (from food or supplements) helps your body absorb iron. Try taking iron pills with a glass of orange juice or some other food that is high in vitamin C, such as citrus fruits. ? Iron pills may cause stomach problems, such as heartburn, nausea, diarrhea, constipation, and cramps. Be sure to drink plenty of fluids, and include fruits, vegetables, and fiber in your diet each day. Iron pills often make your bowel movements dark or green. ? If you forget to take an iron pill, do not take a double dose of iron the next time you take a pill. ? Keep iron pills out of the reach of small children. An overdose of iron can be very dangerous. · Follow your doctor's advice about eating iron-rich foods. These include red meat, shellfish, poultry, eggs, beans, raisins, whole-grain bread, and leafy green vegetables. · Steam vegetables to help them keep their iron content. When should you call for help? Call 911 anytime you think you may need emergency care. For example, call if: 
  · You have symptoms of a heart attack. These may include: 
? Chest pain or pressure, or a strange feeling in the chest. 
? Sweating. ? Shortness of breath. ? Nausea or vomiting. ? Pain, pressure, or a strange feeling in the back, neck, jaw, or upper belly or in one or both shoulders or arms. ? Lightheadedness or sudden weakness. ? A fast or irregular heartbeat. After you call 911, the  may tell you to chew 1 adult-strength or 2 to 4 low-dose aspirin. Wait for an ambulance. Do not try to drive yourself.  
  · You passed out (lost consciousness).   
Call your doctor now or seek immediate medical care if: 
  · You have new or increased shortness of breath.  
  · You are dizzy or lightheaded, or you feel like you may faint.  
  · Your fatigue and weakness continue or get worse.  
  · You have any abnormal bleeding, such as: 
? Nosebleeds. ? Vaginal bleeding that is different (heavier, more frequent, at a different time of the month) than what you are used to. 
? Bloody or black stools, or rectal bleeding. ? Bloody or pink urine. Watch closely for changes in your health, and be sure to contact your doctor if: 
  · You do not get better as expected. Where can you learn more? Go to http://www.[employer].com/ Enter R301 in the search box to learn more about \"Anemia: Care Instructions. \" Current as of: September 23, 2020               Content Version: 12.8 © 7641-0818 [employer]. Care instructions adapted under license by [employer] (which disclaims liability or warranty for this information). If you have questions about a medical condition or this instruction, always ask your healthcare professional. [place] [address] any warranty or liability for your use of this information.

## 2021-06-16 RX ORDER — NIACIN 500 MG/1
TABLET, EXTENDED RELEASE ORAL
Qty: 90 TABLET | Refills: 3 | Status: SHIPPED | OUTPATIENT
Start: 2021-06-16 | End: 2022-06-22

## 2021-06-16 NOTE — TELEPHONE ENCOUNTER
RX refill request from the patient/pharmacy. Patient last seen 05- with labs, and next appt. scheduled for 08-  Requested Prescriptions     Pending Prescriptions Disp Refills    niacin ER (NIASPAN) 500 mg tablet 90 Tablet 3     Sig: TAKE ONE TABLET BY MOUTH AT BEDTIME   .

## 2021-06-17 ENCOUNTER — TRANSCRIBE ORDER (OUTPATIENT)
Dept: SCHEDULING | Age: 66
End: 2021-06-17

## 2021-06-17 DIAGNOSIS — N99.3 PROLAPSE OF VAGINAL VAULT AFTER HYSTERECTOMY: Primary | ICD-10-CM

## 2021-07-01 ENCOUNTER — TRANSCRIBE ORDER (OUTPATIENT)
Dept: SCHEDULING | Age: 66
End: 2021-07-01

## 2021-07-01 DIAGNOSIS — N99.3 PROLAPSE OF VAGINAL VAULT AFTER HYSTERECTOMY: Primary | ICD-10-CM

## 2021-07-15 ENCOUNTER — HOSPITAL ENCOUNTER (OUTPATIENT)
Dept: MRI IMAGING | Age: 66
Discharge: HOME | End: 2021-07-15
Attending: OBSTETRICS & GYNECOLOGY
Payer: MEDICARE

## 2021-07-15 DIAGNOSIS — N99.3 PROLAPSE OF VAGINAL VAULT AFTER HYSTERECTOMY: ICD-10-CM

## 2021-07-15 PROCEDURE — 72195 MRI PELVIS W/O DYE: CPT

## 2021-07-16 RX ORDER — ESTRADIOL 0.05 MG/D
PATCH, EXTENDED RELEASE TRANSDERMAL
Qty: 24 PATCH | Refills: 0 | Status: SHIPPED | OUTPATIENT
Start: 2021-07-16 | End: 2021-10-28

## 2021-07-16 RX ORDER — AMLODIPINE BESYLATE 5 MG/1
TABLET ORAL
Qty: 90 TABLET | Refills: 0 | Status: SHIPPED | OUTPATIENT
Start: 2021-07-16 | End: 2021-10-28

## 2021-08-11 PROBLEM — Z00.00 MEDICARE ANNUAL WELLNESS VISIT, SUBSEQUENT: Status: ACTIVE | Noted: 2021-08-11

## 2021-08-11 NOTE — PROGRESS NOTES
This is a Subsequent Medicare Annual Wellness Visit providing Personalized Prevention Plan Services (PPPS) (Performed 12 months after initial AWV and PPPS )    I have reviewed the patient's medical history in detail and updated the computerized patient record. She returns today for her Medicare subsequent annual wellness examination and screening questionnaire. She is also in follow-up of her medical problems include hypertension, hyperlipidemia, Crohn's disease, GERD, hypothyroidism, allergic rhinitis, COPD, DJD, CKD stage III, obesity and other multiple medical problems. She has a new problem noting a skin lesion on her left shoulder that she is just recently noticed. She did not note it was there before although she is not sure exactly how long it has been there. It is dark in appearance. She denies any chest pain, shortness of breath, palpitations, PND, orthopnea or other cardiac or respiratory complaints. She notes no GI or  complaints. She notes no headaches, dizziness or neurologic complaints. She has no current active arthritic complaints and there are no other complaints on complete review of systems.     History     Past Medical History:   Diagnosis Date    Allergic rhinitis 8/9/2017    Anemia 8/9/2017    Anxiety 8/9/2017    Arrhythmia     irregular heart beat hx and beating fast per patient/no cardiologist    Arthritis     JOINTS  WORSE WAIST DOWN     Back pain 8/9/2017    Chronic kidney disease     Stage III    CKD (chronic kidney disease) 8/9/2017    COPD (chronic obstructive pulmonary disease) ([facility] 8/9/2017    Crohn disease ([facility] 8/9/2017    DJD (degenerative joint disease) 8/9/2017    Dry mouth     evaluated by Dr. Sabrina Mayo (ENT)    GERD (gastroesophageal reflux disease) 8/9/2017    Hormone replacement therapy (HRT) 8/9/2017    Hyperlipidemia 8/9/2017    Hypertension     Hypertension with renal disease 8/9/2017    Hypokalemia 8/9/2017    Hypothyroid 8/9/2017    Ill-defined condition     neuropathy feet    Inguinal hernia 8/9/2017    Leg numbness 8/9/2017    Lumbar herniated disc 8/9/2017    OAB (overactive bladder) 8/9/2017    Obesity 8/9/2017    On statin therapy 8/9/2017    Osteoarthritis 8/9/2017    Pelvic pain in female 5/6/9009    Umbilical hernia 4/0/4544    Varicose vein of leg 8/9/2017      Past Surgical History:   Procedure Laterality Date    HX COLONOSCOPY  12/2010    normal    HX COLONOSCOPY  12/30/2014    normal     HX HEENT      cyst removed left eye    HX HERNIA REPAIR  06/02/2017    right inguinal and umbilical (Dr. Thad Noriega)    HX HYSTERECTOMY      HX ORTHOPAEDIC      bilat knee injections    HX ORTHOPAEDIC      laser treatment left knee and foot    HX ORTHOPAEDIC      hx of gel shots bilat knee    HX ORTHOPAEDIC  12/27/2016    left knee coritizon shot    HX OTHER SURGICAL      cyst removed front left scalp    HX OTHER SURGICAL      colonoscopy    HX OTHER SURGICAL      tooth implant    HX TUBAL LIGATION      NEUROLOGICAL PROCEDURE UNLISTED      Lumbar surgery    MI BREAST SURGERY PROCEDURE UNLISTED      mass removed left breast x2 benign    VASCULAR SURGERY PROCEDURE UNLIST      vein striping     Social History     Tobacco Use    Smoking status: Never Smoker    Smokeless tobacco: Never Used   Vaping Use    Vaping Use: Never used   Substance Use Topics    Alcohol use: Yes     Comment: once a year    Drug use: No     Current Outpatient Medications   Medication Sig Dispense Refill    Elmira 0.05 mg/24 hr APPLY 1 PATCH TOPICALLY TWICE A WEEK 24 Patch 0    amLODIPine (NORVASC) 5 mg tablet Take 1 tablet by mouth once daily 90 Tablet 0    niacin ER (NIASPAN) 500 mg tablet TAKE ONE TABLET BY MOUTH AT BEDTIME 90 Tablet 3    dexAMETHasone (DECADRON) 0.5 mg/5 mL elixir RINSE WITH 1 TEASPOON FOR TWO MINUTES AND SPIT 4 TIMES DAILY      polyethylene glycol (MIRALAX) 17 gram/dose powder Take 17 g by mouth daily.       potassium chloride (K-DUR, KLOR-CON) 20 mEq tablet take 1 tablet by mouth twice a day 60 Tab 0    diclofenac EC (VOLTAREN) 75 mg EC tablet TAKE ONE TABLET BY MOUTH TWICE A  Tab 3    olmesartan (BENICAR) 40 mg tablet Take 1 Tab by mouth daily. 90 Tab 3    atorvastatin (LIPITOR) 20 mg tablet TAKE 1 TABLET BY MOUTH NIGHTLY. 90 Tab 3    cyanocobalamin (VITAMIN B-12) 1,000 mcg sublingual tablet Take 1,000 mcg by mouth daily.  triamcinolone acetonide (KENALOG) 0.1 % topical cream       co-enzyme Q-10 (Co Q-10) 100 mg capsule Take 100 mg by mouth daily.  OTHER Indications: Melaleuca Phytomega - Take 2 softgels twice a day.  OTHER Indications: Melaleuca Vitality - Takes 1 tablet twice a day.  ferrous sulfate 325 mg (65 mg iron) tablet Take  by mouth three (3) times daily (with meals).  vitamin E (AQUA GEMS) 400 unit capsule Take 400 Units by mouth daily.  cholecalciferol, vitamin D3, 2,000 unit tab Take 1 Tab by mouth daily.  ascorbic acid (VITAMIN C) 250 mg tablet Take 500 mg by mouth two (2) times a day.        Allergies   Allergen Reactions    Lisinopril Cough     Family History   Problem Relation Age of Onset    No Known Problems Mother     No Known Problems Father     Stroke Sister        Patient Active Problem List    Diagnosis    Class 1 obesity due to excess calories without serious comorbidity with body mass index (BMI) of 33.0 to 33.9 in adult    Hypertension with renal disease    Mixed hyperlipidemia    Gastroesophageal reflux disease without esophagitis    Primary osteoarthritis involving multiple joints    COPD (chronic obstructive pulmonary disease) (HCC)    Stage 3 chronic kidney disease (HCC)    Chronic non-seasonal allergic rhinitis    Acquired hypothyroidism    Skin lesion of chest wall     L upper chest/shoulder      Medicare annual wellness visit, subsequent    [place]-Walker grade 3 cystocele    Incomplete prolapse of vaginal vault    Vaginal prolapse  Easy bruising    Urinary frequency    Alcohol screening    Dry mouth    Fungal dermatitis    Labyrinthitis of both ears    Acute non-recurrent maxillary sinusitis    Acute bronchitis    Precordial pain    Crohn disease (HCC)    OAB (overactive bladder)    Varicose vein of leg    Umbilical hernia    Lumbar herniated disc    Inguinal hernia    Hormone replacement therapy (HRT)    Back pain    Anxiety    Anemia    Right inguinal hernia    Ventral hernia without obstruction or gangrene       Patient Care Team:  José Braun MD as PCP - General (Internal Medicine)  José Braun MD as PCP - [facility] Empaneled Provider  Kenyetta Dominguez MD (Gynecology)    Depression Risk Factor Screening:     3 most recent PHQ Screens 8/12/2021   Little interest or pleasure in doing things Not at all   Feeling down, depressed, irritable, or hopeless Not at all   Total Score PHQ 2 0   Trouble falling or staying asleep, or sleeping too much -   Feeling tired or having little energy -   Poor appetite, weight loss, or overeating -   Feeling bad about yourself - or that you are a failure or have let yourself or your family down -   Trouble concentrating on things such as school, work, reading, or watching TV -   Moving or speaking so slowly that other people could have noticed; or the opposite being so fidgety that others notice -   Thoughts of being better off dead, or hurting yourself in some way -   PHQ 9 Score -     Alcohol Risk Factor Screening:   Do you average more than 1 drink per night or more than 7 drinks a week:  No    On any one occasion in the past three months have you have had more than 3 drinks containing alcohol:  No    Functional Ability and Level of Safety:     Fall Risk     Fall Risk Assessment, last 12 mths 8/12/2021   Able to walk? Yes   Fall in past 12 months? 0   Do you feel unsteady? 0   Are you worried about falling 0       Hearing Loss   mild    Activities of Daily Living   Self-care. ADL Assessment 8/12/2021   Feeding yourself No Help Needed   Getting from bed to chair No Help Needed   Getting dressed No Help Needed   Bathing or showering No Help Needed   Walk across the room (includes cane/walker) No Help Needed   Using the telphone No Help Needed   Taking your medications No Help Needed   Preparing meals No Help Needed   Managing money (expenses/bills) No Help Needed   Moderately strenuous housework (laundry) No Help Needed   Shopping for personal items (toiletries/medicines) No Help Needed   Shopping for groceries No Help Needed   Driving No Help Needed   Climbing a flight of stairs No Help Needed   Getting to places beyond walking distances No Help Needed       Abuse Screen   Patient is not abused    Social History     Social History Narrative    Not on file       Review of Systems      ROS:    Constitutional: She denies fevers, weight loss, sweats. Eyes: No blurred or double vision. ENT: No difficulty with swallowing, taste, speech or smell. NECK: no stiffness swelling or lymph node enlargement  Respiratory: No cough wheezing or shortness of breath. Cardiovascular: Denies chest pain, palpitations, unexplained indigestion or syncope. Breast: She has noted no masses or lumps and no discharge or axillary swelling  Gastrointestinal:  No changes in bowel movements, no abdominal pain, no bloating. Genitourinary: No discharge or abnormal bleeding or pain  Extremities: No joint pain, stiffness or swelling. Neurological:  No numbness, tingling, burring paresthesias or loss of motor strength. No syncope, dizziness or frequent headache  Skin:  No recent rashes or mole changes except a new skin lesion left shoulder which is dark in appearance. Psychiatric/Behavioral:  Negative for depression. The patient is not nervous/anxious.    HEMATOLOGIC: no easy bruising or bleeding gums  Endocrine: no sweats of urinary frequency or excessive thirst    Physical Examination     Evaluation of Cognitive Function:  Mood/affect:  happy  Appearance: age appropriate  Family member/caregiver input:     Vitals:    08/12/21 1004   BP: 120/80   Pulse: 64   Resp: 16   Temp: 97.3 °F (36.3 °C)   TempSrc: Oral   SpO2: 95%   Weight: 193 lb 11.2 oz (87.9 kg)   Height: 5' 6\" (1.676 m)   PainSc:   0 - No pain        PHYSICAL EXAM:    General appearance - alert, well appearing, and in no distress  Mental status - alert, oriented to person, place, and time  HEENT:  Ears - bilateral TM's and external ear canals clear  Eyes - pupillary responses were normal.  Extraocular muscle function intact. Lids and conjunctiva not injected. Fundoscopic exam revealed sharp disc margins. eye movements intact  Pharynx- clear with teeth in good repair. No masses were noted  Neck - supple without thyromegaly or burit. No JVD noted  Lungs - clear to auscultation and percussion  Cardiac- normal rate, regular rhythm without murmurs. PMI not displaced. No gallop, rub or click  Breast: deferred to GYN  Abdomen - flat, soft, non-tender without palpable organomegaly or mass. No pulsatile mass was felt, and not bruit was heard. Bowel sounds were active   Female - deferred to GYN  Rectal - deferred to GYN  Extremities -  no clubbing cyanosis or edema  Lymphatics - no palpable lymphadenopathy, no hepatosplenomegaly  Peripheral vascular - Dorsalis pedis and posterior tibial pulses felt without difficulty  Skin - no rash or unusual mole change noted. Left shoulder right under the bra strap dark problematic appearing skin lesion concerning for skin cancer  Neurological - Cranial nerves II-XII grossly intact. Motor strength 5/5. DTR's 2+ and symmetric.   Station and gait normal  Back exam - full range of motion, no tenderness, palpable spasm or pain on motion  Musculoskeletal - no joint tenderness, deformity or swelling  Hematologic: no purpura, petechiae or bruising    Results for orders placed or performed in visit on 05/07/21   CBC WITH AUTOMATED DIFF   Result Value Ref Range    WBC 4.5 3.6 - 11.0 K/uL    RBC 4.29 3.80 - 5.20 M/uL    HGB 13.1 11.5 - 16.0 g/dL    HCT 42.3 35.0 - 47.0 %    MCV 98.6 80.0 - 99.0 FL    MCH 30.5 26.0 - 34.0 PG    MCHC 31.0 30.0 - 36.5 g/dL    RDW 14.2 11.5 - 14.5 %    PLATELET 661 938 - 386 K/uL    MPV 11.3 8.9 - 12.9 FL    NRBC 0.0 0  WBC    ABSOLUTE NRBC 0.00 0.00 - 0.01 K/uL    NEUTROPHILS 69 32 - 75 %    LYMPHOCYTES 14 12 - 49 %    MONOCYTES 15 (H) 5 - 13 %    EOSINOPHILS 2 0 - 7 %    BASOPHILS 0 0 - 1 %    IMMATURE GRANULOCYTES 0 0.0 - 0.5 %    ABS. NEUTROPHILS 3.1 1.8 - 8.0 K/UL    ABS. LYMPHOCYTES 0.6 (L) 0.8 - 3.5 K/UL    ABS. MONOCYTES 0.7 0.0 - 1.0 K/UL    ABS. EOSINOPHILS 0.1 0.0 - 0.4 K/UL    ABS. BASOPHILS 0.0 0.0 - 0.1 K/UL    ABS. IMM. GRANS. 0.0 0.00 - 0.04 K/UL    DF SMEAR SCANNED      RBC COMMENTS ANISOCYTOSIS  1+        RBC COMMENTS MACROCYTOSIS  1+       CK   Result Value Ref Range    CK 86 26 - 192 U/L   LIPID PANEL   Result Value Ref Range    LIPID PROFILE          Cholesterol, total 159 <200 MG/DL    Triglyceride 104 <150 MG/DL    HDL Cholesterol 55 MG/DL    LDL, calculated 83.2 0 - 100 MG/DL    VLDL, calculated 20.8 MG/DL    CHOL/HDL Ratio 2.9 0.0 - 5.0     METABOLIC PANEL, COMPREHENSIVE   Result Value Ref Range    Sodium 137 136 - 145 mmol/L    Potassium 4.6 3.5 - 5.1 mmol/L    Chloride 103 97 - 108 mmol/L    CO2 26 21 - 32 mmol/L    Anion gap 8 5 - 15 mmol/L    Glucose 89 65 - 100 mg/dL    BUN 21 (H) 6 - 20 MG/DL    Creatinine 0.89 0.55 - 1.02 MG/DL    BUN/Creatinine ratio 24 (H) 12 - 20      GFR est AA >60 >60 ml/min/1.73m2    GFR est non-AA >60 >60 ml/min/1.73m2    Calcium 10.0 8.5 - 10.1 MG/DL    Bilirubin, total 0.6 0.2 - 1.0 MG/DL    ALT (SGPT) 43 12 - 78 U/L    AST (SGOT) 17 15 - 37 U/L    Alk.  phosphatase 119 (H) 45 - 117 U/L    Protein, total 7.6 6.4 - 8.2 g/dL    Albumin 4.4 3.5 - 5.0 g/dL    Globulin 3.2 2.0 - 4.0 g/dL    A-G Ratio 1.4 1.1 - 2.2 Advice/Referrals/Counseling   Education and counseling provided:  Are appropriate based on today's review and evaluation  End-of-Life planning (with patient's consent)  Pneumococcal Vaccine  Influenza Vaccine  Colorectal cancer screening tests      Assessment/Plan     ASSESSMENT:   1. Hypertension with renal disease    2. Mixed hyperlipidemia    3. Gastroesophageal reflux disease without esophagitis    4. Primary osteoarthritis involving multiple joints    5. Stage 3 chronic kidney disease, unspecified whether stage 3a or 3b CKD ([facility]    6. Chronic obstructive pulmonary disease, unspecified COPD type (HCC)    7. Class 1 obesity due to excess calories without serious comorbidity with body mass index (BMI) of 33.0 to 33.9 in adult    8. Acquired hypothyroidism    9. Chronic non-seasonal allergic rhinitis    10. Crohn's disease without complication, unspecified gastrointestinal tract location ([facility]    11. Anemia, unspecified type    12. Alcohol screening    13. Medicare annual wellness visit, subsequent    14. Skin lesion of chest wall      Impression  1. Hypertension that is well controlled so continue current therapy reviewed with her and her . 2.  Hyperlipidemia prior lab reviewed and repeat status pending I will adjust if needed. 3.  GERD that is stable  4. DJD that is stable  5. CKD stage III repeat status pending  6. COPD stable with O2 sat of 95% on room air. 7.  Obesity we did discuss diet, exercise and weight reduction for overall health benefit and I note her weight is actually down 4-1/2 pounds. 8.  Hypothyroidism repeat status pending  9. Allergic rhinitis stable  10. Crohn's disease currently without symptoms  11. Anemia repeat status pending  12   Annual alcohol screening is done she does drink an occasional social drink but nothing on a regular basis.   I did caution regarding more than 1 drink per day in females with increased cardiovascular risk and increased risk of liver disease and other GI effects. 13.  New skin lesion left upper shoulder/anterior chest wall at the shoulder. This is problematic in nature needs excision. We will set her up for an afternoon appointment to return to remove that. Medicare subsequent annual wellness examination screening questionnaires completed today. The results were reviewed with her and her questions were answered. Lifestyle recommendations modifications discussed and made. I will call with lab results and make further recommendations or adjustments if necessary. Follow-up in 3 months or sooner if there is a problem. PLAN:  .  Orders Placed This Encounter    CBC WITH AUTOMATED DIFF    METABOLIC PANEL, COMPREHENSIVE    LIPID PANEL    CK    T4 (THYROXINE)    TSH 3RD GENERATION    URINALYSIS W/ REFLEX CULTURE         ATTENTION:   This medical record was transcribed using an electronic medical records system. Although proofread, it may and can contain electronic and spelling errors. Other human spelling and other errors may be present. Corrections may be executed at a later time. Please feel free to contact us for any clarifications as needed. Follow-up and Dispositions    · Return in about 3 months (around 11/12/2021). Andrea Hardin MD    Recommended healthy diet low in carbohydrates, fats, sodium and cholesterol. Recommended regular cardiovascular exercise 3-6 times per week for 30-60 minutes daily. Current Outpatient Medications   Medication Sig Dispense Refill    El[place] 0.05 mg/24 hr APPLY 1 PATCH TOPICALLY TWICE A WEEK 24 Patch 0    amLODIPine (NORVASC) 5 mg tablet Take 1 tablet by mouth once daily 90 Tablet 0    niacin ER (NIASPAN) 500 mg tablet TAKE ONE TABLET BY MOUTH AT BEDTIME 90 Tablet 3    dexAMETHasone (DECADRON) 0.5 mg/5 mL elixir RINSE WITH 1 TEASPOON FOR TWO MINUTES AND SPIT 4 TIMES DAILY      polyethylene glycol (MIRALAX) 17 gram/dose powder Take 17 g by mouth daily.       potassium chloride (K-DUR, KLOR-CON) 20 mEq tablet take 1 tablet by mouth twice a day 60 Tab 0    diclofenac EC (VOLTAREN) 75 mg EC tablet TAKE ONE TABLET BY MOUTH TWICE A  Tab 3    olmesartan (BENICAR) 40 mg tablet Take 1 Tab by mouth daily. 90 Tab 3    atorvastatin (LIPITOR) 20 mg tablet TAKE 1 TABLET BY MOUTH NIGHTLY. 90 Tab 3    cyanocobalamin (VITAMIN B-12) 1,000 mcg sublingual tablet Take 1,000 mcg by mouth daily.  triamcinolone acetonide (KENALOG) 0.1 % topical cream       co-enzyme Q-10 (Co Q-10) 100 mg capsule Take 100 mg by mouth daily.  OTHER Indications: Melaleuca Phytomega - Take 2 softgels twice a day.  OTHER Indications: Melaleuca Vitality - Takes 1 tablet twice a day.  ferrous sulfate 325 mg (65 mg iron) tablet Take  by mouth three (3) times daily (with meals).  vitamin E (AQUA GEMS) 400 unit capsule Take 400 Units by mouth daily.  cholecalciferol, vitamin D3, 2,000 unit tab Take 1 Tab by mouth daily.  ascorbic acid (VITAMIN C) 250 mg tablet Take 500 mg by mouth two (2) times a day. No results found for any visits on 08/12/21. Verbal and written instructions (see AVS) provided. Patient expresses understanding of diagnosis and treatment plan.     Sun Minaya MD

## 2021-08-12 ENCOUNTER — OFFICE VISIT (OUTPATIENT)
Dept: INTERNAL MEDICINE CLINIC | Age: 66
End: 2021-08-12
Payer: MEDICARE

## 2021-08-12 VITALS
OXYGEN SATURATION: 95 % | DIASTOLIC BLOOD PRESSURE: 80 MMHG | HEIGHT: 66 IN | TEMPERATURE: 97.3 F | RESPIRATION RATE: 16 BRPM | SYSTOLIC BLOOD PRESSURE: 120 MMHG | WEIGHT: 193.7 LBS | BODY MASS INDEX: 31.13 KG/M2 | HEART RATE: 64 BPM

## 2021-08-12 DIAGNOSIS — J30.89 CHRONIC NON-SEASONAL ALLERGIC RHINITIS: ICD-10-CM

## 2021-08-12 DIAGNOSIS — I12.9 HYPERTENSION WITH RENAL DISEASE: Primary | ICD-10-CM

## 2021-08-12 DIAGNOSIS — Z13.39 ALCOHOL SCREENING: ICD-10-CM

## 2021-08-12 DIAGNOSIS — E66.09 CLASS 1 OBESITY DUE TO EXCESS CALORIES WITHOUT SERIOUS COMORBIDITY WITH BODY MASS INDEX (BMI) OF 33.0 TO 33.9 IN ADULT: ICD-10-CM

## 2021-08-12 DIAGNOSIS — L98.9 SKIN LESION OF CHEST WALL: ICD-10-CM

## 2021-08-12 DIAGNOSIS — E78.2 MIXED HYPERLIPIDEMIA: ICD-10-CM

## 2021-08-12 DIAGNOSIS — K21.9 GASTROESOPHAGEAL REFLUX DISEASE WITHOUT ESOPHAGITIS: ICD-10-CM

## 2021-08-12 DIAGNOSIS — M15.9 PRIMARY OSTEOARTHRITIS INVOLVING MULTIPLE JOINTS: ICD-10-CM

## 2021-08-12 DIAGNOSIS — D64.9 ANEMIA, UNSPECIFIED TYPE: ICD-10-CM

## 2021-08-12 DIAGNOSIS — E03.9 ACQUIRED HYPOTHYROIDISM: ICD-10-CM

## 2021-08-12 DIAGNOSIS — Z00.00 MEDICARE ANNUAL WELLNESS VISIT, SUBSEQUENT: ICD-10-CM

## 2021-08-12 DIAGNOSIS — K50.90 CROHN'S DISEASE WITHOUT COMPLICATION, UNSPECIFIED GASTROINTESTINAL TRACT LOCATION (HCC): ICD-10-CM

## 2021-08-12 DIAGNOSIS — J44.9 CHRONIC OBSTRUCTIVE PULMONARY DISEASE, UNSPECIFIED COPD TYPE (HCC): ICD-10-CM

## 2021-08-12 DIAGNOSIS — N18.30 STAGE 3 CHRONIC KIDNEY DISEASE, UNSPECIFIED WHETHER STAGE 3A OR 3B CKD (HCC): ICD-10-CM

## 2021-08-12 LAB
ALBUMIN SERPL-MCNC: 4.1 G/DL (ref 3.5–5)
ALBUMIN/GLOB SERPL: 1.3 {RATIO} (ref 1.1–2.2)
ALP SERPL-CCNC: 115 U/L (ref 45–117)
ALT SERPL-CCNC: 46 U/L (ref 12–78)
ANION GAP SERPL CALC-SCNC: 5 MMOL/L (ref 5–15)
APPEARANCE UR: CLEAR
AST SERPL-CCNC: 15 U/L (ref 15–37)
BACTERIA URNS QL MICRO: NEGATIVE /HPF
BASOPHILS # BLD: 0 K/UL (ref 0–0.1)
BASOPHILS NFR BLD: 0 % (ref 0–1)
BILIRUB SERPL-MCNC: 0.5 MG/DL (ref 0.2–1)
BILIRUB UR QL: NEGATIVE
BUN SERPL-MCNC: 20 MG/DL (ref 6–20)
BUN/CREAT SERPL: 28 (ref 12–20)
CALCIUM SERPL-MCNC: 9.6 MG/DL (ref 8.5–10.1)
CHLORIDE SERPL-SCNC: 105 MMOL/L (ref 97–108)
CHOLEST SERPL-MCNC: 158 MG/DL
CK SERPL-CCNC: 64 U/L (ref 26–192)
CO2 SERPL-SCNC: 27 MMOL/L (ref 21–32)
COLOR UR: NORMAL
CREAT SERPL-MCNC: 0.72 MG/DL (ref 0.55–1.02)
DIFFERENTIAL METHOD BLD: ABNORMAL
EOSINOPHIL # BLD: 0.1 K/UL (ref 0–0.4)
EOSINOPHIL NFR BLD: 3 % (ref 0–7)
EPITH CASTS URNS QL MICRO: NORMAL /LPF
ERYTHROCYTE [DISTWIDTH] IN BLOOD BY AUTOMATED COUNT: 14 % (ref 11.5–14.5)
GLOBULIN SER CALC-MCNC: 3.2 G/DL (ref 2–4)
GLUCOSE SERPL-MCNC: 85 MG/DL (ref 65–100)
GLUCOSE UR STRIP.AUTO-MCNC: NEGATIVE MG/DL
HCT VFR BLD AUTO: 38.5 % (ref 35–47)
HDLC SERPL-MCNC: 57 MG/DL
HDLC SERPL: 2.8 {RATIO} (ref 0–5)
HGB BLD-MCNC: 12.2 G/DL (ref 11.5–16)
HGB UR QL STRIP: NEGATIVE
HYALINE CASTS URNS QL MICRO: NORMAL /LPF (ref 0–5)
IMM GRANULOCYTES # BLD AUTO: 0 K/UL (ref 0–0.04)
IMM GRANULOCYTES NFR BLD AUTO: 0 % (ref 0–0.5)
KETONES UR QL STRIP.AUTO: NEGATIVE MG/DL
LDLC SERPL CALC-MCNC: 85.6 MG/DL (ref 0–100)
LEUKOCYTE ESTERASE UR QL STRIP.AUTO: NEGATIVE
LYMPHOCYTES # BLD: 0.7 K/UL (ref 0.8–3.5)
LYMPHOCYTES NFR BLD: 15 % (ref 12–49)
MCH RBC QN AUTO: 31.8 PG (ref 26–34)
MCHC RBC AUTO-ENTMCNC: 31.7 G/DL (ref 30–36.5)
MCV RBC AUTO: 100.3 FL (ref 80–99)
MONOCYTES # BLD: 0.7 K/UL (ref 0–1)
MONOCYTES NFR BLD: 15 % (ref 5–13)
NEUTS SEG # BLD: 3.2 K/UL (ref 1.8–8)
NEUTS SEG NFR BLD: 67 % (ref 32–75)
NITRITE UR QL STRIP.AUTO: NEGATIVE
NRBC # BLD: 0 K/UL (ref 0–0.01)
NRBC BLD-RTO: 0 PER 100 WBC
PH UR STRIP: 5.5 [PH] (ref 5–8)
PLATELET # BLD AUTO: 216 K/UL (ref 150–400)
PMV BLD AUTO: 10.6 FL (ref 8.9–12.9)
POTASSIUM SERPL-SCNC: 4.3 MMOL/L (ref 3.5–5.1)
PROT SERPL-MCNC: 7.3 G/DL (ref 6.4–8.2)
PROT UR STRIP-MCNC: NEGATIVE MG/DL
RBC # BLD AUTO: 3.84 M/UL (ref 3.8–5.2)
RBC #/AREA URNS HPF: NORMAL /HPF (ref 0–5)
RBC MORPH BLD: ABNORMAL
SODIUM SERPL-SCNC: 137 MMOL/L (ref 136–145)
SP GR UR REFRACTOMETRY: 1.01 (ref 1–1.03)
T4 SERPL-MCNC: 6.9 UG/DL (ref 4.8–13.9)
TRIGL SERPL-MCNC: 77 MG/DL (ref ?–150)
TSH SERPL DL<=0.05 MIU/L-ACNC: 1.53 UIU/ML (ref 0.36–3.74)
UA: UC IF INDICATED,UAUC: NORMAL
UROBILINOGEN UR QL STRIP.AUTO: 0.2 EU/DL (ref 0.2–1)
VLDLC SERPL CALC-MCNC: 15.4 MG/DL
WBC # BLD AUTO: 4.7 K/UL (ref 3.6–11)
WBC URNS QL MICRO: NORMAL /HPF (ref 0–4)

## 2021-08-12 PROCEDURE — 1090F PRES/ABSN URINE INCON ASSESS: CPT | Performed by: INTERNAL MEDICINE

## 2021-08-12 PROCEDURE — G8417 CALC BMI ABV UP PARAM F/U: HCPCS | Performed by: INTERNAL MEDICINE

## 2021-08-12 PROCEDURE — G8510 SCR DEP NEG, NO PLAN REQD: HCPCS | Performed by: INTERNAL MEDICINE

## 2021-08-12 PROCEDURE — G8536 NO DOC ELDER MAL SCRN: HCPCS | Performed by: INTERNAL MEDICINE

## 2021-08-12 PROCEDURE — G9899 SCRN MAM PERF RSLTS DOC: HCPCS | Performed by: INTERNAL MEDICINE

## 2021-08-12 PROCEDURE — 1101F PT FALLS ASSESS-DOCD LE1/YR: CPT | Performed by: INTERNAL MEDICINE

## 2021-08-12 PROCEDURE — G0439 PPPS, SUBSEQ VISIT: HCPCS | Performed by: INTERNAL MEDICINE

## 2021-08-12 PROCEDURE — G8399 PT W/DXA RESULTS DOCUMENT: HCPCS | Performed by: INTERNAL MEDICINE

## 2021-08-12 PROCEDURE — G8427 DOCREV CUR MEDS BY ELIG CLIN: HCPCS | Performed by: INTERNAL MEDICINE

## 2021-08-12 PROCEDURE — 3017F COLORECTAL CA SCREEN DOC REV: CPT | Performed by: INTERNAL MEDICINE

## 2021-08-12 PROCEDURE — 99214 OFFICE O/P EST MOD 30 MIN: CPT | Performed by: INTERNAL MEDICINE

## 2021-08-12 NOTE — PROGRESS NOTES
HIPAA verified by two patient identifiers. Julito Ureña is a 78 y.o. female    Chief Complaint   Patient presents with    Annual Wellness Visit       Visit Vitals  /80 (BP 1 Location: Left upper arm, BP Patient Position: Sitting, BP Cuff Size: Large adult)   Pulse 64   Temp 97.3 °F (36.3 °C) (Oral)   Resp 16   Ht 5' 6\" (1.676 m)   Wt 193 lb 11.2 oz (87.9 kg)   LMP  (LMP Unknown)   SpO2 95%   BMI 31.26 kg/m²       Pain Scale: 0 - No pain/10  Pain Location:       Health Maintenance Due   Topic Date Due    DTaP/Tdap/Td series (1 - Tdap) Never done    Shingrix Vaccine Age 50> (1 of 2) Never done    Bone Densitometry  04/12/2020    Medicare Yearly Exam  07/25/2021         Coordination of Care Questionnaire:  :   1) Have you been to an emergency room, urgent care, or hospitalized since your last visit? If yes, where when, and reason for visit? no       2. Have seen or consulted any other health care provider since your last visit? If yes, where when, and reason for visit? NO      Patient is accompanied by self I have received verbal consent from Julito Ureña to discuss any/all medical information while they are present in the room.

## 2021-08-12 NOTE — PATIENT INSTRUCTIONS
Allergies: Care Instructions  Your Care Instructions     Allergies occur when your body's defense system (immune system) overreacts to certain substances. The immune system treats a harmless substance as if it were a harmful germ or virus. Many things can make this happen. These include pollens, medicine, food, dust, animal dander, and mold. Allergies can be mild or severe. Mild allergies can be managed with home treatment. But medicine may be needed to prevent problems. Managing your allergies is an important part of staying healthy. Your doctor may suggest that you have allergy testing to help find out what is causing your allergies. Severe allergies can cause reactions that affect your whole body (anaphylactic reactions). Your doctor may prescribe a shot of epinephrine to carry with you in case you have a severe reaction. Learn how to give yourself the shot and keep it with you at all times. Make sure it is not . Follow-up care is a key part of your treatment and safety. Be sure to make and go to all appointments, and call your doctor if you are having problems. It's also a good idea to know your test results and keep a list of the medicines you take. How can you care for yourself at home? · If you have been told by your doctor that dust or dust mites are causing your allergy, decrease the dust around your bed:  ? Wash sheets, pillowcases, and other bedding in hot water every week. ? Use dust-proof covers for pillows, duvets, and mattresses. Avoid plastic covers because they tear easily and do not \"breathe. \" Wash as instructed on the label. ? Do not use any blankets and pillows that you do not need. ? Use blankets that you can wash in your washing machine. ? Consider removing drapes and carpets, which attract and hold dust, from your bedroom. · If you are allergic to house dust and mites, do not use home humidifiers. Your doctor can suggest ways you can control dust and mites.   · Look for signs of cockroaches. Cockroaches cause allergic reactions. Use cockroach baits to get rid of them. Then, clean your home well. Cockroaches like areas where grocery bags, newspapers, empty bottles, or cardboard boxes are stored. Do not keep these inside your home, and keep trash and food containers sealed. Seal off any spots where cockroaches might enter your home. · If you are allergic to mold, get rid of furniture, rugs, and drapes that smell musty. Check for mold in the bathroom. · If you are allergic to outdoor pollen or mold spores, use air-conditioning. Change or clean all filters every month. Keep windows closed. · If you are allergic to pollen, stay inside when pollen counts are high. Use a vacuum  with a HEPA filter or a double-thickness filter at least two times each week. · Stay inside when air pollution is bad. Avoid paint fumes, perfumes, and other strong odors. · Avoid conditions that make your allergies worse. Stay away from smoke. Do not smoke or let anyone else smoke in your house. Do not use fireplaces or wood-burning stoves. · If you are allergic to your pets, change the air filter in your furnace every month. Use high-efficiency filters. · If you are allergic to pet dander, keep pets outside or out of your bedroom. Old carpet and cloth furniture can hold a lot of animal dander. You may need to replace them. When should you call for help? Give an epinephrine shot if:    · You think you are having a severe allergic reaction.     · You have symptoms in more than one body area, such as mild nausea and an itchy mouth. After giving an epinephrine shot call 911, even if you feel better. Call 911 if:    · You have symptoms of a severe allergic reaction. These may include:  ? Sudden raised, red areas (hives) all over your body. ? Swelling of the throat, mouth, lips, or tongue. ? Trouble breathing. ? Passing out (losing consciousness).  Or you may feel very lightheaded or suddenly feel weak, confused, or restless.     · You have been given an epinephrine shot, even if you feel better. Call your doctor now or seek immediate medical care if:    · You have symptoms of an allergic reaction, such as:  ? A rash or hives (raised, red areas on the skin). ? Itching. ? Swelling. ? Belly pain, nausea, or vomiting. Watch closely for changes in your health, and be sure to contact your doctor if:    · You do not get better as expected. Where can you learn more? Go to http://www.carson.com/  Enter W171 in the search box to learn more about \"Allergies: Care Instructions. \"  Current as of: November 6, 2020               Content Version: 12.8  © 2006-2021 Healthwise, Incorporated. Care instructions adapted under license by [employer] (which disclaims liability or warranty for this information). If you have questions about a medical condition or this instruction, always ask your healthcare professional. [place] [address] any warranty or liability for your use of this information.

## 2021-09-10 PROBLEM — Z00.00 MEDICARE ANNUAL WELLNESS VISIT, SUBSEQUENT: Status: RESOLVED | Noted: 2021-08-11 | Resolved: 2021-09-10

## 2021-10-28 RX ORDER — AMLODIPINE BESYLATE 5 MG/1
TABLET ORAL
Qty: 90 TABLET | Refills: 3 | Status: SHIPPED | OUTPATIENT
Start: 2021-10-28 | End: 2022-10-25

## 2021-10-28 RX ORDER — ESTRADIOL 0.05 MG/D
PATCH, EXTENDED RELEASE TRANSDERMAL
Qty: 24 PATCH | Refills: 1 | Status: SHIPPED | OUTPATIENT
Start: 2021-10-28 | End: 2022-05-19

## 2021-10-28 NOTE — TELEPHONE ENCOUNTER
RX refill request from the patient/pharmacy. Patient last seen 08- with labs, and next appt. scheduled for 11-  Requested Prescriptions     Pending Prescriptions Disp Refills    amLODIPine (NORVASC) 5 mg tablet [Pharmacy Med Name: amLODIPine Besylate 5 MG Oral Tablet] 90 Tablet 3     Sig: Take 1 tablet by mouth once daily    Elmira 0.05 mg/24 hr [Pharmacy Med Name: Elmira 0.05 MG/24HR Transdermal Patch Twice Weekly] 24 Patch 1     Sig: APPLY 1 PATCH TOPICALLY TWICE A WEEK   .

## 2021-11-17 NOTE — PROGRESS NOTES
Chief Complaint   Patient presents with    Hypertension     3 month follow up    GERD    Hypothyroidism       SUBJECTIVE:    Beverly Rios is a 78 y.o. female returns in follow-up for her medical problems include hypertension, hyperlipidemia, GERD, Crohn's disease, DJD, COPD, unfortunate obesity and other medical problems. She is taking her medications and trying to follow her diet and try and remain physically active. She currently denies any chest pain, shortness of breath, palpitations, PND, orthopnea or other cardiac or respiratory complaints. She notes no GI or  complaints. She has no headaches, dizziness or neurologic complaints. She has no current active arthritic complaints except for her chronic joint aches and pains and unfortunately her current diclofenac is not seeming to work for her anymore. The remainder complete review of systems is negative. Current Outpatient Medications   Medication Sig Dispense Refill    etodolac (LODINE) 400 mg tablet Take 1 Tablet by mouth two (2) times daily (with meals). 60 Tablet prn    amLODIPine (NORVASC) 5 mg tablet Take 1 tablet by mouth once daily 90 Tablet 3    Elmira 0.05 mg/24 hr APPLY 1 PATCH TOPICALLY TWICE A WEEK 24 Patch 1    niacin ER (NIASPAN) 500 mg tablet TAKE ONE TABLET BY MOUTH AT BEDTIME 90 Tablet 3    dexAMETHasone (DECADRON) 0.5 mg/5 mL elixir RINSE WITH 1 TEASPOON FOR TWO MINUTES AND SPIT 4 TIMES DAILY      polyethylene glycol (MIRALAX) 17 gram/dose powder Take 17 g by mouth daily.  potassium chloride (K-DUR, KLOR-CON) 20 mEq tablet take 1 tablet by mouth twice a day 60 Tab 0    olmesartan (BENICAR) 40 mg tablet Take 1 Tab by mouth daily. 90 Tab 3    atorvastatin (LIPITOR) 20 mg tablet TAKE 1 TABLET BY MOUTH NIGHTLY. 90 Tab 3    cyanocobalamin (VITAMIN B-12) 1,000 mcg sublingual tablet Take 1,000 mcg by mouth daily.       triamcinolone acetonide (KENALOG) 0.1 % topical cream       co-enzyme Q-10 (Co Q-10) 100 mg capsule Take 100 mg by mouth daily.  OTHER Indications: Melaleuca Phytomega - Take 2 softgels twice a day.  OTHER Indications: Melaleuca Vitality - Takes 1 tablet twice a day.  ferrous sulfate 325 mg (65 mg iron) tablet Take  by mouth three (3) times daily (with meals).  vitamin E (AQUA GEMS) 400 unit capsule Take 400 Units by mouth daily.  cholecalciferol, vitamin D3, 2,000 unit tab Take 1 Tab by mouth daily.  ascorbic acid (VITAMIN C) 250 mg tablet Take 500 mg by mouth two (2) times a day.        Past Medical History:   Diagnosis Date    Allergic rhinitis 8/9/2017    Anemia 8/9/2017    Anxiety 8/9/2017    Arrhythmia     irregular heart beat hx and beating fast per patient/no cardiologist    Arthritis     JOINTS  WORSE WAIST DOWN     Back pain 8/9/2017    Chronic kidney disease     Stage III    CKD (chronic kidney disease) 8/9/2017    COPD (chronic obstructive pulmonary disease) ([facility] [address] 8/9/2017    Crohn disease ([facility] [address] 8/9/2017    DJD (degenerative joint disease) 8/9/2017    Dry mouth     evaluated by Dr. Rochelle Grant (ENT)    GERD (gastroesophageal reflux disease) 8/9/2017    Hormone replacement therapy (HRT) 8/9/2017    Hyperlipidemia 8/9/2017    Hypertension     Hypertension with renal disease 8/9/2017    Hypokalemia 8/9/2017    Hypothyroid 8/9/2017    Ill-defined condition     neuropathy feet    Inguinal hernia 8/9/2017    Leg numbness 8/9/2017    Lumbar herniated disc 8/9/2017    OAB (overactive bladder) 8/9/2017    Obesity 8/9/2017    On statin therapy 8/9/2017    Osteoarthritis 8/9/2017    Pelvic pain in female 6/9/7637    Umbilical hernia 5/8/2724    Varicose vein of leg 8/9/2017     Past Surgical History:   Procedure Laterality Date    HX COLONOSCOPY  12/2010    normal    HX COLONOSCOPY  12/30/2014    normal     HX HEENT      cyst removed left eye    HX HERNIA REPAIR  06/02/2017    right inguinal and umbilical (Dr. Brandon Grant)    HX HYSTERECTOMY      HX ORTHOPAEDIC      bilat knee injections    HX ORTHOPAEDIC      laser treatment left knee and foot    HX ORTHOPAEDIC      hx of gel shots bilat knee    HX ORTHOPAEDIC  12/27/2016    left knee coritizon shot    HX OTHER SURGICAL      cyst removed front left scalp    HX OTHER SURGICAL      colonoscopy    HX OTHER SURGICAL      tooth implant    HX TUBAL LIGATION      NEUROLOGICAL PROCEDURE UNLISTED      Lumbar surgery    CA BREAST SURGERY PROCEDURE UNLISTED      mass removed left breast x2 benign    VASCULAR SURGERY PROCEDURE UNLIST      vein striping     Allergies   Allergen Reactions    Lisinopril Cough       REVIEW OF SYSTEMS:  General: negative for - chills or fever, or weight loss or gain  ENT: negative for - headaches, nasal congestion or tinnitus  Eyes: no blurred or visual changes  Neck: No stiffness or swollen nodes  Respiratory: negative for - cough, hemoptysis, shortness of breath or wheezing  Cardiovascular : negative for - chest pain, edema, palpitations or shortness of breath  Gastrointestinal: negative for - abdominal pain, blood in stools, heartburn or nausea/vomiting  Genito-Urinary: no dysuria, trouble voiding, or hematuria  Musculoskeletal: negative for - gait disturbance, joint pain, joint stiffness or joint swelling  Neurological: no TIA or stroke symptoms  Hematologic: no bruises, no bleeding  Lymphatic: no swollen glands  Integument: no lumps, mole changes, nail changes or rash  Endocrine:no malaise/lethargy poly uria or polydipsia or unexpected weight changes        Social History     Socioeconomic History    Marital status:    Tobacco Use    Smoking status: Never Smoker    Smokeless tobacco: Never Used   Vaping Use    Vaping Use: Never used   Substance and Sexual Activity    Alcohol use: Yes     Comment: once a year    Drug use: No    Sexual activity: Yes     Partners: Male     Family History   Problem Relation Age of Onset    No Known Problems Mother     No Known Problems Father     Stroke Sister        OBJECTIVE:     Visit Vitals  /86 (BP 1 Location: Left upper arm, BP Patient Position: Sitting, BP Cuff Size: Adult)   Pulse 61   Temp 97.1 °F (36.2 °C) (Oral)   Resp 17   Ht 5' 6\" (1.676 m)   Wt 197 lb 4.8 oz (89.5 kg)   LMP  (LMP Unknown)   SpO2 98%   BMI 31.85 kg/m²     CONSTITUTIONAL:   well nourished, appears age appropriate  EYES: sclera anicteric, PERRL, EOMI  ENMT:nares clear, moist mucous membranes, pharynx clear  NECK: supple. Thyroid normal, No JVD or bruits  RESPIRATORY: Chest: clear to ascultation and percussion, normal inspiratory effort  CARDIOVASCULAR: Heart: regular rate and rhythm no murmurs, rubs or gallops, PMI not displaced, No thrills, no peripheral edema  GASTROINTESTINAL: Abdomen: non distended, soft, non tender, bowel sounds normal  HEMATOLOGIC: no purpura, petechiae or bruising  LYMPHATIC: No lymph node enlargemant  MUSCULOSKELETAL: Extremities: no active synovitis, pulse 1+   INTEGUMENT: No unusual rashes or suspicious skin lesions noted. Nails appear normal.  PERIPHERAL VASCULAR: normal pulses femoral, PT and DP  NEUROLOGIC: non-focal exam, A & O X 3  PSYCHIATRIC:, appropriate affect     ASSESSMENT:   1. Hypertension with renal disease    2. Mixed hyperlipidemia    3. Gastroesophageal reflux disease without esophagitis    4. Primary osteoarthritis involving multiple joints    5. Chronic obstructive pulmonary disease, unspecified COPD type ([facility]    6. Stage 3 chronic kidney disease, unspecified whether stage 3a or 3b CKD (HCC)    7. Class 1 obesity due to excess calories without serious comorbidity with body mass index (BMI) of 33.0 to 33.9 in adult    8. Needs flu shot      Impression  1. Hypertension that is controlled so continue current therapy reviewed with her. 2.  Hyperlipidemia prior lab reviewed repeat status pending I will adjust if needed. 3   GERD that is stable  4. COPD O2 sat is good at 98% on room air.   5.  DJD not controlled with diclofenac we will try switching the Lodine  6. CKD stage III repeat status pending   7. Obesity I did discuss diet, exercise and weight reduction for overall health benefit. Flu shot given today. Follow-up in 3 months or sooner if there is a problem. I will call her lab results in interim. PLAN:  .  Orders Placed This Encounter    Influenza Vaccine, QUAD, 65 Yrs +  IM  (Fluad 19878 )    METABOLIC PANEL, COMPREHENSIVE    LIPID PANEL    CK    etodolac (LODINE) 400 mg tablet         ATTENTION:   This medical record was transcribed using an electronic medical records system. Although proofread, it may and can contain electronic and spelling errors. Other human spelling and other errors may be present. Corrections may be executed at a later time. Please feel free to contact us for any clarifications as needed. Follow-up and Dispositions    · Return in about 3 months (around 2/18/2022). No results found for any visits on 11/18/21. Milo Rubinstein, MD    The patient verbalized understanding of the problems and plans as explained.

## 2021-11-18 ENCOUNTER — OFFICE VISIT (OUTPATIENT)
Dept: INTERNAL MEDICINE CLINIC | Age: 66
End: 2021-11-18
Payer: MEDICARE

## 2021-11-18 VITALS
RESPIRATION RATE: 17 BRPM | HEIGHT: 66 IN | WEIGHT: 197.3 LBS | HEART RATE: 61 BPM | BODY MASS INDEX: 31.71 KG/M2 | DIASTOLIC BLOOD PRESSURE: 86 MMHG | SYSTOLIC BLOOD PRESSURE: 132 MMHG | OXYGEN SATURATION: 98 % | TEMPERATURE: 97.1 F

## 2021-11-18 DIAGNOSIS — J44.9 CHRONIC OBSTRUCTIVE PULMONARY DISEASE, UNSPECIFIED COPD TYPE (HCC): ICD-10-CM

## 2021-11-18 DIAGNOSIS — K21.9 GASTROESOPHAGEAL REFLUX DISEASE WITHOUT ESOPHAGITIS: ICD-10-CM

## 2021-11-18 DIAGNOSIS — E78.2 MIXED HYPERLIPIDEMIA: ICD-10-CM

## 2021-11-18 DIAGNOSIS — M15.9 PRIMARY OSTEOARTHRITIS INVOLVING MULTIPLE JOINTS: ICD-10-CM

## 2021-11-18 DIAGNOSIS — E66.09 CLASS 1 OBESITY DUE TO EXCESS CALORIES WITHOUT SERIOUS COMORBIDITY WITH BODY MASS INDEX (BMI) OF 33.0 TO 33.9 IN ADULT: ICD-10-CM

## 2021-11-18 DIAGNOSIS — N18.30 STAGE 3 CHRONIC KIDNEY DISEASE, UNSPECIFIED WHETHER STAGE 3A OR 3B CKD (HCC): ICD-10-CM

## 2021-11-18 DIAGNOSIS — I12.9 HYPERTENSION WITH RENAL DISEASE: Primary | ICD-10-CM

## 2021-11-18 DIAGNOSIS — Z23 NEEDS FLU SHOT: ICD-10-CM

## 2021-11-18 LAB
ALBUMIN SERPL-MCNC: 3.8 G/DL (ref 3.5–5)
ALBUMIN/GLOB SERPL: 1.2 {RATIO} (ref 1.1–2.2)
ALP SERPL-CCNC: 103 U/L (ref 45–117)
ALT SERPL-CCNC: 43 U/L (ref 12–78)
ANION GAP SERPL CALC-SCNC: 7 MMOL/L (ref 5–15)
AST SERPL-CCNC: 19 U/L (ref 15–37)
BILIRUB SERPL-MCNC: 0.5 MG/DL (ref 0.2–1)
BUN SERPL-MCNC: 27 MG/DL (ref 6–20)
BUN/CREAT SERPL: 31 (ref 12–20)
CALCIUM SERPL-MCNC: 9.7 MG/DL (ref 8.5–10.1)
CHLORIDE SERPL-SCNC: 105 MMOL/L (ref 97–108)
CHOLEST SERPL-MCNC: 162 MG/DL
CK SERPL-CCNC: 59 U/L (ref 26–192)
CO2 SERPL-SCNC: 26 MMOL/L (ref 21–32)
CREAT SERPL-MCNC: 0.87 MG/DL (ref 0.55–1.02)
GLOBULIN SER CALC-MCNC: 3.2 G/DL (ref 2–4)
GLUCOSE SERPL-MCNC: 93 MG/DL (ref 65–100)
HDLC SERPL-MCNC: 55 MG/DL
HDLC SERPL: 2.9 {RATIO} (ref 0–5)
LDLC SERPL CALC-MCNC: 89.6 MG/DL (ref 0–100)
POTASSIUM SERPL-SCNC: 5 MMOL/L (ref 3.5–5.1)
PROT SERPL-MCNC: 7 G/DL (ref 6.4–8.2)
SODIUM SERPL-SCNC: 138 MMOL/L (ref 136–145)
TRIGL SERPL-MCNC: 87 MG/DL (ref ?–150)
VLDLC SERPL CALC-MCNC: 17.4 MG/DL

## 2021-11-18 PROCEDURE — 3017F COLORECTAL CA SCREEN DOC REV: CPT | Performed by: INTERNAL MEDICINE

## 2021-11-18 PROCEDURE — G8399 PT W/DXA RESULTS DOCUMENT: HCPCS | Performed by: INTERNAL MEDICINE

## 2021-11-18 PROCEDURE — G8536 NO DOC ELDER MAL SCRN: HCPCS | Performed by: INTERNAL MEDICINE

## 2021-11-18 PROCEDURE — G8427 DOCREV CUR MEDS BY ELIG CLIN: HCPCS | Performed by: INTERNAL MEDICINE

## 2021-11-18 PROCEDURE — G0008 ADMIN INFLUENZA VIRUS VAC: HCPCS | Performed by: INTERNAL MEDICINE

## 2021-11-18 PROCEDURE — 99214 OFFICE O/P EST MOD 30 MIN: CPT | Performed by: INTERNAL MEDICINE

## 2021-11-18 PROCEDURE — G9899 SCRN MAM PERF RSLTS DOC: HCPCS | Performed by: INTERNAL MEDICINE

## 2021-11-18 PROCEDURE — 1090F PRES/ABSN URINE INCON ASSESS: CPT | Performed by: INTERNAL MEDICINE

## 2021-11-18 PROCEDURE — G8510 SCR DEP NEG, NO PLAN REQD: HCPCS | Performed by: INTERNAL MEDICINE

## 2021-11-18 PROCEDURE — G8417 CALC BMI ABV UP PARAM F/U: HCPCS | Performed by: INTERNAL MEDICINE

## 2021-11-18 PROCEDURE — 1101F PT FALLS ASSESS-DOCD LE1/YR: CPT | Performed by: INTERNAL MEDICINE

## 2021-11-18 PROCEDURE — 90694 VACC AIIV4 NO PRSRV 0.5ML IM: CPT | Performed by: INTERNAL MEDICINE

## 2021-11-18 RX ORDER — ETODOLAC 400 MG/1
400 TABLET, FILM COATED ORAL 2 TIMES DAILY WITH MEALS
Qty: 60 TABLET | Status: SHIPPED | OUTPATIENT
Start: 2021-11-18 | End: 2022-09-21 | Stop reason: ALTCHOICE

## 2021-11-18 NOTE — PROGRESS NOTES
Chief Complaint   Patient presents with    Hypertension     3 month follow up    GERD    Hypothyroidism     Visit Vitals  /86 (BP 1 Location: Left upper arm, BP Patient Position: Sitting, BP Cuff Size: Adult)   Pulse 61   Temp 97.1 °F (36.2 °C) (Oral)   Resp 17   Ht 5' 6\" (1.676 m)   Wt 197 lb 4.8 oz (89.5 kg)   LMP  (LMP Unknown)   SpO2 98%   BMI 31.85 kg/m²     1. Have you been to the ER, urgent care clinic since your last visit? Hospitalized since your last visit? [facility] for rectal surgery    2. Have you seen or consulted any other health care providers outside of the [address] [place] [address] since your last visit? Include any pap smears or colon screening. Dr. Trinh Duckworth      After obtaining consent and per verbal order from Dr. Khloe De La Cruz, patient received influenza vaccine given by Alyce Day and verified by Uriel Dimas. Fluad Influenza 0.5ml was given IM in left deltoid. Patient tolerated injection and was observed for 10 minutes post injection. VIS was given.

## 2021-11-18 NOTE — PATIENT INSTRUCTIONS
Anemia: Care Instructions  Your Care Instructions     Anemia is a low level of red blood cells, which carry oxygen throughout your body. Many things can cause anemia. Lack of iron is one of the most common causes. Your body needs iron to make hemoglobin, a substance in red blood cells that carries oxygen from the lungs to your body's cells. Without enough iron, the body produces fewer and smaller red blood cells. As a result, your body's cells do not get enough oxygen, and you feel tired and weak. And you may have trouble concentrating. Bleeding is the most common cause of a lack of iron. You may have heavy menstrual bleeding or bleeding caused by conditions such as ulcers, hemorrhoids, or cancer. Regular use of aspirin or other anti-inflammatory medicines (such as ibuprofen) also can cause bleeding in some people. A lack of iron in your diet also can cause anemia, especially at times when the body needs more iron, such as during pregnancy, infancy, and the teen years. Your doctor may have prescribed iron pills. It may take several months of treatment for your iron levels to return to normal. Your doctor also may suggest that you eat foods that are rich in iron, such as meat and beans. There are many other causes of anemia. It is not always due to a lack of iron. Finding the specific cause of your anemia will help your doctor find the right treatment for you. Follow-up care is a key part of your treatment and safety. Be sure to make and go to all appointments, and call your doctor if you are having problems. It's also a good idea to know your test results and keep a list of the medicines you take. How can you care for yourself at home? · Take your medicines exactly as prescribed. Call your doctor if you think you are having a problem with your medicine. · If your doctor recommends iron pills, take them as directed:  ? Try to take the pills on an empty stomach about 1 hour before or 2 hours after meals. But you may need to take iron with food to avoid an upset stomach. ? Do not take antacids or drink milk or caffeine drinks (such as coffee, tea, or cola) at the same time or within 2 hours of the time that you take your iron. They can make it hard for your body to absorb the iron. ? Vitamin C (from food or supplements) helps your body absorb iron. Try taking iron pills with a glass of orange juice or some other food that is high in vitamin C, such as citrus fruits. ? Iron pills may cause stomach problems, such as heartburn, nausea, diarrhea, constipation, and cramps. Be sure to drink plenty of fluids, and include fruits, vegetables, and fiber in your diet each day. Iron pills often make your bowel movements dark or green. ? If you forget to take an iron pill, do not take a double dose of iron the next time you take a pill. ? Keep iron pills out of the reach of small children. An overdose of iron can be very dangerous. · Follow your doctor's advice about eating iron-rich foods. These include red meat, shellfish, poultry, eggs, beans, raisins, whole-grain bread, and leafy green vegetables. · Steam vegetables to help them keep their iron content. When should you call for help? Call 911 anytime you think you may need emergency care. For example, call if:    · You have symptoms of a heart attack. These may include:  ? Chest pain or pressure, or a strange feeling in the chest.  ? Sweating. ? Shortness of breath. ? Nausea or vomiting. ? Pain, pressure, or a strange feeling in the back, neck, jaw, or upper belly or in one or both shoulders or arms. ? Lightheadedness or sudden weakness. ? A fast or irregular heartbeat. After you call 911, the  may tell you to chew 1 adult-strength or 2 to 4 low-dose aspirin. Wait for an ambulance. Do not try to drive yourself.     · You passed out (lost consciousness).    Call your doctor now or seek immediate medical care if:    · You have new or increased shortness of breath.     · You are dizzy or lightheaded, or you feel like you may faint.     · Your fatigue and weakness continue or get worse.     · You have any abnormal bleeding, such as:  ? Nosebleeds. ? Vaginal bleeding that is different (heavier, more frequent, at a different time of the month) than what you are used to.  ? Bloody or black stools, or rectal bleeding. ? Bloody or pink urine. Watch closely for changes in your health, and be sure to contact your doctor if:    · You do not get better as expected. Where can you learn more? Go to http://www.[facility]arson.com/  Enter R301 in the search box to learn more about \"Anemia: Care Instructions. \"  Current as of: April 29, 2021               Content Version: 13.0  © 5945-2070 Healthwise, Incorporated. Care instructions adapted under license by [employer] (which disclaims liability or warranty for this information). If you have questions about a medical condition or this instruction, always ask your healthcare professional. [place] [address] any warranty or liability for your use of this information.

## 2022-02-17 NOTE — PROGRESS NOTES
Chief Complaint   Patient presents with    Hypertension     3 month    GERD    COPD    Osteoarthritis    Chronic Kidney Disease    Cholesterol Problem       SUBJECTIVE:    Abhinav Pineda is a 78 y.o. female who returns in follow-up for medical problems include hypertension, hyperlipidemia, COPD although she never smoked, GERD, Crohn's disease, CKD stage III, DJD and other multiple medical problems. She is taking her medications and trying to follow her diet and try and remain physically active. She currently denies any chest pain, shortness of breath, palpitations, PND, orthopnea or other cardiac or respiratory complaints. She notes no current GI or  complaints. She has no headaches, dizziness or neurologic complaints. No current active arthritic complaints and no other complaints on complete view of systems. Current Outpatient Medications   Medication Sig Dispense Refill    gabapentin (NEURONTIN) 100 mg capsule Take 100 mg by mouth. 1 cap by mouth at bedtime      etodolac (LODINE) 400 mg tablet Take 1 Tablet by mouth two (2) times daily (with meals). 60 Tablet prn    amLODIPine (NORVASC) 5 mg tablet Take 1 tablet by mouth once daily 90 Tablet 3    [place]mira 0.05 mg/24 hr APPLY 1 PATCH TOPICALLY TWICE A WEEK 24 Patch 1    niacin ER (NIASPAN) 500 mg tablet TAKE ONE TABLET BY MOUTH AT BEDTIME 90 Tablet 3    dexAMETHasone (DECADRON) 0.5 mg/5 mL elixir RINSE WITH 1 TEASPOON FOR TWO MINUTES AND SPIT 4 TIMES DAILY      polyethylene glycol (MIRALAX) 17 gram/dose powder Take 17 g by mouth daily.  potassium chloride (K-DUR, KLOR-CON) 20 mEq tablet take 1 tablet by mouth twice a day 60 Tab 0    olmesartan (BENICAR) 40 mg tablet Take 1 Tab by mouth daily. 90 Tab 3    atorvastatin (LIPITOR) 20 mg tablet TAKE 1 TABLET BY MOUTH NIGHTLY. 90 Tab 3    cyanocobalamin (VITAMIN B-12) 1,000 mcg sublingual tablet Take 1,000 mcg by mouth daily.       triamcinolone acetonide (KENALOG) 0.1 % topical cream  co-enzyme Q-10 (Co Q-10) 100 mg capsule Take 100 mg by mouth daily.  OTHER Indications: Melaleuca Phytomega - Take 2 softgels twice a day.  OTHER Indications: Melaleuca Vitality - Takes 1 tablet twice a day.  ferrous sulfate 325 mg (65 mg iron) tablet Take  by mouth three (3) times daily (with meals).  vitamin E (AQUA GEMS) 400 unit capsule Take 400 Units by mouth daily.  cholecalciferol, vitamin D3, 2,000 unit tab Take 1 Tab by mouth daily.  ascorbic acid (VITAMIN C) 250 mg tablet Take 500 mg by mouth two (2) times a day.        Past Medical History:   Diagnosis Date    Allergic rhinitis 8/9/2017    Anemia 8/9/2017    Anxiety 8/9/2017    Arrhythmia     irregular heart beat hx and beating fast per patient/no cardiologist    Arthritis     JOINTS  WORSE WAIST DOWN     Back pain 8/9/2017    Chronic kidney disease     Stage III    CKD (chronic kidney disease) 8/9/2017    COPD (chronic obstructive pulmonary disease) ([facility] [address] 8/9/2017    Crohn disease ([facility] [address] 8/9/2017    DJD (degenerative joint disease) 8/9/2017    Dry mouth     evaluated by Dr. Carter Hawthorne (ENT)    GERD (gastroesophageal reflux disease) 8/9/2017    Hormone replacement therapy (HRT) 8/9/2017    Hyperlipidemia 8/9/2017    Hypertension     Hypertension with renal disease 8/9/2017    Hypokalemia 8/9/2017    Hypothyroid 8/9/2017    Ill-defined condition     neuropathy feet    Inguinal hernia 8/9/2017    Leg numbness 8/9/2017    Lumbar herniated disc 8/9/2017    OAB (overactive bladder) 8/9/2017    Obesity 8/9/2017    On statin therapy 8/9/2017    Osteoarthritis 8/9/2017    Pelvic pain in female 7/7/8434    Umbilical hernia 7/2/1340    Varicose vein of leg 8/9/2017     Past Surgical History:   Procedure Laterality Date    HX COLONOSCOPY  12/2010    normal    HX COLONOSCOPY  12/30/2014    normal     HX HEENT      cyst removed left eye    HX HERNIA REPAIR  06/02/2017    right inguinal and umbilical (Dr. Héctor Fan)    HX HYSTERECTOMY      HX ORTHOPAEDIC      bilat knee injections    HX ORTHOPAEDIC      laser treatment left knee and foot    HX ORTHOPAEDIC      hx of gel shots bilat knee    HX ORTHOPAEDIC  12/27/2016    left knee coritizon shot    HX OTHER SURGICAL      cyst removed front left scalp    HX OTHER SURGICAL      colonoscopy    HX OTHER SURGICAL      tooth implant    HX TUBAL LIGATION      NEUROLOGICAL PROCEDURE UNLISTED      Lumbar surgery    ME BREAST SURGERY PROCEDURE UNLISTED      mass removed left breast x2 benign    VASCULAR SURGERY PROCEDURE UNLIST      vein striping     Allergies   Allergen Reactions    Lisinopril Cough       REVIEW OF SYSTEMS:  General: negative for - chills or fever, or weight loss or gain  ENT: negative for - headaches, nasal congestion or tinnitus  Eyes: no blurred or visual changes  Neck: No stiffness or swollen nodes  Respiratory: negative for - cough, hemoptysis, shortness of breath or wheezing  Cardiovascular : negative for - chest pain, edema, palpitations or shortness of breath  Gastrointestinal: negative for - abdominal pain, blood in stools, heartburn or nausea/vomiting  Genito-Urinary: no dysuria, trouble voiding, or hematuria  Musculoskeletal: negative for - gait disturbance, joint pain, joint stiffness or joint swelling  Neurological: no TIA or stroke symptoms  Hematologic: no bruises, no bleeding  Lymphatic: no swollen glands  Integument: no lumps, mole changes, nail changes or rash  Endocrine:no malaise/lethargy poly uria or polydipsia or unexpected weight changes        Social History     Socioeconomic History    Marital status:    Tobacco Use    Smoking status: Never Smoker    Smokeless tobacco: Never Used   Vaping Use    Vaping Use: Never used   Substance and Sexual Activity    Alcohol use: Yes     Comment: once a year    Drug use: No    Sexual activity: Yes     Partners: Male     Family History   Problem Relation Age of Onset    No Known Problems Mother     No Known Problems Father     Stroke Sister        OBJECTIVE:     Visit Vitals  /84   Pulse 66   Temp 98.2 °F (36.8 °C) (Oral)   Resp 17   Ht 5' 6\" (1.676 m)   Wt 196 lb 4.8 oz (89 kg)   LMP  (LMP Unknown)   SpO2 96%   BMI 31.68 kg/m²     CONSTITUTIONAL:   well nourished, appears age appropriate  EYES: sclera anicteric, PERRL, EOMI  ENMT:nares clear, moist mucous membranes, pharynx clear  NECK: supple. Thyroid normal, No JVD or bruits  RESPIRATORY: Chest: clear to ascultation and percussion, normal inspiratory effort  CARDIOVASCULAR: Heart: regular rate and rhythm no murmurs, rubs or gallops, PMI not displaced, No thrills, no peripheral edema  GASTROINTESTINAL: Abdomen: non distended, soft, non tender, bowel sounds normal  HEMATOLOGIC: no purpura, petechiae or bruising  LYMPHATIC: No lymph node enlargemant  MUSCULOSKELETAL: Extremities: no active synovitis, pulse 1+   INTEGUMENT: No unusual rashes or suspicious skin lesions noted. Nails appear normal.  PERIPHERAL VASCULAR: normal pulses femoral, PT and DP  NEUROLOGIC: non-focal exam, A & O X 3  PSYCHIATRIC:, appropriate affect     ASSESSMENT:   1. Hypertension with renal disease    2. Mixed hyperlipidemia    3. Gastroesophageal reflux disease without esophagitis    4. Primary osteoarthritis involving multiple joints    5. Stage 3 chronic kidney disease, unspecified whether stage 3a or 3b CKD ([facility] [address]    6. Chronic obstructive pulmonary disease, unspecified COPD type (HCC)    7. Class 1 obesity due to excess calories without serious comorbidity with body mass index (BMI) of 33.0 to 33.9 in adult    8. Crohn's disease without complication, unspecified gastrointestinal tract location [facility])      Impression  1. Hypertension that is controlled so continue current therapy reviewed with her. 2.  Hyperlipidemia prior lab reviewed repeat status pending I will adjust if needed. 3.  GERD that is stable  4. DJD that is stable  5.   CKD stage III repeat status pending  6. COPD that is stable  7. Obesity we did discuss diet, exercise and weight reduction for overall health benefit. 8.  Crohn's disease stable no recent flares  I will call the lab and make further recommendations or adjustments if necessary. Follow-up in 3 months or sooner if there is a problem. PLAN:  .  Orders Placed This Encounter    METABOLIC PANEL, COMPREHENSIVE    LIPID PANEL    CK    gabapentin (NEURONTIN) 100 mg capsule         ATTENTION:   This medical record was transcribed using an electronic medical records system. Although proofread, it may and can contain electronic and spelling errors. Other human spelling and other errors may be present. Corrections may be executed at a later time. Please feel free to contact us for any clarifications as needed. Follow-up and Dispositions    · Return in about 3 months (around 5/18/2022). No results found for any visits on 02/18/22. Sara Alonso MD    The patient verbalized understanding of the problems and plans as explained.

## 2022-02-18 ENCOUNTER — OFFICE VISIT (OUTPATIENT)
Dept: INTERNAL MEDICINE CLINIC | Age: 67
End: 2022-02-18
Payer: MEDICARE

## 2022-02-18 VITALS
OXYGEN SATURATION: 96 % | WEIGHT: 196.3 LBS | SYSTOLIC BLOOD PRESSURE: 122 MMHG | HEART RATE: 66 BPM | DIASTOLIC BLOOD PRESSURE: 84 MMHG | HEIGHT: 66 IN | RESPIRATION RATE: 17 BRPM | BODY MASS INDEX: 31.55 KG/M2 | TEMPERATURE: 98.2 F

## 2022-02-18 DIAGNOSIS — K50.90 CROHN'S DISEASE WITHOUT COMPLICATION, UNSPECIFIED GASTROINTESTINAL TRACT LOCATION (HCC): ICD-10-CM

## 2022-02-18 DIAGNOSIS — N18.30 STAGE 3 CHRONIC KIDNEY DISEASE, UNSPECIFIED WHETHER STAGE 3A OR 3B CKD (HCC): ICD-10-CM

## 2022-02-18 DIAGNOSIS — M15.9 PRIMARY OSTEOARTHRITIS INVOLVING MULTIPLE JOINTS: ICD-10-CM

## 2022-02-18 DIAGNOSIS — J44.9 CHRONIC OBSTRUCTIVE PULMONARY DISEASE, UNSPECIFIED COPD TYPE (HCC): ICD-10-CM

## 2022-02-18 DIAGNOSIS — E78.2 MIXED HYPERLIPIDEMIA: ICD-10-CM

## 2022-02-18 DIAGNOSIS — I12.9 HYPERTENSION WITH RENAL DISEASE: Primary | ICD-10-CM

## 2022-02-18 DIAGNOSIS — K21.9 GASTROESOPHAGEAL REFLUX DISEASE WITHOUT ESOPHAGITIS: ICD-10-CM

## 2022-02-18 DIAGNOSIS — E66.09 CLASS 1 OBESITY DUE TO EXCESS CALORIES WITHOUT SERIOUS COMORBIDITY WITH BODY MASS INDEX (BMI) OF 33.0 TO 33.9 IN ADULT: ICD-10-CM

## 2022-02-18 LAB
ALBUMIN SERPL-MCNC: 3.6 G/DL (ref 3.5–5)
ALBUMIN/GLOB SERPL: 1.1 {RATIO} (ref 1.1–2.2)
ALP SERPL-CCNC: 99 U/L (ref 45–117)
ALT SERPL-CCNC: 53 U/L (ref 12–78)
ANION GAP SERPL CALC-SCNC: 4 MMOL/L (ref 5–15)
AST SERPL-CCNC: 24 U/L (ref 15–37)
BILIRUB SERPL-MCNC: 0.5 MG/DL (ref 0.2–1)
BUN SERPL-MCNC: 19 MG/DL (ref 6–20)
BUN/CREAT SERPL: 22 (ref 12–20)
CALCIUM SERPL-MCNC: 9.6 MG/DL (ref 8.5–10.1)
CHLORIDE SERPL-SCNC: 106 MMOL/L (ref 97–108)
CHOLEST SERPL-MCNC: 146 MG/DL
CK SERPL-CCNC: 58 U/L (ref 26–192)
CO2 SERPL-SCNC: 26 MMOL/L (ref 21–32)
CREAT SERPL-MCNC: 0.88 MG/DL (ref 0.55–1.02)
GLOBULIN SER CALC-MCNC: 3.2 G/DL (ref 2–4)
GLUCOSE SERPL-MCNC: 89 MG/DL (ref 65–100)
HDLC SERPL-MCNC: 61 MG/DL
HDLC SERPL: 2.4 {RATIO} (ref 0–5)
LDLC SERPL CALC-MCNC: 73 MG/DL (ref 0–100)
POTASSIUM SERPL-SCNC: 4.5 MMOL/L (ref 3.5–5.1)
PROT SERPL-MCNC: 6.8 G/DL (ref 6.4–8.2)
SODIUM SERPL-SCNC: 136 MMOL/L (ref 136–145)
TRIGL SERPL-MCNC: 60 MG/DL (ref ?–150)
VLDLC SERPL CALC-MCNC: 12 MG/DL

## 2022-02-18 PROCEDURE — 3017F COLORECTAL CA SCREEN DOC REV: CPT | Performed by: INTERNAL MEDICINE

## 2022-02-18 PROCEDURE — G8510 SCR DEP NEG, NO PLAN REQD: HCPCS | Performed by: INTERNAL MEDICINE

## 2022-02-18 PROCEDURE — G8399 PT W/DXA RESULTS DOCUMENT: HCPCS | Performed by: INTERNAL MEDICINE

## 2022-02-18 PROCEDURE — 1090F PRES/ABSN URINE INCON ASSESS: CPT | Performed by: INTERNAL MEDICINE

## 2022-02-18 PROCEDURE — G8417 CALC BMI ABV UP PARAM F/U: HCPCS | Performed by: INTERNAL MEDICINE

## 2022-02-18 PROCEDURE — 99214 OFFICE O/P EST MOD 30 MIN: CPT | Performed by: INTERNAL MEDICINE

## 2022-02-18 PROCEDURE — G8536 NO DOC ELDER MAL SCRN: HCPCS | Performed by: INTERNAL MEDICINE

## 2022-02-18 PROCEDURE — G8427 DOCREV CUR MEDS BY ELIG CLIN: HCPCS | Performed by: INTERNAL MEDICINE

## 2022-02-18 PROCEDURE — 1101F PT FALLS ASSESS-DOCD LE1/YR: CPT | Performed by: INTERNAL MEDICINE

## 2022-02-18 RX ORDER — GABAPENTIN 100 MG/1
100 CAPSULE ORAL
COMMUNITY

## 2022-02-18 NOTE — PROGRESS NOTES
HIPAA verified by two patient identifiers. Glenis Goltz is a 78 y.o. female    Chief Complaint   Patient presents with    Hypertension     3 month    GERD    COPD    Osteoarthritis    Chronic Kidney Disease    Cholesterol Problem       Visit Vitals  BP (!) 130/98 (BP 1 Location: Left upper arm, BP Patient Position: Sitting, BP Cuff Size: Large adult)   Pulse 66   Temp 98.2 °F (36.8 °C) (Oral)   Resp 17   Ht 5' 6\" (1.676 m)   Wt 196 lb 4.8 oz (89 kg)   LMP  (LMP Unknown)   SpO2 96%   BMI 31.68 kg/m²       Pain Scale: 0 - No pain/10  Pain Location:       Health Maintenance Due   Topic Date Due    DTaP/Tdap/Td series (1 - Tdap) Never done    Shingrix Vaccine Age 50> (1 of 2) Never done    Bone Densitometry  04/12/2020    COVID-19 Vaccine (3 - Booster for Moderna series) 08/17/2021         Coordination of Care Questionnaire:  :   1) Have you been to an emergency room, urgent care, or hospitalized since your last visit? If yes, where when, and reason for visit? no       2. Have seen or consulted any other health care provider since your last visit? If yes, where when, and reason for visit? NO      Patient is accompanied by self I have received verbal consent from Glenis Goltz to discuss any/all medical information while they are present in the room.

## 2022-02-18 NOTE — PATIENT INSTRUCTIONS
Anemia: Care Instructions  Your Care Instructions     Anemia is a low level of red blood cells, which carry oxygen throughout your body. Many things can cause anemia. Lack of iron is one of the most common causes. Your body needs iron to make hemoglobin, a substance in red blood cells that carries oxygen from the lungs to your body's cells. Without enough iron, the body produces fewer and smaller red blood cells. As a result, your body's cells do not get enough oxygen, and you feel tired and weak. And you may have trouble concentrating. Bleeding is the most common cause of a lack of iron. You may have heavy menstrual bleeding or bleeding caused by conditions such as ulcers, hemorrhoids, or cancer. Regular use of aspirin or other anti-inflammatory medicines (such as ibuprofen) also can cause bleeding in some people. A lack of iron in your diet also can cause anemia, especially at times when the body needs more iron, such as during pregnancy, infancy, and the teen years. Your doctor may have prescribed iron pills. It may take several months of treatment for your iron levels to return to normal. Your doctor also may suggest that you eat foods that are rich in iron, such as meat and beans. There are many other causes of anemia. It is not always due to a lack of iron. Finding the specific cause of your anemia will help your doctor find the right treatment for you. Follow-up care is a key part of your treatment and safety. Be sure to make and go to all appointments, and call your doctor if you are having problems. It's also a good idea to know your test results and keep a list of the medicines you take. How can you care for yourself at home? · Take your medicines exactly as prescribed. Call your doctor if you think you are having a problem with your medicine. · If your doctor recommends iron pills, take them as directed:  ? Try to take the pills on an empty stomach about 1 hour before or 2 hours after meals. But you may need to take iron with food to avoid an upset stomach. ? Do not take antacids or drink milk or caffeine drinks (such as coffee, tea, or cola) at the same time or within 2 hours of the time that you take your iron. They can make it hard for your body to absorb the iron. ? Vitamin C (from food or supplements) helps your body absorb iron. Try taking iron pills with a glass of orange juice or some other food that is high in vitamin C, such as citrus fruits. ? Iron pills may cause stomach problems, such as heartburn, nausea, diarrhea, constipation, and cramps. Be sure to drink plenty of fluids, and include fruits, vegetables, and fiber in your diet each day. Iron pills often make your bowel movements dark or green. ? If you forget to take an iron pill, do not take a double dose of iron the next time you take a pill. ? Keep iron pills out of the reach of small children. An overdose of iron can be very dangerous. · Follow your doctor's advice about eating iron-rich foods. These include red meat, shellfish, poultry, eggs, beans, raisins, whole-grain bread, and leafy green vegetables. · Steam vegetables to help them keep their iron content. When should you call for help? Call 911 anytime you think you may need emergency care. For example, call if:    · You have symptoms of a heart attack. These may include:  ? Chest pain or pressure, or a strange feeling in the chest.  ? Sweating. ? Shortness of breath. ? Nausea or vomiting. ? Pain, pressure, or a strange feeling in the back, neck, jaw, or upper belly or in one or both shoulders or arms. ? Lightheadedness or sudden weakness. ? A fast or irregular heartbeat. After you call 911, the  may tell you to chew 1 adult-strength or 2 to 4 low-dose aspirin. Wait for an ambulance. Do not try to drive yourself.     · You passed out (lost consciousness).    Call your doctor now or seek immediate medical care if:    · You have new or increased shortness of breath.     · You are dizzy or lightheaded, or you feel like you may faint.     · Your fatigue and weakness continue or get worse.     · You have any abnormal bleeding, such as:  ? Nosebleeds. ? Vaginal bleeding that is different (heavier, more frequent, at a different time of the month) than what you are used to.  ? Bloody or black stools, or rectal bleeding. ? Bloody or pink urine. Watch closely for changes in your health, and be sure to contact your doctor if:    · You do not get better as expected. Where can you learn more? Go to http://www.[facility].com/  Enter R301 in the search box to learn more about \"Anemia: Care Instructions. \"  Current as of: April 29, 2021               Content Version: 13.0  © 0254-1172 [employer]. Care instructions adapted under license by [employer] (which disclaims liability or warranty for this information). If you have questions about a medical condition or this instruction, always ask your healthcare professional. [place] [address] any warranty or liability for your use of this information.

## 2022-03-13 RX ORDER — ATORVASTATIN CALCIUM 20 MG/1
TABLET, FILM COATED ORAL
Qty: 90 TABLET | Refills: 3 | Status: SHIPPED | OUTPATIENT
Start: 2022-03-13

## 2022-03-18 PROBLEM — K40.90 RIGHT INGUINAL HERNIA: Status: ACTIVE | Noted: 2017-06-02

## 2022-03-18 PROBLEM — J44.9 COPD (CHRONIC OBSTRUCTIVE PULMONARY DISEASE) (HCC): Status: ACTIVE | Noted: 2017-08-09

## 2022-03-18 PROBLEM — M54.9 BACK PAIN: Status: ACTIVE | Noted: 2017-08-09

## 2022-03-18 PROBLEM — J30.89 CHRONIC NON-SEASONAL ALLERGIC RHINITIS: Status: ACTIVE | Noted: 2017-08-09

## 2022-03-18 PROBLEM — H83.03 LABYRINTHITIS OF BOTH EARS: Status: ACTIVE | Noted: 2019-01-24

## 2022-03-18 PROBLEM — E03.9 ACQUIRED HYPOTHYROIDISM: Status: ACTIVE | Noted: 2017-08-09

## 2022-03-18 PROBLEM — B36.9 FUNGAL DERMATITIS: Status: ACTIVE | Noted: 2019-06-12

## 2022-03-18 PROBLEM — J01.00 ACUTE NON-RECURRENT MAXILLARY SINUSITIS: Status: ACTIVE | Noted: 2019-01-24

## 2022-03-18 PROBLEM — L98.9 SKIN LESION OF CHEST WALL: Status: ACTIVE | Noted: 2021-08-12

## 2022-03-18 PROBLEM — N81.10 BADEN-WALKER GRADE 3 CYSTOCELE: Status: ACTIVE | Noted: 2020-01-27

## 2022-03-19 PROBLEM — I12.9 HYPERTENSION WITH RENAL DISEASE: Status: ACTIVE | Noted: 2017-08-09

## 2022-03-19 PROBLEM — Z13.39 ALCOHOL SCREENING: Status: ACTIVE | Noted: 2019-07-26

## 2022-03-19 PROBLEM — N18.30 STAGE 3 CHRONIC KIDNEY DISEASE (HCC): Status: ACTIVE | Noted: 2017-08-09

## 2022-03-19 PROBLEM — R07.2 PRECORDIAL PAIN: Status: ACTIVE | Noted: 2018-06-15

## 2022-03-19 PROBLEM — E66.09 CLASS 1 OBESITY DUE TO EXCESS CALORIES WITHOUT SERIOUS COMORBIDITY WITH BODY MASS INDEX (BMI) OF 33.0 TO 33.9 IN ADULT: Status: ACTIVE | Noted: 2017-08-09

## 2022-03-19 PROBLEM — D64.9 ANEMIA: Status: ACTIVE | Noted: 2017-08-09

## 2022-03-19 PROBLEM — E66.811 CLASS 1 OBESITY DUE TO EXCESS CALORIES WITHOUT SERIOUS COMORBIDITY WITH BODY MASS INDEX (BMI) OF 33.0 TO 33.9 IN ADULT: Status: ACTIVE | Noted: 2017-08-09

## 2022-03-19 PROBLEM — N99.3 INCOMPLETE PROLAPSE OF VAGINAL VAULT: Status: ACTIVE | Noted: 2020-01-27

## 2022-03-19 PROBLEM — M15.9 PRIMARY OSTEOARTHRITIS INVOLVING MULTIPLE JOINTS: Status: ACTIVE | Noted: 2017-08-09

## 2022-03-19 PROBLEM — K21.9 GASTROESOPHAGEAL REFLUX DISEASE WITHOUT ESOPHAGITIS: Status: ACTIVE | Noted: 2017-08-09

## 2022-03-19 PROBLEM — K43.9 VENTRAL HERNIA WITHOUT OBSTRUCTION OR GANGRENE: Status: ACTIVE | Noted: 2017-06-02

## 2022-03-19 PROBLEM — J20.9 ACUTE BRONCHITIS: Status: ACTIVE | Noted: 2018-07-02

## 2022-03-19 PROBLEM — F41.9 ANXIETY: Status: ACTIVE | Noted: 2017-08-09

## 2022-03-19 PROBLEM — E78.2 MIXED HYPERLIPIDEMIA: Status: ACTIVE | Noted: 2017-08-09

## 2022-03-19 PROBLEM — R23.3 EASY BRUISING: Status: ACTIVE | Noted: 2020-01-24

## 2022-03-19 PROBLEM — Z79.890 HORMONE REPLACEMENT THERAPY (HRT): Status: ACTIVE | Noted: 2017-08-09

## 2022-03-19 PROBLEM — K42.9 UMBILICAL HERNIA: Status: ACTIVE | Noted: 2017-08-09

## 2022-03-19 PROBLEM — I83.90 VARICOSE VEIN OF LEG: Status: ACTIVE | Noted: 2017-08-09

## 2022-03-19 PROBLEM — N32.81 OAB (OVERACTIVE BLADDER): Status: ACTIVE | Noted: 2017-08-09

## 2022-03-19 PROBLEM — K50.90 CROHN DISEASE (HCC): Status: ACTIVE | Noted: 2017-08-09

## 2022-03-19 PROBLEM — R35.0 URINARY FREQUENCY: Status: ACTIVE | Noted: 2019-11-05

## 2022-03-19 PROBLEM — K40.90 INGUINAL HERNIA: Status: ACTIVE | Noted: 2017-08-09

## 2022-03-19 PROBLEM — M15.0 PRIMARY OSTEOARTHRITIS INVOLVING MULTIPLE JOINTS: Status: ACTIVE | Noted: 2017-08-09

## 2022-03-20 PROBLEM — N81.10 VAGINAL PROLAPSE: Status: ACTIVE | Noted: 2020-01-27

## 2022-03-20 PROBLEM — R68.2 DRY MOUTH: Status: ACTIVE | Noted: 2019-07-05

## 2022-03-20 PROBLEM — M51.26 LUMBAR HERNIATED DISC: Status: ACTIVE | Noted: 2017-08-09

## 2022-04-25 DIAGNOSIS — E87.6 HYPOKALEMIA: ICD-10-CM

## 2022-04-25 RX ORDER — POTASSIUM CHLORIDE 20 MEQ/1
TABLET, EXTENDED RELEASE ORAL
Qty: 180 TABLET | Refills: 3 | Status: SHIPPED | OUTPATIENT
Start: 2022-04-25

## 2022-04-25 NOTE — TELEPHONE ENCOUNTER
RX refill request from the patient/pharmacy. Patient last seen 02-   with labs, and next appt. scheduled for 05-  Requested Prescriptions     Pending Prescriptions Disp Refills    potassium chloride (K-DUR, KLOR-CON M20) 20 mEq tablet 180 Tablet 3     Sig: take 1 tablet by mouth twice a day   .

## 2022-05-19 RX ORDER — ESTRADIOL 0.05 MG/D
FILM, EXTENDED RELEASE TRANSDERMAL
Qty: 24 PATCH | Refills: 0 | Status: SHIPPED | OUTPATIENT
Start: 2022-05-19 | End: 2022-05-24 | Stop reason: SDUPTHER

## 2022-05-19 NOTE — PROGRESS NOTES
Chief Complaint   Patient presents with    Blood sugar problem     3 months    Hypertension    COPD    Osteoarthritis    Cholesterol Problem       SUBJECTIVE:    Artis Gregg is a 78 y.o. female who returns in follow-up for medical problems include hypertension, glucose intolerance, hyperlipidemia, CKD, COPD, DJD and other mild medical problems. She is nonlipid dizziness with movement of her head has been going on for \"a good while \"she notes also that she has some discomfort with numbness in her right arm if she was sitting in certain ways or she lays down at night if she turns over it goes away. It seems to involve the whole arm and not just part of it. She denies any chest pain, shortness of breath, palpitations, PND, orthopnea or other cardiac or respiratory complaints. There are no GI or  complaints. She has no headaches, dizziness except that associate with the head movement or other neurologic complaints except for the numbness in the right arm as described. There are no current active arthritic complaints and she has no other complaints on complete review of systems. Current Outpatient Medications   Medication Sig Dispense Refill    meclizine (ANTIVERT) 12.5 mg tablet Take 1 Tablet by mouth three (3) times daily for 10 days. 30 Tablet 0    estradioL (VIVELLE) 0.05 mg/24 hr APPLY 1 PATCH TOPICALLY TWICE A WEEK 24 Patch 0    potassium chloride (K-DUR, KLOR-CON M20) 20 mEq tablet take 1 tablet by mouth twice a day 180 Tablet 3    atorvastatin (LIPITOR) 20 mg tablet TAKE 1 TABLET EVERY NIGHT 90 Tablet 3    gabapentin (NEURONTIN) 100 mg capsule Take 100 mg by mouth. 1 cap by mouth at bedtime      etodolac (LODINE) 400 mg tablet Take 1 Tablet by mouth two (2) times daily (with meals).  60 Tablet prn    amLODIPine (NORVASC) 5 mg tablet Take 1 tablet by mouth once daily 90 Tablet 3    niacin ER (NIASPAN) 500 mg tablet TAKE ONE TABLET BY MOUTH AT BEDTIME 90 Tablet 3    dexAMETHasone (DECADRON) 0.5 mg/5 mL elixir RINSE WITH 1 TEASPOON FOR TWO MINUTES AND SPIT 4 TIMES DAILY      polyethylene glycol (MIRALAX) 17 gram/dose powder Take 17 g by mouth daily.  olmesartan (BENICAR) 40 mg tablet Take 1 Tab by mouth daily. 90 Tab 3    cyanocobalamin (VITAMIN B-12) 1,000 mcg sublingual tablet Take 1,000 mcg by mouth daily.  triamcinolone acetonide (KENALOG) 0.1 % topical cream       co-enzyme Q-10 (Co Q-10) 100 mg capsule Take 100 mg by mouth daily.  OTHER Indications: Melaleuca Phytomega - Take 2 softgels twice a day.  OTHER Indications: Melaleuca Vitality - Takes 1 tablet twice a day.  ferrous sulfate 325 mg (65 mg iron) tablet Take  by mouth three (3) times daily (with meals).  vitamin E (AQUA GEMS) 400 unit capsule Take 400 Units by mouth daily.  cholecalciferol, vitamin D3, 2,000 unit tab Take 1 Tab by mouth daily.  ascorbic acid (VITAMIN C) 250 mg tablet Take 500 mg by mouth two (2) times a day.        Past Medical History:   Diagnosis Date    Allergic rhinitis 8/9/2017    Anemia 8/9/2017    Anxiety 8/9/2017    Arrhythmia     irregular heart beat hx and beating fast per patient/no cardiologist    Arthritis     JOINTS  WORSE WAIST DOWN     Back pain 8/9/2017    Chronic kidney disease     Stage III    CKD (chronic kidney disease) 8/9/2017    COPD (chronic obstructive pulmonary disease) ([facility] [address] 8/9/2017    Crohn disease ([facility] [address] 8/9/2017    DJD (degenerative joint disease) 8/9/2017    Dry mouth     evaluated by Dr. Chelsea Jean (ENT)    GERD (gastroesophageal reflux disease) 8/9/2017    Hormone replacement therapy (HRT) 8/9/2017    Hyperlipidemia 8/9/2017    Hypertension     Hypertension with renal disease 8/9/2017    Hypokalemia 8/9/2017    Hypothyroid 8/9/2017    Ill-defined condition     neuropathy feet    Inguinal hernia 8/9/2017    Leg numbness 8/9/2017    Lumbar herniated disc 8/9/2017    OAB (overactive bladder) 8/9/2017    Obesity 8/9/2017    On statin therapy 8/9/2017    Osteoarthritis 8/9/2017    Pelvic pain in female 8/2/1232    Umbilical hernia 8/4/1900    Varicose vein of leg 8/9/2017     Past Surgical History:   Procedure Laterality Date    HX COLONOSCOPY  12/2010    normal    HX COLONOSCOPY  12/30/2014    normal     HX HEENT      cyst removed left eye    HX HERNIA REPAIR  06/02/2017    right inguinal and umbilical (Dr. Miles Carbajal)    HX HYSTERECTOMY      HX ORTHOPAEDIC      bilat knee injections    HX ORTHOPAEDIC      laser treatment left knee and foot    HX ORTHOPAEDIC      hx of gel shots bilat knee    HX ORTHOPAEDIC  12/27/2016    left knee coritizon shot    HX OTHER SURGICAL      cyst removed front left scalp    HX OTHER SURGICAL      colonoscopy    HX OTHER SURGICAL      tooth implant    HX TUBAL LIGATION      NEUROLOGICAL PROCEDURE UNLISTED      Lumbar surgery    NM BREAST SURGERY PROCEDURE UNLISTED      mass removed left breast x2 benign    VASCULAR SURGERY PROCEDURE UNLIST      vein striping     Allergies   Allergen Reactions    Lisinopril Cough       REVIEW OF SYSTEMS:  General: negative for - chills or fever, or weight loss or gain  ENT: negative for - headaches, nasal congestion or tinnitus  Eyes: no blurred or visual changes  Neck: No stiffness or swollen nodes  Respiratory: negative for - cough, hemoptysis, shortness of breath or wheezing  Cardiovascular : negative for - chest pain, edema, palpitations or shortness of breath  Gastrointestinal: negative for - abdominal pain, blood in stools, heartburn or nausea/vomiting  Genito-Urinary: no dysuria, trouble voiding, or hematuria  Musculoskeletal: negative for - gait disturbance, joint pain, joint stiffness or joint swelling  Neurological: no TIA or stroke symptoms  Hematologic: no bruises, no bleeding  Lymphatic: no swollen glands  Integument: no lumps, mole changes, nail changes or rash  Endocrine:no malaise/lethargy poly uria or polydipsia or unexpected weight changes        Social History     Socioeconomic History    Marital status:    Tobacco Use    Smoking status: Never Smoker    Smokeless tobacco: Never Used   Vaping Use    Vaping Use: Never used   Substance and Sexual Activity    Alcohol use: Yes     Comment: once a year    Drug use: No    Sexual activity: Yes     Partners: Male     Family History   Problem Relation Age of Onset    No Known Problems Mother     No Known Problems Father     Stroke Sister        OBJECTIVE:     Visit Vitals  /78 (BP 1 Location: Left upper arm, BP Patient Position: Sitting, BP Cuff Size: Large adult)   Pulse 75   Temp 97.8 °F (36.6 °C) (Oral)   Resp 17   Ht 5' 6\" (1.676 m)   Wt 190 lb 12.8 oz (86.5 kg)   LMP  (LMP Unknown)   SpO2 96%   BMI 30.80 kg/m²     CONSTITUTIONAL:   well nourished, appears age appropriate  EYES: sclera anicteric, PERRL, EOMI  ENMT:nares clear, moist mucous membranes, pharynx clear  NECK: supple. Thyroid normal, No JVD or bruits  RESPIRATORY: Chest: clear to ascultation and percussion, normal inspiratory effort  CARDIOVASCULAR: Heart: regular rate and rhythm no murmurs, rubs or gallops, PMI not displaced, No thrills, no peripheral edema  GASTROINTESTINAL: Abdomen: non distended, soft, non tender, bowel sounds normal  HEMATOLOGIC: no purpura, petechiae or bruising  LYMPHATIC: No lymph node enlargemant  MUSCULOSKELETAL: Extremities: no active synovitis, pulse 1+   INTEGUMENT: No unusual rashes or suspicious skin lesions noted. Nails appear normal.  PERIPHERAL VASCULAR: normal pulses femoral, PT and DP  NEUROLOGIC: non-focal exam, A & O X 3  PSYCHIATRIC:, appropriate affect     ASSESSMENT:   1. Hypertension with renal disease    2. Mixed hyperlipidemia    3. Gastroesophageal reflux disease without esophagitis    4. Primary osteoarthritis involving multiple joints    5. Stage 3 chronic kidney disease, unspecified whether stage 3a or 3b CKD ([address]    6.  Chronic obstructive pulmonary disease, unspecified COPD type ([facility] [address]    7. Class 1 obesity due to excess calories without serious comorbidity with body mass index (BMI) of 33.0 to 33.9 in adult    8. Neck pain    9. Labyrinthitis, unspecified laterality      Impression  1. Hypertension that is controlled so continue current therapy reviewed with her. 2.  Hyperlipidemia prior lab reviewed repeat status pending  With 3 GERD that is stable  4 DJD that is stable  5 CKD stage III repeat status pending next #6 COPD stable  7. Obesity we did discuss diet, exercise weight reduction for overall health benefit. 8.  Neck pain and right arm numbness we will get cervical spine x-ray  Benign labyrinthitis we will try meclizine  Recheck 3 months or sooner if there is a problem. I will call the lab results in interim. PLAN:  .  Orders Placed This Encounter    XR SPINE CERV PA LAT ODONT 3 V MAX    LIPID PANEL    METABOLIC PANEL, COMPREHENSIVE    CK    DISCONTD: meclizine (ANTIVERT) 12.5 mg tablet    meclizine (ANTIVERT) 12.5 mg tablet         ATTENTION:   This medical record was transcribed using an electronic medical records system. Although proofread, it may and can contain electronic and spelling errors. Other human spelling and other errors may be present. Corrections may be executed at a later time. Please feel free to contact us for any clarifications as needed. Follow-up and Dispositions    · Return in about 3 months (around 8/20/2022). No results found for any visits on 05/20/22. Singh Key MD    The patient verbalized understanding of the problems and plans as explained.

## 2022-05-20 ENCOUNTER — OFFICE VISIT (OUTPATIENT)
Dept: INTERNAL MEDICINE CLINIC | Age: 67
End: 2022-05-20
Payer: MEDICARE

## 2022-05-20 VITALS
BODY MASS INDEX: 30.67 KG/M2 | TEMPERATURE: 97.8 F | RESPIRATION RATE: 17 BRPM | DIASTOLIC BLOOD PRESSURE: 78 MMHG | OXYGEN SATURATION: 96 % | SYSTOLIC BLOOD PRESSURE: 116 MMHG | WEIGHT: 190.8 LBS | HEART RATE: 75 BPM | HEIGHT: 66 IN

## 2022-05-20 DIAGNOSIS — N18.30 STAGE 3 CHRONIC KIDNEY DISEASE, UNSPECIFIED WHETHER STAGE 3A OR 3B CKD (HCC): ICD-10-CM

## 2022-05-20 DIAGNOSIS — K21.9 GASTROESOPHAGEAL REFLUX DISEASE WITHOUT ESOPHAGITIS: ICD-10-CM

## 2022-05-20 DIAGNOSIS — M15.9 PRIMARY OSTEOARTHRITIS INVOLVING MULTIPLE JOINTS: ICD-10-CM

## 2022-05-20 DIAGNOSIS — I12.9 HYPERTENSION WITH RENAL DISEASE: Primary | ICD-10-CM

## 2022-05-20 DIAGNOSIS — J44.9 CHRONIC OBSTRUCTIVE PULMONARY DISEASE, UNSPECIFIED COPD TYPE (HCC): ICD-10-CM

## 2022-05-20 DIAGNOSIS — M54.2 NECK PAIN: ICD-10-CM

## 2022-05-20 DIAGNOSIS — E66.09 CLASS 1 OBESITY DUE TO EXCESS CALORIES WITHOUT SERIOUS COMORBIDITY WITH BODY MASS INDEX (BMI) OF 33.0 TO 33.9 IN ADULT: ICD-10-CM

## 2022-05-20 DIAGNOSIS — E78.2 MIXED HYPERLIPIDEMIA: ICD-10-CM

## 2022-05-20 DIAGNOSIS — H83.09 LABYRINTHITIS, UNSPECIFIED LATERALITY: ICD-10-CM

## 2022-05-20 PROCEDURE — G8399 PT W/DXA RESULTS DOCUMENT: HCPCS | Performed by: INTERNAL MEDICINE

## 2022-05-20 PROCEDURE — G8417 CALC BMI ABV UP PARAM F/U: HCPCS | Performed by: INTERNAL MEDICINE

## 2022-05-20 PROCEDURE — 1090F PRES/ABSN URINE INCON ASSESS: CPT | Performed by: INTERNAL MEDICINE

## 2022-05-20 PROCEDURE — G8536 NO DOC ELDER MAL SCRN: HCPCS | Performed by: INTERNAL MEDICINE

## 2022-05-20 PROCEDURE — G8427 DOCREV CUR MEDS BY ELIG CLIN: HCPCS | Performed by: INTERNAL MEDICINE

## 2022-05-20 PROCEDURE — 99214 OFFICE O/P EST MOD 30 MIN: CPT | Performed by: INTERNAL MEDICINE

## 2022-05-20 PROCEDURE — 1101F PT FALLS ASSESS-DOCD LE1/YR: CPT | Performed by: INTERNAL MEDICINE

## 2022-05-20 PROCEDURE — 3017F COLORECTAL CA SCREEN DOC REV: CPT | Performed by: INTERNAL MEDICINE

## 2022-05-20 PROCEDURE — G8510 SCR DEP NEG, NO PLAN REQD: HCPCS | Performed by: INTERNAL MEDICINE

## 2022-05-20 RX ORDER — MECLIZINE HCL 12.5 MG 12.5 MG/1
12.5 TABLET ORAL 3 TIMES DAILY
Qty: 30 TABLET | Refills: 0 | Status: SHIPPED | OUTPATIENT
Start: 2022-05-20 | End: 2022-05-30

## 2022-05-20 RX ORDER — MECLIZINE HCL 12.5 MG 12.5 MG/1
12.5 TABLET ORAL 3 TIMES DAILY
Qty: 30 TABLET | Refills: 0 | Status: SHIPPED | OUTPATIENT
Start: 2022-05-20 | End: 2022-05-20 | Stop reason: SDUPTHER

## 2022-05-20 NOTE — PROGRESS NOTES
HIPAA verified by two patient identifiers. Farheen Peña is a 78 y.o. female    Chief Complaint   Patient presents with    Blood sugar problem     3 months    Hypertension    COPD    Osteoarthritis    Cholesterol Problem       Visit Vitals  /78 (BP 1 Location: Left upper arm, BP Patient Position: Sitting, BP Cuff Size: Large adult)   Pulse 75   Temp 97.8 °F (36.6 °C) (Oral)   Resp 17   Ht 5' 6\" (1.676 m)   Wt 190 lb 12.8 oz (86.5 kg)   LMP  (LMP Unknown)   SpO2 96%   BMI 30.80 kg/m²       Pain Scale: 0 - No pain/10  Pain Location:       Health Maintenance Due   Topic Date Due    DTaP/Tdap/Td series (1 - Tdap) Never done    Shingrix Vaccine Age 50> (1 of 2) Never done    Bone Densitometry  04/12/2020    COVID-19 Vaccine (3 - Booster for Moderna series) 08/17/2021    Breast Cancer Screen Mammogram  05/12/2022         Coordination of Care Questionnaire:  :   1) Have you been to an emergency room, urgent care, or hospitalized since your last visit? If yes, where when, and reason for visit? no       2. Have seen or consulted any other health care provider since your last visit? If yes, where when, and reason for visit? NO      Patient is accompanied by self I have received verbal consent from Farheen Peña to discuss any/all medical information while they are present in the room.

## 2022-05-20 NOTE — PATIENT INSTRUCTIONS
Arthritis: Care Instructions  Overview     Arthritis, also called osteoarthritis, is a breakdown of the cartilage that cushions your joints. When the cartilage wears down, your bones rub against each other. This causes pain and stiffness. Many people have some arthritis as they age. Arthritis most often affects the joints of the spine, hands, hips, knees, or feet. Arthritis never goes away completely. But medicine and home treatment can help reduce pain and help you stay active. Follow-up care is a key part of your treatment and safety. Be sure to make and go to all appointments, and call your doctor if you are having problems. It's also a good idea to know your test results and keep a list of the medicines you take. How can you care for yourself at home? · Stay at a healthy weight. Being overweight puts extra strain on your joints. · Talk to your doctor or physical therapist about exercises that will help ease joint pain. ? Stretch. You may enjoy gentle forms of yoga to help keep your joints and muscles flexible. ? Walk instead of jog. Other types of exercise that are less stressful on the joints include riding a bike, swimming, yadi chi, or water exercise. ? Lift weights. Strong muscles help reduce stress on your joints. Stronger thigh muscles, for example, take some of the stress off of the knees and hips. Learn the right way to lift weights so you don't make joint pain worse. · Take your medicines exactly as prescribed. Call your doctor if you think you are having a problem with your medicine. · Take pain medicines exactly as directed. ? If the doctor gave you a prescription medicine for pain, take it as prescribed. ? If you are not taking a prescription pain medicine, ask your doctor if you can take an over-the-counter medicine. · Use a cane, crutch, walker, or another device if you need help to get around. These can help rest your joints.  You also can use other things to make life easier, such as a higher toilet seat and padded handles on kitchen utensils. · Do not sit in low chairs. They can make it hard to get up. · Put heat or cold on your sore joints as needed. Use whichever helps you most. You can also switch between hot and cold packs. ? Apply heat 2 or 3 times a day for 20 to 30 minutesusing a heating pad, hot shower, or hot packto relieve pain and stiffness. But don't use heat on a swollen joint. ? Put ice or a cold pack on your sore joint for 10 to 20 minutes at a time. Put a thin cloth between the ice and your skin. When should you call for help? Call your doctor now or seek immediate medical care if:    · You have sudden swelling, warmth, or pain in any joint.     · You have joint pain and a fever or rash.     · You have such bad pain that you cannot use a joint. Watch closely for changes in your health, and be sure to contact your doctor if:    · You have mild joint symptoms that continue even with more than 6 weeks of care at home.     · You have stomach pain or other problems with your medicine. Where can you learn more? Go to http://www.gray.com/  Enter X689 in the search box to learn more about \"Arthritis: Care Instructions. \"  Current as of: December 20, 2021               Content Version: 13.2  © 0843-5693 [employer]. Care instructions adapted under license by [employer] (which disclaims liability or warranty for this information). If you have questions about a medical condition or this instruction, always ask your healthcare professional. [place] [address] any warranty or liability for your use of this information.

## 2022-05-22 LAB
ALBUMIN SERPL-MCNC: 3.9 G/DL (ref 3.5–5)
ALBUMIN/GLOB SERPL: 1.3 {RATIO} (ref 1.1–2.2)
ALP SERPL-CCNC: 97 U/L (ref 45–117)
ALT SERPL-CCNC: 36 U/L (ref 12–78)
ANION GAP SERPL CALC-SCNC: 7 MMOL/L (ref 5–15)
AST SERPL-CCNC: 20 U/L (ref 15–37)
BILIRUB SERPL-MCNC: 0.5 MG/DL (ref 0.2–1)
BUN SERPL-MCNC: 28 MG/DL (ref 6–20)
BUN/CREAT SERPL: 25 (ref 12–20)
CALCIUM SERPL-MCNC: 8.9 MG/DL (ref 8.5–10.1)
CHLORIDE SERPL-SCNC: 105 MMOL/L (ref 97–108)
CHOLEST SERPL-MCNC: 147 MG/DL
CK SERPL-CCNC: 58 U/L (ref 26–192)
CO2 SERPL-SCNC: 23 MMOL/L (ref 21–32)
CREAT SERPL-MCNC: 1.11 MG/DL (ref 0.55–1.02)
GLOBULIN SER CALC-MCNC: 3 G/DL (ref 2–4)
GLUCOSE SERPL-MCNC: 91 MG/DL (ref 65–100)
HDLC SERPL-MCNC: 56 MG/DL
HDLC SERPL: 2.6 {RATIO} (ref 0–5)
LDLC SERPL CALC-MCNC: 76.2 MG/DL (ref 0–100)
POTASSIUM SERPL-SCNC: 4.6 MMOL/L (ref 3.5–5.1)
PROT SERPL-MCNC: 6.9 G/DL (ref 6.4–8.2)
SODIUM SERPL-SCNC: 135 MMOL/L (ref 136–145)
TRIGL SERPL-MCNC: 74 MG/DL (ref ?–150)
VLDLC SERPL CALC-MCNC: 14.8 MG/DL

## 2022-05-24 NOTE — TELEPHONE ENCOUNTER
Pt called and requested printed prescription to use for GoodRx  Rx will be sent to Dr. Dagmar Nunn call md to sign  PCP: Lauar Myers MD    Last appt: 5/20/2022  Future Appointments   Date Time Provider Diana Allie   8/19/2022 10:50 AM Laura Myers MD [facility]       Last refilled:-    Requested Prescriptions     Pending Prescriptions Disp Refills    estradioL (VIVELLE) 0.05 mg/24 hr 24 Patch 0     Sig: APPLY 1 PATCH TOPICALLY TWICE A WEEK

## 2022-05-25 ENCOUNTER — HOSPITAL ENCOUNTER (OUTPATIENT)
Dept: GENERAL RADIOLOGY | Age: 67
Discharge: HOME | End: 2022-05-25
Payer: MEDICARE

## 2022-05-25 DIAGNOSIS — M54.2 NECK PAIN: ICD-10-CM

## 2022-05-25 PROCEDURE — 72050 X-RAY EXAM NECK SPINE 4/5VWS: CPT

## 2022-05-25 RX ORDER — ESTRADIOL 0.05 MG/D
FILM, EXTENDED RELEASE TRANSDERMAL
Qty: 24 PATCH | Refills: 0 | Status: SHIPPED | OUTPATIENT
Start: 2022-05-25 | End: 2022-10-10

## 2022-05-25 RX ORDER — ESTRADIOL 0.05 MG/D
FILM, EXTENDED RELEASE TRANSDERMAL
Qty: 24 PATCH | Refills: 0 | Status: SHIPPED | OUTPATIENT
Start: 2022-05-25 | End: 2022-05-25 | Stop reason: SDUPTHER

## 2022-05-31 RX ORDER — OLMESARTAN MEDOXOMIL 40 MG/1
TABLET ORAL
Qty: 90 TABLET | Refills: 3 | Status: SHIPPED | OUTPATIENT
Start: 2022-05-31 | End: 2022-08-19 | Stop reason: SDUPTHER

## 2022-05-31 NOTE — TELEPHONE ENCOUNTER
RX refill request from the patient/pharmacy. Patient last seen 05- with labs, and next appt. scheduled for 08-  Requested Prescriptions     Pending Prescriptions Disp Refills    olmesartan (BENICAR) 40 mg tablet [Pharmacy Med Name: OLMESARTAN MEDOXOMIL 40 MG Tablet] 90 Tablet 3     Sig: TAKE 1 TABLET EVERY DAY   .

## 2022-06-09 DIAGNOSIS — M47.812 OSTEOARTHRITIS OF CERVICAL SPINE, UNSPECIFIED SPINAL OSTEOARTHRITIS COMPLICATION STATUS: Primary | ICD-10-CM

## 2022-06-22 RX ORDER — NIACIN 500 MG/1
TABLET, EXTENDED RELEASE ORAL
Qty: 90 TABLET | Refills: 0 | Status: SHIPPED | OUTPATIENT
Start: 2022-06-22 | End: 2022-09-26

## 2022-07-05 LAB — MAMMOGRAPHY, EXTERNAL: NORMAL

## 2022-08-18 PROBLEM — Z00.00 MEDICARE ANNUAL WELLNESS VISIT, SUBSEQUENT: Status: ACTIVE | Noted: 2021-08-11

## 2022-08-18 PROBLEM — E55.9 VITAMIN D DEFICIENCY: Status: ACTIVE | Noted: 2022-08-18

## 2022-08-18 NOTE — PROGRESS NOTES
This is a Subsequent Medicare Annual Wellness Visit providing Personalized Prevention Plan Services (PPPS) (Performed 12 months after initial AWV and PPPS )    I have reviewed the patient's medical history in detail and updated the computerized patient record. She presents today for Medicare subsequent annual wellness examination and screening questionnaire accompanied by her . She is also in follow-up of multi medical problems to include hypertension, hyperlipidemia, GERD, hypothyroidism, CKD stage III, COPD, allergic rhinitis, morbid obesity, Crohn's disease and other mild medical problems. She is still noting some dizziness when she gets up where she seems to be a little unsteady on her feet particular she bends over and stands up but that quickly goes away. She currently denies any chest pain, shortness of breath, palpitations, PND, orthopnea or other cardiac or respiratory complaints. She notes no GI or  complaints. She notes no headaches, dizziness except when she stands up after bending over and notes no other neurologic complaints. No change of her chronic arthritic complaints and no other complaints on complete review of systems.     History     Past Medical History:   Diagnosis Date    Allergic rhinitis 8/9/2017    Anemia 8/9/2017    Anxiety 8/9/2017    Arrhythmia     irregular heart beat hx and beating fast per patient/no cardiologist    Arthritis     JOINTS  WORSE WAIST DOWN     Back pain 8/9/2017    Chronic kidney disease     Stage III    CKD (chronic kidney disease) 8/9/2017    COPD (chronic obstructive pulmonary disease) ([facility] [address] 8/9/2017    Crohn disease ([facility] [address] 8/9/2017    DJD (degenerative joint disease) 8/9/2017    Dry mouth     evaluated by Dr. Angella Moraes (ENT)    GERD (gastroesophageal reflux disease) 8/9/2017    Hormone replacement therapy (HRT) 8/9/2017    Hyperlipidemia 8/9/2017    Hypertension     Hypertension with renal disease 8/9/2017    Hypokalemia 8/9/2017    Hypothyroid 8/9/2017    Ill-defined condition     neuropathy feet    Inguinal hernia 8/9/2017    Leg numbness 8/9/2017    Lumbar herniated disc 8/9/2017    OAB (overactive bladder) 8/9/2017    Obesity 8/9/2017    On statin therapy 8/9/2017    Osteoarthritis 8/9/2017    Pelvic pain in female 2/1/1591    Umbilical hernia 1/8/2481    Varicose vein of leg 8/9/2017      Past Surgical History:   Procedure Laterality Date    HX COLONOSCOPY  12/2010    normal    HX COLONOSCOPY  12/30/2014    normal     HX HEENT      cyst removed left eye    HX HERNIA REPAIR  06/02/2017    right inguinal and umbilical (Dr. Pipo Gilmore)    HX HYSTERECTOMY      HX ORTHOPAEDIC      bilat knee injections    HX ORTHOPAEDIC      laser treatment left knee and foot    HX ORTHOPAEDIC      hx of gel shots bilat knee    HX ORTHOPAEDIC  12/27/2016    left knee coritizon shot    HX OTHER SURGICAL      cyst removed front left scalp    HX OTHER SURGICAL      colonoscopy    HX OTHER SURGICAL      tooth implant    HX TUBAL LIGATION      NEUROLOGICAL PROCEDURE UNLISTED      Lumbar surgery    GA BREAST SURGERY PROCEDURE UNLISTED      mass removed left breast x2 benign    VASCULAR SURGERY PROCEDURE UNLIST      vein striping     Social History     Tobacco Use    Smoking status: Never    Smokeless tobacco: Never   Vaping Use    Vaping Use: Never used   Substance Use Topics    Alcohol use: Yes     Comment: once a year    Drug use: No     Current Outpatient Medications   Medication Sig Dispense Refill    olmesartan (BENICAR) 20 mg tablet Take 1 Tablet by mouth daily.  90 Tablet 3    niacin ER (NIASPAN) 500 mg tablet TAKE 1 TABLET BY MOUTH AT BEDTIME 90 Tablet 0    estradioL (VIVELLE) 0.05 mg/24 hr APPLY 1 PATCH TOPICALLY TWICE A WEEK 24 Patch 0    potassium chloride (K-DUR, KLOR-CON M20) 20 mEq tablet take 1 tablet by mouth twice a day 180 Tablet 3    atorvastatin (LIPITOR) 20 mg tablet TAKE 1 TABLET EVERY NIGHT 90 Tablet 3    gabapentin (NEURONTIN) 100 mg capsule Take 100 mg by mouth. 1 cap by mouth at bedtime      etodolac (LODINE) 400 mg tablet Take 1 Tablet by mouth two (2) times daily (with meals). 60 Tablet prn    amLODIPine (NORVASC) 5 mg tablet Take 1 tablet by mouth once daily 90 Tablet 3    dexAMETHasone (DECADRON) 0.5 mg/5 mL elixir RINSE WITH 1 TEASPOON FOR TWO MINUTES AND SPIT 4 TIMES DAILY      polyethylene glycol (MIRALAX) 17 gram/dose powder Take 17 g by mouth daily. cyanocobalamin (VITAMIN B-12) 1,000 mcg sublingual tablet Take 1,000 mcg by mouth daily. triamcinolone acetonide (KENALOG) 0.1 % topical cream       co-enzyme Q-10 (CO Q-10) 100 mg capsule Take 100 mg by mouth daily. OTHER Indications: Melaleuca Phytomega - Take 2 softgels twice a day. OTHER Indications: Melaleuca Vitality - Takes 1 tablet twice a day. ferrous sulfate 325 mg (65 mg iron) tablet Take  by mouth three (3) times daily (with meals). vitamin E (AQUA GEMS) 400 unit capsule Take 400 Units by mouth daily. cholecalciferol, vitamin D3, 2,000 unit tab Take 1 Tab by mouth daily. ascorbic acid (VITAMIN C) 250 mg tablet Take 500 mg by mouth two (2) times a day.        Allergies   Allergen Reactions    Lisinopril Cough     Family History   Problem Relation Age of Onset    No Known Problems Mother     No Known Problems Father     Stroke Sister        Patient Active Problem List    Diagnosis    Class 1 obesity due to excess calories without serious comorbidity with body mass index (BMI) of 33.0 to 33.9 in adult    Hypertension with renal disease    Mixed hyperlipidemia    Gastroesophageal reflux disease without esophagitis    Primary osteoarthritis involving multiple joints    COPD (chronic obstructive pulmonary disease) (HCC)    Stage 3 chronic kidney disease (HCC)    Chronic non-seasonal allergic rhinitis    Acquired hypothyroidism    Vitamin D deficiency    Neck pain    Skin lesion of chest wall     L upper chest/shoulder      Medicare annual wellness visit, subsequent [place] grade 3 cystocele    Incomplete prolapse of vaginal vault    Vaginal prolapse    Easy bruising    Urinary frequency    Alcohol screening    Dry mouth    Fungal dermatitis    Labyrinthitis of both ears    Acute non-recurrent maxillary sinusitis    Acute bronchitis    Precordial pain    Crohn disease (HCC)    OAB (overactive bladder)    Varicose vein of leg    Umbilical hernia    Lumbar herniated disc    Inguinal hernia    Hormone replacement therapy (HRT)    Back pain    Anxiety    Anemia    Right inguinal hernia    Ventral hernia without obstruction or gangrene       Patient Care Team:  Alexis Angel MD as PCP - General (Internal Medicine Physician)  Alexis Angel MD [facility] PCP [facility]  Julia Silverio MD (Gynecology)    Depression Risk Factor Screening:     3 most recent PHQ Screens 8/19/2022   Little interest or pleasure in doing things Not at all   Feeling down, depressed, irritable, or hopeless Several days   Total Score PHQ 2 1   Trouble falling or staying asleep, or sleeping too much -   Feeling tired or having little energy -   Poor appetite, weight loss, or overeating -   Feeling bad about yourself - or that you are a failure or have let yourself or your family down -   Trouble concentrating on things such as school, work, reading, or watching TV -   Moving or speaking so slowly that other people could have noticed; or the opposite being so fidgety that others notice -   Thoughts of being better off dead, or hurting yourself in some way -   PHQ 9 Score -     Alcohol Risk Factor Screening:   Do you average more than 1 drink per night or more than 7 drinks a week:  No    On any one occasion in the past three months have you have had more than 3 drinks containing alcohol:  No    Functional Ability and Level of Safety:     Fall Risk     Fall Risk Assessment, last 12 mths 8/19/2022   Able to walk? Yes   Fall in past 12 months? 1   Do you feel unsteady?  0   Are you worried about falling 0   Is TUG test greater than 12 seconds? 0   Is the gait abnormal? 0   Number of falls in past 12 months 1   Fall with injury? 0       Hearing Loss   mild    Activities of Daily Living   Self-care. ADL Assessment 8/19/2022   Feeding yourself No Help Needed   Getting from bed to chair No Help Needed   Getting dressed No Help Needed   Bathing or showering No Help Needed   Walk across the room (includes cane/walker) No Help Needed   Using the telphone No Help Needed   Taking your medications No Help Needed   Preparing meals No Help Needed   Managing money (expenses/bills) No Help Needed   Moderately strenuous housework (laundry) No Help Needed   Shopping for personal items (toiletries/medicines) No Help Needed   Shopping for groceries No Help Needed   Driving No Help Needed   Climbing a flight of stairs No Help Needed   Getting to places beyond walking distances No Help Needed       Abuse Screen   Patient is not abused    Social History     Social History Narrative    Not on file       Review of Systems      ROS:    Constitutional: She denies fevers, weight loss, sweats. Eyes: No blurred or double vision. ENT: No difficulty with swallowing, taste, speech or smell. NECK: no stiffness swelling or lymph node enlargement  Respiratory: No cough wheezing or shortness of breath. Cardiovascular: Denies chest pain, palpitations, unexplained indigestion or syncope. Breast: She has noted no masses or lumps and no discharge or axillary swelling  Gastrointestinal:  No changes in bowel movements, no abdominal pain, no bloating. Genitourinary: No discharge or abnormal bleeding or pain  Extremities: No joint pain, stiffness or swelling. Neurological:  No numbness, tingling, burring paresthesias or loss of motor strength. No syncope, dizziness or frequent headache  Skin:  No recent rashes or mole changes. Psychiatric/Behavioral:  Negative for depression. The patient is not nervous/anxious.    HEMATOLOGIC: no easy bruising or bleeding gums  Endocrine: no sweats of urinary frequency or excessive thirst     Physical Examination     Evaluation of Cognitive Function:  Mood/affect:  happy  Appearance: age appropriate  Family member/caregiver input:     Vitals:    08/19/22 1120 08/19/22 1139   BP: 124/82 118/76   Pulse: 71    Resp: 17    Temp: 97.7 °F (36.5 °C)    TempSrc: Oral    SpO2: 98%    Weight: 189 lb 8 oz (86 kg)    Height: 5' 6\" (1.676 m)    PainSc:   0 - No pain         PHYSICAL EXAM:    General appearance - alert, well appearing, and in no distress  Mental status - alert, oriented to person, place, and time  HEENT:  Ears - bilateral TM's and external ear canals clear  Eyes - pupillary responses were normal.  Extraocular muscle function intact. Lids and conjunctiva not injected. Fundoscopic exam revealed sharp disc margins. eye movements intact  Pharynx- clear with teeth in good repair. No masses were noted  Neck - supple without thyromegaly or burit. No JVD noted  Lungs - clear to auscultation and percussion  Cardiac- normal rate, regular rhythm without murmurs. PMI not displaced. No gallop, rub or click  Breast: deferred to GYN  Abdomen - flat, soft, non-tender without palpable organomegaly or mass. No pulsatile mass was felt, and not bruit was heard. Bowel sounds were active   Female - deferred to GYN  Rectal - deferred to GYN  Extremities -  no clubbing cyanosis or edema  Lymphatics - no palpable lymphadenopathy, no hepatosplenomegaly  Peripheral vascular - Dorsalis pedis and posterior tibial pulses felt without difficulty  Skin - no rash or unusual mole change noted  Neurological - Cranial nerves II-XII grossly intact. Motor strength 5/5. DTR's 2+ and symmetric.   Station and gait normal  Back exam - full range of motion, no tenderness, palpable spasm or pain on motion  Musculoskeletal - no joint tenderness, deformity or swelling  Hematologic: no purpura, petechiae or bruising     Results for orders placed or performed in visit on 05/20/22   CK   Result Value Ref Range    CK 58 26 - 192 U/L   LIPID PANEL   Result Value Ref Range    Cholesterol, total 147 <200 MG/DL    Triglyceride 74 <150 MG/DL    HDL Cholesterol 56 MG/DL    LDL, calculated 76.2 0 - 100 MG/DL    VLDL, calculated 14.8 MG/DL    CHOL/HDL Ratio 2.6 0.0 - 5.0     METABOLIC PANEL, COMPREHENSIVE   Result Value Ref Range    Sodium 135 (L) 136 - 145 mmol/L    Potassium 4.6 3.5 - 5.1 mmol/L    Chloride 105 97 - 108 mmol/L    CO2 23 21 - 32 mmol/L    Anion gap 7 5 - 15 mmol/L    Glucose 91 65 - 100 mg/dL    BUN 28 (H) 6 - 20 MG/DL    Creatinine 1.11 (H) 0.55 - 1.02 MG/DL    BUN/Creatinine ratio 25 (H) 12 - 20      GFR est AA 59 (L) >60 ml/min/1.73m2    GFR est non-AA 49 (L) >60 ml/min/1.73m2    Calcium 8.9 8.5 - 10.1 MG/DL    Bilirubin, total 0.5 0.2 - 1.0 MG/DL    ALT (SGPT) 36 12 - 78 U/L    AST (SGOT) 20 15 - 37 U/L    Alk. phosphatase 97 45 - 117 U/L    Protein, total 6.9 6.4 - 8.2 g/dL    Albumin 3.9 3.5 - 5.0 g/dL    Globulin 3.0 2.0 - 4.0 g/dL    A-G Ratio 1.3 1.1 - 2.2         Advice/Referrals/Counseling   Education and counseling provided:  Are appropriate based on today's review and evaluation  End-of-Life planning (with patient's consent)  Pneumococcal Vaccine  Influenza Vaccine  Colorectal cancer screening tests      Assessment/Plan     ASSESSMENT:   1. Hypertension with renal disease    2. Mixed hyperlipidemia    3. Gastroesophageal reflux disease without esophagitis    4. Primary osteoarthritis involving multiple joints    5. Stage 3 chronic kidney disease, unspecified whether stage 3a or 3b CKD ([facility] [address]    6. Chronic obstructive pulmonary disease, unspecified COPD type (HCC)    7. Class 1 obesity due to excess calories without serious comorbidity with body mass index (BMI) of 33.0 to 33.9 in adult    8. Acquired hypothyroidism    9. Chronic non-seasonal allergic rhinitis    10. Anemia, unspecified type    11.  Crohn's disease without complication, unspecified gastrointestinal tract location ([facility] [address]    12. Anxiety    13. Vitamin D deficiency    14. Alcohol screening    15. Medicare annual wellness visit, subsequent      Impression  1. Hypertension blood pressure seems to be a little on the low side and she is having dizziness with bending, standing up so I am decreasing Benicar from 40-20 daily and continuing amlodipine at 5  2. Hyperlipidemia prior lab reviewed repeat status pending  3. GERD that is stable  4. DJD that is stable   5. CKD stage III repeat status pending   6. COPD that is stable  7. Obesity I did discuss diet, exercise weight reduction for overall health benefit  8. Hypothyroidism repeat status pending  9. Allergic rhinitis stable  10. Repeat status pending  11. Crohn's disease no recent symptoms  12 anxiety stable  13 vitamin D deficiency repeat status pending   14. Annual alcohol screening is done she rarely drinks a social drink. We did spend 8 minutes discussing excessive alcohol intake in females who averaged more than 1 drink per day with increased cardiovascular risk and increased risk of liver disease and other GI problems. Medicare subsequent annual wellness examination screening questionnaires completed today. The results were reviewed with her and her  the questions were answered. Lifestyle recommendations modifications discussed and made. I will call the lab results and make further recommendations or adjustments if necessary. Follow-up 3 months or sooner should to be a problem. PLAN:  .  Orders Placed This Encounter    CBC WITH AUTOMATED DIFF    METABOLIC PANEL, COMPREHENSIVE    LIPID PANEL    CK    T4 (THYROXINE)    TSH 3RD GENERATION    URINALYSIS W/ REFLEX CULTURE    VITAMIN D, 25 HYDROXY    olmesartan (BENICAR) 20 mg tablet         ATTENTION:   This medical record was transcribed using an electronic medical records system.   Although proofread, it may and can contain electronic and spelling errors. Other human spelling and other errors may be present. Corrections may be executed at a later time. Please feel free to contact us for any clarifications as needed. Anusha Hammonds MD       Recommended healthy diet low in carbohydrates, fats, sodium and cholesterol. Recommended regular cardiovascular exercise 3-6 times per week for 30-60 minutes daily. Current Outpatient Medications   Medication Sig Dispense Refill    olmesartan (BENICAR) 20 mg tablet Take 1 Tablet by mouth daily. 90 Tablet 3    niacin ER (NIASPAN) 500 mg tablet TAKE 1 TABLET BY MOUTH AT BEDTIME 90 Tablet 0    estradioL (VIVELLE) 0.05 mg/24 hr APPLY 1 PATCH TOPICALLY TWICE A WEEK 24 Patch 0    potassium chloride (K-DUR, KLOR-CON M20) 20 mEq tablet take 1 tablet by mouth twice a day 180 Tablet 3    atorvastatin (LIPITOR) 20 mg tablet TAKE 1 TABLET EVERY NIGHT 90 Tablet 3    gabapentin (NEURONTIN) 100 mg capsule Take 100 mg by mouth. 1 cap by mouth at bedtime      etodolac (LODINE) 400 mg tablet Take 1 Tablet by mouth two (2) times daily (with meals). 60 Tablet prn    amLODIPine (NORVASC) 5 mg tablet Take 1 tablet by mouth once daily 90 Tablet 3    dexAMETHasone (DECADRON) 0.5 mg/5 mL elixir RINSE WITH 1 TEASPOON FOR TWO MINUTES AND SPIT 4 TIMES DAILY      polyethylene glycol (MIRALAX) 17 gram/dose powder Take 17 g by mouth daily. cyanocobalamin (VITAMIN B-12) 1,000 mcg sublingual tablet Take 1,000 mcg by mouth daily. triamcinolone acetonide (KENALOG) 0.1 % topical cream       co-enzyme Q-10 (CO Q-10) 100 mg capsule Take 100 mg by mouth daily. OTHER Indications: Melaleuca Phytomega - Take 2 softgels twice a day. OTHER Indications: Melaleuca Vitality - Takes 1 tablet twice a day. ferrous sulfate 325 mg (65 mg iron) tablet Take  by mouth three (3) times daily (with meals). vitamin E (AQUA GEMS) 400 unit capsule Take 400 Units by mouth daily.       cholecalciferol, vitamin D3, 2,000 unit tab Take 1 Tab by mouth daily. ascorbic acid (VITAMIN C) 250 mg tablet Take 500 mg by mouth two (2) times a day. No results found for any visits on 08/19/22. Verbal and written instructions (see AVS) provided. Patient expresses understanding of diagnosis and treatment plan.     Paola Francisco MD

## 2022-08-19 ENCOUNTER — OFFICE VISIT (OUTPATIENT)
Dept: INTERNAL MEDICINE CLINIC | Age: 67
End: 2022-08-19
Payer: MEDICARE

## 2022-08-19 VITALS
HEIGHT: 66 IN | TEMPERATURE: 97.7 F | OXYGEN SATURATION: 98 % | SYSTOLIC BLOOD PRESSURE: 118 MMHG | WEIGHT: 189.5 LBS | RESPIRATION RATE: 17 BRPM | HEART RATE: 71 BPM | BODY MASS INDEX: 30.46 KG/M2 | DIASTOLIC BLOOD PRESSURE: 76 MMHG

## 2022-08-19 DIAGNOSIS — K21.9 GASTROESOPHAGEAL REFLUX DISEASE WITHOUT ESOPHAGITIS: ICD-10-CM

## 2022-08-19 DIAGNOSIS — K50.90 CROHN'S DISEASE WITHOUT COMPLICATION, UNSPECIFIED GASTROINTESTINAL TRACT LOCATION (HCC): ICD-10-CM

## 2022-08-19 DIAGNOSIS — D64.9 ANEMIA, UNSPECIFIED TYPE: ICD-10-CM

## 2022-08-19 DIAGNOSIS — J44.9 CHRONIC OBSTRUCTIVE PULMONARY DISEASE, UNSPECIFIED COPD TYPE (HCC): ICD-10-CM

## 2022-08-19 DIAGNOSIS — Z13.39 ALCOHOL SCREENING: ICD-10-CM

## 2022-08-19 DIAGNOSIS — E66.09 CLASS 1 OBESITY DUE TO EXCESS CALORIES WITHOUT SERIOUS COMORBIDITY WITH BODY MASS INDEX (BMI) OF 33.0 TO 33.9 IN ADULT: ICD-10-CM

## 2022-08-19 DIAGNOSIS — E55.9 VITAMIN D DEFICIENCY: ICD-10-CM

## 2022-08-19 DIAGNOSIS — E03.9 ACQUIRED HYPOTHYROIDISM: ICD-10-CM

## 2022-08-19 DIAGNOSIS — I12.9 HYPERTENSION WITH RENAL DISEASE: Primary | ICD-10-CM

## 2022-08-19 DIAGNOSIS — Z00.00 MEDICARE ANNUAL WELLNESS VISIT, SUBSEQUENT: ICD-10-CM

## 2022-08-19 DIAGNOSIS — N18.30 STAGE 3 CHRONIC KIDNEY DISEASE, UNSPECIFIED WHETHER STAGE 3A OR 3B CKD (HCC): ICD-10-CM

## 2022-08-19 DIAGNOSIS — E78.2 MIXED HYPERLIPIDEMIA: ICD-10-CM

## 2022-08-19 DIAGNOSIS — J30.89 CHRONIC NON-SEASONAL ALLERGIC RHINITIS: ICD-10-CM

## 2022-08-19 DIAGNOSIS — M15.9 PRIMARY OSTEOARTHRITIS INVOLVING MULTIPLE JOINTS: ICD-10-CM

## 2022-08-19 DIAGNOSIS — F41.9 ANXIETY: ICD-10-CM

## 2022-08-19 PROCEDURE — 1123F ACP DISCUSS/DSCN MKR DOCD: CPT | Performed by: INTERNAL MEDICINE

## 2022-08-19 PROCEDURE — 1101F PT FALLS ASSESS-DOCD LE1/YR: CPT | Performed by: INTERNAL MEDICINE

## 2022-08-19 PROCEDURE — G0442 ANNUAL ALCOHOL SCREEN 15 MIN: HCPCS | Performed by: INTERNAL MEDICINE

## 2022-08-19 PROCEDURE — G8510 SCR DEP NEG, NO PLAN REQD: HCPCS | Performed by: INTERNAL MEDICINE

## 2022-08-19 PROCEDURE — 1090F PRES/ABSN URINE INCON ASSESS: CPT | Performed by: INTERNAL MEDICINE

## 2022-08-19 PROCEDURE — G8427 DOCREV CUR MEDS BY ELIG CLIN: HCPCS | Performed by: INTERNAL MEDICINE

## 2022-08-19 PROCEDURE — G8399 PT W/DXA RESULTS DOCUMENT: HCPCS | Performed by: INTERNAL MEDICINE

## 2022-08-19 PROCEDURE — G8417 CALC BMI ABV UP PARAM F/U: HCPCS | Performed by: INTERNAL MEDICINE

## 2022-08-19 PROCEDURE — 99214 OFFICE O/P EST MOD 30 MIN: CPT | Performed by: INTERNAL MEDICINE

## 2022-08-19 PROCEDURE — G8536 NO DOC ELDER MAL SCRN: HCPCS | Performed by: INTERNAL MEDICINE

## 2022-08-19 PROCEDURE — G0439 PPPS, SUBSEQ VISIT: HCPCS | Performed by: INTERNAL MEDICINE

## 2022-08-19 PROCEDURE — 3017F COLORECTAL CA SCREEN DOC REV: CPT | Performed by: INTERNAL MEDICINE

## 2022-08-19 RX ORDER — OLMESARTAN MEDOXOMIL 20 MG/1
20 TABLET ORAL DAILY
Qty: 90 TABLET | Refills: 3 | Status: SHIPPED | OUTPATIENT
Start: 2022-08-19

## 2022-08-19 NOTE — PROGRESS NOTES
Chief Complaint   Patient presents with    Annual Wellness Visit     Visit Vitals  /82 (BP 1 Location: Left upper arm, BP Patient Position: Sitting, BP Cuff Size: Adult)   Pulse 71   Temp 97.7 °F (36.5 °C) (Oral)   Resp 17   Ht 5' 6\" (1.676 m)   Wt 189 lb 8 oz (86 kg)   LMP  (LMP Unknown)   SpO2 98%   BMI 30.59 kg/m²     1. Have you been to the ER, urgent care clinic since your last visit? Hospitalized since your last visit? No    2. Have you seen or consulted any other health care providers outside of the [address] [place] [address] since your last visit? Include any pap smears or colon screening.  Has seen urology since last visit      Depression Risk Factor Screening:     3 most recent PHQ Screens 8/19/2022   Little interest or pleasure in doing things Not at all   Feeling down, depressed, irritable, or hopeless Several days   Total Score PHQ 2 1   Trouble falling or staying asleep, or sleeping too much -   Feeling tired or having little energy -   Poor appetite, weight loss, or overeating -   Feeling bad about yourself - or that you are a failure or have let yourself or your family down -   Trouble concentrating on things such as school, work, reading, or watching TV -   Moving or speaking so slowly that other people could have noticed; or the opposite being so fidgety that others notice -   Thoughts of being better off dead, or hurting yourself in some way -   PHQ 9 Score -       Functional Ability and Level of Safety:     Activities of Daily Living  ADL Assessment 8/19/2022   Feeding yourself No Help Needed   Getting from bed to chair No Help Needed   Getting dressed No Help Needed   Bathing or showering No Help Needed   Walk across the room (includes cane/walker) No Help Needed   Using the telphone No Help Needed   Taking your medications No Help Needed   Preparing meals No Help Needed   Managing money (expenses/bills) No Help Needed   Moderately strenuous housework (laundry) No Help Needed   Shopping for personal items (toiletries/medicines) No Help Needed   Shopping for groceries No Help Needed   Driving No Help Needed   Climbing a flight of stairs No Help Needed   Getting to places beyond walking distances No Help Needed       Fall Risk  Fall Risk Assessment, last 12 mths 8/19/2022   Able to walk? Yes   Fall in past 12 months? 1   Do you feel unsteady? 0   Are you worried about falling 0   Is TUG test greater than 12 seconds? 0   Is the gait abnormal? 0   Number of falls in past 12 months 1   Fall with injury? 0       Abuse Screen  Abuse Screening Questionnaire 8/19/2022   Do you ever feel afraid of your partner? N   Are you in a relationship with someone who physically or mentally threatens you? N   Is it safe for you to go home?  Y         Patient Care Team   Patient Care Team:  Jeanne Neal MD as PCP - General (Internal Medicine Physician)  Jeanne Neal MD as PCP - [facility] Empaneled Provider  Amanda Green MD (Gynecology)

## 2022-08-19 NOTE — PATIENT INSTRUCTIONS

## 2022-08-20 LAB
25(OH)D3 SERPL-MCNC: 56.4 NG/ML (ref 30–100)
ALBUMIN SERPL-MCNC: 3.8 G/DL (ref 3.5–5)
ALBUMIN/GLOB SERPL: 1.2 {RATIO} (ref 1.1–2.2)
ALP SERPL-CCNC: 105 U/L (ref 45–117)
ALT SERPL-CCNC: 31 U/L (ref 12–78)
ANION GAP SERPL CALC-SCNC: 4 MMOL/L (ref 5–15)
APPEARANCE UR: ABNORMAL
AST SERPL-CCNC: 17 U/L (ref 15–37)
BACTERIA URNS QL MICRO: ABNORMAL /HPF
BASOPHILS # BLD: 0 K/UL (ref 0–0.1)
BASOPHILS NFR BLD: 0 % (ref 0–1)
BILIRUB SERPL-MCNC: 0.6 MG/DL (ref 0.2–1)
BILIRUB UR QL: NEGATIVE
BUN SERPL-MCNC: 27 MG/DL (ref 6–20)
BUN/CREAT SERPL: 26 (ref 12–20)
CALCIUM SERPL-MCNC: 9.6 MG/DL (ref 8.5–10.1)
CHLORIDE SERPL-SCNC: 107 MMOL/L (ref 97–108)
CHOLEST SERPL-MCNC: 145 MG/DL
CK SERPL-CCNC: 63 U/L (ref 26–192)
CO2 SERPL-SCNC: 28 MMOL/L (ref 21–32)
COLOR UR: ABNORMAL
CREAT SERPL-MCNC: 1.04 MG/DL (ref 0.55–1.02)
DIFFERENTIAL METHOD BLD: ABNORMAL
EOSINOPHIL # BLD: 0.2 K/UL (ref 0–0.4)
EOSINOPHIL NFR BLD: 4 % (ref 0–7)
EPITH CASTS URNS QL MICRO: ABNORMAL /LPF
ERYTHROCYTE [DISTWIDTH] IN BLOOD BY AUTOMATED COUNT: 13.4 % (ref 11.5–14.5)
GLOBULIN SER CALC-MCNC: 3.2 G/DL (ref 2–4)
GLUCOSE SERPL-MCNC: 94 MG/DL (ref 65–100)
GLUCOSE UR STRIP.AUTO-MCNC: NEGATIVE MG/DL
HCT VFR BLD AUTO: 38.1 % (ref 35–47)
HDLC SERPL-MCNC: 60 MG/DL
HDLC SERPL: 2.4 {RATIO} (ref 0–5)
HGB BLD-MCNC: 11.9 G/DL (ref 11.5–16)
HGB UR QL STRIP: NEGATIVE
IMM GRANULOCYTES # BLD AUTO: 0 K/UL (ref 0–0.04)
IMM GRANULOCYTES NFR BLD AUTO: 0 % (ref 0–0.5)
KETONES UR QL STRIP.AUTO: ABNORMAL MG/DL
LDLC SERPL CALC-MCNC: 65.6 MG/DL (ref 0–100)
LEUKOCYTE ESTERASE UR QL STRIP.AUTO: NEGATIVE
LYMPHOCYTES # BLD: 0.6 K/UL (ref 0.8–3.5)
LYMPHOCYTES NFR BLD: 14 % (ref 12–49)
MCH RBC QN AUTO: 32.2 PG (ref 26–34)
MCHC RBC AUTO-ENTMCNC: 31.2 G/DL (ref 30–36.5)
MCV RBC AUTO: 103.3 FL (ref 80–99)
MONOCYTES # BLD: 0.7 K/UL (ref 0–1)
MONOCYTES NFR BLD: 16 % (ref 5–13)
NEUTS SEG # BLD: 2.7 K/UL (ref 1.8–8)
NEUTS SEG NFR BLD: 66 % (ref 32–75)
NITRITE UR QL STRIP.AUTO: NEGATIVE
NRBC # BLD: 0 K/UL (ref 0–0.01)
NRBC BLD-RTO: 0 PER 100 WBC
PH UR STRIP: 5.5 [PH] (ref 5–8)
PLATELET # BLD AUTO: 212 K/UL (ref 150–400)
PMV BLD AUTO: 10.6 FL (ref 8.9–12.9)
POTASSIUM SERPL-SCNC: 4.9 MMOL/L (ref 3.5–5.1)
PROT SERPL-MCNC: 7 G/DL (ref 6.4–8.2)
PROT UR STRIP-MCNC: NEGATIVE MG/DL
RBC # BLD AUTO: 3.69 M/UL (ref 3.8–5.2)
RBC #/AREA URNS HPF: ABNORMAL /HPF (ref 0–5)
RBC MORPH BLD: ABNORMAL
SODIUM SERPL-SCNC: 139 MMOL/L (ref 136–145)
SP GR UR REFRACTOMETRY: 1.03
T4 SERPL-MCNC: 7.6 UG/DL (ref 4.8–13.9)
TRIGL SERPL-MCNC: 97 MG/DL (ref ?–150)
TSH SERPL DL<=0.05 MIU/L-ACNC: 0.95 UIU/ML (ref 0.36–3.74)
UA: UC IF INDICATED,UAUC: ABNORMAL
UROBILINOGEN UR QL STRIP.AUTO: 0.2 EU/DL (ref 0.2–1)
VLDLC SERPL CALC-MCNC: 19.4 MG/DL
WBC # BLD AUTO: 4.2 K/UL (ref 3.6–11)
WBC URNS QL MICRO: ABNORMAL /HPF (ref 0–4)

## 2022-08-25 RX ORDER — DOXYCYCLINE 100 MG/1
100 CAPSULE ORAL 2 TIMES DAILY
Qty: 14 CAPSULE | Refills: 0 | OUTPATIENT
Start: 2022-08-25

## 2022-08-25 NOTE — PROGRESS NOTES
Called and spoke to patient  Two pt identifiers confirmed  Informed patient per Dr. Amari Enrique that labs are ok except that she has urine infection and to treat with doxycycline 100 mg twice daily x 7 days  Rx was called into [facility]  Pt will get follow up urinalysis 3-5 days after finishing antibiotic  Patient verbalized understanding of information discussed  with no further questions at this time.

## 2022-08-25 NOTE — TELEPHONE ENCOUNTER
Per Dr. Senait Downey pt has uti and treat with doxycyline 100 mg twice daily x 7 days  Rx was called into [facility]    PCP: Pineda Francisco MD    Last appt: 8/19/2022  Future Appointments   Date Time Provider Diana Allie   11/22/2022 10:10 AM Pineda Francisco MD [facility]       Last refilled:-    Requested Prescriptions     Pending Prescriptions Disp Refills    doxycycline (VIBRAMYCIN) 100 mg capsule 14 Capsule 0     Sig: Take 1 Capsule by mouth two (2) times a day.

## 2022-09-08 ENCOUNTER — LAB ONLY (OUTPATIENT)
Dept: INTERNAL MEDICINE CLINIC | Age: 67
End: 2022-09-08

## 2022-09-08 DIAGNOSIS — N39.0 FREQUENT UTI: Primary | ICD-10-CM

## 2022-09-09 LAB
APPEARANCE UR: CLEAR
BACTERIA SPEC CULT: NORMAL
BILIRUB UR QL: NEGATIVE
COLOR UR: ABNORMAL
GLUCOSE UR STRIP.AUTO-MCNC: NEGATIVE MG/DL
HGB UR QL STRIP: NEGATIVE
KETONES UR QL STRIP.AUTO: ABNORMAL MG/DL
LEUKOCYTE ESTERASE UR QL STRIP.AUTO: NEGATIVE
NITRITE UR QL STRIP.AUTO: NEGATIVE
PH UR STRIP: 5.5 [PH] (ref 5–8)
PROT UR STRIP-MCNC: NEGATIVE MG/DL
SERVICE CMNT-IMP: NORMAL
SP GR UR REFRACTOMETRY: 1.03 (ref 1–1.03)
UROBILINOGEN UR QL STRIP.AUTO: 0.2 EU/DL (ref 0.2–1)

## 2022-09-17 PROBLEM — Z00.00 MEDICARE ANNUAL WELLNESS VISIT, SUBSEQUENT: Status: RESOLVED | Noted: 2021-08-11 | Resolved: 2022-09-17

## 2022-09-21 RX ORDER — DICLOFENAC SODIUM 75 MG/1
75 TABLET, DELAYED RELEASE ORAL 2 TIMES DAILY
Qty: 60 TABLET | Refills: 5 | Status: SHIPPED | OUTPATIENT
Start: 2022-09-21

## 2022-09-21 NOTE — TELEPHONE ENCOUNTER
RX refill request from the patient/pharmacy. Patient last seen 09- with labs, and next appt. scheduled for 11-  Requested Prescriptions     Pending Prescriptions Disp Refills    diclofenac EC (VOLTAREN) 75 mg EC tablet 60 Tablet 5     Sig: Take 1 Tablet by mouth two (2) times a day. Ricardo Kumar

## 2022-09-26 RX ORDER — NIACIN 500 MG/1
TABLET, EXTENDED RELEASE ORAL
Qty: 90 TABLET | Refills: 3 | Status: SHIPPED | OUTPATIENT
Start: 2022-09-26

## 2022-09-26 NOTE — TELEPHONE ENCOUNTER
RX refill request from the patient/pharmacy. Patient last seen 07- with labs, and next appt. scheduled for 10-  Requested Prescriptions     Pending Prescriptions Disp Refills    niacin ER (NIASPAN) 500 mg tablet [Pharmacy Med Name: Niacin ER (Antihyperlipidemic) 500 MG Oral Tablet Extended Release] 90 Tablet 3     Sig: TAKE 1 TABLET BY MOUTH AT BEDTIME    .

## 2022-10-10 RX ORDER — ESTRADIOL 0.05 MG/D
FILM, EXTENDED RELEASE TRANSDERMAL
Qty: 24 PATCH | Refills: 1 | Status: SHIPPED | OUTPATIENT
Start: 2022-10-10

## 2022-10-10 NOTE — TELEPHONE ENCOUNTER
RX refill request from the patient/pharmacy. Patient last seen 08- with labs, and next appt. scheduled for 11-  Requested Prescriptions     Pending Prescriptions Disp Refills    estradioL (VIVELLE) 0.05 mg/24 hr [Pharmacy Med Name: ESTRADIOL 0.05MG PATCH (TWICE WK)] 24 Patch 1     Sig: APPLY 1 PATCH TOPICALLY TWICE A WEEK    .

## 2022-10-25 RX ORDER — AMLODIPINE BESYLATE 5 MG/1
TABLET ORAL
Qty: 90 TABLET | Refills: 3 | Status: SHIPPED | OUTPATIENT
Start: 2022-10-25

## 2022-10-25 NOTE — TELEPHONE ENCOUNTER
RX refill request from the patient/pharmacy. Patient last seen 08- with labs, and next appt. scheduled for 11-  Requested Prescriptions     Pending Prescriptions Disp Refills    amLODIPine (NORVASC) 5 mg tablet [Pharmacy Med Name: amLODIPine Besylate 5 MG Oral Tablet] 90 Tablet 3     Sig: Take 1 tablet by mouth once daily    .

## 2022-11-11 ENCOUNTER — OFFICE VISIT (OUTPATIENT)
Dept: INTERNAL MEDICINE CLINIC | Age: 67
End: 2022-11-11
Payer: MEDICARE

## 2022-11-11 VITALS
RESPIRATION RATE: 16 BRPM | TEMPERATURE: 98.2 F | DIASTOLIC BLOOD PRESSURE: 82 MMHG | SYSTOLIC BLOOD PRESSURE: 128 MMHG | OXYGEN SATURATION: 98 % | BODY MASS INDEX: 31.4 KG/M2 | HEART RATE: 72 BPM | WEIGHT: 195.4 LBS | HEIGHT: 66 IN

## 2022-11-11 DIAGNOSIS — M54.6 ACUTE RIGHT-SIDED THORACIC BACK PAIN: ICD-10-CM

## 2022-11-11 DIAGNOSIS — R07.9 CHEST PAIN, UNSPECIFIED TYPE: Primary | ICD-10-CM

## 2022-11-11 PROCEDURE — G8510 SCR DEP NEG, NO PLAN REQD: HCPCS | Performed by: INTERNAL MEDICINE

## 2022-11-11 PROCEDURE — 3017F COLORECTAL CA SCREEN DOC REV: CPT | Performed by: INTERNAL MEDICINE

## 2022-11-11 PROCEDURE — 1090F PRES/ABSN URINE INCON ASSESS: CPT | Performed by: INTERNAL MEDICINE

## 2022-11-11 PROCEDURE — 99213 OFFICE O/P EST LOW 20 MIN: CPT | Performed by: INTERNAL MEDICINE

## 2022-11-11 PROCEDURE — G8536 NO DOC ELDER MAL SCRN: HCPCS | Performed by: INTERNAL MEDICINE

## 2022-11-11 PROCEDURE — G8399 PT W/DXA RESULTS DOCUMENT: HCPCS | Performed by: INTERNAL MEDICINE

## 2022-11-11 PROCEDURE — 1101F PT FALLS ASSESS-DOCD LE1/YR: CPT | Performed by: INTERNAL MEDICINE

## 2022-11-11 PROCEDURE — 1123F ACP DISCUSS/DSCN MKR DOCD: CPT | Performed by: INTERNAL MEDICINE

## 2022-11-11 PROCEDURE — G8417 CALC BMI ABV UP PARAM F/U: HCPCS | Performed by: INTERNAL MEDICINE

## 2022-11-11 PROCEDURE — G8427 DOCREV CUR MEDS BY ELIG CLIN: HCPCS | Performed by: INTERNAL MEDICINE

## 2022-11-11 RX ORDER — PREDNISONE 5 MG/1
TABLET ORAL
Qty: 48 TABLET | Refills: 0 | Status: SHIPPED | OUTPATIENT
Start: 2022-11-11 | End: 2022-11-22 | Stop reason: ALTCHOICE

## 2022-11-11 NOTE — PROGRESS NOTES
Chief Complaint   Patient presents with    Shoulder Pain     Right shoulder pain x 2 weeks     Visit Vitals  /82 (BP 1 Location: Left upper arm, BP Patient Position: Sitting, BP Cuff Size: Adult)   Pulse 72   Temp 98.2 °F (36.8 °C) (Oral)   Resp 16   Ht 5' 6\" (1.676 m)   Wt 195 lb 6.4 oz (88.6 kg)   LMP  (LMP Unknown)   SpO2 98%   BMI 31.54 kg/m²     1. Have you been to the ER, urgent care clinic since your last visit? Hospitalized since your last visit? No    2. Have you seen or consulted any other health care providers outside of the [address] [place] [address] since your last visit? Include any pap smears or colon screening.  No

## 2022-11-11 NOTE — PROGRESS NOTES
Subjective:   Wilberto Elena is a 75 y.o. female      Chief Complaint   Patient presents with    Shoulder Pain     Right shoulder pain x 2 weeks        History of present illness: She presents complaints of pain in her back/chest area that has been present now for about 2 weeks. She initially seem to have some pain in the right hip but that has resolved. She notes most of the time she will have pain on joint or air the body will last for couple days and go away but this area in the back/chest and right shoulder blade has not gone away now for 2 weeks. She notes no history of falls or trauma. There is no respiratory variation. She notes no exertional variation. There is no change with food intake. She has no bowel or bladder dysfunction or change in urination. She notes no nausea vomiting. She has had no fevers or chills or night sweats.     Patient Active Problem List   Diagnosis Code    Right inguinal hernia K40.90    Ventral hernia without obstruction or gangrene K43.9    Chronic non-seasonal allergic rhinitis J30.89    Crohn disease ([address] K50.90    OAB (overactive bladder) N32.81    Varicose vein of leg U17.39    Umbilical hernia Q41.5    Class 1 obesity due to excess calories without serious comorbidity with body mass index (BMI) of 33.0 to 33.9 in adult E66.09, Z68.33    Lumbar herniated disc M51.26    Inguinal hernia K40.90    Acquired hypothyroidism E03.9    Hypertension with renal disease I12.9    Mixed hyperlipidemia E78.2    Hormone replacement therapy (HRT) Z79.890    Gastroesophageal reflux disease without esophagitis K21.9    Primary osteoarthritis involving multiple joints M15.9    COPD (chronic obstructive pulmonary disease) (HCC) J44.9    Stage 3 chronic kidney disease (HCC) N18.30    Back pain M54.9    Anxiety F41.9    Anemia D64.9    Chest pain R07.9    Acute bronchitis J20.9    Labyrinthitis of both ears H83.03    Acute non-recurrent maxillary sinusitis J01.00    Fungal dermatitis B36.9 Dry mouth R68.2    Alcohol screening Z13.39    Urinary frequency R35.0    Easy bruising R23.3    Corn-Walker grade 3 cystocele N81.10    Incomplete prolapse of vaginal vault N99.3    Vaginal prolapse N81.10    Skin lesion of chest wall L98.9    Neck pain M54.2    Vitamin D deficiency E55.9    Acute right-sided thoracic back pain M54.6      Past Medical History:   Diagnosis Date    Allergic rhinitis 8/9/2017    Anemia 8/9/2017    Anxiety 8/9/2017    Arrhythmia     irregular heart beat hx and beating fast per patient/no cardiologist    Arthritis     JOINTS  WORSE WAIST DOWN     Back pain 8/9/2017    Chronic kidney disease     Stage III    CKD (chronic kidney disease) 8/9/2017    COPD (chronic obstructive pulmonary disease) ([facility] 8/9/2017    Crohn disease ([facility] 8/9/2017    DJD (degenerative joint disease) 8/9/2017    Dry mouth     evaluated by Dr. Miles Fitzpatrick (ENT)    GERD (gastroesophageal reflux disease) 8/9/2017    Hormone replacement therapy (HRT) 8/9/2017    Hyperlipidemia 8/9/2017    Hypertension     Hypertension with renal disease 8/9/2017    Hypokalemia 8/9/2017    Hypothyroid 8/9/2017    Ill-defined condition     neuropathy feet    Inguinal hernia 8/9/2017    Leg numbness 8/9/2017    Lumbar herniated disc 8/9/2017    OAB (overactive bladder) 8/9/2017    Obesity 8/9/2017    On statin therapy 8/9/2017    Osteoarthritis 8/9/2017    Pelvic pain in female 6/6/1050    Umbilical hernia 1/8/1479    Varicose vein of leg 8/9/2017      Allergies   Allergen Reactions    Lisinopril Cough      Family History   Problem Relation Age of Onset    No Known Problems Mother     No Known Problems Father     Stroke Sister       Social History     Socioeconomic History    Marital status:      Spouse name: Not on file    Number of children: Not on file    Years of education: Not on file    Highest education level: Not on file   Occupational History    Not on file   Tobacco Use    Smoking status: Never    Smokeless tobacco: Never   Vaping Use    Vaping Use: Never used   Substance and Sexual Activity    Alcohol use: Yes     Comment: once a year    Drug use: No    Sexual activity: Yes     Partners: Male   Other Topics Concern    Not on file   Social History Narrative    Not on file     Social Determinants of Health     Financial Resource Strain: Not on file   Food Insecurity: Not on file   Transportation Needs: Not on file   Physical Activity: Not on file   Stress: Not on file   Social Connections: Not on file   Intimate Partner Violence: Not on file   Housing Stability: Not on file     Prior to Admission medications    Medication Sig Start Date End Date Taking? Authorizing Provider   amLODIPine (NORVASC) 5 mg tablet Take 1 tablet by mouth once daily 10/25/22  Yes Mely Carmichael MD   estradioL (VIVELLE) 0.05 mg/24 hr APPLY 1 PATCH TOPICALLY TWICE A WEEK 10/10/22  Yes Mely Carmichael MD   niacin ER (NIASPAN) 500 mg tablet TAKE 1 TABLET BY MOUTH AT BEDTIME 9/26/22  Yes Mely Carmichael MD   diclofenac EC (VOLTAREN) 75 mg EC tablet Take 1 Tablet by mouth two (2) times a day. 9/21/22  Yes Mely Carmichael MD   olmesartan (BENICAR) 20 mg tablet Take 1 Tablet by mouth daily. 8/19/22  Yes Mely Carmichael MD   potassium chloride (K-DUR, KLOR-CON M20) 20 mEq tablet take 1 tablet by mouth twice a day 4/25/22  Yes Mely Carmichael MD   atorvastatin (LIPITOR) 20 mg tablet TAKE 1 TABLET EVERY NIGHT 3/13/22  Yes Mely Carmichael MD   gabapentin (NEURONTIN) 100 mg capsule Take 100 mg by mouth. 1 cap by mouth at bedtime   Yes Provider, Historical   dexAMETHasone (DECADRON) 0.5 mg/5 mL elixir RINSE WITH 1 TEASPOON FOR TWO MINUTES AND SPIT 4 TIMES DAILY 4/28/21  Yes Provider, Historical   polyethylene glycol (MIRALAX) 17 gram/dose powder Take 17 g by mouth daily. Yes Provider, Historical   cyanocobalamin (VITAMIN B-12) 1,000 mcg sublingual tablet Take 1,000 mcg by mouth daily.    Yes Provider, Historical   triamcinolone acetonide (KENALOG) 0.1 % topical cream  3/17/20  Yes Provider, Historical   co-enzyme Q-10 (CO Q-10) 100 mg capsule Take 100 mg by mouth daily. Yes Provider, Historical   OTHER Indications: Melaleuca Phytomega - Take 2 softgels twice a day. Yes Provider, Historical   OTHER Indications: Melaleuca Vitality - Takes 1 tablet twice a day. Yes Provider, Historical   ferrous sulfate 325 mg (65 mg iron) tablet Take  by mouth three (3) times daily (with meals). Yes Provider, Historical   vitamin E (AQUA GEMS) 400 unit capsule Take 400 Units by mouth daily. 3/16/17  Yes Gilford Sender, TORRES   cholecalciferol, vitamin D3, 2,000 unit tab Take 1 Tab by mouth daily. Yes Provider, Historical   ascorbic acid (VITAMIN C) 250 mg tablet Take 500 mg by mouth two (2) times a day. Yes Provider, Historical        Review of Systems              Constitutional:  She denies fever, weight loss, sweats or fatigue. EYES: No blurred or double vision,               ENT: no nasal congestion, no headache or dizziness. No difficulty with swallowing, taste, speech or smell. Respiratory:  No cough, wheezing or shortness of breath. No sputum production. Right chest pain and thoracic back pain  Cardiac:  Denies chest pain, palpitations, unexplained indigestion, syncope, edema, PND or orthopnea. GI:  No changes in bowel movements, no abdominal pain, no bloating, anorexia, nausea, vomiting or heartburn. :  No frequency or dysuria. Denies incontinence or sexual dysfunction. Extremities:  No joint pain, stiffness or swelling  Back:.no pain or soreness  Skin:  No recent rashes or mole changes. Neurological:  No numbness, tingling, burning paresthesias or loss of motor strength. No syncope, dizziness, frequent headaches or memory loss.   Hematologic:  No easy bruising  Lymphatic: No lymph node enlargement    Objective:     Vitals:    11/11/22 1443   BP: 128/82   Pulse: 72   Resp: 16   Temp: 98.2 °F (36.8 °C)   TempSrc: Oral   SpO2: 98%   Weight: 195 lb 6.4 oz (88.6 kg)   Height: 5' 6\" (1.676 m)   PainSc:  10 - Worst pain ever   PainLoc: Shoulder       Body mass index is 31.54 kg/m². Physical Examination:              General Appearance:  Well-developed, well-nourished, no acute distress. HEENT:      Ears:  The TMs and ear canals were clear. Eyes:  The pupillary responses were normal.  Extraocular muscle function intact. Lids and conjunctiva not injected. Funduscopic exam revealed sharp disc margins. Nares: Clear w/o edema or erythema  Pharynx:  Clear with teeth in good repair. No masses were noted. Neck:  Supple without thyromegaly or adenopathy. No JVD noted. No carotid                bruits. Lungs:  Clear to auscultation and percussion. Cardiac:  Regular rate and rhythm without murmur. PMI not displaced. No gallop, rub or click. Abdominal: Soft, non-tender, no hepata-spleenomegally or masses  Extremities:  No clubbing, cyanosis or edema. Skin:  No rash or unusual mole changes noted. Lymph Nodes:  None felt in the cervical, supraclavicular, axillary or inguinal region. Neurological: . DTRs 2+ and symmetric. Station and gait normal.   Hematologic:   No purpura or petechiae        Assessment/Plan:         1. Chest pain, unspecified type    2. Acute right-sided thoracic back pain        Impressions/Plan:  Impression  1. Chest pain which appears to be more chest wall pain which I think is referred for back  2. Thoracic back pain. I did a chest x-ray today which is normal as well as doing a thoracic spine film which shows some arthritic changes. We will go and try a steroid Dosepak and she will notify me if this is not effective. If it is effective we will see her as previously scheduled. Orders Placed This Encounter    XR SPINE THORAC 2 V    XR CHEST PA LAT       Follow-up and Dispositions    Return At prior scheduled appointment. No results found for any visits on 11/11/22.       Vickie Bailey Rich Simpson MD    The patient was given after the visit summary the patient verbalized an understanding of the plans and problems as explained.

## 2022-11-22 ENCOUNTER — OFFICE VISIT (OUTPATIENT)
Dept: INTERNAL MEDICINE CLINIC | Age: 67
End: 2022-11-22
Payer: MEDICARE

## 2022-11-22 VITALS
BODY MASS INDEX: 30.86 KG/M2 | OXYGEN SATURATION: 95 % | HEART RATE: 69 BPM | RESPIRATION RATE: 16 BRPM | SYSTOLIC BLOOD PRESSURE: 120 MMHG | HEIGHT: 66 IN | TEMPERATURE: 97.5 F | DIASTOLIC BLOOD PRESSURE: 82 MMHG | WEIGHT: 192 LBS

## 2022-11-22 DIAGNOSIS — M15.9 PRIMARY OSTEOARTHRITIS INVOLVING MULTIPLE JOINTS: ICD-10-CM

## 2022-11-22 DIAGNOSIS — K50.90 CROHN'S DISEASE WITHOUT COMPLICATION, UNSPECIFIED GASTROINTESTINAL TRACT LOCATION (HCC): ICD-10-CM

## 2022-11-22 DIAGNOSIS — K21.9 GASTROESOPHAGEAL REFLUX DISEASE WITHOUT ESOPHAGITIS: ICD-10-CM

## 2022-11-22 DIAGNOSIS — J44.9 CHRONIC OBSTRUCTIVE PULMONARY DISEASE, UNSPECIFIED COPD TYPE (HCC): ICD-10-CM

## 2022-11-22 DIAGNOSIS — Z23 NEEDS FLU SHOT: ICD-10-CM

## 2022-11-22 DIAGNOSIS — N18.30 STAGE 3 CHRONIC KIDNEY DISEASE, UNSPECIFIED WHETHER STAGE 3A OR 3B CKD (HCC): ICD-10-CM

## 2022-11-22 DIAGNOSIS — E78.2 MIXED HYPERLIPIDEMIA: ICD-10-CM

## 2022-11-22 DIAGNOSIS — I12.9 HYPERTENSION WITH RENAL DISEASE: Primary | ICD-10-CM

## 2022-11-22 DIAGNOSIS — E66.09 CLASS 1 OBESITY DUE TO EXCESS CALORIES WITHOUT SERIOUS COMORBIDITY WITH BODY MASS INDEX (BMI) OF 33.0 TO 33.9 IN ADULT: ICD-10-CM

## 2022-11-22 PROCEDURE — G8427 DOCREV CUR MEDS BY ELIG CLIN: HCPCS | Performed by: INTERNAL MEDICINE

## 2022-11-22 PROCEDURE — G8510 SCR DEP NEG, NO PLAN REQD: HCPCS | Performed by: INTERNAL MEDICINE

## 2022-11-22 PROCEDURE — 3017F COLORECTAL CA SCREEN DOC REV: CPT | Performed by: INTERNAL MEDICINE

## 2022-11-22 PROCEDURE — G0008 ADMIN INFLUENZA VIRUS VAC: HCPCS | Performed by: INTERNAL MEDICINE

## 2022-11-22 PROCEDURE — 99214 OFFICE O/P EST MOD 30 MIN: CPT | Performed by: INTERNAL MEDICINE

## 2022-11-22 PROCEDURE — G8399 PT W/DXA RESULTS DOCUMENT: HCPCS | Performed by: INTERNAL MEDICINE

## 2022-11-22 PROCEDURE — 1123F ACP DISCUSS/DSCN MKR DOCD: CPT | Performed by: INTERNAL MEDICINE

## 2022-11-22 PROCEDURE — 1101F PT FALLS ASSESS-DOCD LE1/YR: CPT | Performed by: INTERNAL MEDICINE

## 2022-11-22 PROCEDURE — 1090F PRES/ABSN URINE INCON ASSESS: CPT | Performed by: INTERNAL MEDICINE

## 2022-11-22 PROCEDURE — 90694 VACC AIIV4 NO PRSRV 0.5ML IM: CPT | Performed by: INTERNAL MEDICINE

## 2022-11-22 PROCEDURE — G8536 NO DOC ELDER MAL SCRN: HCPCS | Performed by: INTERNAL MEDICINE

## 2022-11-22 PROCEDURE — G8417 CALC BMI ABV UP PARAM F/U: HCPCS | Performed by: INTERNAL MEDICINE

## 2022-11-22 NOTE — PROGRESS NOTES
Chief Complaint   Patient presents with    Hypertension     3 month follow up    COPD    Cholesterol Problem       SUBJECTIVE:    Nannette Lewis is a 75 y.o. female who returns in follow-up for medical problems include hypertension, hyperlipidemia, GERD, COPD, CKD stage III, obesity and other medical problems. She is taking her medication trying to follow her diet remains physically active. She currently denies any chest pain, shortness of breath, palpitations, PND, orthopnea or other cardiac or respiratory complaints. No current GI or  complaints. There are no headaches, dizziness or neurologic complaints. No current active arthritic complaints and there are no other complaints on complete review of systems. Current Outpatient Medications   Medication Sig Dispense Refill    amLODIPine (NORVASC) 5 mg tablet Take 1 tablet by mouth once daily 90 Tablet 3    estradioL (VIVELLE) 0.05 mg/24 hr APPLY 1 PATCH TOPICALLY TWICE A WEEK 24 Patch 1    niacin ER (NIASPAN) 500 mg tablet TAKE 1 TABLET BY MOUTH AT BEDTIME 90 Tablet 3    diclofenac EC (VOLTAREN) 75 mg EC tablet Take 1 Tablet by mouth two (2) times a day. 60 Tablet 5    olmesartan (BENICAR) 20 mg tablet Take 1 Tablet by mouth daily. 90 Tablet 3    potassium chloride (K-DUR, KLOR-CON M20) 20 mEq tablet take 1 tablet by mouth twice a day 180 Tablet 3    atorvastatin (LIPITOR) 20 mg tablet TAKE 1 TABLET EVERY NIGHT 90 Tablet 3    gabapentin (NEURONTIN) 100 mg capsule Take 100 mg by mouth. 1 cap by mouth at bedtime      dexAMETHasone (DECADRON) 0.5 mg/5 mL elixir RINSE WITH 1 TEASPOON FOR TWO MINUTES AND SPIT 4 TIMES DAILY      polyethylene glycol (MIRALAX) 17 gram/dose powder Take 17 g by mouth daily. cyanocobalamin (VITAMIN B-12) 1,000 mcg sublingual tablet Take 1,000 mcg by mouth daily. triamcinolone acetonide (KENALOG) 0.1 % topical cream       co-enzyme Q-10 (CO Q-10) 100 mg capsule Take 100 mg by mouth daily.       OTHER Indications: Melaleuca Phytomega - Take 2 softgels twice a day. OTHER Indications: Melaleuca Vitality - Takes 1 tablet twice a day. ferrous sulfate 325 mg (65 mg iron) tablet Take  by mouth three (3) times daily (with meals). vitamin E (AQUA GEMS) 400 unit capsule Take 400 Units by mouth daily. cholecalciferol, vitamin D3, 2,000 unit tab Take 1 Tab by mouth daily. ascorbic acid (VITAMIN C) 250 mg tablet Take 500 mg by mouth two (2) times a day.        Past Medical History:   Diagnosis Date    Allergic rhinitis 8/9/2017    Anemia 8/9/2017    Anxiety 8/9/2017    Arrhythmia     irregular heart beat hx and beating fast per patient/no cardiologist    Arthritis     JOINTS  WORSE WAIST DOWN     Back pain 8/9/2017    Chronic kidney disease     Stage III    CKD (chronic kidney disease) 8/9/2017    COPD (chronic obstructive pulmonary disease) ([facility] [address] 8/9/2017    Crohn disease ([facility] [address] 8/9/2017    DJD (degenerative joint disease) 8/9/2017    Dry mouth     evaluated by Dr. Alex Jasso (ENT)    GERD (gastroesophageal reflux disease) 8/9/2017    Hormone replacement therapy (HRT) 8/9/2017    Hyperlipidemia 8/9/2017    Hypertension     Hypertension with renal disease 8/9/2017    Hypokalemia 8/9/2017    Hypothyroid 8/9/2017    Ill-defined condition     neuropathy feet    Inguinal hernia 8/9/2017    Leg numbness 8/9/2017    Lumbar herniated disc 8/9/2017    OAB (overactive bladder) 8/9/2017    Obesity 8/9/2017    On statin therapy 8/9/2017    Osteoarthritis 8/9/2017    Pelvic pain in female 0/7/2295    Umbilical hernia 5/1/7682    Varicose vein of leg 8/9/2017     Past Surgical History:   Procedure Laterality Date    HX COLONOSCOPY  12/2010    normal    HX COLONOSCOPY  12/30/2014    normal     HX HEENT      cyst removed left eye    HX HERNIA REPAIR  06/02/2017    right inguinal and umbilical (Dr. Mickey Delacruz)    HX HYSTERECTOMY      HX ORTHOPAEDIC      bilat knee injections    HX ORTHOPAEDIC      laser treatment left knee and foot HX ORTHOPAEDIC      hx of gel shots bilat knee    HX ORTHOPAEDIC  12/27/2016    left knee coritizon shot    HX OTHER SURGICAL      cyst removed front left scalp    HX OTHER SURGICAL      colonoscopy    HX OTHER SURGICAL      tooth implant    HX TUBAL LIGATION      NEUROLOGICAL PROCEDURE UNLISTED      Lumbar surgery    DC BREAST SURGERY PROCEDURE UNLISTED      mass removed left breast x2 benign    VASCULAR SURGERY PROCEDURE UNLIST      vein striping     Allergies   Allergen Reactions    Lisinopril Cough       REVIEW OF SYSTEMS:  General: negative for - chills or fever, or weight loss or gain  ENT: negative for - headaches, nasal congestion or tinnitus  Eyes: no blurred or visual changes  Neck: No stiffness or swollen nodes  Respiratory: negative for - cough, hemoptysis, shortness of breath or wheezing  Cardiovascular : negative for - chest pain, edema, palpitations or shortness of breath  Gastrointestinal: negative for - abdominal pain, blood in stools, heartburn or nausea/vomiting  Genito-Urinary: no dysuria, trouble voiding, or hematuria  Musculoskeletal: negative for - gait disturbance, joint pain, joint stiffness or joint swelling  Neurological: no TIA or stroke symptoms  Hematologic: no bruises, no bleeding  Lymphatic: no swollen glands  Integument: no lumps, mole changes, nail changes or rash  Endocrine:no malaise/lethargy poly uria or polydipsia or unexpected weight changes        Social History     Socioeconomic History    Marital status:    Tobacco Use    Smoking status: Never    Smokeless tobacco: Never   Vaping Use    Vaping Use: Never used   Substance and Sexual Activity    Alcohol use: Yes     Comment: once a year    Drug use: No    Sexual activity: Yes     Partners: Male     Family History   Problem Relation Age of Onset    No Known Problems Mother     No Known Problems Father     Stroke Sister        OBJECTIVE:     Visit Vitals  /82 (BP 1 Location: Left upper arm, BP Patient Position: Sitting, BP Cuff Size: Adult)   Pulse 69   Temp 97.5 °F (36.4 °C) (Oral)   Resp 16   Ht 5' 6\" (1.676 m)   Wt 192 lb (87.1 kg)   LMP  (LMP Unknown)   SpO2 95%   BMI 30.99 kg/m²     CONSTITUTIONAL:   well nourished, appears age appropriate  EYES: sclera anicteric, PERRL, EOMI  ENMT:nares clear, moist mucous membranes, pharynx clear  NECK: supple. Thyroid normal, No JVD or bruits  RESPIRATORY: Chest: clear to ascultation and percussion, normal inspiratory effort  CARDIOVASCULAR: Heart: regular rate and rhythm no murmurs, rubs or gallops, PMI not displaced, No thrills, no peripheral edema  GASTROINTESTINAL: Abdomen: non distended, soft, non tender, bowel sounds normal  HEMATOLOGIC: no purpura, petechiae or bruising  LYMPHATIC: No lymph node enlargemant  MUSCULOSKELETAL: Extremities: no active synovitis, pulse 1+   INTEGUMENT: No unusual rashes or suspicious skin lesions noted. Nails appear normal.  PERIPHERAL VASCULAR: normal pulses femoral, PT and DP  NEUROLOGIC: non-focal exam, A & O X 3  PSYCHIATRIC:, appropriate affect     ASSESSMENT:   1. Hypertension with renal disease    2. Mixed hyperlipidemia    3. Gastroesophageal reflux disease without esophagitis    4. Primary osteoarthritis involving multiple joints    5. Chronic obstructive pulmonary disease, unspecified COPD type ([address]    6. Stage 3 chronic kidney disease, unspecified whether stage 3a or 3b CKD (HCC)    7. Class 1 obesity due to excess calories without serious comorbidity with body mass index (BMI) of 33.0 to 33.9 in adult    8. Crohn's disease without complication, unspecified gastrointestinal tract location ([address]    9. Needs flu shot      Impression  1. Hypertension is controlled so continue current therapy reviewed with her. 2.  Hyperlipidemia prior lab reviewed repeat status pending   3 GERD stable  4. DJD that is stable  5 COPD O2 sat today good room air  6.   CKD stage III repeat status pending  7 obesity I did discuss diet, exercise weight reduction for overall health benefit none note her weight is actually down a little so encouraged her to continue same. 8.  Crohn's disease no recent flares  Flu shot given today. Follow-up again 3 months or sooner if there is a problem. I will call with lab results in the interim. PLAN:  .  Orders Placed This Encounter    Influenza, FLUAD, (age 72 y+), IM, PF, 0.5 mL    METABOLIC PANEL, COMPREHENSIVE    LIPID PANEL    CK         ATTENTION:   This medical record was transcribed using an electronic medical records system. Although proofread, it may and can contain electronic and spelling errors. Other human spelling and other errors may be present. Corrections may be executed at a later time. Please feel free to contact us for any clarifications as needed. Follow-up and Dispositions    Return in about 3 months (around 2/22/2023). No results found for any visits on 11/22/22. Teofilo Gerber MD    The patient verbalized understanding of the problems and plans as explained.

## 2022-11-22 NOTE — PROGRESS NOTES
Mary Sevilla is a 75 y.o. female     Chief Complaint   Patient presents with    Hypertension     3 month follow up    COPD    Cholesterol Problem       Visit Vitals  /82 (BP 1 Location: Left upper arm, BP Patient Position: Sitting, BP Cuff Size: Adult)   Pulse 69   Temp 97.5 °F (36.4 °C) (Oral)   Resp 16   Ht 5' 6\" (1.676 m)   Wt 192 lb (87.1 kg)   LMP  (LMP Unknown)   SpO2 95%   BMI 30.99 kg/m²       Health Maintenance Due   Topic Date Due    DTaP/Tdap/Td series (1 - Tdap) Never done    Shingrix Vaccine Age 50> (1 of 2) Never done    Bone Densitometry  04/12/2020    COVID-19 Vaccine (3 - Booster for Moderna series) 05/12/2021    Breast Cancer Screen Mammogram  05/12/2022    Flu Vaccine (1) 08/01/2022         1. \"Have you been to the ER, urgent care clinic since your last visit? Hospitalized since your last visit? \" No    2. \"Have you seen or consulted any other health care providers outside of the [address] [place] [address] since your last visit? \" No     3. For patients aged 39-70: Has the patient had a colonoscopy / FIT/ Cologuard? Yes - no Care Gap present      If the patient is female:    4. For patients aged 41-77: Has the patient had a mammogram within the past 2 years? Yes - Care Gap present. Rooming MA/LPN to request most recent results      5. For patients aged 21-65: Has the patient had a pap smear?  NA - based on age or sex

## 2022-11-23 LAB
ALBUMIN SERPL-MCNC: 4 G/DL (ref 3.5–5)
ALBUMIN/GLOB SERPL: 1.3 {RATIO} (ref 1.1–2.2)
ALP SERPL-CCNC: 96 U/L (ref 45–117)
ALT SERPL-CCNC: 45 U/L (ref 12–78)
ANION GAP SERPL CALC-SCNC: 5 MMOL/L (ref 5–15)
AST SERPL-CCNC: 18 U/L (ref 15–37)
BILIRUB SERPL-MCNC: 0.5 MG/DL (ref 0.2–1)
BUN SERPL-MCNC: 38 MG/DL (ref 6–20)
BUN/CREAT SERPL: 31 (ref 12–20)
CALCIUM SERPL-MCNC: 9.6 MG/DL (ref 8.5–10.1)
CHLORIDE SERPL-SCNC: 104 MMOL/L (ref 97–108)
CHOLEST SERPL-MCNC: 171 MG/DL
CK SERPL-CCNC: 85 U/L (ref 26–192)
CO2 SERPL-SCNC: 27 MMOL/L (ref 21–32)
CREAT SERPL-MCNC: 1.22 MG/DL (ref 0.55–1.02)
GLOBULIN SER CALC-MCNC: 3.2 G/DL (ref 2–4)
GLUCOSE SERPL-MCNC: 90 MG/DL (ref 65–100)
HDLC SERPL-MCNC: 69 MG/DL
HDLC SERPL: 2.5 {RATIO} (ref 0–5)
LDLC SERPL CALC-MCNC: 84.2 MG/DL (ref 0–100)
POTASSIUM SERPL-SCNC: 4.9 MMOL/L (ref 3.5–5.1)
PROT SERPL-MCNC: 7.2 G/DL (ref 6.4–8.2)
SODIUM SERPL-SCNC: 136 MMOL/L (ref 136–145)
TRIGL SERPL-MCNC: 89 MG/DL (ref ?–150)
VLDLC SERPL CALC-MCNC: 17.8 MG/DL

## 2022-12-06 RX ORDER — MECLIZINE HCL 12.5 MG 12.5 MG/1
TABLET ORAL
Qty: 30 TABLET | Refills: 0 | Status: SHIPPED | OUTPATIENT
Start: 2022-12-06

## 2022-12-06 NOTE — TELEPHONE ENCOUNTER
RX refill request from the patient/pharmacy. Patient last seen 11- with labs, and next appt. scheduled for 03-  Requested Prescriptions     Pending Prescriptions Disp Refills    meclizine (ANTIVERT) 12.5 mg tablet [Pharmacy Med Name: MECLIZINE HYDROCHLORIDE 12.5 MG Tablet] 30 Tablet 0     Sig: TAKE 1 TABLET THREE TIMES DAILY FOR 10 DAYS    .

## 2023-01-25 ENCOUNTER — TRANSCRIBE ORDER (OUTPATIENT)
Dept: SCHEDULING | Age: 68
End: 2023-01-25

## 2023-01-25 DIAGNOSIS — N81.6 RECTOCELE: ICD-10-CM

## 2023-01-25 DIAGNOSIS — K59.02 DYSSYNERGIC DEFECATION: ICD-10-CM

## 2023-01-25 DIAGNOSIS — R93.5 ABNORMAL MRI, PELVIS: ICD-10-CM

## 2023-01-25 DIAGNOSIS — K59.04 CHRONIC IDIOPATHIC CONSTIPATION: ICD-10-CM

## 2023-01-25 DIAGNOSIS — M62.89 PELVIC FLOOR DYSFUNCTION IN FEMALE: Primary | ICD-10-CM

## 2023-01-25 DIAGNOSIS — N81.10 BLADDER PROLAPSE, FEMALE, ACQUIRED: ICD-10-CM

## 2023-02-09 ENCOUNTER — OFFICE VISIT (OUTPATIENT)
Dept: INTERNAL MEDICINE CLINIC | Age: 68
End: 2023-02-09
Payer: MEDICARE

## 2023-02-09 VITALS
HEART RATE: 71 BPM | TEMPERATURE: 98.2 F | OXYGEN SATURATION: 99 % | SYSTOLIC BLOOD PRESSURE: 134 MMHG | HEIGHT: 66 IN | WEIGHT: 191 LBS | DIASTOLIC BLOOD PRESSURE: 88 MMHG | RESPIRATION RATE: 18 BRPM | BODY MASS INDEX: 30.7 KG/M2

## 2023-02-09 DIAGNOSIS — R23.3 EASY BRUISABILITY: Primary | ICD-10-CM

## 2023-02-09 PROBLEM — R07.9 CHEST PAIN: Status: RESOLVED | Noted: 2018-06-15 | Resolved: 2023-02-09

## 2023-02-09 PROCEDURE — G8510 SCR DEP NEG, NO PLAN REQD: HCPCS | Performed by: INTERNAL MEDICINE

## 2023-02-09 PROCEDURE — 3017F COLORECTAL CA SCREEN DOC REV: CPT | Performed by: INTERNAL MEDICINE

## 2023-02-09 PROCEDURE — G8536 NO DOC ELDER MAL SCRN: HCPCS | Performed by: INTERNAL MEDICINE

## 2023-02-09 PROCEDURE — G8427 DOCREV CUR MEDS BY ELIG CLIN: HCPCS | Performed by: INTERNAL MEDICINE

## 2023-02-09 PROCEDURE — G8417 CALC BMI ABV UP PARAM F/U: HCPCS | Performed by: INTERNAL MEDICINE

## 2023-02-09 PROCEDURE — 1101F PT FALLS ASSESS-DOCD LE1/YR: CPT | Performed by: INTERNAL MEDICINE

## 2023-02-09 PROCEDURE — 1090F PRES/ABSN URINE INCON ASSESS: CPT | Performed by: INTERNAL MEDICINE

## 2023-02-09 PROCEDURE — 99213 OFFICE O/P EST LOW 20 MIN: CPT | Performed by: INTERNAL MEDICINE

## 2023-02-09 PROCEDURE — 1123F ACP DISCUSS/DSCN MKR DOCD: CPT | Performed by: INTERNAL MEDICINE

## 2023-02-09 PROCEDURE — G8399 PT W/DXA RESULTS DOCUMENT: HCPCS | Performed by: INTERNAL MEDICINE

## 2023-02-09 NOTE — PROGRESS NOTES
Subjective:   Jorge Dumont is a 75 y.o. female      Chief Complaint   Patient presents with    Knee Injury     Left knee bruising. Follow-up        History of present illness: She presents for evaluation of continued easy bruising. Initially we thought she was having easy bruising secondary infection was on aspirin but we have stopped that and has not changed. She has extensive bruising her legs as well as her buttocks and now is noting some on her arms. She does note a little bit of bleeding sometimes when she brushes her teeth. She was not taking diclofenac and was still having bruising. She has now gone back on that for the arthritis but the bruising was there even off to diclofenac. She notes no other complaints.     Patient Active Problem List   Diagnosis Code    Right inguinal hernia K40.90    Ventral hernia without obstruction or gangrene K43.9    Chronic non-seasonal allergic rhinitis J30.89    Crohn disease ([address] K50.90    OAB (overactive bladder) N32.81    Varicose vein of leg K62.83    Umbilical hernia F46.8    Class 1 obesity due to excess calories without serious comorbidity with body mass index (BMI) of 33.0 to 33.9 in adult E66.09, Z68.33    Lumbar herniated disc M51.26    Inguinal hernia K40.90    Acquired hypothyroidism E03.9    Hypertension with renal disease I12.9    Mixed hyperlipidemia E78.2    Hormone replacement therapy (HRT) Z79.890    Gastroesophageal reflux disease without esophagitis K21.9    Primary osteoarthritis involving multiple joints M15.9    COPD (chronic obstructive pulmonary disease) (HCC) J44.9    Stage 3 chronic kidney disease (HCC) N18.30    Back pain M54.9    Anxiety F41.9    Anemia D64.9    Acute bronchitis J20.9    Labyrinthitis of both ears H83.03    Acute non-recurrent maxillary sinusitis J01.00    Fungal dermatitis B36.9    Dry mouth R68.2    Alcohol screening Z13.39    Urinary frequency R35.0    Easy bruisability R23.3    [place] grade 3 cystocele N81.10    Incomplete prolapse of vaginal vault N99.3    Vaginal prolapse N81.10    Skin lesion of chest wall L98.9    Neck pain M54.2    Vitamin D deficiency E55.9    Acute right-sided thoracic back pain M54.6      Past Medical History:   Diagnosis Date    Allergic rhinitis 8/9/2017    Anemia 8/9/2017    Anxiety 8/9/2017    Arrhythmia     irregular heart beat hx and beating fast per patient/no cardiologist    Arthritis     JOINTS  WORSE WAIST DOWN     Back pain 8/9/2017    Chronic kidney disease     Stage III    CKD (chronic kidney disease) 8/9/2017    COPD (chronic obstructive pulmonary disease) ([facility] 8/9/2017    Crohn disease ([facility] 8/9/2017    DJD (degenerative joint disease) 8/9/2017    Dry mouth     evaluated by Dr. Daron Toro (ENT)    GERD (gastroesophageal reflux disease) 8/9/2017    Hormone replacement therapy (HRT) 8/9/2017    Hyperlipidemia 8/9/2017    Hypertension     Hypertension with renal disease 8/9/2017    Hypokalemia 8/9/2017    Hypothyroid 8/9/2017    Ill-defined condition     neuropathy feet    Inguinal hernia 8/9/2017    Leg numbness 8/9/2017    Lumbar herniated disc 8/9/2017    OAB (overactive bladder) 8/9/2017    Obesity 8/9/2017    On statin therapy 8/9/2017    Osteoarthritis 8/9/2017    Pelvic pain in female 0/8/4081    Umbilical hernia 0/0/3103    Varicose vein of leg 8/9/2017      Allergies   Allergen Reactions    Lisinopril Cough      Family History   Problem Relation Age of Onset    No Known Problems Mother     No Known Problems Father     Stroke Sister       Social History     Socioeconomic History    Marital status:      Spouse name: Not on file    Number of children: Not on file    Years of education: Not on file    Highest education level: Not on file   Occupational History    Not on file   Tobacco Use    Smoking status: Never    Smokeless tobacco: Never   Vaping Use    Vaping Use: Never used   Substance and Sexual Activity    Alcohol use: Yes     Comment: once a year    Drug use: No    Sexual activity: Yes     Partners: Male   Other Topics Concern    Not on file   Social History Narrative    Not on file     Social Determinants of Health     Financial Resource Strain: Not on file   Food Insecurity: Not on file   Transportation Needs: Not on file   Physical Activity: Not on file   Stress: Not on file   Social Connections: Not on file   Intimate Partner Violence: Not on file   Housing Stability: Not on file     Prior to Admission medications    Medication Sig Start Date End Date Taking? Authorizing Provider   amLODIPine (NORVASC) 5 mg tablet Take 1 tablet by mouth once daily 10/25/22  Yes Abby Solis MD   estradioL (VIVELLE) 0.05 mg/24 hr APPLY 1 PATCH TOPICALLY TWICE A WEEK 10/10/22  Yes Abby Solis MD   niacin ER (NIASPAN) 500 mg tablet TAKE 1 TABLET BY MOUTH AT BEDTIME 9/26/22  Yes Abby Solis MD   diclofenac EC (VOLTAREN) 75 mg EC tablet Take 1 Tablet by mouth two (2) times a day. 9/21/22  Yes Abby Solis MD   olmesartan (BENICAR) 20 mg tablet Take 1 Tablet by mouth daily. 8/19/22  Yes Abby Solis MD   potassium chloride (K-DUR, KLOR-CON M20) 20 mEq tablet take 1 tablet by mouth twice a day 4/25/22  Yes Abby Solis MD   atorvastatin (LIPITOR) 20 mg tablet TAKE 1 TABLET EVERY NIGHT 3/13/22  Yes Abby Solis MD   gabapentin (NEURONTIN) 100 mg capsule Take 100 mg by mouth. 1 cap by mouth at bedtime   Yes Provider, Historical   dexAMETHasone (DECADRON) 0.5 mg/5 mL elixir RINSE WITH 1 TEASPOON FOR TWO MINUTES AND SPIT 4 TIMES DAILY 4/28/21  Yes Provider, Historical   polyethylene glycol (MIRALAX) 17 gram/dose powder Take 17 g by mouth daily. Yes Provider, Historical   cyanocobalamin (VITAMIN B-12) 1,000 mcg sublingual tablet Take 1,000 mcg by mouth daily. Yes Provider, Historical   triamcinolone acetonide (KENALOG) 0.1 % topical cream  3/17/20  Yes Provider, Historical   co-enzyme Q-10 (CO Q-10) 100 mg capsule Take 100 mg by mouth daily. Yes Provider, Historical   OTHER Indications: Melaleuca Phytomega - Take 2 softgels twice a day. Yes Provider, Historical   OTHER Indications: Melaleuca Vitality - Takes 1 tablet twice a day. Yes Provider, Historical   ferrous sulfate 325 mg (65 mg iron) tablet Take  by mouth three (3) times daily (with meals). Yes Provider, Historical   vitamin E (AQUA GEMS) 400 unit capsule Take 400 Units by mouth daily. 3/16/17  Yes Marisela Solares NP   cholecalciferol, vitamin D3, 2,000 unit tab Take 1 Tab by mouth daily. Yes Provider, Historical   ascorbic acid (VITAMIN C) 250 mg tablet Take 500 mg by mouth two (2) times a day. Yes Provider, Historical        Review of Systems              Constitutional:  She denies fever, weight loss, sweats or fatigue. EYES: No blurred or double vision,               ENT: no nasal congestion, no headache or dizziness. No difficulty with               swallowing, taste, speech or smell. Respiratory:  No cough, wheezing or shortness of breath. No sputum production. Cardiac:  Denies chest pain, palpitations, unexplained indigestion, syncope, edema, PND or orthopnea. GI:  No changes in bowel movements, no abdominal pain, no bloating, anorexia, nausea, vomiting or heartburn. :  No frequency or dysuria. Denies incontinence or sexual dysfunction. Extremities:  No joint pain, stiffness or swelling  Back:.no pain or soreness  Skin:  No recent rashes or mole changes. Neurological:  No numbness, tingling, burning paresthesias or loss of motor strength. No syncope, dizziness, frequent headaches or memory loss. Hematologic: Positive for easy bruising  Lymphatic: No lymph node enlargement    Objective:     Vitals:    02/09/23 1118   BP: 134/88   Pulse: 71   Resp: 18   Temp: 98.2 °F (36.8 °C)   TempSrc: Oral   SpO2: 99%   Weight: 191 lb (86.6 kg)   Height: 5' 6\" (1.676 m)   PainSc:   0 - No pain       Body mass index is 30.83 kg/m².    Physical Examination: General Appearance:  Well-developed, well-nourished, no acute distress. HEENT:      Ears:  The TMs and ear canals were clear. Eyes:  The pupillary responses were normal.  Extraocular muscle function intact. Lids and conjunctiva not injected. Funduscopic exam revealed sharp disc margins. Nares: Clear w/o edema or erythema  Pharynx:  Clear with teeth in good repair. No masses were noted. Neck:  Supple without thyromegaly or adenopathy. No JVD noted. No carotid                bruits. Lungs:  Clear to auscultation and percussion. Cardiac:  Regular rate and rhythm without murmur. PMI not displaced. No gallop, rub or click. Abdominal: Soft, non-tender, no hepata-spleenomegally or masses  Extremities:  No clubbing, cyanosis or edema. Skin:  No rash or unusual mole changes noted. Lymph Nodes:  None felt in the cervical, supraclavicular, axillary or inguinal region. Neurological: . DTRs 2+ and symmetric. Station and gait normal.   Hematologic:   Extensive areas of purpura on her legs without evidence of petechiae        Assessment/Plan:         1. Easy bruisability        Impressions/Plan:  Impression  1. Easy bruisability question etiology I will check a CBC and get a peripheral smear. If this is not revealing then we will probably have to send her for hematology evaluation. Clearly is not related to the aspirin which she is off of and not related to diclofenac which she was having the bruising when she was not taking that. Orders Placed This Encounter    CBC WITH AUTOMATED DIFF    PERIPHERAL SMEAR       Follow-up and Dispositions    Return TBD. No results found for any visits on 02/09/23. Alton Daley MD    The patient was given after the visit summary the patient verbalized an understanding of the plans and problems as explained.

## 2023-02-09 NOTE — PROGRESS NOTES
Room: 1  Identified pt with two pt identifiers(name and ). Reviewed record in preparation for visit and have obtained necessary documentation. Chief Complaint   Patient presents with    Knee Injury     Left knee bruising. Follow-up        Vitals:    23 1118   BP: 134/88   Pulse: 71   Resp: 18   Temp: 98.2 °F (36.8 °C)   TempSrc: Oral   SpO2: 99%   Weight: 191 lb (86.6 kg)   Height: 5' 6\" (1.676 m)   PainSc:   0 - No pain       Health Maintenance Due   Topic    DTaP/Tdap/Td series (1 - Tdap)    Shingles Vaccine (1 of 2)    Bone Densitometry     COVID-19 Vaccine (3 - Booster for Moderna series)    Breast Cancer Screen Mammogram        1. \"Have you been to the ER, urgent care clinic since your last visit? Hospitalized since your last visit? \" No    2. \"Have you seen or consulted any other health care providers outside of the [address] [place] [address] since your last visit? \" No     3. For patients over 45: Has the patient had a colonoscopy? Yes - no Care Gap present     If the patient is female:    4. For patients over 40: Has the patient had a mammogram? Yes - Care Gap present. Most recent result on file    5. For patients over 21: Has the patient had a pap smear?  NA - based on age    Current Outpatient Medications   Medication Instructions    amLODIPine (NORVASC) 5 mg tablet Take 1 tablet by mouth once daily    ascorbic acid (vitamin C) (VITAMIN C) 500 mg, Oral, 2 TIMES DAILY    atorvastatin (LIPITOR) 20 mg tablet TAKE 1 TABLET EVERY NIGHT    cholecalciferol, vitamin D3, 2,000 unit tab 1 Tablet, Oral, DAILY    co-enzyme Q-10 (CO Q-10) 100 mg, Oral, DAILY    cyanocobalamin (VITAMIN B-12) 1,000 mcg, Oral, DAILY    dexAMETHasone (DECADRON) 0.5 mg/5 mL elixir RINSE WITH 1 TEASPOON FOR TWO MINUTES AND SPIT 4 TIMES DAILY    diclofenac EC (VOLTAREN) 75 mg, Oral, 2 TIMES DAILY    estradioL (VIVELLE) 0.05 mg/24 hr APPLY 1 PATCH TOPICALLY TWICE A WEEK    ferrous sulfate 325 mg (65 mg iron) tablet Oral, 3 TIMES DAILY WITH MEALS    gabapentin (NEURONTIN) 100 mg, Oral, 1 cap by mouth at bedtime     meclizine (ANTIVERT) 12.5 mg tablet TAKE 1 TABLET THREE TIMES DAILY FOR 10 DAYS    niacin ER (NIASPAN) 500 mg tablet TAKE 1 TABLET BY MOUTH AT BEDTIME    olmesartan (BENICAR) 20 mg, Oral, DAILY    OTHER Indications: Melaleuca Phytomega - Take 2 softgels twice a day. OTHER Indications: Melaleuca Vitality - Takes 1 tablet twice a day. polyethylene glycol (MIRALAX) 17 g, Oral, DAILY    potassium chloride (K-DUR, KLOR-CON M20) 20 mEq tablet take 1 tablet by mouth twice a day    triamcinolone acetonide (KENALOG) 0.1 % topical cream No dose, route, or frequency recorded.     vitamin E (AQUA GEMS) 400 Units, Oral, DAILY       Allergies   Allergen Reactions    Lisinopril Cough       Immunization History   Administered Date(s) Administered    COVID-19, MODERNA BLUE border, Primary or Immunocompromised, (age 18y+), IM, 100 mcg/0.5mL 02/17/2021, 03/17/2021    Influenza Vaccine 10/29/2015, 11/07/2016, 01/05/2019    Influenza Vaccine (Tri) Adjuvanted (>65 Yrs FLUAD TRI 82500) 11/05/2019    Influenza, FLUAD, (age 72 y+), Adjuvanted 10/30/2020, 11/18/2021, 11/22/2022    Influenza, FLUARIX, FLULAVAL, FLUZONE (age 10 mo+) AND AFLURIA, (age 1 y+), PF, 0.5mL 12/01/2017, 01/12/2019    Pneumococcal Conjugate (PCV-13) 09/20/2019    Pneumococcal Polysaccharide (PPSV-23) 03/09/2018       Past Medical History:   Diagnosis Date    Allergic rhinitis 8/9/2017    Anemia 8/9/2017    Anxiety 8/9/2017    Arrhythmia     irregular heart beat hx and beating fast per patient/no cardiologist    Arthritis     JOINTS  WORSE WAIST DOWN     Back pain 8/9/2017    Chronic kidney disease     Stage III    CKD (chronic kidney disease) 8/9/2017    COPD (chronic obstructive pulmonary disease) ([facility] [address] 8/9/2017    Crohn disease ([facility] [address] 8/9/2017    DJD (degenerative joint disease) 8/9/2017    Dry mouth     evaluated by Dr. Yoshi Garcia (ENT)    GERD (gastroesophageal reflux disease) 8/9/2017    Hormone replacement therapy (HRT) 8/9/2017    Hyperlipidemia 8/9/2017    Hypertension     Hypertension with renal disease 8/9/2017    Hypokalemia 8/9/2017    Hypothyroid 8/9/2017    Ill-defined condition     neuropathy feet    Inguinal hernia 8/9/2017    Leg numbness 8/9/2017    Lumbar herniated disc 8/9/2017    OAB (overactive bladder) 8/9/2017    Obesity 8/9/2017    On statin therapy 8/9/2017    Osteoarthritis 8/9/2017    Pelvic pain in female 3/7/7901    Umbilical hernia 0/0/8645    Varicose vein of leg 8/9/2017

## 2023-02-10 LAB
BASOPHILS # BLD: 0 K/UL (ref 0–0.1)
BASOPHILS NFR BLD: 0 % (ref 0–1)
DIFFERENTIAL METHOD BLD: ABNORMAL
EOSINOPHIL # BLD: 0.1 K/UL (ref 0–0.4)
EOSINOPHIL NFR BLD: 3 % (ref 0–7)
ERYTHROCYTE [DISTWIDTH] IN BLOOD BY AUTOMATED COUNT: 13.2 % (ref 11.5–14.5)
HCT VFR BLD AUTO: 39.2 % (ref 35–47)
HGB BLD-MCNC: 12.2 G/DL (ref 11.5–16)
IMM GRANULOCYTES # BLD AUTO: 0 K/UL (ref 0–0.04)
IMM GRANULOCYTES NFR BLD AUTO: 0 % (ref 0–0.5)
LYMPHOCYTES # BLD: 0.7 K/UL (ref 0.8–3.5)
LYMPHOCYTES NFR BLD: 16 % (ref 12–49)
MCH RBC QN AUTO: 31.3 PG (ref 26–34)
MCHC RBC AUTO-ENTMCNC: 31.1 G/DL (ref 30–36.5)
MCV RBC AUTO: 100.5 FL (ref 80–99)
MONOCYTES # BLD: 0.8 K/UL (ref 0–1)
MONOCYTES NFR BLD: 18 % (ref 5–13)
NEUTS SEG # BLD: 2.6 K/UL (ref 1.8–8)
NEUTS SEG NFR BLD: 63 % (ref 32–75)
NRBC # BLD: 0 K/UL (ref 0–0.01)
NRBC BLD-RTO: 0 PER 100 WBC
PERIPHERAL SMEAR,PSM: NORMAL
PLATELET # BLD AUTO: 202 K/UL (ref 150–400)
PMV BLD AUTO: 10.9 FL (ref 8.9–12.9)
RBC # BLD AUTO: 3.9 M/UL (ref 3.8–5.2)
RBC MORPH BLD: ABNORMAL
WBC # BLD AUTO: 4.2 K/UL (ref 3.6–11)

## 2023-02-13 ENCOUNTER — LAB ONLY (OUTPATIENT)
Dept: INTERNAL MEDICINE CLINIC | Age: 68
End: 2023-02-13

## 2023-02-13 DIAGNOSIS — R79.9 ABNORMAL BLOOD CHEMISTRY TEST: Primary | ICD-10-CM

## 2023-02-14 LAB — VIT B12 SERPL-MCNC: 1292 PG/ML (ref 193–986)

## 2023-02-28 ENCOUNTER — HOSPITAL ENCOUNTER (OUTPATIENT)
Age: 68
Setting detail: OUTPATIENT SURGERY
Discharge: HOME | End: 2023-02-28
Attending: INTERNAL MEDICINE | Admitting: INTERNAL MEDICINE
Payer: MEDICARE

## 2023-02-28 VITALS
SYSTOLIC BLOOD PRESSURE: 127 MMHG | OXYGEN SATURATION: 97 % | DIASTOLIC BLOOD PRESSURE: 56 MMHG | HEART RATE: 60 BPM | RESPIRATION RATE: 17 BRPM

## 2023-02-28 PROCEDURE — 76040000007: Performed by: INTERNAL MEDICINE

## 2023-02-28 PROCEDURE — 2709999900 HC NON-CHARGEABLE SUPPLY: Performed by: INTERNAL MEDICINE

## 2023-02-28 PROCEDURE — 77030040810 HC CATH BLLN ANORECT EXPULSN MUIS -B: Performed by: INTERNAL MEDICINE

## 2023-02-28 RX ORDER — LIDOCAINE 50 MG/G
1 PATCH TOPICAL DAILY
COMMUNITY
Start: 2023-02-16

## 2023-02-28 NOTE — PROGRESS NOTES
Rectal exam done by Ramiro Hobbs RN. Anal manometry catheter inserted into rectum. Manometry procedure complete. Catheter inserted into rectum. Balloon filled with 40cc of luke warm H2O, and pt escorted to bathroom for 5 min expulsion test.  Pt was able to expel balloon. Balloon deflated and catheter removed. Pt tolerated procedures well.

## 2023-02-28 NOTE — DISCHARGE INSTRUCTIONS
Viki Gallo  [id number]  1955      MANOMETRY DISCHARGE INSTRUCTION    You may resume your regular diet as tolerated. You may resume your normal daily activities. Call your Physician if you have any complications or questions. I have reviewed discharge instructions with the patient. The patient verbalized understanding. MyChart Activation    Thank you for requesting access to [employer]. Please follow the instructions below to securely access and download your online medical record. [employer] allows you to send messages to your doctor, view your test results, renew your prescriptions, schedule appointments, and more. How Do I Sign Up? In your internet browser, go to [email or web address]  Click on the First Time User? Click Here link in the Sign In box. You will be redirect to the New Member Sign Up page. Enter your [employer] Access Code exactly as it appears below. You will not need to use this code after youve completed the sign-up process. If you do not sign up before the expiration date, you must request a new code. [employer] Access Code: [email or web address]  Expires: 3/11/2023  9:47 AM (This is the date your [employer] access code will )    Enter the last four digits of your Social Security Number (xxxx) and Date of Birth (mm/dd/yyyy) as indicated and click Submit. You will be taken to the next sign-up page. Create a [employer] ID. This will be your [employer] login ID and cannot be changed, so think of one that is secure and easy to remember. Create a [employer] password. You can change your password at any time. Enter your Password Reset Question and Answer. This can be used at a later time if you forget your password. Enter your e-mail address. You will receive e-mail notification when new information is available in [address] Click Sign Up. You can now view and download portions of your medical record.   Click the [place] link to download a portable copy of your medical information. Additional Information    If you have questions, please visit the Frequently Asked Questions section of the [employer] website at [email or web address] Remember, [employer] is NOT to be used for urgent needs. For medical emergencies, dial 911.

## 2023-03-02 NOTE — PROGRESS NOTES
Chief Complaint   Patient presents with    Hypertension     Follow up       SUBJECTIVE:    Jigar Zavaleta is a 75 y.o. female who returns in follow-up for medical problems including hypertension, hyperlipidemia, GERD, DJD, COPD, allergic rhinitis and she continues with a lot of bruising on her legs. We did a recent peripheral smear which was unremarkable. She currently denies any chest pain, shortness of breath, palpitations, PND, orthopnea or other cardiac respiratory complaints. There are no GI or  complaints. There are no headaches, dizziness or neurologic complaints. She has no current active arthritic complaints and there are no other complaints on complete review of systems. Current Outpatient Medications   Medication Sig Dispense Refill    meloxicam (MOBIC) 15 mg tablet Take 1 Tablet by mouth daily. 30 Tablet PRN    lidocaine (LIDODERM) 5 % Apply 1 Patch to affected area daily. psyllium (METAMUCIL) powd Take  by mouth. amLODIPine (NORVASC) 5 mg tablet Take 1 tablet by mouth once daily 90 Tablet 3    estradioL (VIVELLE) 0.05 mg/24 hr APPLY 1 PATCH TOPICALLY TWICE A WEEK 24 Patch 1    niacin ER (NIASPAN) 500 mg tablet TAKE 1 TABLET BY MOUTH AT BEDTIME 90 Tablet 3    olmesartan (BENICAR) 20 mg tablet Take 1 Tablet by mouth daily. 90 Tablet 3    potassium chloride (K-DUR, KLOR-CON M20) 20 mEq tablet take 1 tablet by mouth twice a day 180 Tablet 3    atorvastatin (LIPITOR) 20 mg tablet TAKE 1 TABLET EVERY NIGHT 90 Tablet 3    gabapentin (NEURONTIN) 100 mg capsule Take 100 mg by mouth. 1 cap by mouth at bedtime      dexAMETHasone (DECADRON) 0.5 mg/5 mL elixir RINSE WITH 1 TEASPOON FOR TWO MINUTES AND SPIT 4 TIMES DAILY      polyethylene glycol (MIRALAX) 17 gram/dose powder Take 17 g by mouth daily. cyanocobalamin (VITAMIN B-12) 1,000 mcg sublingual tablet Take 1,000 mcg by mouth daily.       triamcinolone acetonide (KENALOG) 0.1 % topical cream       OTHER Indications: Melaleuca Phytomega - Take 2 softgels twice a day. OTHER Indications: Melaleuca Vitality - Takes 1 tablet twice a day. ferrous sulfate 325 mg (65 mg iron) tablet Take  by mouth three (3) times daily (with meals). vitamin E (AQUA GEMS) 400 unit capsule Take 400 Units by mouth daily. cholecalciferol, vitamin D3, 2,000 unit tab Take 1 Tab by mouth daily. ascorbic acid (VITAMIN C) 250 mg tablet Take 500 mg by mouth two (2) times a day. Coenzyme Q10-Vitamin E 100-5 mg-unit cap Take 100 mg by mouth daily.        Past Medical History:   Diagnosis Date    Allergic rhinitis 8/9/2017    Anemia 8/9/2017    Anxiety 8/9/2017    Arrhythmia     irregular heart beat hx and beating fast per patient/no cardiologist    Arthritis     JOINTS  WORSE WAIST DOWN     Back pain 8/9/2017    Chronic kidney disease     Stage III    CKD (chronic kidney disease) 8/9/2017    COPD (chronic obstructive pulmonary disease) ([facility] [address] 8/9/2017    Crohn disease ([facility] [address] 8/9/2017    DJD (degenerative joint disease) 8/9/2017    Dry mouth     evaluated by Dr. Jorge Bradshaw (ENT)    GERD (gastroesophageal reflux disease) 8/9/2017    Hormone replacement therapy (HRT) 8/9/2017    Hyperlipidemia 8/9/2017    Hypertension     Hypertension with renal disease 8/9/2017    Hypokalemia 8/9/2017    Hypothyroid 8/9/2017    Ill-defined condition     neuropathy feet    Inguinal hernia 8/9/2017    Leg numbness 8/9/2017    Lumbar herniated disc 8/9/2017    OAB (overactive bladder) 8/9/2017    Obesity 8/9/2017    On statin therapy 8/9/2017    Osteoarthritis 8/9/2017    Pelvic pain in female 7/9/0980    Umbilical hernia 2/0/9371    Varicose vein of leg 8/9/2017     Past Surgical History:   Procedure Laterality Date    HX COLONOSCOPY  12/2010    normal    HX COLONOSCOPY  12/30/2014    normal     HX HEENT      cyst removed left eye    HX HERNIA REPAIR  06/02/2017    right inguinal and umbilical (Dr. Ruben Lee)    HX HYSTERECTOMY      HX ORTHOPAEDIC      bilat knee injections    HX ORTHOPAEDIC      laser treatment left knee and foot    HX ORTHOPAEDIC      hx of gel shots bilat knee    HX ORTHOPAEDIC  12/27/2016    left knee coritizon shot    HX OTHER SURGICAL      cyst removed front left scalp    HX OTHER SURGICAL      colonoscopy    HX OTHER SURGICAL      tooth implant    HX TUBAL LIGATION      NE UNLISTED NEUROLOGICAL/NEUROMUSCULAR DX PX      Lumbar surgery    NE UNLISTED PROCEDURE BREAST      mass removed left breast x2 benign    NE UNLISTED PROCEDURE VASCULAR SURGERY      vein striping     Allergies   Allergen Reactions    Lisinopril Cough       REVIEW OF SYSTEMS:  General: negative for - chills or fever, or weight loss or gain  ENT: negative for - headaches, nasal congestion or tinnitus  Eyes: no blurred or visual changes  Neck: No stiffness or swollen nodes  Respiratory: negative for - cough, hemoptysis, shortness of breath or wheezing  Cardiovascular : negative for - chest pain, edema, palpitations or shortness of breath  Gastrointestinal: negative for - abdominal pain, blood in stools, heartburn or nausea/vomiting  Genito-Urinary: no dysuria, trouble voiding, or hematuria  Musculoskeletal: negative for - gait disturbance, joint pain, joint stiffness or joint swelling  Neurological: no TIA or stroke symptoms  Hematologic: no bruises, no bleeding  Lymphatic: no swollen glands  Integument: no lumps, mole changes, nail changes or rash  Endocrine:no malaise/lethargy poly uria or polydipsia or unexpected weight changes        Social History     Socioeconomic History    Marital status:    Tobacco Use    Smoking status: Never    Smokeless tobacco: Never   Vaping Use    Vaping Use: Never used   Substance and Sexual Activity    Alcohol use: Yes     Comment: once a year    Drug use: No    Sexual activity: Yes     Partners: Male     Family History   Problem Relation Age of Onset    No Known Problems Mother     No Known Problems Father     Stroke Sister        OBJECTIVE: Visit Vitals  /78   Pulse 62   Temp 98.2 °F (36.8 °C)   Resp 16   Ht 5' 6\" (1.676 m)   Wt 190 lb 4.8 oz (86.3 kg)   LMP  (LMP Unknown)   SpO2 100%   BMI 30.72 kg/m²     CONSTITUTIONAL:   well nourished, appears age appropriate  EYES: sclera anicteric, PERRL, EOMI  ENMT:nares clear, moist mucous membranes, pharynx clear  NECK: supple. Thyroid normal, No JVD or bruits  RESPIRATORY: Chest: clear to ascultation and percussion, normal inspiratory effort  CARDIOVASCULAR: Heart: regular rate and rhythm no murmurs, rubs or gallops, PMI not displaced, No thrills, no peripheral edema  GASTROINTESTINAL: Abdomen: non distended, soft, non tender, bowel sounds normal  HEMATOLOGIC: no purpura, petechiae or bruising  LYMPHATIC: No lymph node enlargemant  MUSCULOSKELETAL: Extremities: no active synovitis, pulse 1+   INTEGUMENT: No unusual rashes or suspicious skin lesions noted. Nails appear normal.  PERIPHERAL VASCULAR: normal pulses femoral, PT and DP  NEUROLOGIC: non-focal exam, A & O X 3  PSYCHIATRIC:, appropriate affect     ASSESSMENT:   1. Hypertension with renal disease    2. Mixed hyperlipidemia    3. Gastroesophageal reflux disease without esophagitis    4. Primary osteoarthritis involving multiple joints    5. Stage 3 chronic kidney disease, unspecified whether stage 3a or 3b CKD ([facility] [address]    6. Chronic obstructive pulmonary disease, unspecified COPD type (HCC)    7. Class 1 obesity due to excess calories without serious comorbidity with body mass index (BMI) of 33.0 to 33.9 in adult    8. Osteoporosis screening    9. Easy bruisability      Impression  1. Hypertension that is controlled so continue current therapy reviewed with her. 2.  Hyperlipidemia prior lab reviewed repeat status pending  3. GERD stable maxima 4 DJD with easy bruising I will stop diclofenac and try meloxicam and see if that makes a difference. She also does a steroid elixir which I told her to stop.   5.  CKD stage III repeat status pending  6. COPD O2 sat today room air is good  7. Obesity we discussed diet, exercise weight reduction for well health benefit  8. Osteoporosis screening needed we will schedule a bone density  9. Easy bruisability has noted change anti-inflammatory and asked her to stop the prednisone elixir  Follow-up me again in 3 months or sooner should the be a problem. I will call with lab results in the interim. PLAN:  .  Orders Placed This Encounter    DEXA BONE DENSITY STUDY AXIAL    METABOLIC PANEL, COMPREHENSIVE    LIPID PANEL    CK    CBC WITH AUTOMATED DIFF    Coenzyme Q10-Vitamin E 100-5 mg-unit cap    meloxicam (MOBIC) 15 mg tablet         ATTENTION:   This medical record was transcribed using an electronic medical records system. Although proofread, it may and can contain electronic and spelling errors. Other human spelling and other errors may be present. Corrections may be executed at a later time. Please feel free to contact us for any clarifications as needed. No results found for any visits on 03/03/23. Macie Escudero MD    The patient verbalized understanding of the problems and plans as explained.

## 2023-03-03 ENCOUNTER — OFFICE VISIT (OUTPATIENT)
Dept: INTERNAL MEDICINE CLINIC | Age: 68
End: 2023-03-03

## 2023-03-03 VITALS
WEIGHT: 190.3 LBS | HEIGHT: 66 IN | BODY MASS INDEX: 30.58 KG/M2 | RESPIRATION RATE: 16 BRPM | OXYGEN SATURATION: 100 % | HEART RATE: 62 BPM | TEMPERATURE: 98.2 F | DIASTOLIC BLOOD PRESSURE: 78 MMHG | SYSTOLIC BLOOD PRESSURE: 130 MMHG

## 2023-03-03 DIAGNOSIS — I12.9 HYPERTENSION WITH RENAL DISEASE: Primary | ICD-10-CM

## 2023-03-03 DIAGNOSIS — Z13.820 OSTEOPOROSIS SCREENING: ICD-10-CM

## 2023-03-03 DIAGNOSIS — E78.2 MIXED HYPERLIPIDEMIA: ICD-10-CM

## 2023-03-03 DIAGNOSIS — E66.09 CLASS 1 OBESITY DUE TO EXCESS CALORIES WITHOUT SERIOUS COMORBIDITY WITH BODY MASS INDEX (BMI) OF 33.0 TO 33.9 IN ADULT: ICD-10-CM

## 2023-03-03 DIAGNOSIS — M15.9 PRIMARY OSTEOARTHRITIS INVOLVING MULTIPLE JOINTS: ICD-10-CM

## 2023-03-03 DIAGNOSIS — J44.9 CHRONIC OBSTRUCTIVE PULMONARY DISEASE, UNSPECIFIED COPD TYPE (HCC): ICD-10-CM

## 2023-03-03 DIAGNOSIS — K21.9 GASTROESOPHAGEAL REFLUX DISEASE WITHOUT ESOPHAGITIS: ICD-10-CM

## 2023-03-03 DIAGNOSIS — N18.30 STAGE 3 CHRONIC KIDNEY DISEASE, UNSPECIFIED WHETHER STAGE 3A OR 3B CKD (HCC): ICD-10-CM

## 2023-03-03 DIAGNOSIS — R23.3 EASY BRUISABILITY: ICD-10-CM

## 2023-03-03 RX ORDER — MELOXICAM 15 MG/1
15 TABLET ORAL DAILY
Qty: 30 TABLET | Status: SHIPPED | OUTPATIENT
Start: 2023-03-03

## 2023-03-03 RX ORDER — NIACIN (INOSITOL NIACINATE) 400(500MG)
100 CAPSULE ORAL DAILY
COMMUNITY

## 2023-03-03 NOTE — PROGRESS NOTES
Chief Complaint   Patient presents with    Hypertension     Follow up        Visit Vitals  /78   Pulse 62   Temp 98.2 °F (36.8 °C)   Resp 16   Ht 5' 6\" (1.676 m)   Wt 190 lb 4.8 oz (86.3 kg)   LMP  (LMP Unknown)   SpO2 100%   BMI 30.72 kg/m²        1. Have you been to the ER, urgent care clinic since your last visit? Hospitalized since your last visit? No    2. Have you seen or consulted any other health care providers outside of the [address] [place] [address] since your last visit? Include any pap smears or colon screening. No     Health Maintenance Due   Topic Date Due    DTaP/Tdap/Td series (1 - Tdap) Never done    Shingles Vaccine (1 of 2) Never done    Bone Densitometry  04/12/2020    COVID-19 Vaccine (3 - Booster for Moderna series) 05/12/2021    Breast Cancer Screen Mammogram  05/12/2022        3 most recent PHQ Screens 3/3/2023   Little interest or pleasure in doing things Not at all   Feeling down, depressed, irritable, or hopeless Not at all   Total Score PHQ 2 0   Trouble falling or staying asleep, or sleeping too much -   Feeling tired or having little energy -   Poor appetite, weight loss, or overeating -   Feeling bad about yourself - or that you are a failure or have let yourself or your family down -   Trouble concentrating on things such as school, work, reading, or watching TV -   Moving or speaking so slowly that other people could have noticed; or the opposite being so fidgety that others notice -   Thoughts of being better off dead, or hurting yourself in some way -   PHQ 9 Score -        Fall Risk Assessment, last 12 mths 3/3/2023   Able to walk? Yes   Fall in past 12 months? 0   Do you feel unsteady? 0   Are you worried about falling 0   Is TUG test greater than 12 seconds? -   Is the gait abnormal? -   Number of falls in past 12 months -   Fall with injury?  -       Learning Assessment 8/21/2017   PRIMARY LEARNER Patient   HIGHEST LEVEL OF EDUCATION - PRIMARY LEARNER  GRADUATED HIGH SCHOOL OR GED   PRIMARY LANGUAGE ENGLISH   LEARNER PREFERENCE PRIMARY LISTENING   ANSWERED BY patient   RELATIONSHIP SELF

## 2023-03-04 LAB
ALBUMIN SERPL-MCNC: 4 G/DL (ref 3.5–5)
ALBUMIN/GLOB SERPL: 1.4 (ref 1.1–2.2)
ALP SERPL-CCNC: 97 U/L (ref 45–117)
ALT SERPL-CCNC: 42 U/L (ref 12–78)
ANION GAP SERPL CALC-SCNC: 4 MMOL/L (ref 5–15)
AST SERPL-CCNC: 21 U/L (ref 15–37)
BASOPHILS # BLD: 0 K/UL (ref 0–0.1)
BASOPHILS NFR BLD: 0 % (ref 0–1)
BILIRUB SERPL-MCNC: 0.5 MG/DL (ref 0.2–1)
BUN SERPL-MCNC: 19 MG/DL (ref 6–20)
BUN/CREAT SERPL: 21 (ref 12–20)
CALCIUM SERPL-MCNC: 9.4 MG/DL (ref 8.5–10.1)
CHLORIDE SERPL-SCNC: 106 MMOL/L (ref 97–108)
CHOLEST SERPL-MCNC: 137 MG/DL
CK SERPL-CCNC: 42 U/L (ref 26–192)
CO2 SERPL-SCNC: 27 MMOL/L (ref 21–32)
CREAT SERPL-MCNC: 0.92 MG/DL (ref 0.55–1.02)
DIFFERENTIAL METHOD BLD: ABNORMAL
EOSINOPHIL # BLD: 0.1 K/UL (ref 0–0.4)
EOSINOPHIL NFR BLD: 3 % (ref 0–7)
ERYTHROCYTE [DISTWIDTH] IN BLOOD BY AUTOMATED COUNT: 12.9 % (ref 11.5–14.5)
GLOBULIN SER CALC-MCNC: 2.9 G/DL (ref 2–4)
GLUCOSE SERPL-MCNC: 90 MG/DL (ref 65–100)
HCT VFR BLD AUTO: 35.5 % (ref 35–47)
HDLC SERPL-MCNC: 50 MG/DL
HDLC SERPL: 2.7 (ref 0–5)
HGB BLD-MCNC: 11.3 G/DL (ref 11.5–16)
IMM GRANULOCYTES # BLD AUTO: 0 K/UL (ref 0–0.04)
IMM GRANULOCYTES NFR BLD AUTO: 0 % (ref 0–0.5)
LDLC SERPL CALC-MCNC: 70 MG/DL (ref 0–100)
LYMPHOCYTES # BLD: 0.6 K/UL (ref 0.8–3.5)
LYMPHOCYTES NFR BLD: 17 % (ref 12–49)
MCH RBC QN AUTO: 31.6 PG (ref 26–34)
MCHC RBC AUTO-ENTMCNC: 31.8 G/DL (ref 30–36.5)
MCV RBC AUTO: 99.2 FL (ref 80–99)
MONOCYTES # BLD: 0.6 K/UL (ref 0–1)
MONOCYTES NFR BLD: 17 % (ref 5–13)
NEUTS SEG # BLD: 2.3 K/UL (ref 1.8–8)
NEUTS SEG NFR BLD: 63 % (ref 32–75)
NRBC # BLD: 0 K/UL (ref 0–0.01)
NRBC BLD-RTO: 0 PER 100 WBC
PLATELET # BLD AUTO: 202 K/UL (ref 150–400)
PMV BLD AUTO: 10.7 FL (ref 8.9–12.9)
POTASSIUM SERPL-SCNC: 4.9 MMOL/L (ref 3.5–5.1)
PROT SERPL-MCNC: 6.9 G/DL (ref 6.4–8.2)
RBC # BLD AUTO: 3.58 M/UL (ref 3.8–5.2)
RBC MORPH BLD: ABNORMAL
SODIUM SERPL-SCNC: 137 MMOL/L (ref 136–145)
TRIGL SERPL-MCNC: 85 MG/DL (ref ?–150)
VLDLC SERPL CALC-MCNC: 17 MG/DL
WBC # BLD AUTO: 3.6 K/UL (ref 3.6–11)

## 2023-03-08 DIAGNOSIS — E87.6 HYPOKALEMIA: ICD-10-CM

## 2023-03-08 RX ORDER — POTASSIUM CHLORIDE 20 MEQ/1
TABLET, EXTENDED RELEASE ORAL
Qty: 180 TABLET | Refills: 3 | Status: SHIPPED | OUTPATIENT
Start: 2023-03-08

## 2023-03-08 NOTE — TELEPHONE ENCOUNTER
RX refill request from the patient/pharmacy. Patient last seen 03- with labs, and next appt. scheduled for 06-  Requested Prescriptions     Pending Prescriptions Disp Refills    potassium chloride (K-DUR, KLOR-CON M20) 20 mEq tablet [Pharmacy Med Name: POTASSIUM CHLORIDE ER 20 MEQ Tablet Extended Release] 180 Tablet 3     Sig: TAKE 1 TABLET TWICE DAILY    .

## 2023-03-13 NOTE — PROCEDURES
Female Anorectal Manometry Procedure Note     Procedure Date: 2/28/23     Referring Provider: Dr Tiaog Hood     Operation/Procedure: Anorectal Manometry/Rectal Sensation/Tone     Indication: Pelvic floor dysfunction, rectocele bladder prolapse, chronic constipation, abnormal MRI     Description of the Operation/Procedure:A 12-sensor high resolution solid state manometry probe with a 4cm long balloon was placed through the anus into the rectum. When correctly positioned, the pressure sensors were located 1cm apart from the anal margin and the balloon at 7-11 cm. After correct placement, the probe was taped to the perineum. After a run in period of 5 minutes, the patient was asked to perform anal squeeze maneuvers twice, and bearing down maneuvers. Thereafter, intermittent rectal balloon distention was performed to assess rectoanal reflexes, rectal sensation, and rectal compliance by distending the balloon in a step wise manner. After this, the probe was removed. We then placed a 50cc water filled balloon in the rectum and the patient was asked to expel this device in privacy on a commode. Findings:       Anal Sphincter Pressures:  Female > age 48 - Normal Mean and 10-90th %ile  Maximum resting pressure    91.9 mmHg (66, 33-93)   Maximum squeeze pressure    151.2 mmHg (193, 122-281)   Duration of squeeze    6.3 seconds (13, 3-23)   Straining maneuver rectal pressure    76.9 mmHg (37, 11-69)   Straining maneuver anal residual pressure    94.2 mmHg (64, 35-97)   Rectoanal Pressure Gradient    -17 (-27, -64 to +12)   Sphincter length    4.8 cm (3.6, 2.4-4.6)         Rectoanal Reflexes: The rectoanal inhibitory reflex was:  Present        First sensation    60 cc (35, 20-50)   Urgency    60 cc (59, 40-90)   Discomfort    90 cc (98, )        Balloon Expulsion Test:  Able to expel a 40cc balloon in 5 minute?  Yes         Impression:   According to the 2019 [facility] Classifications and the [employer] et al 2019 \"Anorectal pressures measured with high-resolution manometry in healthy people--Normal values and asymptomatic pelvic floor dysfunction\":  1) There is no manometric evidence to support rectoanal hyporeflexia or areflexia. 2) There is normal resting tone and contractility of the anal sphincter. 3) There is no evidence to support dyssynergic defecation; intrarectal pressure and anal relaxation are within normal limits in the setting of normal balloon expulsion. 4) There is no manometric evidence to support a disorder of rectal sensitivity. Recommendation: This is an unremarkable study. Can consider PFPT evaluation but would also assess response to additional laxatives.

## 2023-04-13 ENCOUNTER — HOSPITAL ENCOUNTER (OUTPATIENT)
Age: 68
Setting detail: OUTPATIENT SURGERY
Discharge: HOME | End: 2023-04-13
Attending: INTERNAL MEDICINE | Admitting: INTERNAL MEDICINE
Payer: MEDICARE

## 2023-04-13 VITALS
BODY MASS INDEX: 29.22 KG/M2 | HEART RATE: 50 BPM | OXYGEN SATURATION: 98 % | RESPIRATION RATE: 18 BRPM | WEIGHT: 181.8 LBS | SYSTOLIC BLOOD PRESSURE: 111 MMHG | HEIGHT: 66 IN | TEMPERATURE: 98 F | DIASTOLIC BLOOD PRESSURE: 46 MMHG

## 2023-04-13 PROCEDURE — 76060000031 HC ANESTHESIA FIRST 0.5 HR: Performed by: INTERNAL MEDICINE

## 2023-04-13 PROCEDURE — 2709999900 HC NON-CHARGEABLE SUPPLY: Performed by: INTERNAL MEDICINE

## 2023-04-13 PROCEDURE — 76040000019: Performed by: INTERNAL MEDICINE

## 2023-04-13 RX ORDER — ONDANSETRON 2 MG/ML
4 INJECTION INTRAMUSCULAR; INTRAVENOUS AS NEEDED
Status: CANCELLED | OUTPATIENT
Start: 2023-04-13

## 2023-04-13 RX ORDER — MIDAZOLAM HYDROCHLORIDE 1 MG/ML
.25-5 INJECTION, SOLUTION INTRAMUSCULAR; INTRAVENOUS
Status: CANCELLED | OUTPATIENT
Start: 2023-04-13 | End: 2023-04-13

## 2023-04-13 RX ORDER — FLUMAZENIL 0.1 MG/ML
0.2 INJECTION INTRAVENOUS
Status: CANCELLED | OUTPATIENT
Start: 2023-04-13 | End: 2023-04-13

## 2023-04-13 RX ORDER — SODIUM CHLORIDE 9 MG/ML
125 INJECTION, SOLUTION INTRAVENOUS CONTINUOUS
Status: DISCONTINUED | OUTPATIENT
Start: 2023-04-13 | End: 2023-04-13 | Stop reason: HOSPADM

## 2023-04-13 RX ORDER — EPINEPHRINE 0.1 MG/ML
1 INJECTION INTRACARDIAC; INTRAVENOUS
Status: CANCELLED | OUTPATIENT
Start: 2023-04-13 | End: 2023-04-14

## 2023-04-13 RX ORDER — NALOXONE HYDROCHLORIDE 0.4 MG/ML
0.4 INJECTION, SOLUTION INTRAMUSCULAR; INTRAVENOUS; SUBCUTANEOUS
Status: CANCELLED | OUTPATIENT
Start: 2023-04-13 | End: 2023-04-13

## 2023-04-13 RX ORDER — DIPHENHYDRAMINE HYDROCHLORIDE 50 MG/ML
50 INJECTION, SOLUTION INTRAMUSCULAR; INTRAVENOUS ONCE
Status: CANCELLED | OUTPATIENT
Start: 2023-04-13 | End: 2023-04-13

## 2023-04-13 RX ORDER — DEXTROMETHORPHAN/PSEUDOEPHED 2.5-7.5/.8
1.2 DROPS ORAL
Status: CANCELLED | OUTPATIENT
Start: 2023-04-13

## 2023-04-13 RX ORDER — LIDOCAINE HYDROCHLORIDE 10 MG/ML
0.1 INJECTION, SOLUTION EPIDURAL; INFILTRATION; INTRACAUDAL; PERINEURAL AS NEEDED
Status: CANCELLED | OUTPATIENT
Start: 2023-04-13

## 2023-04-13 RX ORDER — ATROPINE SULFATE 0.1 MG/ML
0.5 INJECTION INTRAVENOUS
Status: CANCELLED | OUTPATIENT
Start: 2023-04-13 | End: 2023-04-14

## 2023-04-13 NOTE — DISCHARGE INSTRUCTIONS
Christi Cordero  [id number]  1955    It was my pleasure seeing you for your procedure. You will also receive a summary report with the findings from this procedure and any further recommendations. If you had polyps removed or biopsies taken during your procedure, you will receive a separate letter from me within the next 2 weeks. If you don't receive this letter or if you have any questions, please call my office [phone]. Please take note of the post procedure instructions listed below. Best Wishes,    Dr. Gabe Eng    These instructions give you information on caring for yourself after your procedure. Call your doctor if you have any problems or questions after your procedure. HOME CARE  Walk if you have belly cramping or gas. Walking will help get rid of the air and reduce the bloated feeling in your belly (abdomen). Your IV site (where you received drugs) may be tender to touch. Place warm towels on the site; keep your arm up on two pillows if you have any swelling or soreness in the area. You may shower. ACTIVITY:  Take frequent rest periods and move at a slower pace for the next 24 hours. .  You may resume your regular activity tomorrow if you are feeling back to normal.  Do not drive or ride a bicycle for at least 24 hours (because of the medicine (anesthesia) used during the test). Do not sign any important legal documents or use or operate any machinery for 24 hours  Do not take sleeping medicines/nerve drugs for 24 hours unless the doctor tells you. You can return to work/school tomorrow unless otherwise instructed. NUTRITION:  Drink plenty of fluids to keep your pee (urine) clear or pale yellow  Begin with a light meal and progress to your normal diet. Heavy or fried foods are harder to digest and may make you feel sick to your stomach (nauseated).   Once you are feeling back to normal, you may resume your normal diet as instructed by your doctor. Avoid alcoholic beverages for 24 hours or as instructed. IF YOU HAD BIOPSIES TAKEN OR POLYPS REMOVED DURING THE PROCEDURE:  For the next 7 days, avoid all non-steroidal antiinflammatory medications such as Ibuprofen, Motrin, Advil, Alleve, Madeline-seltzer, Goody's powder, BC powder. If you do not have an heart condition that requires you to take a daily aspirin, you should avoid taking aspirin for 7 days. Eat a soft diet for 24 hours. Monitor your stools for any blood or dark black, tar-like, stools as this may be a sign of bleeding and if you see any blood, notify your doctor immediately. GET HELP RIGHT AWAY AND SEEK IMMEDIATE MEDICAL CARE IF:  You have more than a spotting of blood in your stool. You pass clumps of tissue (blood clots) or fill the toilet with blood. Your belly is painfully swollen or puffy (abdominal distention). You throw up (vomit). You have a fever. You have redness, pain or swelling at the IV site that last greater than two days. You have abdominal pain or discomfort that is severe or gets worse throughout the day. Post-procedure diagnosis:  Diverticulosis, hemorrhoids     Post-procedure recommendations:          Learning About Coronavirus (COVID-19)  Coronavirus (COVID-19): Overview  What is coronavirus (COVID-19)? The coronavirus disease (COVID-19) is caused by a virus. It is an illness that was first found in [place], [place], in December 2019. It has since spread worldwide. The virus can cause fever, cough, and trouble breathing. In severe cases, it can cause pneumonia and make it hard to breathe without help. It can cause death. Coronaviruses are a large group of viruses. They cause the common cold. They also cause more serious illnesses like Middle East respiratory syndrome (MERS) and severe acute respiratory syndrome (SARS). COVID-19 is caused by a novel coronavirus.  That means it's a new type that has not been seen in people before. This virus spreads person-to-person through droplets from coughing and sneezing. It can also spread when you are close to someone who is infected. And it can spread when you touch something that has the virus on it, such as a doorknob or a tabletop. What can you do to protect yourself from coronavirus (COVID-19)? The best way to protect yourself from getting sick is to: Avoid areas where there is an outbreak. Avoid contact with people who may be infected. Wash your hands often with soap or alcohol-based hand sanitizers. Avoid crowds and try to stay at least 6 feet away from other people. Wash your hands often, especially after you cough or sneeze. Use soap and water, and scrub for at least 20 seconds. If soap and water aren't available, use an alcohol-based hand . Avoid touching your mouth, nose, and eyes. What can you do to avoid spreading the virus to others? To help avoid spreading the virus to others:  Cover your mouth with a tissue when you cough or sneeze. Then throw the tissue in the trash. Use a disinfectant to clean things that you touch often. Stay home if you are sick or have been exposed to the virus. Don't go to school, work, or public areas. And don't use public transportation. If you are sick:  Leave your home only if you need to get medical care. But call the doctor's office first so they know you're coming. And wear a face mask, if you have one. If you have a face mask, wear it whenever you're around other people. It can help stop the spread of the virus when you cough or sneeze. Clean and disinfect your home every day. Use household  and disinfectant wipes or sprays. Take special care to clean things that you grab with your hands. These include doorknobs, remote controls, phones, and handles on your refrigerator and microwave. And don't forget countertops, tabletops, bathrooms, and computer keyboards.   When to call for help  Call 911 anytime you think you may need emergency care. For example, call if:  You have severe trouble breathing. (You can't talk at all.)  You have constant chest pain or pressure. You are severely dizzy or lightheaded. You are confused or can't think clearly. Your face and lips have a blue color. You pass out (lose consciousness) or are very hard to wake up. Call your doctor now if you develop symptoms such as:  Shortness of breath. Fever. Cough. If you need to get care, call ahead to the doctor's office for instructions before you go. Make sure you wear a face mask, if you have one, to prevent exposing other people to the virus. Where can you get the latest information? The following health organizations are tracking and studying this virus. Their websites contain the most up-to-date information. Lien Syed also learn what to do if you think you may have been exposed to the virus. U.S. Centers for Disease Control and Prevention (CDC): The CDC provides updated news about the disease and travel advice. The website also tells you how to prevent the spread of infection. www.cdc.gov  World Health Organization [facility]): WHO offers information about the virus outbreaks. WHO also has travel advice. www.who.int  Current as of: April 1, 2020               Content Version: 12.4  © 2006-2020 [employer]. Care instructions adapted under license by your healthcare professional. If you have questions about a medical condition or this instruction, always ask your healthcare professional. [address] any warranty or liability for your use of this information.       Patient Education on Sedation / Analgesia Administered for Procedure      For 24 hours after general anesthesia or intravenous analgesia / sedation:  Have someone responsible help you with your care  Limit your activities  Do not drive and operate hazardous machinery  Do not make important personal, legal or business decisions  Do not drink alcoholic beverages  If you have not urinated within 8 hours after discharge, please contact your physician  Resume your medications unless otherwise instructed    For 24 hours after general anesthesia or intravenous analgesia / sedation  you may experience:  Drowsiness, dizziness, sleepiness, or confusion  Difficulty remembering or delayed reaction times  Difficulty with your balance, especially while walking, move slowly and carefully, do not make sudden position changes  Difficulty focusing or blurred vision    You may not be aware of slight changes in your behavior and/or your reaction time because of the medication used during and after your procedure.     Report the following to your physician:  Excessive pain, swelling, redness or odor of or around the surgical area  Temperature over 100.5  Nausea and vomiting lasting longer than 4 hours or if unable to take medications  Any signs of decreased circulation or nerve impairment to extremity: change in color, persistent numbness, tingling, coldness or increase pain  Any questions or concerns    IF YOU REPORT TO AN EMERGENCY ROOM, DOCTOR'S OFFICE OR HOSPITAL WITHIN 24 HOURS AFTER YOUR PROCEDURE, BRING THIS SHEET AND YOUR AFTER VISIT SUMMARY WITH YOU AND GIVE IT TO THE PHYSICIAN OR NURSE ATTENDING YOU.

## 2023-04-13 NOTE — PROCEDURES
Colonoscopy Procedure Note    Indications:   See Preoperative Diagnosis above  Referring Physician: Sia Castellon MD  Anesthesia/Sedation: MAC anesthesia Propofol  Endoscopist:  Dr. Fred Gonzalez  Assistant:  Endoscopy Technician-1: Leslee Lundy  Endoscopy RN-1: Jewel Kline  Preoperative diagnosis: Abnormal Imaging  Postoperative diagnosis: Diverticulosis,     Procedure in Detail:  Informed consent was obtained for the procedure, including sedation. Risks of perforation, hemorrhage, adverse drug reaction, and aspiration were discussed. The patient was placed in the left lateral decubitus position. Based on the pre-procedure assessment, including review of the patient's medical history, medications, allergies, and review of systems, she had been deemed to be an appropriate candidate for moderate sedation; she was therefore sedated with the medications listed above. The patient was monitored continuously with ECG tracing, pulse oximetry, blood pressure monitoring, and direct observations. A rectal examination was performed. The [id number] was inserted into the rectum and advanced under direct vision to the cecum, which was identified by the ileocecal valve and appendiceal orifice. The quality of the colonic preparation was excellent. A careful inspection was made as the colonoscope was withdrawn, including a retroflexed view of the rectum; findings and interventions are described below. Appropriate photodocumentation was obtained.     Findings:  JOSE: hemorrhoids  Rectum: internal hemorrhoids on retroflexion  Sigmoid: moderate diverticulosis, otherwise unremarakble  Descending Colon: moderate diverticulosis, otherwise unremarakble  Transverse Colon: normal  no mucosal lesion appreciated  Ascending Colon: normal  no mucosal lesion appreciated  Cecum: normal  no mucosal lesion appreciated  Terminal Ileum: not intubated    Specimens:    none    EBL: None    Complications: None; patient tolerated the procedure well.     Recommendations:   - Repeat colonoscopy in 10 years if very healthy, otherwise not recommended due to age  - discussed w/ Aisha Dos Santos      Signed By: Charly Walls MD                        April 13, 2023

## 2023-04-13 NOTE — ROUTINE PROCESS
Odin Ines  1955  [id number]    Situation:  Verbal report received from: Linh Polanco RN  Procedure: Procedure(s):  COLONOSCOPY    Background:    Preoperative diagnosis: Abnormal Imaging  Postoperative diagnosis: Diverticulosis, hemorrhoids     :  Dr. Navjot Danielle   Assistant(s): Endoscopy Technician-1: Nixon Woo  Endoscopy RN-1: Kaveh Caballero    Specimens: * No specimens in log *  H. Pylori  no          Anesthesia gave intra-procedure sedation and medications, see anesthesia flow sheet yes    Intravenous fluids: NS@ KVO     Vital signs stable         Abdominal assessment: round and soft      Recommendation:  Discharge patient per MD order .      Family or Friend    Permission to share finding with family or friend yes

## 2023-04-13 NOTE — H&P
[facility]  Outpatient History and Physical    Patient: Veronica     Physician: Rosalia Reid MD    Vital Signs: Blood pressure 133/78, pulse 74, temperature 98.3 °F (36.8 °C), resp. rate 21, height 5' 6\" (1.676 m), weight 82.5 kg (181 lb 12.8 oz), SpO2 98 %, not currently breastfeeding. Allergies:    Allergies   Allergen Reactions    Lisinopril Cough       Chief Complaint: screening colonoscopy    History:  Past Medical History:   Diagnosis Date    Allergic rhinitis 8/9/2017    Anemia 8/9/2017    Anxiety 8/9/2017    Arrhythmia     irregular heart beat hx and beating fast per patient/no cardiologist    Arthritis     JOINTS  WORSE WAIST DOWN     Back pain 8/9/2017    Chronic kidney disease     Stage III    CKD (chronic kidney disease) 8/9/2017    COPD (chronic obstructive pulmonary disease) ([facility] [address] 8/9/2017    Crohn disease ([facility] [address] 8/9/2017    DJD (degenerative joint disease) 8/9/2017    Dry mouth     evaluated by Dr. Kin Walsh (ENT)    GERD (gastroesophageal reflux disease) 8/9/2017    Hormone replacement therapy (HRT) 8/9/2017    Hyperlipidemia 8/9/2017    Hypertension     Hypertension with renal disease 8/9/2017    Hypokalemia 8/9/2017    Hypothyroid 8/9/2017    Ill-defined condition     neuropathy feet    Inguinal hernia 8/9/2017    Leg numbness 8/9/2017    Lumbar herniated disc 8/9/2017    OAB (overactive bladder) 8/9/2017    Obesity 8/9/2017    On statin therapy 8/9/2017    Osteoarthritis 8/9/2017    Pelvic pain in female 2/3/3639    Umbilical hernia 9/5/4055    Varicose vein of leg 8/9/2017      Past Surgical History:   Procedure Laterality Date    HX COLONOSCOPY  12/2010    normal    HX COLONOSCOPY  12/30/2014    normal     HX HEENT      cyst removed left eye    HX HERNIA REPAIR  06/02/2017    right inguinal and umbilical (Dr. Clara Chen)    HX HYSTERECTOMY      HX ORTHOPAEDIC      bilat knee injections    HX ORTHOPAEDIC      laser treatment left knee and foot    HX ORTHOPAEDIC      hx of gel shots bilat knee    HX ORTHOPAEDIC  12/27/2016    left knee coritizon shot    HX OTHER SURGICAL      cyst removed front left scalp    HX OTHER SURGICAL      colonoscopy    HX OTHER SURGICAL      tooth implant    HX TUBAL LIGATION      HX UROLOGICAL      anterior/posterior repair    [place] UNLISTED NEUROLOGICAL/NEUROMUSCULAR DX PX      Lumbar surgery    [place] UNLISTED PROCEDURE BREAST      mass removed left breast x2 benign    [place] UNLISTED PROCEDURE VASCULAR SURGERY      vein striping      Social History     Socioeconomic History    Marital status:    Tobacco Use    Smoking status: Never    Smokeless tobacco: Never   Vaping Use    Vaping Use: Never used   Substance and Sexual Activity    Alcohol use: Yes     Comment: once a year    Drug use: No    Sexual activity: Yes     Partners: Male      Family History   Problem Relation Age of Onset    No Known Problems Mother         unknown    No Known Problems Father         no contact with/unknown    Stroke Sister        Medications:   Prior to Admission medications    Medication Sig Start Date End Date Taking? Authorizing Provider   potassium chloride (K-DUR, KLOR-CON M20) 20 mEq tablet TAKE 1 TABLET TWICE DAILY 3/8/23  Yes Kirill Infante MD   meloxicam [facility]) 15 mg tablet Take 1 Tablet by mouth daily. 3/3/23  Yes Kirill Infante MD   amLODIPine (NORVASC) 5 mg tablet Take 1 tablet by mouth once daily 10/25/22  Yes Kirill Infante MD   estradioL (VIVELLE) 0.05 mg/24 hr APPLY 1 PATCH TOPICALLY TWICE A WEEK 10/10/22  Yes Kirill Infante MD   niacin ER (NIASPAN) 500 mg tablet TAKE 1 TABLET BY MOUTH AT BEDTIME 9/26/22  Yes Kirill Infante MD   olmesartan (BENICAR) 20 mg tablet Take 1 Tablet by mouth daily. 8/19/22  Yes Kirill Infante MD   OTHER Indications: Melaleuca Phytomega - Take 2 softgels twice a day. Yes Provider, Historical   OTHER Indications: Melaleuca Vitality - Takes 1 tablet twice a day.    Yes Provider, Historical   ferrous sulfate 325 mg (65 mg iron) tablet Take  by mouth three (3) times daily (with meals). Yes Provider, Historical   cholecalciferol, vitamin D3, 2,000 unit tab Take 1 Tablet by mouth daily. Yes Provider, Historical   Coenzyme Q10-Vitamin E 100-5 mg-unit cap Take 100 mg by mouth daily. Patient not taking: Reported on 4/13/2023    Provider, Historical   lidocaine (LIDODERM) 5 % Apply 1 Patch to affected area as needed. Patient not taking: Reported on 4/13/2023 2/16/23   Provider, Historical   psyllium (METAMUCIL) powd Take  by mouth daily. Patient not taking: Reported on 4/13/2023    Provider, Historical   atorvastatin (LIPITOR) 20 mg tablet TAKE 1 TABLET EVERY NIGHT  Patient not taking: Reported on 4/13/2023 3/13/22   Susi Mccoy MD   polyethylene glycol [facility]) 17 gram/dose powder Take 17 g by mouth daily. Patient not taking: Reported on 4/13/2023    Provider, Historical   cyanocobalamin (VITAMIN B-12) 1,000 mcg sublingual tablet Take 1 Tablet by mouth daily. Patient not taking: Reported on 4/13/2023    Provider, Historical   triamcinolone acetonide (KENALOG) 0.1 % topical cream as needed. Patient not taking: Reported on 4/13/2023 3/17/20   Provider, Historical   vitamin E (AQUA GEMS) 400 unit capsule Take 1 Capsule by mouth daily. Patient not taking: Reported on 4/13/2023 3/16/17   Ashley Alva NP   ascorbic acid (VITAMIN C) 250 mg tablet Take 2 Tablets by mouth two (2) times a day. Patient not taking: Reported on 4/13/2023    Provider, Historical       Physical Exam:   General: alert, no distress   HEENT: Head: Normocephalic, no lesions, without obvious abnormality.    Heart: regular rate and rhythm, S1, S2 normal, no murmur, click, rub or gallop   Lungs: chest clear, no wheezing, rales, normal symmetric air entry   Abdominal: Bowel sounds are normal, liver is not enlarged, spleen is not enlarged   Neurological: Grossly normal   Extremities: extremities normal, atraumatic, no cyanosis or edema     Plan of Care/Planned Procedure: colonoscopy    The heart, lungs and mental status were satisfactory for the administration of MAC sedation and for the procedure. Informed consent was obtained for the procedure, including sedation. Risks of perforation, hemorrhage, adverse drug reaction, and aspiration were discussed. The risks, benefits and alternatives were again reiterated to the patient to include the risk of infection, bleeding, medication reaction, aspiration, perforation which could require immediate surgery, cardiopulmonary complication, issues with anesthesia and death. The patient was counseled at length about the risks of apolonia Covid-19 in the franky-operative and post-operative states including the recovery window of their procedure. The patient was made aware that apolonia Covid-19 after a surgical procedure may worsen their prognosis for recovering from the virus and lend to a higher morbidity and or mortality risk. The patient was given the options of postponing their procedure. All of the risks, benefits, and alternatives were discussed. The patient does  wish to proceed with the procedure.

## 2023-04-24 NOTE — PATIENT INSTRUCTIONS
3 y.o. male, Lee Simental, presents with Conjunctivitis  Dad states that 1 week ago he started with both eyes looking red. All inflamed per dad. Looked more to be of the eyelids. Dad has photos that show eyelid redness and swelling. A physician friend prescribed Tobramycin which improved the redness and swelling. Greatly improved but was sent home for it anyway 3 days ago. Completely gone 2 days ago. Now having some right eyelid redness. A little crust from eyelashes. Not a bunch of discharge. Denies pain. No vision changes. Dad was also giving Benadryl at night and Zyrtec in the morning.     Review of Systems  Review of Systems   Constitutional:  Negative for activity change, appetite change and fever.   HENT:  Negative for congestion and rhinorrhea.    Eyes:  Positive for discharge, redness and itching. Negative for pain and visual disturbance.   Respiratory:  Negative for cough and wheezing.    Gastrointestinal:  Negative for diarrhea and vomiting.   Genitourinary:  Negative for decreased urine volume and difficulty urinating.   Skin:  Negative for rash.    Objective:   Physical Exam  Vitals reviewed.   Constitutional:       General: He is active. He is not in acute distress.     Appearance: He is well-developed.   HENT:      Head: Normocephalic and atraumatic.      Right Ear: Tympanic membrane normal.      Left Ear: Tympanic membrane normal.      Nose: Nose normal.      Mouth/Throat:      Mouth: Mucous membranes are moist.      Pharynx: Oropharynx is clear.   Eyes:      General: Lids are normal.      Conjunctiva/sclera:      Right eye: Right conjunctiva is injected (mildly). Exudate (minimal dried exudate on lashes) present.      Left eye: Left conjunctiva is not injected. No exudate.  Cardiovascular:      Rate and Rhythm: Normal rate and regular rhythm.      Pulses: Normal pulses.      Heart sounds: Normal heart sounds, S1 normal and S2 normal.   Pulmonary:      Effort: Pulmonary effort is normal. No  Abdominal Hernia Repair: What to Expect at Home Your Recovery After surgery to repair your hernia, you are likely to have pain for a few days. You may also feel like you have the flu, and you may have a low fever and feel tired and sick to your stomach. This is common. You should feel better after a few days and will probably feel much better in 7 days. For several weeks you may feel twinges or pulling in the hernia repair when you move. You may have some bruising around the area of the repair. This is normal. 
This care sheet gives you a general idea about how long it will take for you to recover. But each person recovers at a different pace. Follow the steps below to get better as quickly as possible. How can you care for yourself at home? Activity 
  · Rest when you feel tired. Getting enough sleep will help you recover.  
  · Try to walk each day. Start by walking a little more than you did the day before. Bit by bit, increase the amount you walk. Walking boosts blood flow and helps prevent pneumonia and constipation.  
  · If your doctor gives you an abdominal binder to wear, use it as directed. This is an elastic bandage that wraps around your belly and upper hips. It helps support your belly muscles after surgery.  
  · Avoid strenuous activities, such as biking, jogging, weight lifting, or aerobic exercise, until your doctor says it is okay.  
  · Avoid lifting anything that would make you strain. This may include heavy grocery bags and milk containers, a heavy briefcase or backpack, cat litter or dog food bags, a vacuum , or a child.  
  · Ask your doctor when you can drive again.  
  · Most people are able to return to work within 1 to 2 weeks after surgery. But if your job requires that you do heavy lifting or strenuous activity, you may need to take 4 to 6 weeks off from work.  
  · You may shower 24 to 48 hours after surgery, if your doctor okays it. Pat the cut (incision) dry. Do not take a bath for the first 2 weeks, or until your doctor tells you it is okay.  
  · Ask your doctor when it is okay for you to have sex. Diet 
  · You can eat your normal diet. If your stomach is upset, try bland, low-fat foods like plain rice, broiled chicken, toast, and yogurt.  
  · Drink plenty of fluids (unless your doctor tells you not to).  
  · You may notice that your bowel movements are not regular right after your surgery. This is common. Avoid constipation and straining with bowel movements. You may want to take a fiber supplement every day. If you have not had a bowel movement after a couple of days, ask your doctor about taking a mild laxative. Medicines 
  · Your doctor will tell you if and when you can restart your medicines. He or she will also give you instructions about taking any new medicines.  
  · If you take aspirin or some other blood thinner, ask your doctor if and when to start taking it again. Make sure that you understand exactly what your doctor wants you to do.  
  · Be safe with medicines. Take pain medicines exactly as directed. ? If the doctor gave you a prescription medicine for pain, take it as prescribed. ? If you are not taking a prescription pain medicine, ask your doctor if you can take an over-the-counter medicine.  
  · If your doctor prescribed antibiotics, take them as directed. Do not stop taking them just because you feel better. You need to take the full course of antibiotics.  
  · If you think your pain medicine is making you sick to your stomach: 
? Take your medicine after meals (unless your doctor has told you not to). ? Ask your doctor for a different pain medicine. Incision care 
  · If you have strips of tape on the cut (incision) the doctor made, leave the tape on for a week or until it falls off. Or follow your doctor's instructions for removing the tape. respiratory distress or retractions.      Breath sounds: Normal breath sounds and air entry. No wheezing, rhonchi or rales.   Skin:     General: Skin is warm.      Capillary Refill: Capillary refill takes less than 2 seconds.      Findings: No rash.     Assessment:     3 y.o. male Lee was seen today for conjunctivitis.    Diagnoses and all orders for this visit:    Allergic conjunctivitis of both eyes  -     olopatadine (PATANOL) 0.1 % ophthalmic solution; Place 1 drop into both eyes 2 (two) times daily.      Plan:      1. Complete 3 more days of antibiotics at home to complete the course. Use Patanol as prescribed (can find OTC as well). Advised on symptomatic care and when to return to clinic. Handout provided.     · If you have staples closing the cut, you will need to visit your doctor in 1 to 2 weeks to have them removed.  
  · Wash the area daily with warm, soapy water, and pat it dry. Don't use hydrogen peroxide or alcohol, which can slow healing. You may cover the area with a gauze bandage if it weeps or rubs against clothing. Change the bandage every day. Other instructions 
  · Hold a pillow over your incision when you cough or take deep breaths. This will support your belly and decrease your pain.  
  · Do breathing exercises at home as instructed by your doctor. This will help prevent pneumonia.  
  · If you had laparoscopic surgery, you may also have pain in your left shoulder. The pain usually lasts about a day or two. Follow-up care is a key part of your treatment and safety. Be sure to make and go to all appointments, and call your doctor if you are having problems. It's also a good idea to know your test results and keep a list of the medicines you take. When should you call for help? Call 911 anytime you think you may need emergency care. For example, call if: 
  · You passed out (lost consciousness).  
  · You are short of breath. Call your doctor now or seek immediate medical care if: 
  · You are sick to your stomach and cannot drink fluids.  
  · You have signs of a blood clot in your leg (called a deep vein thrombosis), such as: 
? Pain in your calf, back of the knee, thigh, or groin. ? Redness and swelling in your leg or groin.  
  · You have signs of infection, such as: 
? Increased pain, swelling, warmth, or redness. ? Red streaks leading from the incision. ? Pus draining from the incision. ? A fever.  
  · You cannot pass stools or gas.  
  · You have pain that does not get better after you take pain medicine.  
  · You have loose stitches, or your incision comes open.  
  · Bright red blood has soaked through the bandage over your incision. Watch closely for changes in your health, and be sure to contact your doctor if you have any problems. Where can you learn more? Go to http://www.gray.com/ Enter B577 in the search box to learn more about \"Abdominal Hernia Repair: What to Expect at Home. \" Current as of: April 15, 2020               Content Version: 12.6 © 4220-6635 [employer], [employer]. Care instructions adapted under license by [employer] (which disclaims liability or warranty for this information). If you have questions about a medical condition or this instruction, always ask your healthcare professional. [address] any warranty or liability for your use of this information.

## 2023-05-21 RX ORDER — POLYETHYLENE GLYCOL 3350 17 G/17G
17 POWDER, FOR SOLUTION ORAL DAILY
COMMUNITY

## 2023-05-21 RX ORDER — TRIAMCINOLONE ACETONIDE 1 MG/G
CREAM TOPICAL PRN
COMMUNITY
Start: 2020-03-17

## 2023-05-21 RX ORDER — UBIDECARENONE 100 MG
100 CAPSULE ORAL DAILY
COMMUNITY

## 2023-05-21 RX ORDER — FERROUS SULFATE 325(65) MG
TABLET ORAL
COMMUNITY

## 2023-05-21 RX ORDER — AMLODIPINE BESYLATE 5 MG/1
1 TABLET ORAL DAILY
COMMUNITY
Start: 2022-10-25

## 2023-05-21 RX ORDER — ESTRADIOL 0.05 MG/D
PATCH, EXTENDED RELEASE TRANSDERMAL
COMMUNITY
Start: 2022-10-10 | End: 2023-06-27

## 2023-05-21 RX ORDER — ASCORBIC ACID 250 MG
500 TABLET ORAL 2 TIMES DAILY
COMMUNITY

## 2023-05-21 RX ORDER — MELOXICAM 15 MG/1
15 TABLET ORAL DAILY
COMMUNITY
Start: 2023-03-03

## 2023-05-21 RX ORDER — OLMESARTAN MEDOXOMIL 20 MG/1
20 TABLET ORAL DAILY
COMMUNITY
Start: 2022-08-19 | End: 2023-07-19

## 2023-05-21 RX ORDER — POTASSIUM CHLORIDE 20 MEQ/1
1 TABLET, EXTENDED RELEASE ORAL 2 TIMES DAILY
COMMUNITY
Start: 2023-03-08

## 2023-05-21 RX ORDER — NIACIN 500 MG/1
1 TABLET, EXTENDED RELEASE ORAL NIGHTLY
COMMUNITY
Start: 2022-09-26

## 2023-05-21 RX ORDER — LIDOCAINE 50 MG/G
1 PATCH TOPICAL PRN
COMMUNITY
Start: 2023-02-16

## 2023-05-21 RX ORDER — ATORVASTATIN CALCIUM 20 MG/1
1 TABLET, FILM COATED ORAL NIGHTLY
COMMUNITY
Start: 2022-03-13 | End: 2023-05-22

## 2023-05-22 RX ORDER — ATORVASTATIN CALCIUM 20 MG/1
TABLET, FILM COATED ORAL
Qty: 90 TABLET | Refills: 5 | Status: SHIPPED | OUTPATIENT
Start: 2023-05-22

## 2023-05-22 NOTE — TELEPHONE ENCOUNTER
RX refill request from the patient/pharmacy. Patient last seen 03/03/2023 with labs, and next appt. scheduled for 6/15/2023.     Requested Prescriptions     Pending Prescriptions Disp Refills    atorvastatin (LIPITOR) 20 MG tablet [Pharmacy Med Name: ATORVASTATIN CALCIUM 20 MG Tablet] 90 tablet      Sig: TAKE 1 TABLET EVERY NIGHT

## 2023-06-14 PROBLEM — K21.9 GASTROESOPHAGEAL REFLUX DISEASE WITHOUT ESOPHAGITIS: Status: ACTIVE | Noted: 2017-08-09

## 2023-06-14 PROBLEM — N32.81 OAB (OVERACTIVE BLADDER): Status: ACTIVE | Noted: 2017-08-09

## 2023-06-14 PROBLEM — E03.9 ACQUIRED HYPOTHYROIDISM: Status: ACTIVE | Noted: 2017-08-09

## 2023-06-14 PROBLEM — M15.9 PRIMARY OSTEOARTHRITIS INVOLVING MULTIPLE JOINTS: Status: ACTIVE | Noted: 2017-08-09

## 2023-06-14 PROBLEM — N18.30 STAGE 3 CHRONIC KIDNEY DISEASE (HCC): Status: ACTIVE | Noted: 2017-08-09

## 2023-06-14 PROBLEM — J30.89 CHRONIC NON-SEASONAL ALLERGIC RHINITIS: Status: ACTIVE | Noted: 2017-08-09

## 2023-06-14 PROBLEM — M15.0 PRIMARY OSTEOARTHRITIS INVOLVING MULTIPLE JOINTS: Status: ACTIVE | Noted: 2017-08-09

## 2023-06-14 PROBLEM — L98.9 SKIN LESION OF CHEST WALL: Status: RESOLVED | Noted: 2021-08-12 | Resolved: 2023-06-14

## 2023-06-14 PROBLEM — K50.90 CROHN DISEASE (HCC): Status: ACTIVE | Noted: 2017-08-09

## 2023-06-14 PROBLEM — H83.03 LABYRINTHITIS OF BOTH EARS: Status: RESOLVED | Noted: 2019-01-24 | Resolved: 2023-06-14

## 2023-06-14 PROBLEM — I12.9 HYPERTENSION WITH RENAL DISEASE: Status: ACTIVE | Noted: 2017-08-09

## 2023-06-14 PROBLEM — J44.9 COPD (CHRONIC OBSTRUCTIVE PULMONARY DISEASE) (HCC): Status: ACTIVE | Noted: 2017-08-09

## 2023-06-14 PROBLEM — E78.2 MIXED HYPERLIPIDEMIA: Status: ACTIVE | Noted: 2017-08-09

## 2023-06-14 PROBLEM — E66.811 CLASS 1 OBESITY DUE TO EXCESS CALORIES WITHOUT SERIOUS COMORBIDITY WITH BODY MASS INDEX (BMI) OF 33.0 TO 33.9 IN ADULT: Status: RESOLVED | Noted: 2017-08-09 | Resolved: 2023-06-14

## 2023-06-14 PROBLEM — E55.9 VITAMIN D DEFICIENCY: Status: ACTIVE | Noted: 2022-08-18

## 2023-06-14 PROBLEM — E66.09 CLASS 1 OBESITY DUE TO EXCESS CALORIES WITHOUT SERIOUS COMORBIDITY WITH BODY MASS INDEX (BMI) OF 33.0 TO 33.9 IN ADULT: Status: RESOLVED | Noted: 2017-08-09 | Resolved: 2023-06-14

## 2023-06-14 PROBLEM — F41.9 ANXIETY: Status: ACTIVE | Noted: 2017-08-09

## 2023-06-27 RX ORDER — ESTRADIOL 0.05 MG/D
PATCH, EXTENDED RELEASE TRANSDERMAL
Qty: 24 PATCH | Refills: 0 | Status: SHIPPED | OUTPATIENT
Start: 2023-06-27

## 2023-07-12 ENCOUNTER — TELEPHONE (OUTPATIENT)
Facility: CLINIC | Age: 68
End: 2023-07-12

## 2023-07-12 NOTE — TELEPHONE ENCOUNTER
----- Message from Christiano sent at 7/12/2023  2:16 PM EDT -----  Subject: Message to Provider    QUESTIONS  Information for Provider? Pt is returning a phone call from Maryuri Lloyd. Please   call pt back on home or cell number.    ---------------------------------------------------------------------------  --------------  Karthik Smallwood SQR  [phone]; OK to leave message on voicemail  ---------------------------------------------------------------------------  --------------  SCRIPT ANSWERS  undefined

## 2023-07-19 RX ORDER — OLMESARTAN MEDOXOMIL 20 MG/1
TABLET ORAL
Qty: 90 TABLET | Refills: 3 | Status: SHIPPED | OUTPATIENT
Start: 2023-07-19

## 2023-07-19 NOTE — TELEPHONE ENCOUNTER
RX refill request from the patient/pharmacy. Patient last seen 06- with labs, and next appt. scheduled for 09-  Requested Prescriptions     Pending Prescriptions Disp Refills    olmesartan (BENICAR) 20 MG tablet [Pharmacy Med Name: Olmesartan Medoxomil 20 MG Oral Tablet] 90 tablet 3     Sig: Take 1 tablet by mouth once daily    .

## 2023-08-04 ENCOUNTER — OFFICE VISIT (OUTPATIENT)
Age: 68
End: 2023-08-04

## 2023-08-04 VITALS
RESPIRATION RATE: 15 BRPM | SYSTOLIC BLOOD PRESSURE: 133 MMHG | HEART RATE: 69 BPM | WEIGHT: 185 LBS | OXYGEN SATURATION: 97 % | BODY MASS INDEX: 29.73 KG/M2 | HEIGHT: 66 IN | DIASTOLIC BLOOD PRESSURE: 81 MMHG | TEMPERATURE: 97.5 F

## 2023-08-04 DIAGNOSIS — Z20.822 CLOSE EXPOSURE TO COVID-19 VIRUS: Primary | ICD-10-CM

## 2023-08-04 LAB
Lab: NORMAL
QC PASS/FAIL: NORMAL
SARS-COV-2, POC: NORMAL

## 2023-08-05 NOTE — PROGRESS NOTES
[facility] MEDICINE DISCHARGE SUMMARY NOTE    Patient: Pinky Li Discharge Date: 8/5/2023   YOB: 1954 Admission Date: 7/31/2023  6:20 AM   MRN: [id number] Admission Length: 5 day(s)     PCP: Pita Pérez MD    Admitting Physician: Karthik Oneill MD Discharge Physician: Aron Chavez DO       Admission Information     Admission Diagnoses:   Weakness [R53.1]  Hyperglycemia [R73.9]  Pneumonia of left upper lobe due to infectious organism [J18.9]  Pneumonia due to other specified bacteria (CMD) [J15.8]    Admission Condition: poor  Condition at Discharge:  good     Primary Discharge Diagnoses:   Pneumonia of left upper lobe due to infectious organism  (primary encounter diagnosis)  Hyperglycemia  Weakness    Secondary Discharge Diagnoses:   Patient Active Problem List   Diagnosis   • Type 2 diabetes mellitus with hyperglycemia, with long-term current use of insulin (CMD)   • HTN   • Mixed hyperlipidemia   • Diverticular disease of left colon   • S/P drug eluting coronary stent placement   • Anemia   • Mural thrombus of cardiac apex   • Diabetic polyneuropathy associated with type 2 diabetes mellitus (CMD)   • Anticoagulant long-term use   • Status post coronary artery stent placement   • Mixed Alzheimer's and vascular dementia (CMD)   • Hemiparesis and alteration of sensations as late effects of stroke (CMD)   • Ex-smoker   • Chronic respiratory failure (CMD)   • Class 1 obesity due to excess calories with serious comorbidity and body mass index (BMI) of 34.0 to 34.9 in adult   • Chronic systolic congestive heart failure (CMD)   • Major depressive disorder with current active episode   • Gastritis without bleeding   • Long term current use of anticoagulant therapy   • Cerebrovascular accident (CVA) due to embolism of right middle cerebral artery (CMD)   • Acute hypoxemic respiratory failure due to COVID-19 (CMD)   • Ventricular tachycardia (CMD)   • Hypomagnesemia   • V tach (CMD)  Gynecology Progress Note    Patient doing well post-op day 1 from Procedure(s):  CYSTOCELE REPAIR, PERINEOPLASTY,SACROSPINOUS LIGAMENT FIXATION  SACROSPINOUS LIGAMENT FIXATION to the right without significant complaints. Pain controlled on current medication. Voiding without difficulty. Patient is passing flatus. Vitals:  Blood pressure 99/54, pulse 62, temperature 97.7 °F (36.5 °C), resp. rate 16, SpO2 97 %. Temp (24hrs), Av °F (36.7 °C), Min:97.6 °F (36.4 °C), Max:98.3 °F (36.8 °C)        Exam:  Patient without distress. Abdomen soft,  nontender. Incision dry and clean without erythema. Lower extremities are negative for swelling, cords, or tenderness. Lab/Data Review: All lab results for the last 24 hours reviewed. Assessment and Plan:  Patient appears to be having uncomplicated post Procedure(s):  CYSTOCELE REPAIR, PERINEOPLASTY,SACROSPINOUS LIGAMENT FIXATION  SACROSPINOUS LIGAMENT FIXATION to the right course. Continue routine post-op care.   • Closed displaced fracture of lateral malleolus of left fibula, initial encounter   • Biotronik Single Chamber IC (DX lead)   • Chronic pain of left knee   • Fall at home   • Pneumonia due to other specified bacteria (CMD)       Code status/Goals of care discussion:  Selective Treatment/DNR         TCM Follow up      Radiology tests requiring followup: None     Other tests/treatment requiring follow up : O2 requirements      Studies pending at time of discharge: None   Unresulted Labs (From admission, onward)     Start     Ordered    08/01/23 1700  Metered Blood Glucose 4 times daily, before meals and bedtime  4 TIMES DAILY BEFORE MEALS & N,   Routine       08/01/23 1347    08/01/23 0600  Prothrombin Time (INR/PT)  AM DRAW,   Routine       07/31/23 1055    07/31/23 1100  Metered Blood Glucose  4 TIMES DAILY BEFORE MEALS & N,   Routine       07/31/23 1020    07/31/23 0918  SPUTUM, BACTERIAL CULTURE WITH GRAM STAIN  IF CONDITION MET,   TD         07/31/23 0918    07/31/23 0739  POCT Lactic Acid with Reflex (for sepsis work-up)  ONE TIME,   Routine         07/31/23 0739              Unresulted Procedure (From admission, onward)    None            Needs further Goals of care discussion Yes     Discharge Medications:      Summary of your Discharge Medications      Take these Medications      Details   acetaminophen 500 MG tablet  Commonly known as: TYLENOL   Take 1,000 mg by mouth 3 times daily as needed for Pain.     * albuterol (2.5 MG/3ML) 0.083% nebulizer solution  Commonly known as: VENTOLIN   Take 3 mLs by nebulization every 6 hours as needed for Wheezing. J44.9 Severe COPD  Comment: Verbal order given, this might be a duplicate.     * albuterol 108 (90 Base) MCG/ACT inhaler   Inhale 2 puffs into the lungs every 4 hours as needed for Shortness of Breath or Wheezing.     AMIODarone 200 MG tablet  Commonly known as: PACERONE  Start taking on: June 1, 2023   Take 1 tablet by mouth every 12 hours for 5 days, THEN 1  tablet daily.     aspirin 81 MG tablet   Take 1 tablet by mouth daily.     atorvastatin 80 MG tablet  Commonly known as: LIPITOR   TAKE 1 TABLET BY MOUTH EVERY NIGHT     B-12 1000 MCG Tab   Take 1 tablet by mouth daily.     cefUROXime 500 MG tablet  Commonly known as: CEFTIN   Take 1 tablet by mouth in the morning and 1 tablet in the evening. Do all this for 3 days.     DISPENSE   Dispense one wheelchair     donepezil 10 MG tablet  Commonly known as: ARICEPT   Take 1 tablet by mouth nightly.     doxycycline hyclate 100 MG capsule  Commonly known as: VIBRAMYCIN   Take 1 capsule by mouth in the morning and 1 capsule in the evening. Do all this for 3 days.     empagliflozin 25 MG tablet  Commonly known as: Jardiance   Take 1 tablet by mouth daily (before breakfast).     furosemide 40 MG tablet  Commonly known as: LASIX   Take 1 tablet by mouth daily.  Comment: Dose increase     gabapentin 100 MG capsule  Commonly known as: NEURONTIN   Take 2 pills at same time nightly     guaiFENesin 600 MG 12 hr tablet  Commonly known as: MUCINEX   Take 1,200 mg by mouth 2 times daily as needed for Congestion.     Insulin Pen Needle 32G X 4 MM Misc  Commonly known as: BD Pen Needle Anum U/F   USE AS DIRECTED FOR INSULIN ONCE DAILY     Lantus SoloStar 100 UNIT/ML pen-injector   Generic drug: insulin glargine  Inject 20 Units into the skin daily. Prime 2 units before each dose.  Comment: Dose increase- fill when appropriate.     lisinopril 10 MG tablet  Commonly known as: ZESTRIL   Take 1 tablet by mouth daily. May be different from previous script , 10 mg daily is correct     metFORMIN 500 MG 24 hr tablet  Commonly known as: GLUCOPHAGE-XR   Take 2 tablets by mouth in the morning and 2 tablets in the evening. Take with meals.     metoPROLOL succinate 25 MG 24 hr tablet  Commonly known as: TOPROL-XL   Take 1 tablet by mouth daily. Do not start before June 8, 2023.     mexiletine 150 MG capsule  Commonly known as: MEXITIL   Take 1 capsule  by mouth in the morning and 1 capsule at noon and 1 capsule in the evening.  Comment: 90 day supply once med increase established.     montelukast 10 MG tablet  Commonly known as: SINGULAIR   TAKE 1 TABLET BY MOUTH EVERY EVENING     omeprazole 20 MG capsule  Commonly known as: PrilOSEC   Take 1 capsule by mouth daily.     OneTouch Delica Lancets 33G Misc   Test blood sugar two times daily as directed. Diagnosis: Type 2 diabetes. Meter: One Touch Verio Flex     OneTouch Verio test strip   Generic drug: blood glucose  Test blood sugar 5 times daily. Diagnosis: E11.9. Meter: OneTouch Verio     potassium chloride 10 MEQ yaron ER tablet  Commonly known as: KLOR-CON M   Take 1 tablet by mouth daily.     sertraline 25 MG tablet  Commonly known as: ZOLOFT   TAKE 1 TABLET BY MOUTH DAILY     Trelegy Ellipta 100-62.5-25 MCG/ACT inhaler   Generic drug: fluticasone-umeclidin-vilanterol  Inhale 1 puff into the lungs daily.     VITAMIN D (CHOLECALCIFEROL) PO   Take 2,000 Units by mouth daily.     warfarin 4 MG tablet  Commonly known as: COUMADIN   Take as directed. If you are unsure how to take this medication, talk to your nurse or doctor.  Original instructions: Take 1 tablet (=4 mg) orally on Monday and Friday and take 0.5 tablet (=2 mg) all other days         * This list has 2 medication(s) that are the same as other medications prescribed for you. Read the directions carefully, and ask your doctor or other care provider to review them with you.                    Home Health referral:  No                Follow-up    Complete course of antibiotics as prescribed.  Follow-up with PCP and Cardiology.  Continue higher dose of furosemide as recently adjusted.  Use 1 L of oxygen continuously, 3 L with activity.    Discharge Instructions     Diet: cardiac, diabetic diet Activity:  activity as tolerated   Wound Care: none needed Disposition: Home     Other instructions:     None       Hospital Course   Admission Narrative:    Pinky QUIROGA  Jen is a 69 year old female with a history of Alzheimer's dementia, coronary artery disease asthma, prior stroke with left-sided deficits, congestive heart failure, COPD, hypertension, hyperlipidemia, and diabetes mellitus who presented to the hospital with shortness of breath, generalized weakness and fall.  She had been having shortness of breath for about a week and had recently had her furosemide increased from 20-40 mg a day with some improvement but still with shortness of breath.  She had previously required oxygen but was able to come off for several weeks.  She had oxygen at home to be used on an as-needed basis.  Initial chest x-ray showed a left base opacity and was admitted with pneumonia sepsis with acute hypoxic respiratory failure.  She was admitted, started on empiric antibiotics.  Her course was complicated by fluctuating strength and confusion however had cleared for several days prior to discharge.  She had gradual improvement in her condition with resolution of dyspnea, however she was requiring some supplemental oxygen still.  Given her history, it was felt that this may take a couple weeks for her to recover without need for oxygen.  Home O2 evaluation yielded need for 1 L of oxygen continuously and 3 L with activity.  BNP was slightly elevated however she did not have clinical signs of hypervolemia and on the day of discharge, she was near her dry weight of approximately 195 lb.  Her condition was discussed with her daughter at length throughout the stay.  Of note, given her medical complexity, chronicity of conditions and frequent hospitalizations, palliative Care had been consulted however due to their workload, were unable to see her prior to discharge.  Also of note, she was needing more insulin during hospitalization but diet was significantly different from home and A1c was measured at 7.3.  She was discharged in stable condition      Consultants:  None        Discharge Physical  Exam     General: In NAD. Conversant. Awake, Alert, Oriented to time, place, person.  Vital Signs:   Visit Vitals  BP (!) 144/70 (BP Location: LUE - Left upper extremity, Patient Position: Sitting)   Pulse 60   Temp 98.6 °F (37 °C) (Oral)   Resp 17   Ht 5' 7\" (1.702 m)   Wt 89.2 kg (196 lb 11.2 oz)   SpO2 93%   BMI 30.81 kg/m²     Skin: Moist and warm, no rashes  Neck: Supple, symmetric. No JVD, No carotid bruit. Normal carotid upstroke and amplitude.   Respiratory:  Mild basilar rhonchi, improved   Cardiovascular: S1, S2. RRR. 2+ pedal pulses  Abdomen: Soft, nontender, NRT, No HSM  Extremities: No edema or calf tenderness B/L. No acrocyanosis.   Neurologic: CN II-XII are grossly intact. Full strength in upper and lower extremities B/L    Wt Readings from Last 3 Encounters:   08/05/23 89.2 kg (196 lb 11.2 oz)   07/06/23 92.1 kg (203 lb)   06/02/23 95.6 kg (210 lb 12.2 oz)           Time taken to coordinate discharge care:  More than 30 minutes.    Discharge instructions, medications and followup appointment were discussed with patient/family and after visit summary was provided.     Aron Chavez DO  8/5/2023  12:42 PM    CC:   Pita Pérez MD

## 2023-08-09 ENCOUNTER — OFFICE VISIT (OUTPATIENT)
Age: 68
End: 2023-08-09

## 2023-08-09 VITALS
RESPIRATION RATE: 14 BRPM | HEIGHT: 66 IN | TEMPERATURE: 98.5 F | DIASTOLIC BLOOD PRESSURE: 63 MMHG | OXYGEN SATURATION: 97 % | WEIGHT: 180 LBS | BODY MASS INDEX: 28.93 KG/M2 | HEART RATE: 86 BPM | SYSTOLIC BLOOD PRESSURE: 93 MMHG

## 2023-08-09 DIAGNOSIS — U07.1 COVID-19: Primary | ICD-10-CM

## 2023-08-09 LAB
Lab: ABNORMAL
QC PASS/FAIL: ABNORMAL
SARS-COV-2, POC: DETECTED

## 2023-08-09 ASSESSMENT — ENCOUNTER SYMPTOMS: COUGH: 1
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
## 2023-09-14 PROBLEM — Z00.00 MEDICARE ANNUAL WELLNESS VISIT, SUBSEQUENT: Status: ACTIVE | Noted: 2021-08-11

## 2023-09-15 ENCOUNTER — OFFICE VISIT (OUTPATIENT)
Facility: CLINIC | Age: 68
End: 2023-09-15

## 2023-09-15 VITALS
WEIGHT: 185 LBS | SYSTOLIC BLOOD PRESSURE: 127 MMHG | BODY MASS INDEX: 29.73 KG/M2 | RESPIRATION RATE: 18 BRPM | HEIGHT: 66 IN | HEART RATE: 69 BPM | DIASTOLIC BLOOD PRESSURE: 86 MMHG | TEMPERATURE: 98 F | OXYGEN SATURATION: 96 %

## 2023-09-15 DIAGNOSIS — I12.9 HYPERTENSION WITH RENAL DISEASE: Primary | ICD-10-CM

## 2023-09-15 DIAGNOSIS — M15.9 PRIMARY OSTEOARTHRITIS INVOLVING MULTIPLE JOINTS: ICD-10-CM

## 2023-09-15 DIAGNOSIS — E78.2 MIXED HYPERLIPIDEMIA: ICD-10-CM

## 2023-09-15 DIAGNOSIS — Z00.00 MEDICARE ANNUAL WELLNESS VISIT, SUBSEQUENT: ICD-10-CM

## 2023-09-15 DIAGNOSIS — K21.9 GASTROESOPHAGEAL REFLUX DISEASE WITHOUT ESOPHAGITIS: ICD-10-CM

## 2023-09-15 DIAGNOSIS — K50.90 CROHN'S DISEASE WITHOUT COMPLICATION, UNSPECIFIED GASTROINTESTINAL TRACT LOCATION (HCC): ICD-10-CM

## 2023-09-15 DIAGNOSIS — N32.81 OAB (OVERACTIVE BLADDER): ICD-10-CM

## 2023-09-15 DIAGNOSIS — E55.9 VITAMIN D DEFICIENCY: ICD-10-CM

## 2023-09-15 DIAGNOSIS — J30.89 CHRONIC NON-SEASONAL ALLERGIC RHINITIS: ICD-10-CM

## 2023-09-15 DIAGNOSIS — E03.9 ACQUIRED HYPOTHYROIDISM: ICD-10-CM

## 2023-09-15 DIAGNOSIS — F41.9 ANXIETY: ICD-10-CM

## 2023-09-15 DIAGNOSIS — Z13.39 ALCOHOL SCREENING: ICD-10-CM

## 2023-09-15 DIAGNOSIS — N18.30 STAGE 3 CHRONIC KIDNEY DISEASE, UNSPECIFIED WHETHER STAGE 3A OR 3B CKD (HCC): ICD-10-CM

## 2023-09-15 DIAGNOSIS — J44.9 CHRONIC OBSTRUCTIVE PULMONARY DISEASE, UNSPECIFIED COPD TYPE (HCC): ICD-10-CM

## 2023-09-15 DIAGNOSIS — D64.9 ANEMIA, UNSPECIFIED TYPE: ICD-10-CM

## 2023-09-15 LAB
CK SERPL-CCNC: 71 U/L (ref 26–192)
COMMENT:: NORMAL
SPECIMEN HOLD: NORMAL

## 2023-09-15 ASSESSMENT — PATIENT HEALTH QUESTIONNAIRE - PHQ9
SUM OF ALL RESPONSES TO PHQ QUESTIONS 1-9: 0
1. LITTLE INTEREST OR PLEASURE IN DOING THINGS: 0
SUM OF ALL RESPONSES TO PHQ QUESTIONS 1-9: 0
2. FEELING DOWN, DEPRESSED OR HOPELESS: 0
SUM OF ALL RESPONSES TO PHQ9 QUESTIONS 1 & 2: 0

## 2023-09-15 ASSESSMENT — LIFESTYLE VARIABLES
HOW OFTEN DO YOU HAVE A DRINK CONTAINING ALCOHOL: 2-4 TIMES A MONTH
HOW MANY STANDARD DRINKS CONTAINING ALCOHOL DO YOU HAVE ON A TYPICAL DAY: 1 OR 2

## 2023-09-15 NOTE — PATIENT INSTRUCTIONS
Advance Directives: Care Instructions  Overview  An advance directive is a legal way to state your wishes at the end of your life. It tells your family and your doctor what to do if you can't say what you want. There are two main types of advance directives. You can change them any time your wishes change. Living will. This form tells your family and your doctor your wishes about life support and other treatment. The form is also called a declaration. Medical power of . This form lets you name a person to make treatment decisions for you when you can't speak for yourself. This person is called a health care agent (health care proxy, health care surrogate). The form is also called a durable power of  for health care. If you do not have an advance directive, decisions about your medical care may be made by a family member, or by a doctor or a  who doesn't know you. It may help to think of an advance directive as a gift to the people who care for you. If you have one, they won't have to make tough decisions by themselves. For more information, including forms for your state, see the [address] [place] [address] website (www.caringinfo.org/planning/advance-directives/). Follow-up care is a key part of your treatment and safety. Be sure to make and go to all appointments, and call your doctor if you are having problems. It's also a good idea to know your test results and keep a list of the medicines you take. What should you include in an advance directive? Many states have a unique advance directive form. (It may ask you to address specific issues.) Or you might use a universal form that's approved by many states. If your form doesn't tell you what to address, it may be hard to know what to include in your advance directive. Use the questions below to help you get started. Who do you want to make decisions about your medical care if you are not able to?   What life-support measures do you want if you

## 2023-09-16 LAB
25(OH)D3 SERPL-MCNC: 63.4 NG/ML (ref 30–100)
ALBUMIN SERPL-MCNC: 4 G/DL (ref 3.5–5)
ALBUMIN/GLOB SERPL: 1.1 (ref 1.1–2.2)
ALP SERPL-CCNC: 100 U/L (ref 45–117)
ALT SERPL-CCNC: 33 U/L (ref 12–78)
ANION GAP SERPL CALC-SCNC: 3 MMOL/L (ref 5–15)
APPEARANCE UR: CLEAR
AST SERPL-CCNC: 16 U/L (ref 15–37)
BACTERIA URNS QL MICRO: NEGATIVE /HPF
BASOPHILS # BLD: 0 K/UL (ref 0–0.1)
BASOPHILS NFR BLD: 0 % (ref 0–1)
BILIRUB SERPL-MCNC: 0.5 MG/DL (ref 0.2–1)
BILIRUB UR QL: NEGATIVE
BUN SERPL-MCNC: 27 MG/DL (ref 6–20)
BUN/CREAT SERPL: 30 (ref 12–20)
CALCIUM SERPL-MCNC: 10 MG/DL (ref 8.5–10.1)
CHLORIDE SERPL-SCNC: 103 MMOL/L (ref 97–108)
CHOLEST SERPL-MCNC: 156 MG/DL
CO2 SERPL-SCNC: 31 MMOL/L (ref 21–32)
COLOR UR: NORMAL
CREAT SERPL-MCNC: 0.9 MG/DL (ref 0.55–1.02)
DIFFERENTIAL METHOD BLD: ABNORMAL
EOSINOPHIL # BLD: 0.2 K/UL (ref 0–0.4)
EOSINOPHIL NFR BLD: 4 % (ref 0–7)
EPITH CASTS URNS QL MICRO: NORMAL /LPF
ERYTHROCYTE [DISTWIDTH] IN BLOOD BY AUTOMATED COUNT: 13.9 % (ref 11.5–14.5)
GLOBULIN SER CALC-MCNC: 3.5 G/DL (ref 2–4)
GLUCOSE SERPL-MCNC: 81 MG/DL (ref 65–100)
GLUCOSE UR STRIP.AUTO-MCNC: NEGATIVE MG/DL
HCT VFR BLD AUTO: 38.6 % (ref 35–47)
HDLC SERPL-MCNC: 62 MG/DL
HDLC SERPL: 2.5 (ref 0–5)
HGB BLD-MCNC: 12.1 G/DL (ref 11.5–16)
HGB UR QL STRIP: NEGATIVE
HYALINE CASTS URNS QL MICRO: NORMAL /LPF (ref 0–5)
IMM GRANULOCYTES # BLD AUTO: 0 K/UL (ref 0–0.04)
IMM GRANULOCYTES NFR BLD AUTO: 0 % (ref 0–0.5)
KETONES UR QL STRIP.AUTO: NEGATIVE MG/DL
LDLC SERPL CALC-MCNC: 82 MG/DL (ref 0–100)
LEUKOCYTE ESTERASE UR QL STRIP.AUTO: NEGATIVE
LYMPHOCYTES # BLD: 0.8 K/UL (ref 0.8–3.5)
LYMPHOCYTES NFR BLD: 19 % (ref 12–49)
MCH RBC QN AUTO: 30.7 PG (ref 26–34)
MCHC RBC AUTO-ENTMCNC: 31.3 G/DL (ref 30–36.5)
MCV RBC AUTO: 98 FL (ref 80–99)
MONOCYTES # BLD: 0.7 K/UL (ref 0–1)
MONOCYTES NFR BLD: 16 % (ref 5–13)
NEUTS SEG # BLD: 2.5 K/UL (ref 1.8–8)
NEUTS SEG NFR BLD: 61 % (ref 32–75)
NITRITE UR QL STRIP.AUTO: NEGATIVE
NRBC # BLD: 0 K/UL (ref 0–0.01)
NRBC BLD-RTO: 0 PER 100 WBC
PH UR STRIP: 6 (ref 5–8)
PLATELET # BLD AUTO: 205 K/UL (ref 150–400)
PLATELET COMMENT: ABNORMAL
PMV BLD AUTO: 10.8 FL (ref 8.9–12.9)
POTASSIUM SERPL-SCNC: 4.3 MMOL/L (ref 3.5–5.1)
PROT SERPL-MCNC: 7.5 G/DL (ref 6.4–8.2)
PROT UR STRIP-MCNC: NEGATIVE MG/DL
RBC # BLD AUTO: 3.94 M/UL (ref 3.8–5.2)
RBC #/AREA URNS HPF: NORMAL /HPF (ref 0–5)
RBC MORPH BLD: ABNORMAL
SODIUM SERPL-SCNC: 137 MMOL/L (ref 136–145)
SP GR UR REFRACTOMETRY: <1.005 (ref 1–1.03)
T4 FREE SERPL-MCNC: 0.9 NG/DL (ref 0.8–1.5)
TRIGL SERPL-MCNC: 60 MG/DL
TSH SERPL DL<=0.05 MIU/L-ACNC: 1.43 UIU/ML (ref 0.36–3.74)
UROBILINOGEN UR QL STRIP.AUTO: 0.2 EU/DL (ref 0.2–1)
VLDLC SERPL CALC-MCNC: 12 MG/DL
WBC # BLD AUTO: 4.2 K/UL (ref 3.6–11)
WBC URNS QL MICRO: NORMAL /HPF (ref 0–4)

## 2023-09-27 RX ORDER — NIACIN 500 MG/1
500 TABLET, EXTENDED RELEASE ORAL
Qty: 90 TABLET | Refills: 1 | Status: SHIPPED | OUTPATIENT
Start: 2023-09-27

## 2023-09-27 NOTE — TELEPHONE ENCOUNTER
RX refill request from the patient/pharmacy. Patient last seen 09- with labs, and next appt. scheduled for 01-  Requested Prescriptions     Pending Prescriptions Disp Refills    niacin (NIASPAN) 500 MG extended release tablet [Pharmacy Med Name: Niacin ER (Antihyperlipidemic) 500 MG Oral Tablet Extended Release] 90 tablet 1     Sig: TAKE 1 TABLET BY MOUTH AT BEDTIME    .

## 2023-09-28 RX ORDER — ESTRADIOL 0.05 MG/D
PATCH, EXTENDED RELEASE TRANSDERMAL
Qty: 24 PATCH | Refills: 3 | Status: SHIPPED | OUTPATIENT
Start: 2023-09-28 | End: 2023-09-29 | Stop reason: SDUPTHER

## 2023-09-28 NOTE — TELEPHONE ENCOUNTER
RX refill request from the patient/pharmacy. Patient last seen 09- with labs, and next appt. scheduled for 01-  Requested Prescriptions     Pending Prescriptions Disp Refills    estradiol (VIVELLE) 0.05 MG/24HR 24 patch 3     Sig: APPLY 1 PATCH TOPICALLY TWICE A WEEK    .

## 2023-09-29 RX ORDER — ESTRADIOL 0.05 MG/D
PATCH, EXTENDED RELEASE TRANSDERMAL
Qty: 24 PATCH | Refills: 3 | Status: SHIPPED | OUTPATIENT
Start: 2023-09-29

## 2023-10-14 PROBLEM — Z00.00 MEDICARE ANNUAL WELLNESS VISIT, SUBSEQUENT: Status: RESOLVED | Noted: 2021-08-11 | Resolved: 2023-10-14

## 2023-11-06 RX ORDER — AMLODIPINE BESYLATE 5 MG/1
5 TABLET ORAL DAILY
Qty: 90 TABLET | Refills: 3 | Status: SHIPPED | OUTPATIENT
Start: 2023-11-06

## 2023-11-06 NOTE — TELEPHONE ENCOUNTER
Requested Prescriptions     Pending Prescriptions Disp Refills    amLODIPine (NORVASC) 5 MG tablet [Pharmacy Med Name: amLODIPine Besylate 5 MG Oral Tablet] 90 tablet 3     Sig: Take 1 tablet by mouth once daily       RX refill request from the patient/pharmacy. Patient last seen 9/15/23 with labs, and next appt. scheduled for 1/4/24.

## 2024-01-09 RX ORDER — NIACIN 500 MG/1
500 TABLET, EXTENDED RELEASE ORAL NIGHTLY
Qty: 90 TABLET | Refills: 3 | Status: SHIPPED | OUTPATIENT
Start: 2024-01-09

## 2024-01-09 RX ORDER — OLMESARTAN MEDOXOMIL 20 MG/1
20 TABLET ORAL DAILY
Qty: 90 TABLET | Refills: 3 | Status: SHIPPED | OUTPATIENT
Start: 2024-01-09

## 2024-01-09 RX ORDER — AMLODIPINE BESYLATE 5 MG/1
5 TABLET ORAL DAILY
Qty: 90 TABLET | Refills: 3 | Status: SHIPPED | OUTPATIENT
Start: 2024-01-09

## 2024-01-09 NOTE — TELEPHONE ENCOUNTER
RX refill request from the patient/pharmacy. Patient last seen 09- with labs, and next appt. scheduled for 01-  Requested Prescriptions     Pending Prescriptions Disp Refills    olmesartan (BENICAR) 20 MG tablet 90 tablet 3     Sig: Take 1 tablet by mouth daily    amLODIPine (NORVASC) 5 MG tablet 90 tablet 3     Sig: Take 1 tablet by mouth daily    niacin (NIASPAN) 500 MG extended release tablet 90 tablet 3     Sig: Take 1 tablet by mouth nightly at bedtime.    .

## 2024-01-29 NOTE — PROGRESS NOTES
Chief Complaint   Patient presents with    Hypertension     3 month follow-up       SUBJECTIVE:    Anisha Packer is a 68 y.o. female who returns in follow-up for her medical problems include hypertension, hyperlipidemia, DJD, GERD, obesity and other medical problems.  She is taking her medication trying to follow her diet remains physically active.  Currently she denies any chest pain, shortness of breath or cardiac respiratory complaints.  There are no GI or  complaints.  She has no headaches, dizziness or neurologic complaints.  She has no current active arthritic complaints and there are no other complaints on complete review of systems.      Current Outpatient Medications   Medication Sig Dispense Refill    olmesartan (BENICAR) 20 MG tablet Take 1 tablet by mouth daily 90 tablet 3    amLODIPine (NORVASC) 5 MG tablet Take 1 tablet by mouth daily 90 tablet 3    niacin (NIASPAN) 500 MG extended release tablet Take 1 tablet by mouth nightly at bedtime. 90 tablet 3    estradiol (VIVELLE) 0.05 MG/24HR APPLY 1 PATCH TOPICALLY TWICE A WEEK 24 patch 3    atorvastatin (LIPITOR) 20 MG tablet TAKE 1 TABLET EVERY NIGHT 90 tablet 5    ascorbic acid (VITAMIN C) 250 MG tablet Take 2 tablets by mouth 2 times daily      Cholecalciferol 50 MCG (2000 UT) TABS Take 1 tablet by mouth daily      coenzyme Q10 100 MG CAPS capsule Take 1 capsule by mouth daily      Cyanocobalamin 1000 MCG SUBL Take 1,000 mcg by mouth daily      ferrous sulfate (IRON 325) 325 (65 Fe) MG tablet Take by mouth 3 times daily (with meals)      lidocaine (LIDODERM) 5 % Apply 1 patch topically as needed      meloxicam (MOBIC) 15 MG tablet Take 1 tablet by mouth daily      polyethylene glycol (GLYCOLAX) 17 GM/SCOOP powder Take 17 g by mouth daily      potassium chloride (KLOR-CON M) 20 MEQ extended release tablet Take 1 tablet by mouth 2 times daily      triamcinolone (KENALOG) 0.1 % cream as needed       No current facility-administered medications for

## 2024-01-30 ENCOUNTER — OFFICE VISIT (OUTPATIENT)
Facility: CLINIC | Age: 69
End: 2024-01-30
Payer: MEDICARE

## 2024-01-30 VITALS
OXYGEN SATURATION: 97 % | SYSTOLIC BLOOD PRESSURE: 126 MMHG | BODY MASS INDEX: 29.89 KG/M2 | HEART RATE: 68 BPM | RESPIRATION RATE: 17 BRPM | TEMPERATURE: 98.3 F | WEIGHT: 186 LBS | DIASTOLIC BLOOD PRESSURE: 82 MMHG | HEIGHT: 66 IN

## 2024-01-30 DIAGNOSIS — K21.9 GASTROESOPHAGEAL REFLUX DISEASE WITHOUT ESOPHAGITIS: ICD-10-CM

## 2024-01-30 DIAGNOSIS — K50.90 CROHN'S DISEASE WITHOUT COMPLICATION, UNSPECIFIED GASTROINTESTINAL TRACT LOCATION (HCC): ICD-10-CM

## 2024-01-30 DIAGNOSIS — J44.9 CHRONIC OBSTRUCTIVE PULMONARY DISEASE, UNSPECIFIED COPD TYPE (HCC): ICD-10-CM

## 2024-01-30 DIAGNOSIS — M15.9 PRIMARY OSTEOARTHRITIS INVOLVING MULTIPLE JOINTS: ICD-10-CM

## 2024-01-30 DIAGNOSIS — I12.9 HYPERTENSION WITH RENAL DISEASE: Primary | ICD-10-CM

## 2024-01-30 DIAGNOSIS — E78.2 MIXED HYPERLIPIDEMIA: ICD-10-CM

## 2024-01-30 DIAGNOSIS — N18.30 STAGE 3 CHRONIC KIDNEY DISEASE, UNSPECIFIED WHETHER STAGE 3A OR 3B CKD (HCC): ICD-10-CM

## 2024-01-30 LAB
ALBUMIN SERPL-MCNC: 3.7 G/DL (ref 3.5–5)
ALBUMIN/GLOB SERPL: 1.1 (ref 1.1–2.2)
ALP SERPL-CCNC: 92 U/L (ref 45–117)
ALT SERPL-CCNC: 28 U/L (ref 12–78)
ANION GAP SERPL CALC-SCNC: 5 MMOL/L (ref 5–15)
AST SERPL-CCNC: 16 U/L (ref 15–37)
BILIRUB SERPL-MCNC: 0.5 MG/DL (ref 0.2–1)
BUN SERPL-MCNC: 24 MG/DL (ref 6–20)
BUN/CREAT SERPL: 30 (ref 12–20)
CALCIUM SERPL-MCNC: 9.5 MG/DL (ref 8.5–10.1)
CHLORIDE SERPL-SCNC: 102 MMOL/L (ref 97–108)
CHOLEST SERPL-MCNC: 155 MG/DL
CK SERPL-CCNC: 50 U/L (ref 26–192)
CO2 SERPL-SCNC: 30 MMOL/L (ref 21–32)
CREAT SERPL-MCNC: 0.79 MG/DL (ref 0.55–1.02)
GLOBULIN SER CALC-MCNC: 3.4 G/DL (ref 2–4)
GLUCOSE SERPL-MCNC: 83 MG/DL (ref 65–100)
HDLC SERPL-MCNC: 64 MG/DL
HDLC SERPL: 2.4 (ref 0–5)
LDLC SERPL CALC-MCNC: 79.2 MG/DL (ref 0–100)
POTASSIUM SERPL-SCNC: 4.1 MMOL/L (ref 3.5–5.1)
PROT SERPL-MCNC: 7.1 G/DL (ref 6.4–8.2)
SODIUM SERPL-SCNC: 137 MMOL/L (ref 136–145)
TRIGL SERPL-MCNC: 59 MG/DL
VLDLC SERPL CALC-MCNC: 11.8 MG/DL

## 2024-01-30 PROCEDURE — G8484 FLU IMMUNIZE NO ADMIN: HCPCS | Performed by: INTERNAL MEDICINE

## 2024-01-30 PROCEDURE — G8417 CALC BMI ABV UP PARAM F/U: HCPCS | Performed by: INTERNAL MEDICINE

## 2024-01-30 PROCEDURE — 1036F TOBACCO NON-USER: CPT | Performed by: INTERNAL MEDICINE

## 2024-01-30 PROCEDURE — 1090F PRES/ABSN URINE INCON ASSESS: CPT | Performed by: INTERNAL MEDICINE

## 2024-01-30 PROCEDURE — 3017F COLORECTAL CA SCREEN DOC REV: CPT | Performed by: INTERNAL MEDICINE

## 2024-01-30 PROCEDURE — G8428 CUR MEDS NOT DOCUMENT: HCPCS | Performed by: INTERNAL MEDICINE

## 2024-01-30 PROCEDURE — 3023F SPIROM DOC REV: CPT | Performed by: INTERNAL MEDICINE

## 2024-01-30 PROCEDURE — G8399 PT W/DXA RESULTS DOCUMENT: HCPCS | Performed by: INTERNAL MEDICINE

## 2024-01-30 PROCEDURE — 1123F ACP DISCUSS/DSCN MKR DOCD: CPT | Performed by: INTERNAL MEDICINE

## 2024-01-30 PROCEDURE — 99214 OFFICE O/P EST MOD 30 MIN: CPT | Performed by: INTERNAL MEDICINE

## 2024-01-30 SDOH — ECONOMIC STABILITY: INCOME INSECURITY: HOW HARD IS IT FOR YOU TO PAY FOR THE VERY BASICS LIKE FOOD, HOUSING, MEDICAL CARE, AND HEATING?: NOT HARD AT ALL

## 2024-01-30 SDOH — ECONOMIC STABILITY: FOOD INSECURITY: WITHIN THE PAST 12 MONTHS, YOU WORRIED THAT YOUR FOOD WOULD RUN OUT BEFORE YOU GOT MONEY TO BUY MORE.: NEVER TRUE

## 2024-01-30 SDOH — ECONOMIC STABILITY: FOOD INSECURITY: WITHIN THE PAST 12 MONTHS, THE FOOD YOU BOUGHT JUST DIDN'T LAST AND YOU DIDN'T HAVE MONEY TO GET MORE.: NEVER TRUE

## 2024-01-30 SDOH — ECONOMIC STABILITY: HOUSING INSECURITY
IN THE LAST 12 MONTHS, WAS THERE A TIME WHEN YOU DID NOT HAVE A STEADY PLACE TO SLEEP OR SLEPT IN A SHELTER (INCLUDING NOW)?: NO

## 2024-01-30 ASSESSMENT — PATIENT HEALTH QUESTIONNAIRE - PHQ9
2. FEELING DOWN, DEPRESSED OR HOPELESS: 0
SUM OF ALL RESPONSES TO PHQ QUESTIONS 1-9: 0
SUM OF ALL RESPONSES TO PHQ QUESTIONS 1-9: 0
1. LITTLE INTEREST OR PLEASURE IN DOING THINGS: 0
SUM OF ALL RESPONSES TO PHQ QUESTIONS 1-9: 0
SUM OF ALL RESPONSES TO PHQ9 QUESTIONS 1 & 2: 0
SUM OF ALL RESPONSES TO PHQ QUESTIONS 1-9: 0

## 2024-01-30 NOTE — PROGRESS NOTES
Chief Complaint   Patient presents with    Hypertension     3 month follow-up         Health Maintenance Due   Topic Date Due    DTaP/Tdap/Td vaccine (1 - Tdap) Never done    Shingles vaccine (1 of 2) Never done    Respiratory Syncytial Virus (RSV) Pregnant or age 60 yrs+ (1 - 1-dose 60+ series) Never done    Breast cancer screen  05/12/2022    Pneumococcal 65+ years Vaccine (3 - PPSV23 or PCV20) 03/09/2023    COVID-19 Vaccine (3 - 2023-24 season) 09/01/2023    Annual Wellness Visit (Medicare Advantage)  01/01/2024         \"Have you been to the ER, urgent care clinic since your last visit?  Hospitalized since your last visit?\"    NO    “Have you seen or consulted any other health care providers outside of [facility] since your last visit?”    Yes, on file        Have you had a mammogram?”   NO

## 2024-03-20 RX ORDER — MELOXICAM 15 MG/1
15 TABLET ORAL DAILY
Qty: 30 TABLET | Refills: 0 | Status: SHIPPED | OUTPATIENT
Start: 2024-03-20

## 2024-03-20 NOTE — TELEPHONE ENCOUNTER
Melba Miles was referred to genetic counseling at [facility] to discuss aneuploidy and carrier screening options. She was unaccompanied.  This note is intended to summarize the 30 minute discussion I had with Melba. This patient was seen via real-time videoconferencing.  This patient was located at [facility], and the genetic counselor was at a remote office in [place].     OBSTETRIC HISTORY: Melba is a 28-year-old  female. Her JACE is No JACE on file., making her 11 weeks and 5 days today. Her history is significant for one IAB, two term deliveries, and one  (35 week 6 day) delivery. She denies the use of cigarettes, alcohol, or any additional teratogens. The benefits of taking folic acid prior to conception and the dangers of alcohol use during pregnancy were discussed.    FAMILY HISTORY: A three-generation family history was obtained. Melba reported that she has two sons and one daughter. She reported that they are healthy and have no developmental delays or intellectual disabilities.     The patient reported a negative family history for cognitive impairment/developmental delay, recurrent miscarriages, stillbirths or infancy deaths, chromosomal or genetic disorders, and birth defects. She also reported no additional family history of significant breast, colon, ovarian, or any other cancers. Consanguinity was denied.     Melba reports her ancestry as Black, and the FOB as Black. Ashkenazi Yazidism and Amharic-Faroese ancestry was denied.     COUNSELING: We began by discussing Melba Miles's age-related risk for chromosome conditions.  Based on the fact that this patient will be 28 years of age at delivery, her overall age-related risk of having a baby with any chromosome condition is 1 in 476.  We discussed some of the more common chromosome abnormalities including Down syndrome, trisomy 13, and trisomy 18.  We talked about the etiologies, clinical characteristics, and medical management of  PCP: Justin Vivar MD    Last appt: 1/30/2024    Future Appointments   Date Time Provider Department Center   5/3/2024  9:20 AM Justin Vivar MD [facility] AMB       Requested Prescriptions     Pending Prescriptions Disp Refills    meloxicam (MOBIC) 15 MG tablet [Pharmacy Med Name: Meloxicam 15 MG Oral Tablet] 30 tablet 0     Sig: Take 1 tablet by mouth once daily       these conditions.       We discussed the purpose of a nuchal translucency ultrasound at 11-14wks gestation. This ultrasound is to measure the nuchal translucency which is a form of aneuploidy screening and should be considered as such.  If a patient elects to have an NT only, this might not yield much clinically significant information or alter the age-related risk for aneuploidy. NT measurement is most useful when used in combination with serum screening (i.e. cell-free fetal DNA testing).      According to updated ACOG and ACMG guidelines, we also explored the option of cell-free fetal DNA testing.  We reviewed that this blood test has >99% sensitivity and specificity in the detection of Down syndrome and is also able to detect trisomy 13 and trisomy 18. Sex chromosome aneuploidies and select chromosome microdeletions are also available by request.  Melba declined cell free fetal DNA testing, as it was already drawn at her doctor's office.    Both of these screening options can result in false positive or false negative results. We reviewed the limitations of the tests including the absence of coverage of all chromosomes and the variable positive predictive values. Confirmatory diagnostic testing should be considered before irreversible decisions are made.      We then reviewed the option of prenatal diagnostic testing (CVS and amniocentesis). We talked about the risks, benefits, and limitations of these procedures. The patient was not interested in diagnostic testing.     A detailed fetal anatomic survey is available between 18 and 22 weeks gestation to screen for birth defects or soft markers of a chromosome abnormality.  We explained ultrasound is not diagnostic for chromosome abnormalities and cannot detect all birth defects.     Per ACOG and ACMG recommendations, carrier screening for cystic fibrosis (CF) and spinal muscular atrophy (SMA) should be offered to all pregnant women and other diseases should be  offered based on the patient's ethnicity. A family history is not always necessary for a couple to be at risk of having a child with a recessive disorder. We reviewed phenotype, autosomal recessive inheritance and estimated carrier frequency for these diseases. Melba was also offered the option of screening for up to 556 recessive and X-linked conditions.  These options were declined today.     PLAN:  1. The patient declined cell-free fetal DNA testing, as it was previously   2. The patient declined carrier screening for CF, SMA, and hemoglobinopathies.   3. MSAFP ONLY should be offered at [id number] for ONTD risk assessment. A full quad screen is not necessary.   4. Targeted fetal anatomic survey should be offered at 18-22 wks gestation.     Thank you for the referral of this patient to genetic counseling. Please contact me with any additional concerns.    Marietta Salgado MS [facility]  Certified Genetic Counselor

## 2024-03-22 RX ORDER — NIACIN 500 MG/1
500 TABLET, EXTENDED RELEASE ORAL NIGHTLY
Qty: 90 TABLET | Refills: 0 | Status: SHIPPED | OUTPATIENT
Start: 2024-03-22

## 2024-03-22 NOTE — TELEPHONE ENCOUNTER
PCP: Justin Vivar MD    Last appt: 1/30/2024    Future Appointments   Date Time Provider Department Center   5/3/2024  9:20 AM Justin Vivar MD [facility] AM[facility]       Requested Prescriptions     Pending Prescriptions Disp Refills    niacin (NIASPAN) 500 MG extended release tablet [Pharmacy Med Name: Niacin ER (Antihyperlipidemic) 500 MG Oral Tablet Extended Release] 90 tablet 0     Sig: TAKE 1 TABLET BY MOUTH AT BEDTIME

## 2024-04-16 RX ORDER — MELOXICAM 15 MG/1
15 TABLET ORAL DAILY
Qty: 30 TABLET | Refills: 0 | Status: SHIPPED | OUTPATIENT
Start: 2024-04-16 | End: 2024-04-17 | Stop reason: SDUPTHER

## 2024-04-16 NOTE — TELEPHONE ENCOUNTER
PCP: Justin Vivar MD    Last appt: 1/30/2024  Future Appointments   Date Time Provider Department Center   5/3/2024  9:20 AM Justin Vivar MD [facility]       Requested Prescriptions     Pending Prescriptions Disp Refills    meloxicam (MOBIC) 15 MG tablet 30 tablet 0     Sig: Take 1 tablet by mouth daily       Prior labs and Blood pressures:  BP Readings from Last 3 Encounters:   01/30/24 126/82   09/15/23 127/86   08/09/23 93/63     Lab Results   Component Value Date/Time     01/30/2024 10:30 AM    K 4.1 01/30/2024 10:30 AM     01/30/2024 10:30 AM    CO2 30 01/30/2024 10:30 AM    BUN 24 01/30/2024 10:30 AM    GFRAA >60 08/19/2022 12:04 PM     No results found for: \"HBA1C\", \"[id number]\"  Lab Results   Component Value Date/Time    CHOL 155 01/30/2024 10:30 AM    HDL 64 01/30/2024 10:30 AM    VLDL 22 02/05/2021 10:29 AM     No results found for: \"VITD3\", \"VD3RIA\"        Lab Results   Component Value Date/Time    TSH 0.95 08/19/2022 12:04 PM

## 2024-04-17 RX ORDER — MELOXICAM 15 MG/1
15 TABLET ORAL DAILY
Qty: 30 TABLET | Refills: 0 | Status: SHIPPED | OUTPATIENT
Start: 2024-04-17

## 2024-04-17 NOTE — TELEPHONE ENCOUNTER
PCP: uJstin Vivar MD    Last appt: 1/30/2024    Future Appointments   Date Time Provider Department Center   5/3/2024  9:20 AM Justin Vivar MD [facility] AMB       Requested Prescriptions     Pending Prescriptions Disp Refills    meloxicam (MOBIC) 15 MG tablet 30 tablet 0     Sig: Take 1 tablet by mouth daily

## 2024-04-25 RX ORDER — POTASSIUM CHLORIDE 20 MEQ/1
20 TABLET, EXTENDED RELEASE ORAL 2 TIMES DAILY
Qty: 180 TABLET | Refills: 3 | Status: SHIPPED | OUTPATIENT
Start: 2024-04-25

## 2024-04-25 NOTE — TELEPHONE ENCOUNTER
RX refill request from the patient/pharmacy. Patient last seen 01- with labs, and next appt. scheduled for 05-  Requested Prescriptions     Pending Prescriptions Disp Refills    potassium chloride (KLOR-CON M) 20 MEQ extended release tablet [Pharmacy Med Name: POTASSIUM CHLORIDE ER 20 MEQ Tablet Extended Release] 180 tablet 3     Sig: TAKE 1 TABLET TWICE DAILY    .

## 2024-05-09 PROBLEM — N99.3 INCOMPLETE PROLAPSE OF VAGINAL VAULT: Status: RESOLVED | Noted: 2020-01-27 | Resolved: 2024-05-09

## 2024-05-10 ENCOUNTER — OFFICE VISIT (OUTPATIENT)
Facility: CLINIC | Age: 69
End: 2024-05-10

## 2024-05-10 VITALS
HEART RATE: 66 BPM | DIASTOLIC BLOOD PRESSURE: 80 MMHG | BODY MASS INDEX: 29.89 KG/M2 | RESPIRATION RATE: 18 BRPM | WEIGHT: 186 LBS | HEIGHT: 66 IN | SYSTOLIC BLOOD PRESSURE: 108 MMHG | OXYGEN SATURATION: 96 % | TEMPERATURE: 97.7 F

## 2024-05-10 DIAGNOSIS — M15.9 PRIMARY OSTEOARTHRITIS INVOLVING MULTIPLE JOINTS: ICD-10-CM

## 2024-05-10 DIAGNOSIS — E55.9 VITAMIN D DEFICIENCY: ICD-10-CM

## 2024-05-10 DIAGNOSIS — Z13.39 ALCOHOL SCREENING: ICD-10-CM

## 2024-05-10 DIAGNOSIS — K21.9 GASTROESOPHAGEAL REFLUX DISEASE WITHOUT ESOPHAGITIS: ICD-10-CM

## 2024-05-10 DIAGNOSIS — I12.9 HYPERTENSION WITH RENAL DISEASE: Primary | ICD-10-CM

## 2024-05-10 DIAGNOSIS — N18.30 STAGE 3 CHRONIC KIDNEY DISEASE, UNSPECIFIED WHETHER STAGE 3A OR 3B CKD (HCC): ICD-10-CM

## 2024-05-10 DIAGNOSIS — J44.9 CHRONIC OBSTRUCTIVE PULMONARY DISEASE, UNSPECIFIED COPD TYPE (HCC): ICD-10-CM

## 2024-05-10 DIAGNOSIS — E78.2 MIXED HYPERLIPIDEMIA: ICD-10-CM

## 2024-05-10 DIAGNOSIS — N32.81 OAB (OVERACTIVE BLADDER): ICD-10-CM

## 2024-05-10 DIAGNOSIS — Z00.00 MEDICARE ANNUAL WELLNESS VISIT, SUBSEQUENT: ICD-10-CM

## 2024-05-10 DIAGNOSIS — K50.90 CROHN'S DISEASE WITHOUT COMPLICATION, UNSPECIFIED GASTROINTESTINAL TRACT LOCATION (HCC): ICD-10-CM

## 2024-05-10 DIAGNOSIS — F41.9 ANXIETY: ICD-10-CM

## 2024-05-10 DIAGNOSIS — E03.9 ACQUIRED HYPOTHYROIDISM: ICD-10-CM

## 2024-05-10 DIAGNOSIS — J30.89 CHRONIC NON-SEASONAL ALLERGIC RHINITIS: ICD-10-CM

## 2024-05-10 ASSESSMENT — PATIENT HEALTH QUESTIONNAIRE - PHQ9
1. LITTLE INTEREST OR PLEASURE IN DOING THINGS: NOT AT ALL
SUM OF ALL RESPONSES TO PHQ QUESTIONS 1-9: 0
SUM OF ALL RESPONSES TO PHQ QUESTIONS 1-9: 0
2. FEELING DOWN, DEPRESSED OR HOPELESS: NOT AT ALL
SUM OF ALL RESPONSES TO PHQ QUESTIONS 1-9: 0
SUM OF ALL RESPONSES TO PHQ9 QUESTIONS 1 & 2: 0
SUM OF ALL RESPONSES TO PHQ QUESTIONS 1-9: 0

## 2024-05-10 ASSESSMENT — LIFESTYLE VARIABLES
HOW OFTEN DO YOU HAVE A DRINK CONTAINING ALCOHOL: MONTHLY OR LESS
HOW MANY STANDARD DRINKS CONTAINING ALCOHOL DO YOU HAVE ON A TYPICAL DAY: PATIENT DOES NOT DRINK

## 2024-05-10 NOTE — PROGRESS NOTES
Anisha Packer is a 68 y.o. female     Chief Complaint   Patient presents with    Medicare AWV       /69 (Site: Left Upper Arm, Position: Sitting, Cuff Size: Medium Adult)   Pulse 66   Temp 97.7 °F (36.5 °C) (Oral)   Resp 18   Ht 1.676 m (5' 6\")   Wt 84.4 kg (186 lb)   SpO2 96%   BMI 30.02 kg/m²     Health Maintenance Due   Topic Date Due    DTaP/Tdap/Td vaccine (1 - Tdap) Never done    Shingles vaccine (1 of 2) Never done    Respiratory Syncytial Virus (RSV) Pregnant or age 60 yrs+ (1 - 1-dose 60+ series) Never done    Pneumococcal 65+ years Vaccine (3 of 3 - PPSV23 or PCV20) 03/09/2023    COVID-19 Vaccine (3 - 2023-24 season) 09/01/2023    Annual Wellness Visit (Medicare Advantage)  01/01/2024         \"Have you been to the ER, urgent care clinic since your last visit?  Hospitalized since your last visit?\"    NO    “Have you seen or consulted any other health care providers outside of [facility] since your last visit?”    NO                   
motor strength.  No syncope, dizziness or frequent headache  Skin:  No recent rashes or mole changes.  Psychiatric/Behavioral:  Negative for depression.  The patient is not nervous/anxious.   HEMATOLOGIC: no easy bruising or bleeding gums  Endocrine: no sweats of urinary frequency or excessive thirst        Objective   Vitals:    05/10/24 1110 05/10/24 1145   BP: 104/69 108/80   Site: Left Upper Arm    Position: Sitting    Cuff Size: Medium Adult    Pulse: 66    Resp: 18    Temp: 97.7 °F (36.5 °C)    TempSrc: Oral    SpO2: 96%    Weight: 84.4 kg (186 lb)    Height: 1.676 m (5' 6\")       Body mass index is 30.02 kg/m².        [unfilled]     PHYSICAL EXAM:    General appearance - alert, well appearing, and in no distress  Mental status - alert, oriented to person, place, and time  HEENT:  Ears - bilateral TM's and external ear canals clear  Eyes - pupillary responses were normal.  Extraocular muscle function intact.  Lids and conjunctiva not injected.  Fundoscopic exam revealed sharp disc margins.eye movements intact  Pharynx- clear with teeth in good repair.  No masses were noted  Neck - supple without thyromegaly or burit.  No JVD noted  Lungs - clear to auscultation and percussion  Cardiac- normal rate, regular rhythm without murmurs.  PMI not displaced.  No gallop, rub or click  Breast: deferred to GYN  Abdomen - flat, soft, non-tender without palpable organomegaly or mass.  No pulsatile mass was felt, and not bruit was heard.  Bowel sounds were active   Female - deferred to GYN  Rectal - deferred to GYN  Extremities -  no clubbing cyanosis or edema  Lymphatics - no palpable lymphadenopathy, no hepatosplenomegaly  Peripheral vascular - Dorsalis pedis and posterior tibial pulses felt without difficulty  Skin - no rash or unusual mole change noted  Neurological - Cranial nerves II-XII grossly intact.  Motor strength 5/5.  DTR's 2+ and symmetric.  Station and gait normal  Back exam - full range of motion, no

## 2024-05-10 NOTE — PATIENT INSTRUCTIONS
labels and try to avoid saturated and trans fats. They increase your risk of heart disease by raising cholesterol levels.     Limit the amount of solid fat--butter, margarine, and shortening--you eat. Use olive, peanut, or canola oil when you cook. Bake, broil, and steam foods instead of frying them.     Eat a variety of fruit and vegetables every day. Dark green, deep orange, red, or yellow fruits and vegetables are especially good for you. Examples include spinach, carrots, peaches, and berries.     Foods high in fiber can reduce your cholesterol and provide important vitamins and minerals. High-fiber foods include whole-grain cereals and breads, oatmeal, beans, brown rice, citrus fruits, and apples.     Eat lean proteins. Heart-healthy proteins include seafood, lean meats and poultry, eggs, beans, peas, nuts, seeds, and soy products.     Limit drinks and foods with added sugar. These include candy, desserts, and soda pop.   Heart-healthy lifestyle    If your doctor recommends it, get more exercise. For many people, walking is a good choice. Or you may want to swim, bike, or do other activities. Bit by bit, increase the time you're active every day. Try for at least 30 minutes on most days of the week.     Try to quit or cut back on using tobacco and other nicotine products. This includes smoking and vaping. If you need help quitting, talk to your doctor about stop-smoking programs and medicines. These can increase your chances of quitting for good. Quitting is one of the most important things you can do to protect your heart. It is never too late to quit. Try to avoid secondhand smoke too.     Stay at a weight that's healthy for you. Talk to your doctor if you need help losing weight.     Try to get 7 to 9 hours of sleep each night.     Limit alcohol to 2 drinks a day for men and 1 drink a day for women. Too much alcohol can cause health problems.     Manage other health problems such as diabetes, high blood

## 2024-05-11 LAB
25(OH)D3 SERPL-MCNC: 78.3 NG/ML (ref 30–100)
ALBUMIN SERPL-MCNC: 4 G/DL (ref 3.5–5)
ALBUMIN/GLOB SERPL: 1.2 (ref 1.1–2.2)
ALP SERPL-CCNC: 94 U/L (ref 45–117)
ALT SERPL-CCNC: 29 U/L (ref 12–78)
ANION GAP SERPL CALC-SCNC: 3 MMOL/L (ref 5–15)
APPEARANCE UR: CLEAR
AST SERPL-CCNC: 13 U/L (ref 15–37)
BACTERIA URNS QL MICRO: NEGATIVE /HPF
BASOPHILS # BLD: 0 K/UL (ref 0–0.1)
BASOPHILS NFR BLD: 0 % (ref 0–1)
BILIRUB SERPL-MCNC: 0.6 MG/DL (ref 0.2–1)
BILIRUB UR QL: NEGATIVE
BUN SERPL-MCNC: 14 MG/DL (ref 6–20)
BUN/CREAT SERPL: 18 (ref 12–20)
CALCIUM SERPL-MCNC: 10.1 MG/DL (ref 8.5–10.1)
CHLORIDE SERPL-SCNC: 103 MMOL/L (ref 97–108)
CHOLEST SERPL-MCNC: 140 MG/DL
CK SERPL-CCNC: 63 U/L (ref 26–192)
CO2 SERPL-SCNC: 30 MMOL/L (ref 21–32)
COLOR UR: ABNORMAL
CREAT SERPL-MCNC: 0.79 MG/DL (ref 0.55–1.02)
DIFFERENTIAL METHOD BLD: ABNORMAL
EOSINOPHIL # BLD: 0.2 K/UL (ref 0–0.4)
EOSINOPHIL NFR BLD: 4 % (ref 0–7)
EPITH CASTS URNS QL MICRO: ABNORMAL /LPF
ERYTHROCYTE [DISTWIDTH] IN BLOOD BY AUTOMATED COUNT: 13.5 % (ref 11.5–14.5)
GLOBULIN SER CALC-MCNC: 3.4 G/DL (ref 2–4)
GLUCOSE SERPL-MCNC: 88 MG/DL (ref 65–100)
GLUCOSE UR STRIP.AUTO-MCNC: NEGATIVE MG/DL
HCT VFR BLD AUTO: 39.1 % (ref 35–47)
HDLC SERPL-MCNC: 64 MG/DL
HDLC SERPL: 2.2 (ref 0–5)
HGB BLD-MCNC: 12.2 G/DL (ref 11.5–16)
HGB UR QL STRIP: NEGATIVE
IMM GRANULOCYTES # BLD AUTO: 0 K/UL (ref 0–0.04)
IMM GRANULOCYTES NFR BLD AUTO: 0 % (ref 0–0.5)
KETONES UR QL STRIP.AUTO: NEGATIVE MG/DL
LDLC SERPL CALC-MCNC: 58 MG/DL (ref 0–100)
LEUKOCYTE ESTERASE UR QL STRIP.AUTO: ABNORMAL
LYMPHOCYTES # BLD: 0.7 K/UL (ref 0.8–3.5)
LYMPHOCYTES NFR BLD: 18 % (ref 12–49)
MCH RBC QN AUTO: 31 PG (ref 26–34)
MCHC RBC AUTO-ENTMCNC: 31.2 G/DL (ref 30–36.5)
MCV RBC AUTO: 99.5 FL (ref 80–99)
MONOCYTES # BLD: 0.6 K/UL (ref 0–1)
MONOCYTES NFR BLD: 14 % (ref 5–13)
NEUTS SEG # BLD: 2.5 K/UL (ref 1.8–8)
NEUTS SEG NFR BLD: 64 % (ref 32–75)
NITRITE UR QL STRIP.AUTO: NEGATIVE
NRBC # BLD: 0 K/UL (ref 0–0.01)
NRBC BLD-RTO: 0 PER 100 WBC
PH UR STRIP: 7 (ref 5–8)
PLATELET # BLD AUTO: 194 K/UL (ref 150–400)
PMV BLD AUTO: 10.6 FL (ref 8.9–12.9)
POTASSIUM SERPL-SCNC: 4.1 MMOL/L (ref 3.5–5.1)
PROT SERPL-MCNC: 7.4 G/DL (ref 6.4–8.2)
PROT UR STRIP-MCNC: NEGATIVE MG/DL
RBC # BLD AUTO: 3.93 M/UL (ref 3.8–5.2)
RBC #/AREA URNS HPF: ABNORMAL /HPF (ref 0–5)
RBC MORPH BLD: ABNORMAL
SODIUM SERPL-SCNC: 136 MMOL/L (ref 136–145)
SP GR UR REFRACTOMETRY: <1.005 (ref 1–1.03)
T4 FREE SERPL-MCNC: 0.9 NG/DL (ref 0.8–1.5)
TRIGL SERPL-MCNC: 90 MG/DL
TSH SERPL DL<=0.05 MIU/L-ACNC: 1.1 UIU/ML (ref 0.36–3.74)
UROBILINOGEN UR QL STRIP.AUTO: 0.2 EU/DL (ref 0.2–1)
VLDLC SERPL CALC-MCNC: 18 MG/DL
WBC # BLD AUTO: 4 K/UL (ref 3.6–11)
WBC URNS QL MICRO: ABNORMAL /HPF (ref 0–4)

## 2024-06-08 PROBLEM — Z00.00 MEDICARE ANNUAL WELLNESS VISIT, SUBSEQUENT: Status: RESOLVED | Noted: 2021-08-11 | Resolved: 2024-06-08

## 2024-06-14 RX ORDER — MELOXICAM 15 MG/1
15 TABLET ORAL DAILY
Qty: 30 TABLET | Refills: 0 | Status: SHIPPED | OUTPATIENT
Start: 2024-06-14

## 2024-06-14 NOTE — TELEPHONE ENCOUNTER
PCP: Justin Vivar MD    Last appt: 5/10/2024  Future Appointments   Date Time Provider Department Center   8/16/2024  9:30 AM Justin Vivar MD [facility]       Requested Prescriptions     Pending Prescriptions Disp Refills    meloxicam (MOBIC) 15 MG tablet [Pharmacy Med Name: Meloxicam 15 MG Oral Tablet] 30 tablet 0     Sig: Take 1 tablet by mouth once daily       Prior labs and Blood pressures:  BP Readings from Last 3 Encounters:   05/10/24 108/80   01/30/24 126/82   09/15/23 127/86     Lab Results   Component Value Date/Time     05/10/2024 11:58 AM    K 4.1 05/10/2024 11:58 AM     05/10/2024 11:58 AM    CO2 30 05/10/2024 11:58 AM    BUN 14 05/10/2024 11:58 AM    GFRAA >60 08/19/2022 12:04 PM     No results found for: \"HBA1C\", \"[id number]\"  Lab Results   Component Value Date/Time    CHOL 140 05/10/2024 11:58 AM    HDL 64 05/10/2024 11:58 AM    LDL 58 05/10/2024 11:58 AM    LDL 79.2 01/30/2024 10:30 AM    VLDL 18 05/10/2024 11:58 AM    VLDL 22 02/05/2021 10:29 AM     No results found for: \"VITD3\"        Lab Results   Component Value Date/Time    TSH 0.95 08/19/2022 12:04 PM

## 2024-07-10 RX ORDER — MELOXICAM 15 MG/1
15 TABLET ORAL DAILY
Qty: 30 TABLET | Refills: 5 | Status: SHIPPED | OUTPATIENT
Start: 2024-07-10

## 2024-07-10 NOTE — TELEPHONE ENCOUNTER
RX refill request from the patient/pharmacy. Patient last seen 05- with labs, and next appt. scheduled for 08-  Requested Prescriptions     Pending Prescriptions Disp Refills    meloxicam (MOBIC) 15 MG tablet [Pharmacy Med Name: Meloxicam 15 MG Oral Tablet] 30 tablet 5     Sig: Take 1 tablet by mouth once daily    .

## 2024-07-11 RX ORDER — ATORVASTATIN CALCIUM 20 MG/1
TABLET, FILM COATED ORAL
Qty: 90 TABLET | Refills: 3 | Status: SHIPPED | OUTPATIENT
Start: 2024-07-11

## 2024-07-11 NOTE — TELEPHONE ENCOUNTER
PCP: Justin Vivar MD    Last appt: 5/10/2024  Future Appointments   Date Time Provider Department Center   8/16/2024  9:30 AM Justin Vivar MD [facility] AM[facility]       Requested Prescriptions     Pending Prescriptions Disp Refills    atorvastatin (LIPITOR) 20 MG tablet [Pharmacy Med Name: ATORVASTATIN CALCIUM 20 MG Tablet] 90 tablet 3     Sig: TAKE 1 TABLET EVERY NIGHT       Prior labs and Blood pressures:  BP Readings from Last 3 Encounters:   05/10/24 108/80   01/30/24 126/82   09/15/23 127/86     Lab Results   Component Value Date/Time     05/10/2024 11:58 AM    K 4.1 05/10/2024 11:58 AM     05/10/2024 11:58 AM    CO2 30 05/10/2024 11:58 AM    BUN 14 05/10/2024 11:58 AM    GFRAA >60 08/19/2022 12:04 PM     No results found for: \"HBA1C\", \"[id number]\"  Lab Results   Component Value Date/Time    CHOL 140 05/10/2024 11:58 AM    HDL 64 05/10/2024 11:58 AM    LDL 58 05/10/2024 11:58 AM    LDL 79.2 01/30/2024 10:30 AM    VLDL 18 05/10/2024 11:58 AM    VLDL 22 02/05/2021 10:29 AM     No results found for: \"VITD3\"        Lab Results   Component Value Date/Time    TSH 1.10 05/10/2024 11:58 AM

## 2024-11-15 NOTE — PROGRESS NOTES
Chief Complaint   Patient presents with    Hypertension     3 month f/u    Gastroesophageal Reflux    Cholesterol Problem       SUBJECTIVE:    Anisha Packer is a 69 y.o. female who returns in follow-up for medical problems include hypertension, hyperlipidemia, GERD, DJD, obesity and other medical problems.  She has done some problems with dizziness when she bends over but not when she moves her head from side-to-side.  That is transient and goes away.  She currently denies any chest pain, shortness of breath, palpitations, PND, orthopnea or other cardiac respiratory complaints.  She notes no current GI or  complaints.  She does have some problems with neuropathy in her legs and she has an gabapentin that she has had for years that she takes intermittently but she thinks it may be no good now it is more than 4 years old.  She notes no current arthritic complaints and there are no other complaints of complete review of systems.      Current Outpatient Medications   Medication Sig Dispense Refill    atorvastatin (LIPITOR) 20 MG tablet TAKE 1 TABLET EVERY NIGHT 90 tablet 3    meloxicam (MOBIC) 15 MG tablet Take 1 tablet by mouth once daily 30 tablet 5    potassium chloride (KLOR-CON M) 20 MEQ extended release tablet TAKE 1 TABLET TWICE DAILY 180 tablet 3    niacin (NIASPAN) 500 MG extended release tablet TAKE 1 TABLET BY MOUTH AT BEDTIME 90 tablet 0    olmesartan (BENICAR) 20 MG tablet Take 1 tablet by mouth daily 90 tablet 3    estradiol (VIVELLE) 0.05 MG/24HR APPLY 1 PATCH TOPICALLY TWICE A WEEK 24 patch 3    ascorbic acid (VITAMIN C) 250 MG tablet Take 2 tablets by mouth 2 times daily      Cholecalciferol 50 MCG (2000 UT) TABS Take 1 tablet by mouth daily      coenzyme Q10 100 MG CAPS capsule Take 1 capsule by mouth daily      ferrous sulfate (IRON 325) 325 (65 Fe) MG tablet Take by mouth 3 times daily (with meals)      polyethylene glycol (GLYCOLAX) 17 GM/SCOOP powder Take 17 g by mouth daily

## 2024-11-18 ENCOUNTER — OFFICE VISIT (OUTPATIENT)
Facility: CLINIC | Age: 69
End: 2024-11-18
Payer: MEDICARE

## 2024-11-18 VITALS
BODY MASS INDEX: 30.6 KG/M2 | HEART RATE: 71 BPM | RESPIRATION RATE: 17 BRPM | OXYGEN SATURATION: 98 % | TEMPERATURE: 97.3 F | SYSTOLIC BLOOD PRESSURE: 114 MMHG | HEIGHT: 66 IN | DIASTOLIC BLOOD PRESSURE: 74 MMHG | WEIGHT: 190.4 LBS

## 2024-11-18 DIAGNOSIS — J44.9 CHRONIC OBSTRUCTIVE PULMONARY DISEASE, UNSPECIFIED COPD TYPE (HCC): ICD-10-CM

## 2024-11-18 DIAGNOSIS — N18.30 STAGE 3 CHRONIC KIDNEY DISEASE, UNSPECIFIED WHETHER STAGE 3A OR 3B CKD (HCC): ICD-10-CM

## 2024-11-18 DIAGNOSIS — G62.9 NEUROPATHY: ICD-10-CM

## 2024-11-18 DIAGNOSIS — I12.9 HYPERTENSION WITH RENAL DISEASE: Primary | ICD-10-CM

## 2024-11-18 DIAGNOSIS — K50.90 CROHN'S DISEASE WITHOUT COMPLICATION, UNSPECIFIED GASTROINTESTINAL TRACT LOCATION (HCC): ICD-10-CM

## 2024-11-18 DIAGNOSIS — E78.2 MIXED HYPERLIPIDEMIA: ICD-10-CM

## 2024-11-18 DIAGNOSIS — M15.0 PRIMARY OSTEOARTHRITIS INVOLVING MULTIPLE JOINTS: ICD-10-CM

## 2024-11-18 DIAGNOSIS — Z23 NEEDS FLU SHOT: ICD-10-CM

## 2024-11-18 DIAGNOSIS — K21.9 GASTROESOPHAGEAL REFLUX DISEASE WITHOUT ESOPHAGITIS: ICD-10-CM

## 2024-11-18 LAB
ALBUMIN SERPL-MCNC: 3.8 G/DL (ref 3.5–5)
ALBUMIN/GLOB SERPL: 1.2 (ref 1.1–2.2)
ALP SERPL-CCNC: 92 U/L (ref 45–117)
ALT SERPL-CCNC: 30 U/L (ref 12–78)
ANION GAP SERPL CALC-SCNC: 8 MMOL/L (ref 2–12)
AST SERPL-CCNC: 12 U/L (ref 15–37)
BILIRUB SERPL-MCNC: 0.5 MG/DL (ref 0.2–1)
BUN SERPL-MCNC: 28 MG/DL (ref 6–20)
BUN/CREAT SERPL: 33 (ref 12–20)
CALCIUM SERPL-MCNC: 10.3 MG/DL (ref 8.5–10.1)
CHLORIDE SERPL-SCNC: 103 MMOL/L (ref 97–108)
CHOLEST SERPL-MCNC: 156 MG/DL
CK SERPL-CCNC: 44 U/L (ref 26–192)
CO2 SERPL-SCNC: 28 MMOL/L (ref 21–32)
CREAT SERPL-MCNC: 0.86 MG/DL (ref 0.55–1.02)
GLOBULIN SER CALC-MCNC: 3.2 G/DL (ref 2–4)
GLUCOSE SERPL-MCNC: 89 MG/DL (ref 65–100)
HDLC SERPL-MCNC: 70 MG/DL
HDLC SERPL: 2.2 (ref 0–5)
LDLC SERPL CALC-MCNC: 70.6 MG/DL (ref 0–100)
POTASSIUM SERPL-SCNC: 4.1 MMOL/L (ref 3.5–5.1)
PROT SERPL-MCNC: 7 G/DL (ref 6.4–8.2)
SODIUM SERPL-SCNC: 139 MMOL/L (ref 136–145)
TRIGL SERPL-MCNC: 77 MG/DL
VLDLC SERPL CALC-MCNC: 15.4 MG/DL

## 2024-11-18 PROCEDURE — 90653 IIV ADJUVANT VACCINE IM: CPT | Performed by: INTERNAL MEDICINE

## 2024-11-18 PROCEDURE — G8417 CALC BMI ABV UP PARAM F/U: HCPCS | Performed by: INTERNAL MEDICINE

## 2024-11-18 PROCEDURE — 99214 OFFICE O/P EST MOD 30 MIN: CPT | Performed by: INTERNAL MEDICINE

## 2024-11-18 PROCEDURE — 1036F TOBACCO NON-USER: CPT | Performed by: INTERNAL MEDICINE

## 2024-11-18 PROCEDURE — G0008 ADMIN INFLUENZA VIRUS VAC: HCPCS | Performed by: INTERNAL MEDICINE

## 2024-11-18 PROCEDURE — 3017F COLORECTAL CA SCREEN DOC REV: CPT | Performed by: INTERNAL MEDICINE

## 2024-11-18 PROCEDURE — G8399 PT W/DXA RESULTS DOCUMENT: HCPCS | Performed by: INTERNAL MEDICINE

## 2024-11-18 PROCEDURE — 1090F PRES/ABSN URINE INCON ASSESS: CPT | Performed by: INTERNAL MEDICINE

## 2024-11-18 PROCEDURE — 1123F ACP DISCUSS/DSCN MKR DOCD: CPT | Performed by: INTERNAL MEDICINE

## 2024-11-18 PROCEDURE — 3023F SPIROM DOC REV: CPT | Performed by: INTERNAL MEDICINE

## 2024-11-18 PROCEDURE — G8482 FLU IMMUNIZE ORDER/ADMIN: HCPCS | Performed by: INTERNAL MEDICINE

## 2024-11-18 PROCEDURE — G8427 DOCREV CUR MEDS BY ELIG CLIN: HCPCS | Performed by: INTERNAL MEDICINE

## 2024-11-18 RX ORDER — AMLODIPINE BESYLATE 2.5 MG/1
2.5 TABLET ORAL DAILY
Qty: 30 TABLET | Status: SHIPPED | OUTPATIENT
Start: 2024-11-18

## 2024-11-18 RX ORDER — GABAPENTIN 300 MG/1
300 CAPSULE ORAL NIGHTLY
Qty: 30 CAPSULE | Refills: 5 | Status: SHIPPED | OUTPATIENT
Start: 2024-11-18 | End: 2025-05-17

## 2024-11-18 NOTE — PROGRESS NOTES
Anisha Packer is a 69 y.o. female     Chief Complaint   Patient presents with    Hypertension     3 month f/u    Gastroesophageal Reflux    Cholesterol Problem       /77 (Site: Left Upper Arm, Position: Sitting, Cuff Size: Large Adult)   Pulse 71   Temp 97.3 °F (36.3 °C) (Oral)   Resp 17   Ht 1.676 m (5' 6\")   Wt 86.4 kg (190 lb 6.4 oz)   SpO2 98%   BMI 30.73 kg/m²     Health Maintenance Due   Topic Date Due    DTaP/Tdap/Td vaccine (1 - Tdap) Never done    Shingles vaccine (1 of 2) Never done    Respiratory Syncytial Virus (RSV) Pregnant or age 60 yrs+ (1 - Risk 60-74 years 1-dose series) Never done    Pneumococcal 65+ years Vaccine (3 of 3 - PPSV23 or PCV20) 03/09/2023    Flu vaccine (1) 08/01/2024    COVID-19 Vaccine (3 - 2023-24 season) 09/01/2024         \"Have you been to the ER, urgent care clinic since your last visit?  Hospitalized since your last visit?\"    NO    “Have you seen or consulted any other health care providers outside of [facility] since your last visit?”    NO

## 2024-12-06 RX ORDER — ESTRADIOL 0.05 MG/D
PATCH, EXTENDED RELEASE TRANSDERMAL
Qty: 24 PATCH | Refills: 3 | Status: SHIPPED | OUTPATIENT
Start: 2024-12-06

## 2024-12-06 NOTE — TELEPHONE ENCOUNTER
PCP: Justin Vivar MD    Last appt: 11/18/2024  Future Appointments   Date Time Provider Department Center   2/18/2025  9:50 AM Justin Vivar MD [facility] DEP       Requested Prescriptions     Pending Prescriptions Disp Refills    estradiol (VIVELLE) 0.05 MG/24HR [Pharmacy Med Name: ESTRADIOL 0.05 MG PATCH (2/WK)] 24 patch 3     Sig: APPLY 1 PATCH TOPICALLY TWICE A WEEK.       Prior labs and Blood pressures:  BP Readings from Last 3 Encounters:   11/18/24 114/74   05/10/24 108/80   01/30/24 126/82     Lab Results   Component Value Date/Time     11/18/2024 11:13 AM    K 4.1 11/18/2024 11:13 AM     11/18/2024 11:13 AM    CO2 28 11/18/2024 11:13 AM    BUN 28 11/18/2024 11:13 AM    GFRAA >60 08/19/2022 12:04 PM     No results found for: \"HBA1C\", \"[id number]\"  Lab Results   Component Value Date/Time    CHOL 156 11/18/2024 11:13 AM    HDL 70 11/18/2024 11:13 AM    LDL 70.6 11/18/2024 11:13 AM    LDL 79.2 01/30/2024 10:30 AM    VLDL 15.4 11/18/2024 11:13 AM    VLDL 22 02/05/2021 10:29 AM     No results found for: \"VITD3\"        Lab Results   Component Value Date/Time    TSH 1.10 05/10/2024 11:58 AM

## 2025-01-03 NOTE — PROGRESS NOTES
Chief Complaint   Patient presents with    Hypertension     3 month f/u         1. Have you been to the ER, urgent care clinic since your last visit? Hospitalized since your last visit? no    2. Have you seen or consulted any other health care providers outside of the [address] [place] [address] since your last visit? Include any pap smears or colon screening.   no Received fax from [email or web address]. Fluticasone  mcg inhaler needs a PA. Insurance prefers Arnuity, Asmanex, or Qvar. Called [email or web address] and they do not have the Asmanex. They do have the Ovar Redinhaler 80 mcg.

## 2025-01-08 RX ORDER — MELOXICAM 15 MG/1
15 TABLET ORAL DAILY
Qty: 30 TABLET | Refills: 0 | Status: SHIPPED | OUTPATIENT
Start: 2025-01-08

## 2025-01-08 NOTE — TELEPHONE ENCOUNTER
PCP: Justin Vivar MD    Last appt: 11/18/2024    Future Appointments   Date Time Provider Department Center   2/18/2025  9:50 AM Justin Vivar MD [facility] DEP       Requested Prescriptions     Pending Prescriptions Disp Refills    meloxicam (MOBIC) 15 MG tablet [Pharmacy Med Name: Meloxicam 15 MG Oral Tablet] 30 tablet 0     Sig: Take 1 tablet by mouth once daily

## 2025-01-10 NOTE — PROGRESS NOTES
End of Shift Nursing Note    Bedside shift change report given to Courtney Hodgson (oncoming nurse) by Carlos Ramirez (offgoing nurse). Report included the following information SBAR, Kardex, Procedure Summary and Recent Results. Zone Phone:   [phone]    Significant changes during shift:    none   Non-emergent issues for physician to address:   none     Number times ambulated in hallway past shift: none      Number of times OOB to chair past shift: 1    POD #: 0     Vital Signs:    Temp: 97.7 °F (36.5 °C)     Pulse (Heart Rate): (!) 50     BP: 105/53     Resp Rate: 20     O2 Sat (%): 94 %    Lines & Drains:     Urinary Catheter? No   Placement Date:    Medical Necessity:   Central Line? No   Placement Date:    Medical Necessity:   PICC Line? No   Placement Date:    Medical Necessity:     NG tube [] in [] removed [x] not applicable   Drains [] in [] removed [x] not applicable     Skin Integrity:      Wounds: no   Dressings Present: no    Wound Concerns: no      GI:    Current diet:  DIET REGULAR    Nausea: YES  Vomiting: YES  Bowel Sounds: YES  Flatus: NO  Last Bowel Movement: yesterday   Appearance:     Respiratory:  Supplemental O2: No      Device:    via  Liters/min     Incentive Spirometer: YES  Volume:   Coughing and Deep Breathing: YES  Oral Care: NO  Understanding (patient/family education): YES   Getting out of bed: YES  Head of bed elevation: YES    Patient Safety:    Falls Score: 2  Mobility Score: 1  Bed Alarm On? No  Sitter? No      Opportunity for questions and clarification was given to oncoming nurse. Patient bed is in low position, side rails are up x 2, door & observation blinds open as needed, call bell within reach and patient not in distress.     Socrates Valdivia RN Patient needs to FU in in 3 months    Patient needs to schedule labs nonfasting . Today    Placed referral for Referrals: OB / GYN . Schedule ASAP.     Change in medications. no    Patient was advised to go to nearest ER immediately / dial 911 with any worsening of the preexisting symptoms / onset of new symptoms or distress / No improvement in next 48 hours.    Call office with any concerns.

## 2025-02-07 RX ORDER — MELOXICAM 15 MG/1
15 TABLET ORAL DAILY
Qty: 30 TABLET | Refills: 0 | Status: SHIPPED | OUTPATIENT
Start: 2025-02-07

## 2025-02-07 NOTE — TELEPHONE ENCOUNTER
PCP: Justin Vivar MD    Last appt: 11/18/2024  Future Appointments   Date Time Provider Department Center   2/18/2025  9:50 AM Justin Vivar MD [facility] DEP       Requested Prescriptions     Pending Prescriptions Disp Refills    meloxicam (MOBIC) 15 MG tablet [Pharmacy Med Name: Meloxicam 15 MG Oral Tablet] 30 tablet 0     Sig: Take 1 tablet by mouth once daily       Prior labs and Blood pressures:  BP Readings from Last 3 Encounters:   11/18/24 114/74   05/10/24 108/80   01/30/24 126/82     Lab Results   Component Value Date/Time     11/18/2024 11:13 AM    K 4.1 11/18/2024 11:13 AM     11/18/2024 11:13 AM    CO2 28 11/18/2024 11:13 AM    BUN 28 11/18/2024 11:13 AM    GFRAA >60 08/19/2022 12:04 PM     No results found for: \"HBA1C\", \"[id number]\"  Lab Results   Component Value Date/Time    CHOL 156 11/18/2024 11:13 AM    HDL 70 11/18/2024 11:13 AM    LDL 70.6 11/18/2024 11:13 AM    LDL 79.2 01/30/2024 10:30 AM    VLDL 15.4 11/18/2024 11:13 AM    VLDL 22 02/05/2021 10:29 AM     No results found for: \"VITD3\"        Lab Results   Component Value Date/Time    TSH 1.10 05/10/2024 11:58 AM

## 2025-02-17 NOTE — PROGRESS NOTES
This is a Subsequent Medicare Annual Wellness Visit providing Personalized Prevention Plan Services (PPPS) (Performed 12 months after initial AWV and PPPS )    I have reviewed the patient's medical history in detail and updated the computerized patient record.  She presents in follow-up accompanied by her  for Medicare subsequent annual wellness examination and screening questionnaire.    She is also here in follow-up of her multiple medical problems include hypertension, hyperlipidemia, COPD, allergic rhinitis, GERD, hypothyroidism, DJD, CKD stage III, Crohn's disease, anxiety and other multimedical problems.  In addition to her chronic problems she is developed a problem with the rash on her buttocks that is somewhat bothersome.  She also notes a little cystic area with a perianal area but no evidence of drainage from this.  She notes no chest pain, shortness of breath or cardiac respiratory complaints.  She does intermittently get waves, overall like she may be going to get lightheaded and pass out but then they quickly go away just a few seconds.  There are no current GI or  complaints.  She notes no headaches, dizziness or neurologic complaints.  She notes no change of her chronic arthritic complaints and there are no other complaints on complete review systems.    History     Past Medical History:   Diagnosis Date    Allergic rhinitis 8/9/2017    Anemia 8/9/2017    Anxiety 8/9/2017    Arrhythmia     irregular heart beat hx and beating fast per patient/no cardiologist    Arthritis     JOINTS  WORSE WAIST DOWN     Back pain 8/9/2017    Chronic kidney disease     Stage III    CKD (chronic kidney disease) 8/9/2017    COPD (chronic obstructive pulmonary disease) (HCC) 8/9/2017    Crohn disease (HCC) 8/9/2017    DJD (degenerative joint disease) 8/9/2017    Dry mouth     evaluated by Dr. Yepez (ENT)    GERD (gastroesophageal reflux disease) 8/9/2017    Hormone replacement therapy (HRT) 8/9/2017

## 2025-02-18 ENCOUNTER — OFFICE VISIT (OUTPATIENT)
Facility: CLINIC | Age: 70
End: 2025-02-18

## 2025-02-18 VITALS
BODY MASS INDEX: 31.27 KG/M2 | DIASTOLIC BLOOD PRESSURE: 80 MMHG | HEART RATE: 71 BPM | SYSTOLIC BLOOD PRESSURE: 110 MMHG | RESPIRATION RATE: 17 BRPM | HEIGHT: 66 IN | WEIGHT: 194.6 LBS | TEMPERATURE: 97.5 F | OXYGEN SATURATION: 98 %

## 2025-02-18 DIAGNOSIS — Z13.39 ALCOHOL SCREENING: ICD-10-CM

## 2025-02-18 DIAGNOSIS — K50.90 CROHN'S DISEASE WITHOUT COMPLICATION, UNSPECIFIED GASTROINTESTINAL TRACT LOCATION (HCC): ICD-10-CM

## 2025-02-18 DIAGNOSIS — R42 DIZZY SPELLS: ICD-10-CM

## 2025-02-18 DIAGNOSIS — M15.0 PRIMARY OSTEOARTHRITIS INVOLVING MULTIPLE JOINTS: ICD-10-CM

## 2025-02-18 DIAGNOSIS — R42 DIZZINESS: ICD-10-CM

## 2025-02-18 DIAGNOSIS — J44.9 CHRONIC OBSTRUCTIVE PULMONARY DISEASE, UNSPECIFIED COPD TYPE (HCC): ICD-10-CM

## 2025-02-18 DIAGNOSIS — N18.30 STAGE 3 CHRONIC KIDNEY DISEASE, UNSPECIFIED WHETHER STAGE 3A OR 3B CKD (HCC): ICD-10-CM

## 2025-02-18 DIAGNOSIS — J30.89 CHRONIC NON-SEASONAL ALLERGIC RHINITIS: ICD-10-CM

## 2025-02-18 DIAGNOSIS — I12.9 HYPERTENSION WITH RENAL DISEASE: Primary | ICD-10-CM

## 2025-02-18 DIAGNOSIS — B36.9 DERMATITIS FUNGAL: ICD-10-CM

## 2025-02-18 DIAGNOSIS — E03.9 ACQUIRED HYPOTHYROIDISM: ICD-10-CM

## 2025-02-18 DIAGNOSIS — K21.9 GASTROESOPHAGEAL REFLUX DISEASE WITHOUT ESOPHAGITIS: ICD-10-CM

## 2025-02-18 DIAGNOSIS — E78.2 MIXED HYPERLIPIDEMIA: ICD-10-CM

## 2025-02-18 DIAGNOSIS — F41.9 ANXIETY: ICD-10-CM

## 2025-02-18 DIAGNOSIS — E55.9 VITAMIN D DEFICIENCY: ICD-10-CM

## 2025-02-18 DIAGNOSIS — Z00.00 MEDICARE ANNUAL WELLNESS VISIT, SUBSEQUENT: ICD-10-CM

## 2025-02-18 DIAGNOSIS — N32.81 OAB (OVERACTIVE BLADDER): ICD-10-CM

## 2025-02-18 RX ORDER — CLOTRIMAZOLE AND BETAMETHASONE DIPROPIONATE 10; .64 MG/G; MG/G
CREAM TOPICAL
Qty: 30 G | Refills: 1 | Status: SHIPPED | OUTPATIENT
Start: 2025-02-18

## 2025-02-18 ASSESSMENT — LIFESTYLE VARIABLES
HOW MANY STANDARD DRINKS CONTAINING ALCOHOL DO YOU HAVE ON A TYPICAL DAY: 1 OR 2
HOW OFTEN DO YOU HAVE A DRINK CONTAINING ALCOHOL: MONTHLY OR LESS

## 2025-02-18 ASSESSMENT — PATIENT HEALTH QUESTIONNAIRE - PHQ9
SUM OF ALL RESPONSES TO PHQ QUESTIONS 1-9: 0
SUM OF ALL RESPONSES TO PHQ9 QUESTIONS 1 & 2: 0
1. LITTLE INTEREST OR PLEASURE IN DOING THINGS: NOT AT ALL
SUM OF ALL RESPONSES TO PHQ QUESTIONS 1-9: 0
2. FEELING DOWN, DEPRESSED OR HOPELESS: NOT AT ALL

## 2025-02-18 NOTE — PATIENT INSTRUCTIONS
speak for yourself. This person is called a health care agent (health care proxy, health care surrogate). The form is also called a durable power of  for health care.  If you do not have an advance directive, decisions about your medical care may be made by a family member, or by a doctor or a  who doesn't know you.  It may help to think of an advance directive as a gift to the people who care for you. If you have one, they won't have to make tough decisions by themselves.  For more information, including forms for your state, see the [employer] website (www.caringinfo.org/planning/advance-directives/).  Follow-up care is a key part of your treatment and safety. Be sure to make and go to all appointments, and call your doctor if you are having problems. It's also a good idea to know your test results and keep a list of the medicines you take.  What should you include in an advance directive?  Many states have a unique advance directive form. (It may ask you to address specific issues.) Or you might use a universal form that's approved by many states.  If your form doesn't tell you what to address, it may be hard to know what to include in your advance directive. Use the questions below to help you get started.  Who do you want to make decisions about your medical care if you are not able to?  What life-support measures do you want if you have a serious illness that gets worse over time or can't be cured?  What are you most afraid of that might happen? (Maybe you're afraid of having pain, losing your independence, or being kept alive by machines.)  Where would you prefer to die? (Your home? A hospital? A nursing home?)  Do you want to donate your organs when you die?  Do you want certain Hinduism practices performed before you die?  When should you call for help?  Be sure to contact your doctor if you have any questions.  Where can you learn more?  Go to https://www.healthwise.net/patientEd and enter

## 2025-02-18 NOTE — PROGRESS NOTES
Anisha Packer is a 69 y.o. female     Chief Complaint   Patient presents with    Medicare AWV       /80 (Site: Left Upper Arm, Position: Sitting, Cuff Size: Large Adult)   Pulse 71   Temp 97.5 °F (36.4 °C) (Oral)   Resp 17   Ht 1.676 m (5' 6\")   Wt 88.3 kg (194 lb 9.6 oz)   SpO2 98%   BMI 31.41 kg/m²     Health Maintenance Due   Topic Date Due    DTaP/Tdap/Td vaccine (1 - Tdap) Never done    Shingles vaccine (1 of 2) Never done    Respiratory Syncytial Virus (RSV) Pregnant or age 60 yrs+ (1 - Risk 60-74 years 1-dose series) Never done    COVID-19 Vaccine (3 - 2024-25 season) 09/01/2024    Pneumococcal 50+ years Vaccine (3 of 3 - PCV20 or PCV21) 09/20/2024    Annual Wellness Visit (Medicare Advantage)  01/01/2025         \"Have you been to the ER, urgent care clinic since your last visit?  Hospitalized since your last visit?\"    NO    “Have you seen or consulted any other health care providers outside of [facility] since your last visit?”    NO

## 2025-02-19 LAB
25(OH)D3 SERPL-MCNC: 58.8 NG/ML (ref 30–100)
ALBUMIN SERPL-MCNC: 3.9 G/DL (ref 3.5–5)
ALBUMIN/GLOB SERPL: 1.2 (ref 1.1–2.2)
ALP SERPL-CCNC: 103 U/L (ref 45–117)
ALT SERPL-CCNC: 29 U/L (ref 12–78)
ANION GAP SERPL CALC-SCNC: 6 MMOL/L (ref 2–12)
APPEARANCE UR: CLEAR
AST SERPL-CCNC: 20 U/L (ref 15–37)
BACTERIA URNS QL MICRO: ABNORMAL /HPF
BASOPHILS # BLD: 0.02 K/UL (ref 0–0.1)
BASOPHILS NFR BLD: 0.5 % (ref 0–1)
BILIRUB SERPL-MCNC: 0.7 MG/DL (ref 0.2–1)
BILIRUB UR QL: NEGATIVE
BUN SERPL-MCNC: 19 MG/DL (ref 6–20)
BUN/CREAT SERPL: 24 (ref 12–20)
CALCIUM SERPL-MCNC: 10 MG/DL (ref 8.5–10.1)
CHLORIDE SERPL-SCNC: 104 MMOL/L (ref 97–108)
CHOLEST SERPL-MCNC: 182 MG/DL
CK SERPL-CCNC: 53 U/L (ref 26–192)
CO2 SERPL-SCNC: 28 MMOL/L (ref 21–32)
COLOR UR: ABNORMAL
CREAT SERPL-MCNC: 0.79 MG/DL (ref 0.55–1.02)
DIFFERENTIAL METHOD BLD: ABNORMAL
EOSINOPHIL # BLD: 0.15 K/UL (ref 0–0.4)
EOSINOPHIL NFR BLD: 3.4 % (ref 0–7)
EPITH CASTS URNS QL MICRO: ABNORMAL /LPF
ERYTHROCYTE [DISTWIDTH] IN BLOOD BY AUTOMATED COUNT: 13.5 % (ref 11.5–14.5)
GLOBULIN SER CALC-MCNC: 3.3 G/DL (ref 2–4)
GLUCOSE SERPL-MCNC: 82 MG/DL (ref 65–100)
GLUCOSE UR STRIP.AUTO-MCNC: NEGATIVE MG/DL
HCT VFR BLD AUTO: 39.5 % (ref 35–47)
HDLC SERPL-MCNC: 62 MG/DL
HDLC SERPL: 2.9 (ref 0–5)
HGB BLD-MCNC: 12.4 G/DL (ref 11.5–16)
HGB UR QL STRIP: NEGATIVE
HYALINE CASTS URNS QL MICRO: ABNORMAL /LPF (ref 0–5)
IMM GRANULOCYTES # BLD AUTO: 0.01 K/UL (ref 0–0.04)
IMM GRANULOCYTES NFR BLD AUTO: 0.2 % (ref 0–0.5)
KETONES UR QL STRIP.AUTO: NEGATIVE MG/DL
LDLC SERPL CALC-MCNC: 101.2 MG/DL (ref 0–100)
LEUKOCYTE ESTERASE UR QL STRIP.AUTO: NEGATIVE
LYMPHOCYTES # BLD: 0.66 K/UL (ref 0.8–3.5)
LYMPHOCYTES NFR BLD: 15 % (ref 12–49)
MCH RBC QN AUTO: 31 PG (ref 26–34)
MCHC RBC AUTO-ENTMCNC: 31.4 G/DL (ref 30–36.5)
MCV RBC AUTO: 98.8 FL (ref 80–99)
MONOCYTES # BLD: 0.74 K/UL (ref 0–1)
MONOCYTES NFR BLD: 16.8 % (ref 5–13)
NEUTS SEG # BLD: 2.82 K/UL (ref 1.8–8)
NEUTS SEG NFR BLD: 64.1 % (ref 32–75)
NITRITE UR QL STRIP.AUTO: NEGATIVE
NRBC # BLD: 0 K/UL (ref 0–0.01)
NRBC BLD-RTO: 0 PER 100 WBC
PH UR STRIP: 5.5 (ref 5–8)
PLATELET # BLD AUTO: 192 K/UL (ref 150–400)
PMV BLD AUTO: 10.9 FL (ref 8.9–12.9)
POTASSIUM SERPL-SCNC: 4.3 MMOL/L (ref 3.5–5.1)
PROT SERPL-MCNC: 7.2 G/DL (ref 6.4–8.2)
PROT UR STRIP-MCNC: NEGATIVE MG/DL
RBC # BLD AUTO: 4 M/UL (ref 3.8–5.2)
RBC #/AREA URNS HPF: ABNORMAL /HPF (ref 0–5)
RBC MORPH BLD: ABNORMAL
SODIUM SERPL-SCNC: 138 MMOL/L (ref 136–145)
SP GR UR REFRACTOMETRY: 1.01 (ref 1–1.03)
T4 FREE SERPL-MCNC: 0.9 NG/DL (ref 0.8–1.5)
TRIGL SERPL-MCNC: 94 MG/DL
TSH SERPL DL<=0.05 MIU/L-ACNC: 1.8 UIU/ML (ref 0.36–3.74)
UROBILINOGEN UR QL STRIP.AUTO: 0.2 EU/DL (ref 0.2–1)
VLDLC SERPL CALC-MCNC: 18.8 MG/DL
WBC # BLD AUTO: 4.4 K/UL (ref 3.6–11)
WBC URNS QL MICRO: ABNORMAL /HPF (ref 0–4)

## 2025-02-26 ENCOUNTER — HOSPITAL ENCOUNTER (OUTPATIENT)
Facility: HOSPITAL | Age: 70
Discharge: HOME | End: 2025-02-28
Attending: INTERNAL MEDICINE
Payer: MEDICARE

## 2025-02-26 DIAGNOSIS — R42 DIZZINESS: ICD-10-CM

## 2025-02-26 PROCEDURE — 93225 XTRNL ECG REC<48 HRS REC: CPT

## 2025-03-03 RX ORDER — NIACIN 500 MG/1
500 TABLET, EXTENDED RELEASE ORAL NIGHTLY
Qty: 90 TABLET | Refills: 1 | Status: SHIPPED | OUTPATIENT
Start: 2025-03-03

## 2025-03-03 RX ORDER — OLMESARTAN MEDOXOMIL 20 MG/1
20 TABLET ORAL DAILY
Qty: 90 TABLET | Refills: 1 | Status: SHIPPED | OUTPATIENT
Start: 2025-03-03

## 2025-03-03 NOTE — TELEPHONE ENCOUNTER
PCP: Justin Vivar MD    Last appt: 2/18/2025    Future Appointments   Date Time Provider Department Center   5/30/2025 10:00 AM Justin Vivar MD [facility]       Requested Prescriptions     Pending Prescriptions Disp Refills    niacin (NIASPAN) 500 MG extended release tablet [Pharmacy Med Name: Niacin ER (Antihyperlipidemic) Oral Tablet Extended Release 500 MG] 90 tablet 1     Sig: TAKE 1 TABLET AT BEDTIME    olmesartan (BENICAR) 20 MG tablet [Pharmacy Med Name: Olmesartan Medoxomil Oral Tablet 20 MG] 90 tablet 1     Sig: TAKE 1 TABLET EVERY DAY

## 2025-03-10 ENCOUNTER — RESULTS FOLLOW-UP (OUTPATIENT)
Facility: HOSPITAL | Age: 70
End: 2025-03-10

## 2025-03-10 RX ORDER — METOPROLOL SUCCINATE 25 MG/1
25 TABLET, EXTENDED RELEASE ORAL DAILY
Qty: 30 TABLET | Refills: 5 | Status: SHIPPED | OUTPATIENT
Start: 2025-03-10

## 2025-03-11 RX ORDER — MELOXICAM 15 MG/1
15 TABLET ORAL DAILY
Qty: 30 TABLET | Refills: 0 | Status: SHIPPED | OUTPATIENT
Start: 2025-03-11

## 2025-03-11 NOTE — TELEPHONE ENCOUNTER
PCP: Justin Vivar MD    Last appt: 2/18/2025    Future Appointments   Date Time Provider Department Center   5/30/2025 10:00 AM Justin Vivar MD [facility] DEP       Requested Prescriptions     Pending Prescriptions Disp Refills    meloxicam (MOBIC) 15 MG tablet [Pharmacy Med Name: Meloxicam 15 MG Oral Tablet] 30 tablet 0     Sig: Take 1 tablet by mouth once daily

## 2025-03-19 PROBLEM — Z00.00 MEDICARE ANNUAL WELLNESS VISIT, SUBSEQUENT: Status: RESOLVED | Noted: 2021-08-11 | Resolved: 2025-03-19

## 2025-03-20 RX ORDER — POTASSIUM CHLORIDE 1500 MG/1
20 TABLET, EXTENDED RELEASE ORAL 2 TIMES DAILY
Qty: 180 TABLET | Refills: 3 | Status: SHIPPED | OUTPATIENT
Start: 2025-03-20

## 2025-03-20 NOTE — TELEPHONE ENCOUNTER
PCP: Justin Vivar MD    Last appt: 2/18/2025    Future Appointments   Date Time Provider Department Center   5/30/2025 10:00 AM Justin Vivar MD [facility] DEP       Requested Prescriptions     Pending Prescriptions Disp Refills    potassium chloride (KLOR-CON M) 20 MEQ extended release tablet [Pharmacy Med Name: Potassium Chloride Cris ER Oral Tablet Extended Release 20 MEQ] 180 tablet 3     Sig: TAKE 1 TABLET TWICE DAILY

## 2025-04-07 RX ORDER — MELOXICAM 15 MG/1
15 TABLET ORAL DAILY
Qty: 30 TABLET | Refills: 0 | Status: SHIPPED | OUTPATIENT
Start: 2025-04-07

## 2025-05-01 ENCOUNTER — HOSPITAL ENCOUNTER (INPATIENT)
Facility: HOSPITAL | Age: 70
LOS: 7 days | Discharge: HOME HEALTH | DRG: 872 | End: 2025-05-09
Attending: EMERGENCY MEDICINE | Admitting: STUDENT IN AN ORGANIZED HEALTH CARE EDUCATION/TRAINING PROGRAM
Payer: MEDICARE

## 2025-05-01 ENCOUNTER — APPOINTMENT (OUTPATIENT)
Facility: HOSPITAL | Age: 70
DRG: 872 | End: 2025-05-01
Payer: MEDICARE

## 2025-05-01 DIAGNOSIS — R62.7 FAILURE TO THRIVE IN ADULT: ICD-10-CM

## 2025-05-01 DIAGNOSIS — R65.10 SIRS (SYSTEMIC INFLAMMATORY RESPONSE SYNDROME) (HCC): ICD-10-CM

## 2025-05-01 DIAGNOSIS — G62.9 NEUROPATHY: ICD-10-CM

## 2025-05-01 DIAGNOSIS — R50.9 FEVER OF UNKNOWN ORIGIN: ICD-10-CM

## 2025-05-01 DIAGNOSIS — R50.9 FEVER OF UNKNOWN ORIGIN (FUO): Primary | ICD-10-CM

## 2025-05-01 LAB
ALBUMIN SERPL-MCNC: 3.5 G/DL (ref 3.5–5)
ALBUMIN/GLOB SERPL: 0.9 (ref 1.1–2.2)
ALP SERPL-CCNC: 80 U/L (ref 45–117)
ALT SERPL-CCNC: 35 U/L (ref 12–78)
ANION GAP SERPL CALC-SCNC: 5 MMOL/L (ref 2–12)
AST SERPL-CCNC: 34 U/L (ref 15–37)
BASOPHILS # BLD: 0 K/UL (ref 0–0.1)
BASOPHILS NFR BLD: 0 % (ref 0–1)
BILIRUB SERPL-MCNC: 0.6 MG/DL (ref 0.2–1)
BUN SERPL-MCNC: 26 MG/DL (ref 6–20)
BUN/CREAT SERPL: 22 (ref 12–20)
CALCIUM SERPL-MCNC: 9.5 MG/DL (ref 8.5–10.1)
CHLORIDE SERPL-SCNC: 100 MMOL/L (ref 97–108)
CO2 SERPL-SCNC: 26 MMOL/L (ref 21–32)
CREAT SERPL-MCNC: 1.19 MG/DL (ref 0.55–1.02)
DIFFERENTIAL METHOD BLD: ABNORMAL
EOSINOPHIL # BLD: 0.27 K/UL (ref 0–0.4)
EOSINOPHIL NFR BLD: 4 % (ref 0–7)
ERYTHROCYTE [DISTWIDTH] IN BLOOD BY AUTOMATED COUNT: 13.6 % (ref 11.5–14.5)
FLUAV RNA SPEC QL NAA+PROBE: NOT DETECTED
FLUBV RNA SPEC QL NAA+PROBE: NOT DETECTED
GLOBULIN SER CALC-MCNC: 3.7 G/DL (ref 2–4)
GLUCOSE SERPL-MCNC: 118 MG/DL (ref 65–100)
HCT VFR BLD AUTO: 36.8 % (ref 35–47)
HGB BLD-MCNC: 12.1 G/DL (ref 11.5–16)
IMM GRANULOCYTES # BLD AUTO: 0 K/UL (ref 0–0.04)
IMM GRANULOCYTES NFR BLD AUTO: 0 % (ref 0–0.5)
LACTATE BLD-SCNC: 0.88 MMOL/L (ref 0.4–2)
LYMPHOCYTES # BLD: 0.48 K/UL (ref 0.8–3.5)
LYMPHOCYTES NFR BLD: 7 % (ref 12–49)
MCH RBC QN AUTO: 31.3 PG (ref 26–34)
MCHC RBC AUTO-ENTMCNC: 32.9 G/DL (ref 30–36.5)
MCV RBC AUTO: 95.3 FL (ref 80–99)
MONOCYTES # BLD: 0.54 K/UL (ref 0–1)
MONOCYTES NFR BLD: 8 % (ref 5–13)
NEUTS BAND NFR BLD MANUAL: 10 %
NEUTS SEG # BLD: 5.51 K/UL (ref 1.8–8)
NEUTS SEG NFR BLD: 71 % (ref 32–75)
NRBC # BLD: 0 K/UL (ref 0–0.01)
NRBC BLD-RTO: 0 PER 100 WBC
PLATELET # BLD AUTO: 134 K/UL (ref 150–400)
PMV BLD AUTO: 10.4 FL (ref 8.9–12.9)
POTASSIUM SERPL-SCNC: 3.7 MMOL/L (ref 3.5–5.1)
PROT SERPL-MCNC: 7.2 G/DL (ref 6.4–8.2)
RBC # BLD AUTO: 3.86 M/UL (ref 3.8–5.2)
RBC MORPH BLD: ABNORMAL
SARS-COV-2 RNA RESP QL NAA+PROBE: NOT DETECTED
SODIUM SERPL-SCNC: 131 MMOL/L (ref 136–145)
SOURCE: NORMAL
TROPONIN I SERPL HS-MCNC: 13 NG/L (ref 0–51)
WBC # BLD AUTO: 6.8 K/UL (ref 3.6–11)
WBC MORPH BLD: ABNORMAL

## 2025-05-01 PROCEDURE — 85025 COMPLETE CBC W/AUTO DIFF WBC: CPT

## 2025-05-01 PROCEDURE — 87636 SARSCOV2 & INF A&B AMP PRB: CPT

## 2025-05-01 PROCEDURE — 6360000002 HC RX W HCPCS: Performed by: EMERGENCY MEDICINE

## 2025-05-01 PROCEDURE — 84484 ASSAY OF TROPONIN QUANT: CPT

## 2025-05-01 PROCEDURE — 96375 TX/PRO/DX INJ NEW DRUG ADDON: CPT

## 2025-05-01 PROCEDURE — 99285 EMERGENCY DEPT VISIT HI MDM: CPT

## 2025-05-01 PROCEDURE — 80053 COMPREHEN METABOLIC PANEL: CPT

## 2025-05-01 PROCEDURE — 96361 HYDRATE IV INFUSION ADD-ON: CPT

## 2025-05-01 PROCEDURE — 36415 COLL VENOUS BLD VENIPUNCTURE: CPT

## 2025-05-01 PROCEDURE — 71045 X-RAY EXAM CHEST 1 VIEW: CPT

## 2025-05-01 PROCEDURE — 93005 ELECTROCARDIOGRAM TRACING: CPT | Performed by: EMERGENCY MEDICINE

## 2025-05-01 PROCEDURE — 83605 ASSAY OF LACTIC ACID: CPT

## 2025-05-01 PROCEDURE — 87154 CUL TYP ID BLD PTHGN 6+ TRGT: CPT

## 2025-05-01 PROCEDURE — 2580000003 HC RX 258: Performed by: EMERGENCY MEDICINE

## 2025-05-01 PROCEDURE — 2500000003 HC RX 250 WO HCPCS: Performed by: EMERGENCY MEDICINE

## 2025-05-01 PROCEDURE — 51701 INSERT BLADDER CATHETER: CPT

## 2025-05-01 PROCEDURE — 96365 THER/PROPH/DIAG IV INF INIT: CPT

## 2025-05-01 PROCEDURE — 6370000000 HC RX 637 (ALT 250 FOR IP): Performed by: EMERGENCY MEDICINE

## 2025-05-01 PROCEDURE — 81001 URINALYSIS AUTO W/SCOPE: CPT

## 2025-05-01 PROCEDURE — 87040 BLOOD CULTURE FOR BACTERIA: CPT

## 2025-05-01 PROCEDURE — 96366 THER/PROPH/DIAG IV INF ADDON: CPT

## 2025-05-01 RX ORDER — VANCOMYCIN 2 G/400ML
2000 INJECTION, SOLUTION INTRAVENOUS ONCE
Status: COMPLETED | OUTPATIENT
Start: 2025-05-01 | End: 2025-05-02

## 2025-05-01 RX ORDER — 0.9 % SODIUM CHLORIDE 0.9 %
1000 INTRAVENOUS SOLUTION INTRAVENOUS ONCE
Status: COMPLETED | OUTPATIENT
Start: 2025-05-01 | End: 2025-05-01

## 2025-05-01 RX ORDER — ACETAMINOPHEN 500 MG
1000 TABLET ORAL
Status: COMPLETED | OUTPATIENT
Start: 2025-05-01 | End: 2025-05-01

## 2025-05-01 RX ADMIN — SODIUM CHLORIDE 1000 ML: 9 INJECTION, SOLUTION INTRAVENOUS at 20:13

## 2025-05-01 RX ADMIN — ACETAMINOPHEN 1000 MG: 500 TABLET ORAL at 21:23

## 2025-05-01 RX ADMIN — VANCOMYCIN 2000 MG: 2 INJECTION, SOLUTION INTRAVENOUS at 21:29

## 2025-05-01 RX ADMIN — WATER 2000 MG: 1 INJECTION INTRAMUSCULAR; INTRAVENOUS; SUBCUTANEOUS at 20:12

## 2025-05-01 ASSESSMENT — PAIN - FUNCTIONAL ASSESSMENT
PAIN_FUNCTIONAL_ASSESSMENT: 0-10
PAIN_FUNCTIONAL_ASSESSMENT: 0-10

## 2025-05-01 ASSESSMENT — LIFESTYLE VARIABLES
HOW OFTEN DO YOU HAVE A DRINK CONTAINING ALCOHOL: NEVER
HOW MANY STANDARD DRINKS CONTAINING ALCOHOL DO YOU HAVE ON A TYPICAL DAY: PATIENT DOES NOT DRINK

## 2025-05-01 ASSESSMENT — PAIN SCALES - GENERAL
PAINLEVEL_OUTOF10: 1
PAINLEVEL_OUTOF10: 10
PAINLEVEL_OUTOF10: 0

## 2025-05-01 NOTE — ED PROVIDER NOTES
[facility] EMERGENCY DEPARTMENT  EMERGENCY DEPARTMENT ENCOUNTER       Pt Name: Anisha Packer  MRN: [id number]  Birthdate 1955  Date of evaluation: 5/1/2025  Provider: Sergey Antonio MD   PCP: Justin Vivar MD  Note Started: 3:14 AM 5/1/25     CHIEF COMPLAINT       Chief Complaint   Patient presents with    Fatigue     Patient to ED by EMS from home with fatigue on-going since this Tuesday. Patient states she was nauseous this morning, and felt weak when attempting to get out of bed. Patient slid down to the ground, and was unable to stand up. Patient slid herself down the stairs per EMS and was able to stand. Pt walks with cane at baseline but has had difficulty ambulating today. Only noted pain is chronic neuropathy.        HISTORY OF PRESENT ILLNESS: 1 or more elements      History From: Patient, EMS, History limited by: No limitations     Anisha Packer is a 69 y.o. female with history of CKD, COPD without baseline O2 requirements, hypertension, hyperlipidemia who presents with chief complaint of fatigue, generalized weakness, fevers.  Symptoms have been present over the past 2 days.  Patient found to have low-grade temperature 99.7 °F by EMS prior to arrival.  Today she felt too weak to be able to stand and has had decreased appetite today.  She denies any sore throat, cough, chest pain, shortness breath, abdominal pain, nausea, vomiting, diarrhea, or urinary symptoms.  Denies any rashes, headaches, or neck pain/stiffness.  Nobody else sick at home.  She appears clinically well and nontoxic but does appear tired.     Nursing Notes were all reviewed and agreed with or any disagreements were addressed in the HPI.     REVIEW OF SYSTEMS        Positives and Pertinent negatives as per HPI.    PAST HISTORY     Past Medical History:  Past Medical History:   Diagnosis Date    Allergic rhinitis 8/9/2017    Anemia 8/9/2017    Anxiety 8/9/2017    Arrhythmia     irregular heart beat hx and beating fast

## 2025-05-02 ENCOUNTER — APPOINTMENT (OUTPATIENT)
Facility: HOSPITAL | Age: 70
DRG: 872 | End: 2025-05-02
Payer: MEDICARE

## 2025-05-02 PROBLEM — R65.10 SIRS (SYSTEMIC INFLAMMATORY RESPONSE SYNDROME) (HCC): Status: ACTIVE | Noted: 2025-05-02

## 2025-05-02 LAB
ACB COMPLEX DNA BLD POS QL NAA+NON-PROBE: NOT DETECTED
ACCESSION NUMBER, LLC1M: ABNORMAL
ANION GAP SERPL CALC-SCNC: 7 MMOL/L (ref 2–12)
APPEARANCE UR: CLEAR
B FRAGILIS DNA BLD POS QL NAA+NON-PROBE: NOT DETECTED
B PERT DNA SPEC QL NAA+PROBE: NOT DETECTED
BACTERIA URNS QL MICRO: NEGATIVE /HPF
BASOPHILS # BLD: 0 K/UL (ref 0–0.1)
BASOPHILS NFR BLD: 0 % (ref 0–1)
BILIRUB UR QL: NEGATIVE
BIOFIRE TEST COMMENT: ABNORMAL
BORDETELLA PARAPERTUSSIS BY PCR: NOT DETECTED
BUN SERPL-MCNC: 18 MG/DL (ref 6–20)
BUN/CREAT SERPL: 20 (ref 12–20)
C ALBICANS DNA BLD POS QL NAA+NON-PROBE: NOT DETECTED
C AURIS DNA BLD POS QL NAA+NON-PROBE: NOT DETECTED
C GATTII+NEOFOR DNA BLD POS QL NAA+N-PRB: NOT DETECTED
C GLABRATA DNA BLD POS QL NAA+NON-PROBE: NOT DETECTED
C KRUSEI DNA BLD POS QL NAA+NON-PROBE: NOT DETECTED
C PARAP DNA BLD POS QL NAA+NON-PROBE: NOT DETECTED
C PNEUM DNA SPEC QL NAA+PROBE: NOT DETECTED
C TROPICLS DNA BLD POS QL NAA+NON-PROBE: NOT DETECTED
CALCIUM SERPL-MCNC: 8.7 MG/DL (ref 8.5–10.1)
CHLORIDE SERPL-SCNC: 100 MMOL/L (ref 97–108)
CO2 SERPL-SCNC: 21 MMOL/L (ref 21–32)
COLOR UR: ABNORMAL
CREAT SERPL-MCNC: 0.92 MG/DL (ref 0.55–1.02)
CRP SERPL-MCNC: 3.26 MG/DL (ref 0–0.3)
DIFFERENTIAL METHOD BLD: ABNORMAL
E CLOAC COMP DNA BLD POS NAA+NON-PROBE: NOT DETECTED
E COLI DNA BLD POS QL NAA+NON-PROBE: NOT DETECTED
E FAECALIS DNA BLD POS QL NAA+NON-PROBE: NOT DETECTED
E FAECIUM DNA BLD POS QL NAA+NON-PROBE: NOT DETECTED
ECHO BSA: 2.06 M2
EKG ATRIAL RATE: 99 BPM
EKG DIAGNOSIS: NORMAL
EKG P AXIS: 39 DEGREES
EKG P-R INTERVAL: 212 MS
EKG Q-T INTERVAL: 316 MS
EKG QRS DURATION: 88 MS
EKG QTC CALCULATION (BAZETT): 405 MS
EKG R AXIS: 11 DEGREES
EKG T AXIS: 1 DEGREES
EKG VENTRICULAR RATE: 99 BPM
ENTEROBACTERALES DNA BLD POS NAA+N-PRB: NOT DETECTED
EOSINOPHIL # BLD: 0.14 K/UL (ref 0–0.4)
EOSINOPHIL NFR BLD: 2 % (ref 0–7)
EPITH CASTS URNS QL MICRO: ABNORMAL /LPF
ERYTHROCYTE [DISTWIDTH] IN BLOOD BY AUTOMATED COUNT: 13.5 % (ref 11.5–14.5)
FLUAV SUBTYP SPEC NAA+PROBE: NOT DETECTED
FLUBV RNA SPEC QL NAA+PROBE: NOT DETECTED
GLUCOSE SERPL-MCNC: 100 MG/DL (ref 65–100)
GLUCOSE UR STRIP.AUTO-MCNC: NEGATIVE MG/DL
GP B STREP DNA BLD POS QL NAA+NON-PROBE: NOT DETECTED
HADV DNA SPEC QL NAA+PROBE: NOT DETECTED
HAEM INFLU DNA BLD POS QL NAA+NON-PROBE: NOT DETECTED
HCOV 229E RNA SPEC QL NAA+PROBE: NOT DETECTED
HCOV HKU1 RNA SPEC QL NAA+PROBE: NOT DETECTED
HCOV NL63 RNA SPEC QL NAA+PROBE: NOT DETECTED
HCOV OC43 RNA SPEC QL NAA+PROBE: NOT DETECTED
HCT VFR BLD AUTO: 36.1 % (ref 35–47)
HGB BLD-MCNC: 11.8 G/DL (ref 11.5–16)
HGB UR QL STRIP: NEGATIVE
HMPV RNA SPEC QL NAA+PROBE: NOT DETECTED
HPIV1 RNA SPEC QL NAA+PROBE: NOT DETECTED
HPIV2 RNA SPEC QL NAA+PROBE: NOT DETECTED
HPIV3 RNA SPEC QL NAA+PROBE: NOT DETECTED
HPIV4 RNA SPEC QL NAA+PROBE: NOT DETECTED
IMM GRANULOCYTES # BLD AUTO: 0 K/UL (ref 0–0.04)
IMM GRANULOCYTES NFR BLD AUTO: 0 % (ref 0–0.5)
K OXYTOCA DNA BLD POS QL NAA+NON-PROBE: NOT DETECTED
KETONES UR QL STRIP.AUTO: 15 MG/DL
KLEBSIELLA SP DNA BLD POS QL NAA+NON-PRB: NOT DETECTED
KLEBSIELLA SP DNA BLD POS QL NAA+NON-PRB: NOT DETECTED
L MONOCYTOG DNA BLD POS QL NAA+NON-PROBE: NOT DETECTED
LEUKOCYTE ESTERASE UR QL STRIP.AUTO: NEGATIVE
LYMPHOCYTES # BLD: 0.62 K/UL (ref 0.8–3.5)
LYMPHOCYTES NFR BLD: 9 % (ref 12–49)
M PNEUMO DNA SPEC QL NAA+PROBE: NOT DETECTED
MCH RBC QN AUTO: 31.3 PG (ref 26–34)
MCHC RBC AUTO-ENTMCNC: 32.7 G/DL (ref 30–36.5)
MCV RBC AUTO: 95.8 FL (ref 80–99)
MECA+MECC ISLT/SPM QL: DETECTED
MONOCYTES # BLD: 0.35 K/UL (ref 0–1)
MONOCYTES NFR BLD: 5 % (ref 5–13)
N MEN DNA BLD POS QL NAA+NON-PROBE: NOT DETECTED
NEUTS BAND NFR BLD MANUAL: 11 %
NEUTS SEG # BLD: 5.79 K/UL (ref 1.8–8)
NEUTS SEG NFR BLD: 73 % (ref 32–75)
NITRITE UR QL STRIP.AUTO: NEGATIVE
NRBC # BLD: 0 K/UL (ref 0–0.01)
NRBC BLD-RTO: 0 PER 100 WBC
P AERUGINOSA DNA BLD POS NAA+NON-PROBE: NOT DETECTED
PH UR STRIP: 5.5 (ref 5–8)
PLATELET # BLD AUTO: 111 K/UL (ref 150–400)
PMV BLD AUTO: 11.1 FL (ref 8.9–12.9)
POTASSIUM SERPL-SCNC: 3.8 MMOL/L (ref 3.5–5.1)
PROCALCITONIN SERPL-MCNC: 0.28 NG/ML
PROT UR STRIP-MCNC: 30 MG/DL
PROTEUS SP DNA BLD POS QL NAA+NON-PROBE: NOT DETECTED
RBC # BLD AUTO: 3.77 M/UL (ref 3.8–5.2)
RBC #/AREA URNS HPF: ABNORMAL /HPF (ref 0–5)
RBC MORPH BLD: ABNORMAL
RESISTANT GENE TARGETS: ABNORMAL
RSV RNA SPEC QL NAA+PROBE: NOT DETECTED
RV+EV RNA SPEC QL NAA+PROBE: NOT DETECTED
S AUREUS DNA BLD POS QL NAA+NON-PROBE: NOT DETECTED
S AUREUS+CONS DNA BLD POS NAA+NON-PROBE: DETECTED
S EPIDERMIDIS DNA BLD POS QL NAA+NON-PRB: DETECTED
S LUGDUNENSIS DNA BLD POS QL NAA+NON-PRB: NOT DETECTED
S MALTOPHILIA DNA BLD POS QL NAA+NON-PRB: NOT DETECTED
S MARCESCENS DNA BLD POS NAA+NON-PROBE: NOT DETECTED
S PNEUM DNA BLD POS QL NAA+NON-PROBE: NOT DETECTED
S PYO DNA BLD POS QL NAA+NON-PROBE: NOT DETECTED
SALMONELLA DNA BLD POS QL NAA+NON-PROBE: NOT DETECTED
SARS-COV-2 RNA RESP QL NAA+PROBE: NOT DETECTED
SODIUM SERPL-SCNC: 128 MMOL/L (ref 136–145)
SP GR UR REFRACTOMETRY: 1.03
STREPTOCOCCUS DNA BLD POS NAA+NON-PROBE: NOT DETECTED
URINE CULTURE IF INDICATED: ABNORMAL
UROBILINOGEN UR QL STRIP.AUTO: 0.2 EU/DL (ref 0.2–1)
WBC # BLD AUTO: 6.9 K/UL (ref 3.6–11)
WBC MORPH BLD: ABNORMAL
WBC URNS QL MICRO: ABNORMAL /HPF (ref 0–4)

## 2025-05-02 PROCEDURE — 6360000004 HC RX CONTRAST MEDICATION: Performed by: RADIOLOGY

## 2025-05-02 PROCEDURE — 2580000003 HC RX 258: Performed by: STUDENT IN AN ORGANIZED HEALTH CARE EDUCATION/TRAINING PROGRAM

## 2025-05-02 PROCEDURE — 6370000000 HC RX 637 (ALT 250 FOR IP)

## 2025-05-02 PROCEDURE — 97530 THERAPEUTIC ACTIVITIES: CPT

## 2025-05-02 PROCEDURE — 97162 PT EVAL MOD COMPLEX 30 MIN: CPT

## 2025-05-02 PROCEDURE — 97116 GAIT TRAINING THERAPY: CPT

## 2025-05-02 PROCEDURE — 71260 CT THORAX DX C+: CPT

## 2025-05-02 PROCEDURE — 6360000002 HC RX W HCPCS: Performed by: STUDENT IN AN ORGANIZED HEALTH CARE EDUCATION/TRAINING PROGRAM

## 2025-05-02 PROCEDURE — 85025 COMPLETE CBC W/AUTO DIFF WBC: CPT

## 2025-05-02 PROCEDURE — 2500000003 HC RX 250 WO HCPCS

## 2025-05-02 PROCEDURE — 86140 C-REACTIVE PROTEIN: CPT

## 2025-05-02 PROCEDURE — 93970 EXTREMITY STUDY: CPT

## 2025-05-02 PROCEDURE — 2580000003 HC RX 258

## 2025-05-02 PROCEDURE — 84145 PROCALCITONIN (PCT): CPT

## 2025-05-02 PROCEDURE — 1100000003 HC PRIVATE W/ TELEMETRY

## 2025-05-02 PROCEDURE — 97165 OT EVAL LOW COMPLEX 30 MIN: CPT

## 2025-05-02 PROCEDURE — 6360000002 HC RX W HCPCS

## 2025-05-02 PROCEDURE — 0202U NFCT DS 22 TRGT SARS-COV-2: CPT

## 2025-05-02 PROCEDURE — 6360000002 HC RX W HCPCS: Performed by: EMERGENCY MEDICINE

## 2025-05-02 PROCEDURE — 80048 BASIC METABOLIC PNL TOTAL CA: CPT

## 2025-05-02 PROCEDURE — 2500000003 HC RX 250 WO HCPCS: Performed by: STUDENT IN AN ORGANIZED HEALTH CARE EDUCATION/TRAINING PROGRAM

## 2025-05-02 PROCEDURE — 36415 COLL VENOUS BLD VENIPUNCTURE: CPT

## 2025-05-02 RX ORDER — POTASSIUM CHLORIDE 7.45 MG/ML
10 INJECTION INTRAVENOUS PRN
Status: DISCONTINUED | OUTPATIENT
Start: 2025-05-02 | End: 2025-05-09 | Stop reason: HOSPADM

## 2025-05-02 RX ORDER — SODIUM CHLORIDE 9 MG/ML
INJECTION, SOLUTION INTRAVENOUS CONTINUOUS
Status: ACTIVE | OUTPATIENT
Start: 2025-05-02 | End: 2025-05-02

## 2025-05-02 RX ORDER — IOPAMIDOL 755 MG/ML
100 INJECTION, SOLUTION INTRAVASCULAR
Status: COMPLETED | OUTPATIENT
Start: 2025-05-02 | End: 2025-05-02

## 2025-05-02 RX ORDER — ENOXAPARIN SODIUM 100 MG/ML
40 INJECTION SUBCUTANEOUS DAILY
Status: DISCONTINUED | OUTPATIENT
Start: 2025-05-02 | End: 2025-05-09 | Stop reason: HOSPADM

## 2025-05-02 RX ORDER — ONDANSETRON 2 MG/ML
4 INJECTION INTRAMUSCULAR; INTRAVENOUS EVERY 4 HOURS PRN
Status: DISCONTINUED | OUTPATIENT
Start: 2025-05-02 | End: 2025-05-09 | Stop reason: HOSPADM

## 2025-05-02 RX ORDER — ACETAMINOPHEN 650 MG/1
650 SUPPOSITORY RECTAL EVERY 6 HOURS PRN
Status: DISCONTINUED | OUTPATIENT
Start: 2025-05-02 | End: 2025-05-09 | Stop reason: HOSPADM

## 2025-05-02 RX ORDER — GABAPENTIN 300 MG/1
300 CAPSULE ORAL NIGHTLY
Status: DISCONTINUED | OUTPATIENT
Start: 2025-05-02 | End: 2025-05-09 | Stop reason: HOSPADM

## 2025-05-02 RX ORDER — SODIUM CHLORIDE 0.9 % (FLUSH) 0.9 %
5-40 SYRINGE (ML) INJECTION EVERY 12 HOURS SCHEDULED
Status: DISCONTINUED | OUTPATIENT
Start: 2025-05-02 | End: 2025-05-09 | Stop reason: HOSPADM

## 2025-05-02 RX ORDER — POTASSIUM CHLORIDE 1500 MG/1
40 TABLET, EXTENDED RELEASE ORAL PRN
Status: DISCONTINUED | OUTPATIENT
Start: 2025-05-02 | End: 2025-05-09 | Stop reason: HOSPADM

## 2025-05-02 RX ORDER — POLYETHYLENE GLYCOL 3350 17 G/17G
17 POWDER, FOR SOLUTION ORAL DAILY PRN
Status: DISCONTINUED | OUTPATIENT
Start: 2025-05-02 | End: 2025-05-09 | Stop reason: HOSPADM

## 2025-05-02 RX ORDER — ACETAMINOPHEN 325 MG/1
650 TABLET ORAL EVERY 6 HOURS PRN
Status: DISCONTINUED | OUTPATIENT
Start: 2025-05-02 | End: 2025-05-09 | Stop reason: HOSPADM

## 2025-05-02 RX ORDER — SODIUM CHLORIDE 9 MG/ML
INJECTION, SOLUTION INTRAVENOUS PRN
Status: DISCONTINUED | OUTPATIENT
Start: 2025-05-02 | End: 2025-05-09 | Stop reason: HOSPADM

## 2025-05-02 RX ORDER — SODIUM CHLORIDE 0.9 % (FLUSH) 0.9 %
5-40 SYRINGE (ML) INJECTION PRN
Status: DISCONTINUED | OUTPATIENT
Start: 2025-05-02 | End: 2025-05-09 | Stop reason: HOSPADM

## 2025-05-02 RX ORDER — ACETAMINOPHEN 325 MG/1
650 TABLET ORAL EVERY 4 HOURS PRN
Status: DISCONTINUED | OUTPATIENT
Start: 2025-05-02 | End: 2025-05-02

## 2025-05-02 RX ORDER — MAGNESIUM SULFATE IN WATER 40 MG/ML
2000 INJECTION, SOLUTION INTRAVENOUS PRN
Status: DISCONTINUED | OUTPATIENT
Start: 2025-05-02 | End: 2025-05-09 | Stop reason: HOSPADM

## 2025-05-02 RX ORDER — METOPROLOL SUCCINATE 25 MG/1
25 TABLET, EXTENDED RELEASE ORAL DAILY
Status: DISCONTINUED | OUTPATIENT
Start: 2025-05-02 | End: 2025-05-09 | Stop reason: HOSPADM

## 2025-05-02 RX ADMIN — METOPROLOL SUCCINATE 25 MG: 25 TABLET, EXTENDED RELEASE ORAL at 10:07

## 2025-05-02 RX ADMIN — GABAPENTIN 300 MG: 300 CAPSULE ORAL at 20:30

## 2025-05-02 RX ADMIN — ACETAMINOPHEN 650 MG: 325 TABLET ORAL at 14:41

## 2025-05-02 RX ADMIN — CEFEPIME 2000 MG: 2 INJECTION, POWDER, FOR SOLUTION INTRAVENOUS at 17:40

## 2025-05-02 RX ADMIN — SODIUM CHLORIDE 1250 MG: 0.9 INJECTION, SOLUTION INTRAVENOUS at 23:38

## 2025-05-02 RX ADMIN — IOPAMIDOL 100 ML: 755 INJECTION, SOLUTION INTRAVENOUS at 00:11

## 2025-05-02 RX ADMIN — ONDANSETRON 4 MG: 2 INJECTION, SOLUTION INTRAMUSCULAR; INTRAVENOUS at 20:45

## 2025-05-02 RX ADMIN — SODIUM CHLORIDE, PRESERVATIVE FREE 10 ML: 5 INJECTION INTRAVENOUS at 10:07

## 2025-05-02 RX ADMIN — WATER 2000 MG: 1 INJECTION INTRAMUSCULAR; INTRAVENOUS; SUBCUTANEOUS at 06:04

## 2025-05-02 RX ADMIN — ENOXAPARIN SODIUM 40 MG: 100 INJECTION SUBCUTANEOUS at 10:07

## 2025-05-02 RX ADMIN — SODIUM CHLORIDE, PRESERVATIVE FREE 10 ML: 5 INJECTION INTRAVENOUS at 20:44

## 2025-05-02 RX ADMIN — ACETAMINOPHEN 650 MG: 325 TABLET ORAL at 20:30

## 2025-05-02 RX ADMIN — SODIUM CHLORIDE: 0.9 INJECTION, SOLUTION INTRAVENOUS at 06:04

## 2025-05-02 ASSESSMENT — PAIN SCALES - GENERAL: PAINLEVEL_OUTOF10: 0

## 2025-05-02 NOTE — ED NOTES
Bedside and Verbal shift change report given to Nava (oncoming nurse) by Austin (offgoing nurse). Report included the following information Nurse Handoff Report and ED SBAR.

## 2025-05-02 NOTE — ED NOTES
23:18 Assumed care of pt. Bedside report received.  White board updated. Plan of care discussed. Call bell in reach. Will continue to monitor.

## 2025-05-02 NOTE — ED NOTES
0230 Assumed care of pt. Bedside report received.  White board updated. Plan of care discussed. Call bell in reach. Will continue to monitor.

## 2025-05-02 NOTE — ED NOTES
00:06  Pt to CT via stretcher.     00:16  Pt back from CT.      01:00 Pt resting in bed.  Call bell in reach. Will continue to monitor.     02:05  Awaiting hospitalist to evaluate for admission.

## 2025-05-02 NOTE — H&P
demonstrates a stable  cardiopericardial silhouette.There is no pleural effusion.There is no focal consolidation.There is no pneumothorax.     No acute intrathoracic process is identified. Electronically signed by EUFEMIA SHAFFER     _______________________________________________________________________    TOTAL TIME:  76 Minutes      Signed: DEISI Wagner NP  [facility] [place] [facility] Hospitalist/Nocturnist  5/2/2025 3:31 AM    Please do not hesitate to reach out to myself or assigned provider on-call via [employer] system with questions or concerns.     Procedures: see electronic medical records for all procedures/Xrays and details which were not copied into this note but were reviewed prior to creation of Plan.    I have utilized all available immediate resources to obtain, update, review, and reconcile the patient's current medications.    I attest the foregoing medication list in the medical record is true, accurate, and complete to the best of my knowledge. I have utilized all available resources available at the time of admit to complete medication reconciliation.

## 2025-05-03 ENCOUNTER — APPOINTMENT (OUTPATIENT)
Facility: HOSPITAL | Age: 70
DRG: 872 | End: 2025-05-03
Payer: MEDICARE

## 2025-05-03 LAB
ALBUMIN SERPL-MCNC: 2.9 G/DL (ref 3.5–5)
ALBUMIN/GLOB SERPL: 0.9 (ref 1.1–2.2)
ALP SERPL-CCNC: 83 U/L (ref 45–117)
ALT SERPL-CCNC: 105 U/L (ref 12–78)
ANION GAP SERPL CALC-SCNC: 8 MMOL/L (ref 2–12)
AST SERPL-CCNC: 131 U/L (ref 15–37)
BACTERIA SPEC CULT: ABNORMAL
BACTERIA SPEC CULT: ABNORMAL
BASOPHILS # BLD: 0.01 K/UL (ref 0–0.1)
BASOPHILS NFR BLD: 0.2 % (ref 0–1)
BILIRUB SERPL-MCNC: 0.6 MG/DL (ref 0.2–1)
BUN SERPL-MCNC: 18 MG/DL (ref 6–20)
BUN/CREAT SERPL: 22 (ref 12–20)
CALCIUM SERPL-MCNC: 8 MG/DL (ref 8.5–10.1)
CHLORIDE SERPL-SCNC: 101 MMOL/L (ref 97–108)
CO2 SERPL-SCNC: 20 MMOL/L (ref 21–32)
CREAT SERPL-MCNC: 0.83 MG/DL (ref 0.55–1.02)
DIFFERENTIAL METHOD BLD: ABNORMAL
EOSINOPHIL # BLD: 0.33 K/UL (ref 0–0.4)
EOSINOPHIL NFR BLD: 6.3 % (ref 0–7)
ERYTHROCYTE [DISTWIDTH] IN BLOOD BY AUTOMATED COUNT: 13.2 % (ref 11.5–14.5)
GLOBULIN SER CALC-MCNC: 3.3 G/DL (ref 2–4)
GLUCOSE SERPL-MCNC: 93 MG/DL (ref 65–100)
HCT VFR BLD AUTO: 33.8 % (ref 35–47)
HGB BLD-MCNC: 11.2 G/DL (ref 11.5–16)
IMM GRANULOCYTES # BLD AUTO: 0.04 K/UL (ref 0–0.04)
IMM GRANULOCYTES NFR BLD AUTO: 0.8 % (ref 0–0.5)
LYMPHOCYTES # BLD: 0.36 K/UL (ref 0.8–3.5)
LYMPHOCYTES NFR BLD: 6.9 % (ref 12–49)
MCH RBC QN AUTO: 31.5 PG (ref 26–34)
MCHC RBC AUTO-ENTMCNC: 33.1 G/DL (ref 30–36.5)
MCV RBC AUTO: 94.9 FL (ref 80–99)
MONOCYTES # BLD: 0.43 K/UL (ref 0–1)
MONOCYTES NFR BLD: 8.3 % (ref 5–13)
NEUTS SEG # BLD: 4.04 K/UL (ref 1.8–8)
NEUTS SEG NFR BLD: 77.5 % (ref 32–75)
NRBC # BLD: 0 K/UL (ref 0–0.01)
NRBC BLD-RTO: 0 PER 100 WBC
PLATELET # BLD AUTO: 100 K/UL (ref 150–400)
PMV BLD AUTO: 10.9 FL (ref 8.9–12.9)
POTASSIUM SERPL-SCNC: 3.5 MMOL/L (ref 3.5–5.1)
PROT SERPL-MCNC: 6.2 G/DL (ref 6.4–8.2)
RBC # BLD AUTO: 3.56 M/UL (ref 3.8–5.2)
SERVICE CMNT-IMP: ABNORMAL
SODIUM SERPL-SCNC: 129 MMOL/L (ref 136–145)
WBC # BLD AUTO: 5.2 K/UL (ref 3.6–11)

## 2025-05-03 PROCEDURE — 6360000002 HC RX W HCPCS

## 2025-05-03 PROCEDURE — 36415 COLL VENOUS BLD VENIPUNCTURE: CPT

## 2025-05-03 PROCEDURE — 6370000000 HC RX 637 (ALT 250 FOR IP)

## 2025-05-03 PROCEDURE — 6360000004 HC RX CONTRAST MEDICATION: Performed by: RADIOLOGY

## 2025-05-03 PROCEDURE — 2580000003 HC RX 258: Performed by: STUDENT IN AN ORGANIZED HEALTH CARE EDUCATION/TRAINING PROGRAM

## 2025-05-03 PROCEDURE — 71275 CT ANGIOGRAPHY CHEST: CPT

## 2025-05-03 PROCEDURE — 80053 COMPREHEN METABOLIC PANEL: CPT

## 2025-05-03 PROCEDURE — 6360000002 HC RX W HCPCS: Performed by: STUDENT IN AN ORGANIZED HEALTH CARE EDUCATION/TRAINING PROGRAM

## 2025-05-03 PROCEDURE — 2500000003 HC RX 250 WO HCPCS

## 2025-05-03 PROCEDURE — 87040 BLOOD CULTURE FOR BACTERIA: CPT

## 2025-05-03 PROCEDURE — 85025 COMPLETE CBC W/AUTO DIFF WBC: CPT

## 2025-05-03 PROCEDURE — 1100000003 HC PRIVATE W/ TELEMETRY

## 2025-05-03 RX ORDER — SALIVA STIMULANT COMB. NO.3
SPRAY, NON-AEROSOL (ML) MUCOUS MEMBRANE PRN
Status: DISCONTINUED | OUTPATIENT
Start: 2025-05-03 | End: 2025-05-09 | Stop reason: HOSPADM

## 2025-05-03 RX ORDER — IOPAMIDOL 755 MG/ML
100 INJECTION, SOLUTION INTRAVASCULAR
Status: COMPLETED | OUTPATIENT
Start: 2025-05-03 | End: 2025-05-03

## 2025-05-03 RX ADMIN — CEFEPIME 2000 MG: 2 INJECTION, POWDER, FOR SOLUTION INTRAVENOUS at 21:57

## 2025-05-03 RX ADMIN — Medication: at 21:51

## 2025-05-03 RX ADMIN — IOPAMIDOL 100 ML: 755 INJECTION, SOLUTION INTRAVENOUS at 12:01

## 2025-05-03 RX ADMIN — ENOXAPARIN SODIUM 40 MG: 100 INJECTION SUBCUTANEOUS at 09:25

## 2025-05-03 RX ADMIN — CEFEPIME 2000 MG: 2 INJECTION, POWDER, FOR SOLUTION INTRAVENOUS at 14:37

## 2025-05-03 RX ADMIN — ACETAMINOPHEN 650 MG: 325 TABLET ORAL at 21:52

## 2025-05-03 RX ADMIN — SODIUM CHLORIDE 1250 MG: 0.9 INJECTION, SOLUTION INTRAVENOUS at 17:39

## 2025-05-03 RX ADMIN — SODIUM CHLORIDE, PRESERVATIVE FREE 10 ML: 5 INJECTION INTRAVENOUS at 09:26

## 2025-05-03 RX ADMIN — GABAPENTIN 300 MG: 300 CAPSULE ORAL at 21:52

## 2025-05-03 RX ADMIN — CEFEPIME 2000 MG: 2 INJECTION, POWDER, FOR SOLUTION INTRAVENOUS at 06:04

## 2025-05-03 ASSESSMENT — PAIN SCALES - GENERAL
PAINLEVEL_OUTOF10: 7
PAINLEVEL_OUTOF10: 0

## 2025-05-03 ASSESSMENT — PAIN DESCRIPTION - LOCATION: LOCATION: BACK

## 2025-05-03 ASSESSMENT — PAIN DESCRIPTION - DESCRIPTORS: DESCRIPTORS: ACHING;SHARP;THROBBING

## 2025-05-03 NOTE — CONSULTS
Consult received for Chr Hyponatremia     Labs reviewed Na 129 , normal creatinine , TSH  Medication list reviewed    Will order serum, urine osmolality, BNP, uric acid, cortisol     Full consult to follow

## 2025-05-04 ENCOUNTER — APPOINTMENT (OUTPATIENT)
Facility: HOSPITAL | Age: 70
DRG: 872 | End: 2025-05-04
Payer: MEDICARE

## 2025-05-04 LAB
ALBUMIN SERPL-MCNC: 3 G/DL (ref 3.5–5)
ALBUMIN/GLOB SERPL: 0.9 (ref 1.1–2.2)
ALP SERPL-CCNC: 108 U/L (ref 45–117)
ALT SERPL-CCNC: 102 U/L (ref 12–78)
ANION GAP SERPL CALC-SCNC: 5 MMOL/L (ref 2–12)
AST SERPL-CCNC: 88 U/L (ref 15–37)
BASOPHILS # BLD: 0.01 K/UL (ref 0–0.1)
BASOPHILS NFR BLD: 0.2 % (ref 0–1)
BILIRUB SERPL-MCNC: 0.6 MG/DL (ref 0.2–1)
BUN SERPL-MCNC: 11 MG/DL (ref 6–20)
BUN/CREAT SERPL: 16 (ref 12–20)
CALCIUM SERPL-MCNC: 8.7 MG/DL (ref 8.5–10.1)
CHLORIDE SERPL-SCNC: 104 MMOL/L (ref 97–108)
CO2 SERPL-SCNC: 25 MMOL/L (ref 21–32)
CORTIS AM PEAK SERPL-MCNC: 15.9 UG/DL (ref 4.3–22.45)
CREAT SERPL-MCNC: 0.7 MG/DL (ref 0.55–1.02)
CREAT UR-MCNC: 38.3 MG/DL
DIFFERENTIAL METHOD BLD: ABNORMAL
EOSINOPHIL # BLD: 0.35 K/UL (ref 0–0.4)
EOSINOPHIL NFR BLD: 7.7 % (ref 0–7)
ERYTHROCYTE [DISTWIDTH] IN BLOOD BY AUTOMATED COUNT: 13.6 % (ref 11.5–14.5)
GLOBULIN SER CALC-MCNC: 3.4 G/DL (ref 2–4)
GLUCOSE SERPL-MCNC: 83 MG/DL (ref 65–100)
HCT VFR BLD AUTO: 36.7 % (ref 35–47)
HGB BLD-MCNC: 11.9 G/DL (ref 11.5–16)
IMM GRANULOCYTES # BLD AUTO: 0.03 K/UL (ref 0–0.04)
IMM GRANULOCYTES NFR BLD AUTO: 0.7 % (ref 0–0.5)
LYMPHOCYTES # BLD: 0.48 K/UL (ref 0.8–3.5)
LYMPHOCYTES NFR BLD: 10.5 % (ref 12–49)
MCH RBC QN AUTO: 30.8 PG (ref 26–34)
MCHC RBC AUTO-ENTMCNC: 32.4 G/DL (ref 30–36.5)
MCV RBC AUTO: 95.1 FL (ref 80–99)
MONOCYTES # BLD: 0.44 K/UL (ref 0–1)
MONOCYTES NFR BLD: 9.7 % (ref 5–13)
NEUTS SEG # BLD: 3.24 K/UL (ref 1.8–8)
NEUTS SEG NFR BLD: 71.2 % (ref 32–75)
NRBC # BLD: 0 K/UL (ref 0–0.01)
NRBC BLD-RTO: 0 PER 100 WBC
NT PRO BNP: 845 PG/ML
OSMOLALITY SERPL: 282 MOSM/KG H2O
OSMOLALITY UR: 262 MOSM/KG H2O
PLATELET # BLD AUTO: 105 K/UL (ref 150–400)
PMV BLD AUTO: 11 FL (ref 8.9–12.9)
POTASSIUM SERPL-SCNC: 3.7 MMOL/L (ref 3.5–5.1)
PROT SERPL-MCNC: 6.4 G/DL (ref 6.4–8.2)
PROT UR-MCNC: 29 MG/DL (ref 0–11.9)
PROT/CREAT UR-RTO: 0.8
RBC # BLD AUTO: 3.86 M/UL (ref 3.8–5.2)
SODIUM SERPL-SCNC: 134 MMOL/L (ref 136–145)
SODIUM UR-SCNC: 45 MMOL/L
URATE SERPL-MCNC: 3.9 MG/DL (ref 2.6–6)
VANCOMYCIN SERPL-MCNC: 14.6 UG/ML
WBC # BLD AUTO: 4.6 K/UL (ref 3.6–11)

## 2025-05-04 PROCEDURE — 6360000002 HC RX W HCPCS: Performed by: STUDENT IN AN ORGANIZED HEALTH CARE EDUCATION/TRAINING PROGRAM

## 2025-05-04 PROCEDURE — 36415 COLL VENOUS BLD VENIPUNCTURE: CPT

## 2025-05-04 PROCEDURE — 6360000002 HC RX W HCPCS

## 2025-05-04 PROCEDURE — 84550 ASSAY OF BLOOD/URIC ACID: CPT

## 2025-05-04 PROCEDURE — 80053 COMPREHEN METABOLIC PANEL: CPT

## 2025-05-04 PROCEDURE — 83930 ASSAY OF BLOOD OSMOLALITY: CPT

## 2025-05-04 PROCEDURE — 2500000003 HC RX 250 WO HCPCS

## 2025-05-04 PROCEDURE — 6370000000 HC RX 637 (ALT 250 FOR IP)

## 2025-05-04 PROCEDURE — 1100000003 HC PRIVATE W/ TELEMETRY

## 2025-05-04 PROCEDURE — 80202 ASSAY OF VANCOMYCIN: CPT

## 2025-05-04 PROCEDURE — 84300 ASSAY OF URINE SODIUM: CPT

## 2025-05-04 PROCEDURE — 2580000003 HC RX 258: Performed by: STUDENT IN AN ORGANIZED HEALTH CARE EDUCATION/TRAINING PROGRAM

## 2025-05-04 PROCEDURE — 83935 ASSAY OF URINE OSMOLALITY: CPT

## 2025-05-04 PROCEDURE — 84156 ASSAY OF PROTEIN URINE: CPT

## 2025-05-04 PROCEDURE — 83880 ASSAY OF NATRIURETIC PEPTIDE: CPT

## 2025-05-04 PROCEDURE — 85025 COMPLETE CBC W/AUTO DIFF WBC: CPT

## 2025-05-04 PROCEDURE — 82533 TOTAL CORTISOL: CPT

## 2025-05-04 PROCEDURE — 82570 ASSAY OF URINE CREATININE: CPT

## 2025-05-04 RX ADMIN — CEFEPIME 2000 MG: 2 INJECTION, POWDER, FOR SOLUTION INTRAVENOUS at 14:54

## 2025-05-04 RX ADMIN — ENOXAPARIN SODIUM 40 MG: 100 INJECTION SUBCUTANEOUS at 09:53

## 2025-05-04 RX ADMIN — MELATONIN 3 MG: at 22:09

## 2025-05-04 RX ADMIN — ACETAMINOPHEN 650 MG: 325 TABLET ORAL at 22:10

## 2025-05-04 RX ADMIN — SODIUM CHLORIDE, PRESERVATIVE FREE 10 ML: 5 INJECTION INTRAVENOUS at 22:10

## 2025-05-04 RX ADMIN — SODIUM CHLORIDE 1250 MG: 0.9 INJECTION, SOLUTION INTRAVENOUS at 11:01

## 2025-05-04 RX ADMIN — CEFEPIME 2000 MG: 2 INJECTION, POWDER, FOR SOLUTION INTRAVENOUS at 21:45

## 2025-05-04 RX ADMIN — SODIUM CHLORIDE, PRESERVATIVE FREE 10 ML: 5 INJECTION INTRAVENOUS at 09:53

## 2025-05-04 RX ADMIN — CEFEPIME 2000 MG: 2 INJECTION, POWDER, FOR SOLUTION INTRAVENOUS at 06:41

## 2025-05-04 RX ADMIN — GABAPENTIN 300 MG: 300 CAPSULE ORAL at 22:09

## 2025-05-04 ASSESSMENT — PAIN DESCRIPTION - LOCATION: LOCATION: BACK

## 2025-05-04 ASSESSMENT — PAIN SCALES - GENERAL
PAINLEVEL_OUTOF10: 0
PAINLEVEL_OUTOF10: 5

## 2025-05-04 ASSESSMENT — PAIN DESCRIPTION - ORIENTATION: ORIENTATION: LEFT

## 2025-05-04 ASSESSMENT — PAIN DESCRIPTION - DESCRIPTORS: DESCRIPTORS: ACHING

## 2025-05-04 NOTE — CONSULTS
Nephrology Consult Note                                                                                Patient: Anisha Packer MRN: [id number]     YOB: 1955  Age: 69 y.o.  Sex: female      Consult requested by: Poonam Morales MD    Reason for Consultation --at anemia    Subjective:      Anisha Packer is a 69 y.o. female with a past medical history of hypertension, generalized arthritis came to the ER as she was having fever, nausea and generalized weakness, reduced appetite.  She also reported a fall at home before arrival.  She had a low-grade temperature of 100.6 on arrival, was tachycardic, mildly hypotensive.  She was admitted for SIRS and fever of unknown origin.      Her serum sodium was 131 on admission and it trended down to 128, nephrology consultation was requested for further evaluation    This morning when she was seen at bedside, she reported feeling better.  Did not have any persistent nausea.     Chart review shows she has a history of COPD but patient stated that she is not aware of having any major kidney issues    Past Medical History:  Past Medical History:   Diagnosis Date    Allergic rhinitis 8/9/2017    Anemia 8/9/2017    Anxiety 8/9/2017    Arrhythmia     irregular heart beat hx and beating fast per patient/no cardiologist    Arthritis     JOINTS  WORSE WAIST DOWN     Back pain 8/9/2017    Chronic kidney disease     Stage III    CKD (chronic kidney disease) 8/9/2017    COPD (chronic obstructive pulmonary disease) ([facility]) 8/9/2017    Crohn disease (HCC) 8/9/2017    DJD (degenerative joint disease) 8/9/2017    Dry mouth     evaluated by Dr. Yepez (ENT)    GERD (gastroesophageal reflux disease) 8/9/2017    Hormone replacement therapy (HRT) 8/9/2017    Hyperlipidemia 8/9/2017    Hypertension     Hypertension with renal disease 8/9/2017    Hypokalemia 8/9/2017    Hypothyroid 8/9/2017    Ill-defined condition     neuropathy feet    Inguinal hernia 8/9/2017    Leg

## 2025-05-05 LAB
ALBUMIN SERPL-MCNC: 2.8 G/DL (ref 3.5–5)
ALBUMIN/GLOB SERPL: 0.8 (ref 1.1–2.2)
ALP SERPL-CCNC: 110 U/L (ref 45–117)
ALT SERPL-CCNC: 88 U/L (ref 12–78)
ANION GAP SERPL CALC-SCNC: 7 MMOL/L (ref 2–12)
AST SERPL-CCNC: 62 U/L (ref 15–37)
BASOPHILS # BLD: 0.01 K/UL (ref 0–0.1)
BASOPHILS NFR BLD: 0.3 % (ref 0–1)
BILIRUB SERPL-MCNC: 0.5 MG/DL (ref 0.2–1)
BUN SERPL-MCNC: 12 MG/DL (ref 6–20)
BUN/CREAT SERPL: 17 (ref 12–20)
CALCIUM SERPL-MCNC: 8.5 MG/DL (ref 8.5–10.1)
CHLORIDE SERPL-SCNC: 107 MMOL/L (ref 97–108)
CO2 SERPL-SCNC: 23 MMOL/L (ref 21–32)
CREAT SERPL-MCNC: 0.71 MG/DL (ref 0.55–1.02)
DIFFERENTIAL METHOD BLD: ABNORMAL
EOSINOPHIL # BLD: 0.32 K/UL (ref 0–0.4)
EOSINOPHIL NFR BLD: 9 % (ref 0–7)
ERYTHROCYTE [DISTWIDTH] IN BLOOD BY AUTOMATED COUNT: 13.5 % (ref 11.5–14.5)
GLOBULIN SER CALC-MCNC: 3.5 G/DL (ref 2–4)
GLUCOSE SERPL-MCNC: 106 MG/DL (ref 65–100)
HCT VFR BLD AUTO: 34.7 % (ref 35–47)
HGB BLD-MCNC: 11.3 G/DL (ref 11.5–16)
IMM GRANULOCYTES # BLD AUTO: 0.02 K/UL (ref 0–0.04)
IMM GRANULOCYTES NFR BLD AUTO: 0.6 % (ref 0–0.5)
LYMPHOCYTES # BLD: 0.73 K/UL (ref 0.8–3.5)
LYMPHOCYTES NFR BLD: 20.4 % (ref 12–49)
MCH RBC QN AUTO: 31 PG (ref 26–34)
MCHC RBC AUTO-ENTMCNC: 32.6 G/DL (ref 30–36.5)
MCV RBC AUTO: 95.3 FL (ref 80–99)
MONOCYTES # BLD: 0.49 K/UL (ref 0–1)
MONOCYTES NFR BLD: 13.7 % (ref 5–13)
NEUTS SEG # BLD: 2 K/UL (ref 1.8–8)
NEUTS SEG NFR BLD: 56 % (ref 32–75)
NRBC # BLD: 0 K/UL (ref 0–0.01)
NRBC BLD-RTO: 0 PER 100 WBC
PLATELET # BLD AUTO: 114 K/UL (ref 150–400)
PMV BLD AUTO: 10.9 FL (ref 8.9–12.9)
POTASSIUM SERPL-SCNC: 3.7 MMOL/L (ref 3.5–5.1)
PROT SERPL-MCNC: 6.3 G/DL (ref 6.4–8.2)
RBC # BLD AUTO: 3.64 M/UL (ref 3.8–5.2)
SODIUM SERPL-SCNC: 137 MMOL/L (ref 136–145)
WBC # BLD AUTO: 3.6 K/UL (ref 3.6–11)

## 2025-05-05 PROCEDURE — 2580000003 HC RX 258: Performed by: STUDENT IN AN ORGANIZED HEALTH CARE EDUCATION/TRAINING PROGRAM

## 2025-05-05 PROCEDURE — 6360000002 HC RX W HCPCS: Performed by: STUDENT IN AN ORGANIZED HEALTH CARE EDUCATION/TRAINING PROGRAM

## 2025-05-05 PROCEDURE — 6370000000 HC RX 637 (ALT 250 FOR IP)

## 2025-05-05 PROCEDURE — 6360000002 HC RX W HCPCS

## 2025-05-05 PROCEDURE — 85025 COMPLETE CBC W/AUTO DIFF WBC: CPT

## 2025-05-05 PROCEDURE — 80053 COMPREHEN METABOLIC PANEL: CPT

## 2025-05-05 PROCEDURE — 36415 COLL VENOUS BLD VENIPUNCTURE: CPT

## 2025-05-05 PROCEDURE — 97116 GAIT TRAINING THERAPY: CPT

## 2025-05-05 PROCEDURE — 97110 THERAPEUTIC EXERCISES: CPT

## 2025-05-05 PROCEDURE — 1100000003 HC PRIVATE W/ TELEMETRY

## 2025-05-05 PROCEDURE — 2500000003 HC RX 250 WO HCPCS

## 2025-05-05 RX ORDER — ASCORBIC ACID 500 MG
500 TABLET ORAL 2 TIMES DAILY
Status: CANCELLED | OUTPATIENT
Start: 2025-05-05

## 2025-05-05 RX ORDER — LIDOCAINE 4 G/G
1 PATCH TOPICAL
Status: DISCONTINUED | OUTPATIENT
Start: 2025-05-05 | End: 2025-05-09 | Stop reason: HOSPADM

## 2025-05-05 RX ORDER — KETOROLAC TROMETHAMINE 30 MG/ML
15 INJECTION, SOLUTION INTRAMUSCULAR; INTRAVENOUS ONCE
Status: DISCONTINUED | OUTPATIENT
Start: 2025-05-06 | End: 2025-05-09 | Stop reason: HOSPADM

## 2025-05-05 RX ORDER — LOSARTAN POTASSIUM 25 MG/1
12.5 TABLET ORAL DAILY
Status: CANCELLED | OUTPATIENT
Start: 2025-05-05

## 2025-05-05 RX ORDER — ATORVASTATIN CALCIUM 20 MG/1
20 TABLET, FILM COATED ORAL NIGHTLY
Status: CANCELLED | OUTPATIENT
Start: 2025-05-05

## 2025-05-05 RX ORDER — AMLODIPINE BESYLATE 5 MG/1
2.5 TABLET ORAL DAILY
Status: CANCELLED | OUTPATIENT
Start: 2025-05-05

## 2025-05-05 RX ORDER — AMPICILLIN TRIHYDRATE 250 MG
100 CAPSULE ORAL DAILY
Status: CANCELLED | OUTPATIENT
Start: 2025-05-05

## 2025-05-05 RX ORDER — ESTRADIOL 0.05 MG/D
1 PATCH, EXTENDED RELEASE TRANSDERMAL
Status: CANCELLED | OUTPATIENT
Start: 2025-05-05

## 2025-05-05 RX ADMIN — ACETAMINOPHEN 650 MG: 325 TABLET ORAL at 21:49

## 2025-05-05 RX ADMIN — CEFEPIME 2000 MG: 2 INJECTION, POWDER, FOR SOLUTION INTRAVENOUS at 21:52

## 2025-05-05 RX ADMIN — METOPROLOL SUCCINATE 25 MG: 25 TABLET, EXTENDED RELEASE ORAL at 08:07

## 2025-05-05 RX ADMIN — SODIUM CHLORIDE, PRESERVATIVE FREE 10 ML: 5 INJECTION INTRAVENOUS at 21:49

## 2025-05-05 RX ADMIN — CEFEPIME 2000 MG: 2 INJECTION, POWDER, FOR SOLUTION INTRAVENOUS at 06:41

## 2025-05-05 RX ADMIN — SODIUM CHLORIDE 1250 MG: 0.9 INJECTION, SOLUTION INTRAVENOUS at 05:01

## 2025-05-05 RX ADMIN — CEFEPIME 2000 MG: 2 INJECTION, POWDER, FOR SOLUTION INTRAVENOUS at 15:03

## 2025-05-05 RX ADMIN — POLYETHYLENE GLYCOL 3350 17 G: 17 POWDER, FOR SOLUTION ORAL at 08:18

## 2025-05-05 RX ADMIN — SODIUM CHLORIDE 1250 MG: 0.9 INJECTION, SOLUTION INTRAVENOUS at 23:02

## 2025-05-05 RX ADMIN — ENOXAPARIN SODIUM 40 MG: 100 INJECTION SUBCUTANEOUS at 08:08

## 2025-05-05 RX ADMIN — GABAPENTIN 300 MG: 300 CAPSULE ORAL at 21:49

## 2025-05-05 RX ADMIN — ACETAMINOPHEN 650 MG: 325 TABLET ORAL at 08:10

## 2025-05-05 RX ADMIN — MELATONIN 3 MG: at 21:49

## 2025-05-05 RX ADMIN — SODIUM CHLORIDE, PRESERVATIVE FREE 10 ML: 5 INJECTION INTRAVENOUS at 08:08

## 2025-05-05 ASSESSMENT — PAIN SCALES - GENERAL
PAINLEVEL_OUTOF10: 10
PAINLEVEL_OUTOF10: 10

## 2025-05-05 ASSESSMENT — PAIN DESCRIPTION - ORIENTATION
ORIENTATION: LEFT
ORIENTATION: LEFT

## 2025-05-05 ASSESSMENT — PAIN DESCRIPTION - LOCATION
LOCATION: FLANK
LOCATION: FLANK

## 2025-05-05 ASSESSMENT — PAIN DESCRIPTION - DESCRIPTORS: DESCRIPTORS: ACHING;DISCOMFORT

## 2025-05-05 NOTE — WOUND CARE
Wound care consult for this simple abrasion to the right elbow that occurred a few days prior to admission when she slipped out of her bed and hit it on the steps. Pt. Is 69 years old and she is admitted due to sepsis of unknown origin.   Pt. Is usually ambulatory at home.   Past Medical History:   Diagnosis Date    Allergic rhinitis 8/9/2017    Anemia 8/9/2017    Anxiety 8/9/2017    Arrhythmia     irregular heart beat hx and beating fast per patient/no cardiologist    Arthritis     JOINTS  WORSE WAIST DOWN     Back pain 8/9/2017    Chronic kidney disease     Stage III    CKD (chronic kidney disease) 8/9/2017    COPD (chronic obstructive pulmonary disease) ([facility]) 8/9/2017    Crohn disease (HCC) 8/9/2017    DJD (degenerative joint disease) 8/9/2017    Dry mouth     evaluated by Dr. Yepez (ENT)    GERD (gastroesophageal reflux disease) 8/9/2017    Hormone replacement therapy (HRT) 8/9/2017    Hyperlipidemia 8/9/2017    Hypertension     Hypertension with renal disease 8/9/2017    Hypokalemia 8/9/2017    Hypothyroid 8/9/2017    Ill-defined condition     neuropathy feet    Inguinal hernia 8/9/2017    Leg numbness 8/9/2017    Lumbar herniated disc 8/9/2017    OAB (overactive bladder) 8/9/2017    Obesity 8/9/2017    On statin therapy 8/9/2017    Osteoarthritis 8/9/2017    Pelvic pain in female 8/9/2017    Umbilical hernia 8/9/2017    Varicose vein of leg 8/9/2017     Past Surgical History:   Procedure Laterality Date    BREAST SURGERY      mass removed left breast x2 benign    COLONOSCOPY N/A 4/13/2023    COLONOSCOPY performed by Daniel De La Rosa MD at [facility] ENDOSCOPY    COLONOSCOPY  12/2010    normal    COLONOSCOPY  12/30/2014    normal     HEENT      cyst removed left eye    HERNIA REPAIR  06/02/2017    right inguinal and umbilical (Dr. Angelo)    HYSTERECTOMY (CERVIX STATUS UNKNOWN)      NEUROLOGICAL SURGERY      Lumbar surgery    ORTHOPEDIC SURGERY      bilat knee injections    ORTHOPEDIC SURGERY      laser

## 2025-05-05 NOTE — CARE COORDINATION
Care Management Initial Assessment       RUR: 11%  Readmission? No  1st IM letter given? Yes - 5/2/25  1st  letter given: No      CM introduce self, explain role and confirmed demographics with pt.    Pt lives with her spouse in a two story home with two steps to enter her game room and 2nd floor bedroom.    No hx of home health, SNF or inpatient rehab.    Pt uses [facility] on [address]    At the time of d/c pt's family will transport.    CM will follow and assist with d/c planning.    CM is aware of the recommendation is for High intensity/comprehensive skilled physical therapy in a multidisciplinary setting as patient is working towards tolerating up to 3 hours of therapy/day 5-7x/week.    CM has reach out to MD regarding if they are agreeable with the recommendation.       05/05/25 1152   Service Assessment   Patient Orientation Alert and Oriented   Cognition Alert   History Provided By Patient;Medical Record   Primary Caregiver Self   Support Systems Spouse/Significant Other;Family Members   Patient's Healthcare Decision Maker is: Legal Next of Kin  (Pt's spouse-Chase Packer)   PCP Verified by CM Yes   Last Visit to PCP Within last 3 months   Prior Functional Level Independent in ADLs/IADLs   Current Functional Level Assistance with the following:   Can patient return to prior living arrangement No   Family able to assist with home care needs: Yes   Would you like for me to discuss the discharge plan with any other family members/significant others, and if so, who? Yes  (pt's spouse-Chase Packer)   Financial Resources Medicare   Community Resources None   Social/Functional History   Lives With Spouse   Type of Home House   Home Equipment Cane;Walker - Rolling;Grab bars  (Shower bench)   Active  Yes   Discharge Planning   Type of Residence Skilled Nursing Facility;House   Current Services Prior To Admission None   Patient expects to be discharged to: Other (comment)  (Home with spouse/HH Vs.

## 2025-05-06 LAB
ALBUMIN SERPL-MCNC: 2.8 G/DL (ref 3.5–5)
ALBUMIN/GLOB SERPL: 0.9 (ref 1.1–2.2)
ALP SERPL-CCNC: 114 U/L (ref 45–117)
ALT SERPL-CCNC: 107 U/L (ref 12–78)
ANION GAP SERPL CALC-SCNC: 3 MMOL/L (ref 2–12)
AST SERPL-CCNC: 79 U/L (ref 15–37)
BASOPHILS # BLD: 0 K/UL (ref 0–0.1)
BASOPHILS NFR BLD: 0 % (ref 0–1)
BILIRUB SERPL-MCNC: 0.6 MG/DL (ref 0.2–1)
BUN SERPL-MCNC: 11 MG/DL (ref 6–20)
BUN/CREAT SERPL: 16 (ref 12–20)
CALCIUM SERPL-MCNC: 8.8 MG/DL (ref 8.5–10.1)
CHLORIDE SERPL-SCNC: 111 MMOL/L (ref 97–108)
CO2 SERPL-SCNC: 26 MMOL/L (ref 21–32)
CREAT SERPL-MCNC: 0.7 MG/DL (ref 0.55–1.02)
CRP SERPL-MCNC: 1.12 MG/DL (ref 0–0.3)
DIFFERENTIAL METHOD BLD: ABNORMAL
EOSINOPHIL # BLD: 0.24 K/UL (ref 0–0.4)
EOSINOPHIL NFR BLD: 8 % (ref 0–7)
ERYTHROCYTE [DISTWIDTH] IN BLOOD BY AUTOMATED COUNT: 13.5 % (ref 11.5–14.5)
ERYTHROCYTE [SEDIMENTATION RATE] IN BLOOD: 37 MM/HR (ref 0–30)
GLOBULIN SER CALC-MCNC: 3.2 G/DL (ref 2–4)
GLUCOSE SERPL-MCNC: 101 MG/DL (ref 65–100)
HCT VFR BLD AUTO: 34.6 % (ref 35–47)
HGB BLD-MCNC: 11.1 G/DL (ref 11.5–16)
IMM GRANULOCYTES # BLD AUTO: 0 K/UL (ref 0–0.04)
IMM GRANULOCYTES NFR BLD AUTO: 0 % (ref 0–0.5)
LYMPHOCYTES # BLD: 0.48 K/UL (ref 0.8–3.5)
LYMPHOCYTES NFR BLD: 16 % (ref 12–49)
MCH RBC QN AUTO: 30.7 PG (ref 26–34)
MCHC RBC AUTO-ENTMCNC: 32.1 G/DL (ref 30–36.5)
MCV RBC AUTO: 95.8 FL (ref 80–99)
MONOCYTES # BLD: 0.21 K/UL (ref 0–1)
MONOCYTES NFR BLD: 7 % (ref 5–13)
NEUTS SEG # BLD: 2.07 K/UL (ref 1.8–8)
NEUTS SEG NFR BLD: 69 % (ref 32–75)
NRBC # BLD: 0 K/UL (ref 0–0.01)
NRBC BLD-RTO: 0 PER 100 WBC
NT PRO BNP: 254 PG/ML
PLATELET # BLD AUTO: 130 K/UL (ref 150–400)
PMV BLD AUTO: 10.7 FL (ref 8.9–12.9)
POTASSIUM SERPL-SCNC: 3.8 MMOL/L (ref 3.5–5.1)
PROT SERPL-MCNC: 6 G/DL (ref 6.4–8.2)
RBC # BLD AUTO: 3.61 M/UL (ref 3.8–5.2)
RBC MORPH BLD: ABNORMAL
SODIUM SERPL-SCNC: 140 MMOL/L (ref 136–145)
WBC # BLD AUTO: 3 K/UL (ref 3.6–11)

## 2025-05-06 PROCEDURE — 97535 SELF CARE MNGMENT TRAINING: CPT | Performed by: OCCUPATIONAL THERAPIST

## 2025-05-06 PROCEDURE — 6370000000 HC RX 637 (ALT 250 FOR IP)

## 2025-05-06 PROCEDURE — 1100000003 HC PRIVATE W/ TELEMETRY

## 2025-05-06 PROCEDURE — 80053 COMPREHEN METABOLIC PANEL: CPT

## 2025-05-06 PROCEDURE — 85025 COMPLETE CBC W/AUTO DIFF WBC: CPT

## 2025-05-06 PROCEDURE — 86140 C-REACTIVE PROTEIN: CPT

## 2025-05-06 PROCEDURE — 6360000002 HC RX W HCPCS: Performed by: STUDENT IN AN ORGANIZED HEALTH CARE EDUCATION/TRAINING PROGRAM

## 2025-05-06 PROCEDURE — 6360000002 HC RX W HCPCS

## 2025-05-06 PROCEDURE — 36415 COLL VENOUS BLD VENIPUNCTURE: CPT

## 2025-05-06 PROCEDURE — 6370000000 HC RX 637 (ALT 250 FOR IP): Performed by: NURSE PRACTITIONER

## 2025-05-06 PROCEDURE — 85652 RBC SED RATE AUTOMATED: CPT

## 2025-05-06 PROCEDURE — 83880 ASSAY OF NATRIURETIC PEPTIDE: CPT

## 2025-05-06 PROCEDURE — 2580000003 HC RX 258: Performed by: STUDENT IN AN ORGANIZED HEALTH CARE EDUCATION/TRAINING PROGRAM

## 2025-05-06 RX ADMIN — CEFEPIME 2000 MG: 2 INJECTION, POWDER, FOR SOLUTION INTRAVENOUS at 23:35

## 2025-05-06 RX ADMIN — GABAPENTIN 300 MG: 300 CAPSULE ORAL at 20:55

## 2025-05-06 RX ADMIN — ENOXAPARIN SODIUM 40 MG: 100 INJECTION SUBCUTANEOUS at 09:34

## 2025-05-06 RX ADMIN — SODIUM CHLORIDE 1250 MG: 0.9 INJECTION, SOLUTION INTRAVENOUS at 16:35

## 2025-05-06 RX ADMIN — MELATONIN 3 MG: at 20:55

## 2025-05-06 RX ADMIN — METOPROLOL SUCCINATE 25 MG: 25 TABLET, EXTENDED RELEASE ORAL at 09:35

## 2025-05-06 RX ADMIN — CEFEPIME 2000 MG: 2 INJECTION, POWDER, FOR SOLUTION INTRAVENOUS at 16:30

## 2025-05-06 RX ADMIN — ACETAMINOPHEN 650 MG: 325 TABLET ORAL at 16:08

## 2025-05-06 RX ADMIN — ACETAMINOPHEN 650 MG: 325 TABLET ORAL at 23:36

## 2025-05-06 ASSESSMENT — PAIN DESCRIPTION - LOCATION
LOCATION: FLANK
LOCATION: FLANK

## 2025-05-06 ASSESSMENT — PAIN SCALES - GENERAL
PAINLEVEL_OUTOF10: 8
PAINLEVEL_OUTOF10: 0
PAINLEVEL_OUTOF10: 3

## 2025-05-06 ASSESSMENT — PAIN DESCRIPTION - ORIENTATION
ORIENTATION: LEFT
ORIENTATION: LEFT

## 2025-05-06 ASSESSMENT — PAIN DESCRIPTION - DESCRIPTORS: DESCRIPTORS: ACHING

## 2025-05-06 NOTE — CONSULTS
ID  Chart reviewed at length  CoNS multiple colony types 2/4 is a likley contaminant  Unless wound infection is deep and draining.  Repeat cultures are negative.  D/w wound care, wound photos also reviewed.  Elbow wound is very superficial.  D/w hospitalist.    May DC vancomycin.  Please call with questions, concerns.

## 2025-05-07 ENCOUNTER — APPOINTMENT (OUTPATIENT)
Facility: HOSPITAL | Age: 70
DRG: 872 | End: 2025-05-07
Payer: MEDICARE

## 2025-05-07 LAB
ALBUMIN SERPL-MCNC: 3.1 G/DL (ref 3.5–5)
ALBUMIN/GLOB SERPL: 0.8 (ref 1.1–2.2)
ALP SERPL-CCNC: 130 U/L (ref 45–117)
ALT SERPL-CCNC: 185 U/L (ref 12–78)
ANION GAP SERPL CALC-SCNC: 5 MMOL/L (ref 2–12)
AST SERPL-CCNC: 150 U/L (ref 15–37)
BACTERIA SPEC CULT: NORMAL
BASOPHILS # BLD: 0.01 K/UL (ref 0–0.1)
BASOPHILS NFR BLD: 0.3 % (ref 0–1)
BILIRUB SERPL-MCNC: 0.6 MG/DL (ref 0.2–1)
BUN SERPL-MCNC: 11 MG/DL (ref 6–20)
BUN/CREAT SERPL: 16 (ref 12–20)
CALCIUM SERPL-MCNC: 9.6 MG/DL (ref 8.5–10.1)
CHLORIDE SERPL-SCNC: 109 MMOL/L (ref 97–108)
CO2 SERPL-SCNC: 26 MMOL/L (ref 21–32)
CREAT SERPL-MCNC: 0.7 MG/DL (ref 0.55–1.02)
DIFFERENTIAL METHOD BLD: ABNORMAL
ECHO AO ASC DIAM: 3.8 CM
ECHO AO ASCENDING AORTA INDEX: 1.9 CM/M2
ECHO AO ROOT DIAM: 3.5 CM
ECHO AO ROOT INDEX: 1.75 CM/M2
ECHO BSA: 2.06 M2
ECHO LA DIAMETER INDEX: 1.85 CM/M2
ECHO LA DIAMETER: 3.7 CM
ECHO LA TO AORTIC ROOT RATIO: 1.06
ECHO LV E' LATERAL VELOCITY: 8.29 CM/S
ECHO LV E' SEPTAL VELOCITY: 3.74 CM/S
ECHO LV EF PHYSICIAN: 65 %
ECHO LV FRACTIONAL SHORTENING: 50 % (ref 28–44)
ECHO LV INTERNAL DIMENSION DIASTOLE INDEX: 2.2 CM/M2
ECHO LV INTERNAL DIMENSION DIASTOLIC: 4.4 CM (ref 3.9–5.3)
ECHO LV INTERNAL DIMENSION SYSTOLIC INDEX: 1.1 CM/M2
ECHO LV INTERNAL DIMENSION SYSTOLIC: 2.2 CM
ECHO LV IVSD: 0.9 CM (ref 0.6–0.9)
ECHO LV MASS 2D: 118.6 G (ref 67–162)
ECHO LV MASS INDEX 2D: 59.3 G/M2 (ref 43–95)
ECHO LV POSTERIOR WALL DIASTOLIC: 0.8 CM (ref 0.6–0.9)
ECHO LV RELATIVE WALL THICKNESS RATIO: 0.36
ECHO LVOT AREA: 3.1 CM2
ECHO LVOT DIAM: 2 CM
ECHO LVOT MEAN GRADIENT: 1 MMHG
ECHO LVOT PEAK GRADIENT: 2 MMHG
ECHO LVOT PEAK VELOCITY: 0.8 M/S
ECHO LVOT STROKE VOLUME INDEX: 29.7 ML/M2
ECHO LVOT SV: 59.3 ML
ECHO LVOT VTI: 18.9 CM
ECHO MV A VELOCITY: 0.73 M/S
ECHO MV AREA VTI: 2.8 CM2
ECHO MV E DECELERATION TIME (DT): 183.8 MS
ECHO MV E VELOCITY: 0.88 M/S
ECHO MV E/A RATIO: 1.21
ECHO MV E/E' LATERAL: 10.62
ECHO MV E/E' RATIO (AVERAGED): 17.07
ECHO MV E/E' SEPTAL: 23.53
ECHO MV LVOT VTI INDEX: 1.11
ECHO MV MAX VELOCITY: 0.8 M/S
ECHO MV MEAN GRADIENT: 1 MMHG
ECHO MV MEAN VELOCITY: 0.4 M/S
ECHO MV PEAK GRADIENT: 2 MMHG
ECHO MV VTI: 21 CM
ECHO PV MAX VELOCITY: 0.8 M/S
ECHO PV MEAN GRADIENT: 1 MMHG
ECHO PV MEAN VELOCITY: 0.5 M/S
ECHO PV PEAK GRADIENT: 2 MMHG
ECHO RV INTERNAL DIMENSION: 3.2 CM
ECHO RV TAPSE: 2.5 CM (ref 1.7–?)
ECHO TV REGURGITANT MAX VELOCITY: 2.41 M/S
ECHO TV REGURGITANT PEAK GRADIENT: 24 MMHG
EOSINOPHIL # BLD: 0.23 K/UL (ref 0–0.4)
EOSINOPHIL NFR BLD: 7.5 % (ref 0–7)
ERYTHROCYTE [DISTWIDTH] IN BLOOD BY AUTOMATED COUNT: 13.4 % (ref 11.5–14.5)
GLOBULIN SER CALC-MCNC: 3.7 G/DL (ref 2–4)
GLUCOSE SERPL-MCNC: 94 MG/DL (ref 65–100)
HCT VFR BLD AUTO: 36.7 % (ref 35–47)
HGB BLD-MCNC: 11.8 G/DL (ref 11.5–16)
IMM GRANULOCYTES # BLD AUTO: 0.03 K/UL (ref 0–0.04)
IMM GRANULOCYTES NFR BLD AUTO: 1 % (ref 0–0.5)
LYMPHOCYTES # BLD: 0.69 K/UL (ref 0.8–3.5)
LYMPHOCYTES NFR BLD: 22.5 % (ref 12–49)
MCH RBC QN AUTO: 31.1 PG (ref 26–34)
MCHC RBC AUTO-ENTMCNC: 32.2 G/DL (ref 30–36.5)
MCV RBC AUTO: 96.8 FL (ref 80–99)
MONOCYTES # BLD: 0.49 K/UL (ref 0–1)
MONOCYTES NFR BLD: 16 % (ref 5–13)
NEUTS SEG # BLD: 1.62 K/UL (ref 1.8–8)
NEUTS SEG NFR BLD: 52.7 % (ref 32–75)
NRBC # BLD: 0 K/UL (ref 0–0.01)
NRBC BLD-RTO: 0 PER 100 WBC
PLATELET # BLD AUTO: 163 K/UL (ref 150–400)
PMV BLD AUTO: 10.8 FL (ref 8.9–12.9)
POTASSIUM SERPL-SCNC: 3.8 MMOL/L (ref 3.5–5.1)
PROT SERPL-MCNC: 6.8 G/DL (ref 6.4–8.2)
RBC # BLD AUTO: 3.79 M/UL (ref 3.8–5.2)
SERVICE CMNT-IMP: NORMAL
SERVICE CMNT-IMP: NORMAL
SODIUM SERPL-SCNC: 140 MMOL/L (ref 136–145)
VANCOMYCIN SERPL-MCNC: 21.8 UG/ML
WBC # BLD AUTO: 3.1 K/UL (ref 3.6–11)

## 2025-05-07 PROCEDURE — 85025 COMPLETE CBC W/AUTO DIFF WBC: CPT

## 2025-05-07 PROCEDURE — 2500000003 HC RX 250 WO HCPCS

## 2025-05-07 PROCEDURE — 36415 COLL VENOUS BLD VENIPUNCTURE: CPT

## 2025-05-07 PROCEDURE — 93306 TTE W/DOPPLER COMPLETE: CPT

## 2025-05-07 PROCEDURE — 97110 THERAPEUTIC EXERCISES: CPT

## 2025-05-07 PROCEDURE — 6360000002 HC RX W HCPCS: Performed by: STUDENT IN AN ORGANIZED HEALTH CARE EDUCATION/TRAINING PROGRAM

## 2025-05-07 PROCEDURE — 6370000000 HC RX 637 (ALT 250 FOR IP): Performed by: NURSE PRACTITIONER

## 2025-05-07 PROCEDURE — 97116 GAIT TRAINING THERAPY: CPT

## 2025-05-07 PROCEDURE — 6370000000 HC RX 637 (ALT 250 FOR IP)

## 2025-05-07 PROCEDURE — 80202 ASSAY OF VANCOMYCIN: CPT

## 2025-05-07 PROCEDURE — 80053 COMPREHEN METABOLIC PANEL: CPT

## 2025-05-07 PROCEDURE — 2580000003 HC RX 258: Performed by: STUDENT IN AN ORGANIZED HEALTH CARE EDUCATION/TRAINING PROGRAM

## 2025-05-07 PROCEDURE — 1100000003 HC PRIVATE W/ TELEMETRY

## 2025-05-07 PROCEDURE — 6360000002 HC RX W HCPCS

## 2025-05-07 RX ADMIN — ENOXAPARIN SODIUM 40 MG: 100 INJECTION SUBCUTANEOUS at 09:47

## 2025-05-07 RX ADMIN — SODIUM CHLORIDE, PRESERVATIVE FREE 10 ML: 5 INJECTION INTRAVENOUS at 09:53

## 2025-05-07 RX ADMIN — ACETAMINOPHEN 650 MG: 325 TABLET ORAL at 21:56

## 2025-05-07 RX ADMIN — GABAPENTIN 300 MG: 300 CAPSULE ORAL at 21:57

## 2025-05-07 RX ADMIN — CEFEPIME 2000 MG: 2 INJECTION, POWDER, FOR SOLUTION INTRAVENOUS at 16:51

## 2025-05-07 RX ADMIN — CEFEPIME 2000 MG: 2 INJECTION, POWDER, FOR SOLUTION INTRAVENOUS at 09:53

## 2025-05-07 RX ADMIN — MELATONIN 3 MG: at 22:00

## 2025-05-07 RX ADMIN — SODIUM CHLORIDE, PRESERVATIVE FREE 10 ML: 5 INJECTION INTRAVENOUS at 21:57

## 2025-05-07 ASSESSMENT — PAIN DESCRIPTION - DESCRIPTORS: DESCRIPTORS: ACHING

## 2025-05-07 ASSESSMENT — PAIN DESCRIPTION - LOCATION: LOCATION: BACK

## 2025-05-07 ASSESSMENT — PAIN DESCRIPTION - ORIENTATION: ORIENTATION: RIGHT

## 2025-05-07 ASSESSMENT — PAIN SCALES - GENERAL
PAINLEVEL_OUTOF10: 4
PAINLEVEL_OUTOF10: 7

## 2025-05-07 NOTE — CARE COORDINATION
Pt is clear from CM standpoint for d/c.    Pt's spouse will transport.    Transition of Care Plan:    RUR: 11%  Prior Level of Functioning: Independent   Disposition: Home with spouse and home health-[facility]  LILI: 5/7/25  If SNF or IPR: Date FOC offered:   Date FOC received:   Accepting facility:   Date authorization started with reference number:   Date authorization received and expires:   Follow up appointments: PCP   DME needed: No DME needed   Transportation at discharge: Pt's spouse   IM/IMM Medicare/ letter given: 5/7/25  Is patient a [place] and connected with VA? No   If yes, was [place] transfer form completed and VA notified? No  Caregiver Contact: Pt's spouse   Discharge Caregiver contacted prior to discharge? Pt was contacted   Care Conference needed? No  Barriers to discharge: ECHO         05/07/25 1346   Services At/After Discharge   Transition of Care Consult (CM Consult) Home Health   Services At/After Discharge Home Health   [place] Resource Information Provided? No   Mode of Transport at Discharge Self   Confirm Follow Up Transport Self   Condition of Participation: Discharge Planning   The Plan for Transition of Care is related to the following treatment goals: Goal is to return home with spouse, follow up appointments and home health   The Patient and/or Patient Representative was provided with a Choice of Provider? Patient   The Patient and/Or Patient Representative agree with the Discharge Plan? Yes   Freedom of Choice list was provided with basic dialogue that supports the patient's individualized plan of care/goals, treatment preferences, and shares the quality data associated with the providers?  Yes     Enma Adler

## 2025-05-08 ENCOUNTER — APPOINTMENT (OUTPATIENT)
Facility: HOSPITAL | Age: 70
DRG: 872 | End: 2025-05-08
Payer: MEDICARE

## 2025-05-08 LAB
ALBUMIN SERPL-MCNC: 2.8 G/DL (ref 3.5–5)
ALBUMIN/GLOB SERPL: 0.8 (ref 1.1–2.2)
ALP SERPL-CCNC: 127 U/L (ref 45–117)
ALT SERPL-CCNC: 317 U/L (ref 12–78)
ANION GAP SERPL CALC-SCNC: 5 MMOL/L (ref 2–12)
AST SERPL-CCNC: 232 U/L (ref 15–37)
BACTERIA SPEC CULT: NORMAL
BACTERIA SPEC CULT: NORMAL
BASOPHILS # BLD: 0.01 K/UL (ref 0–0.1)
BASOPHILS NFR BLD: 0.3 % (ref 0–1)
BILIRUB SERPL-MCNC: 0.6 MG/DL (ref 0.2–1)
BUN SERPL-MCNC: 11 MG/DL (ref 6–20)
BUN/CREAT SERPL: 16 (ref 12–20)
CALCIUM SERPL-MCNC: 9.4 MG/DL (ref 8.5–10.1)
CHLORIDE SERPL-SCNC: 108 MMOL/L (ref 97–108)
CO2 SERPL-SCNC: 26 MMOL/L (ref 21–32)
CREAT SERPL-MCNC: 0.69 MG/DL (ref 0.55–1.02)
DIFFERENTIAL METHOD BLD: ABNORMAL
EOSINOPHIL # BLD: 0.22 K/UL (ref 0–0.4)
EOSINOPHIL NFR BLD: 5.7 % (ref 0–7)
ERYTHROCYTE [DISTWIDTH] IN BLOOD BY AUTOMATED COUNT: 13.2 % (ref 11.5–14.5)
GLOBULIN SER CALC-MCNC: 3.6 G/DL (ref 2–4)
GLUCOSE SERPL-MCNC: 103 MG/DL (ref 65–100)
HCT VFR BLD AUTO: 34.3 % (ref 35–47)
HGB BLD-MCNC: 11.1 G/DL (ref 11.5–16)
IMM GRANULOCYTES # BLD AUTO: 0.02 K/UL (ref 0–0.04)
IMM GRANULOCYTES NFR BLD AUTO: 0.5 % (ref 0–0.5)
LIPASE SERPL-CCNC: 28 U/L (ref 13–75)
LYMPHOCYTES # BLD: 0.65 K/UL (ref 0.8–3.5)
LYMPHOCYTES NFR BLD: 17 % (ref 12–49)
MCH RBC QN AUTO: 30.7 PG (ref 26–34)
MCHC RBC AUTO-ENTMCNC: 32.4 G/DL (ref 30–36.5)
MCV RBC AUTO: 94.8 FL (ref 80–99)
MONOCYTES # BLD: 0.57 K/UL (ref 0–1)
MONOCYTES NFR BLD: 15.1 % (ref 5–13)
NEUTS SEG # BLD: 2.33 K/UL (ref 1.8–8)
NEUTS SEG NFR BLD: 61.4 % (ref 32–75)
NRBC # BLD: 0 K/UL (ref 0–0.01)
NRBC BLD-RTO: 0 PER 100 WBC
PLATELET # BLD AUTO: 180 K/UL (ref 150–400)
PMV BLD AUTO: 10 FL (ref 8.9–12.9)
POTASSIUM SERPL-SCNC: 3.7 MMOL/L (ref 3.5–5.1)
PROT SERPL-MCNC: 6.4 G/DL (ref 6.4–8.2)
RBC # BLD AUTO: 3.62 M/UL (ref 3.8–5.2)
RBC MORPH BLD: ABNORMAL
SERVICE CMNT-IMP: NORMAL
SERVICE CMNT-IMP: NORMAL
SODIUM SERPL-SCNC: 139 MMOL/L (ref 136–145)
WBC # BLD AUTO: 3.8 K/UL (ref 3.6–11)

## 2025-05-08 PROCEDURE — 1100000003 HC PRIVATE W/ TELEMETRY

## 2025-05-08 PROCEDURE — 2500000003 HC RX 250 WO HCPCS

## 2025-05-08 PROCEDURE — 6370000000 HC RX 637 (ALT 250 FOR IP)

## 2025-05-08 PROCEDURE — 6360000002 HC RX W HCPCS

## 2025-05-08 PROCEDURE — 76705 ECHO EXAM OF ABDOMEN: CPT

## 2025-05-08 PROCEDURE — 97530 THERAPEUTIC ACTIVITIES: CPT

## 2025-05-08 PROCEDURE — 83690 ASSAY OF LIPASE: CPT

## 2025-05-08 PROCEDURE — 80053 COMPREHEN METABOLIC PANEL: CPT

## 2025-05-08 PROCEDURE — 36415 COLL VENOUS BLD VENIPUNCTURE: CPT

## 2025-05-08 PROCEDURE — 85025 COMPLETE CBC W/AUTO DIFF WBC: CPT

## 2025-05-08 PROCEDURE — 6360000002 HC RX W HCPCS: Performed by: STUDENT IN AN ORGANIZED HEALTH CARE EDUCATION/TRAINING PROGRAM

## 2025-05-08 PROCEDURE — 97165 OT EVAL LOW COMPLEX 30 MIN: CPT

## 2025-05-08 PROCEDURE — 2580000003 HC RX 258: Performed by: STUDENT IN AN ORGANIZED HEALTH CARE EDUCATION/TRAINING PROGRAM

## 2025-05-08 RX ADMIN — ENOXAPARIN SODIUM 40 MG: 100 INJECTION SUBCUTANEOUS at 08:30

## 2025-05-08 RX ADMIN — CEFEPIME 2000 MG: 2 INJECTION, POWDER, FOR SOLUTION INTRAVENOUS at 07:58

## 2025-05-08 RX ADMIN — SODIUM CHLORIDE, PRESERVATIVE FREE 10 ML: 5 INJECTION INTRAVENOUS at 08:26

## 2025-05-08 RX ADMIN — CEFEPIME 2000 MG: 2 INJECTION, POWDER, FOR SOLUTION INTRAVENOUS at 00:26

## 2025-05-08 RX ADMIN — METOPROLOL SUCCINATE 25 MG: 25 TABLET, EXTENDED RELEASE ORAL at 08:26

## 2025-05-09 VITALS
BODY MASS INDEX: 32.14 KG/M2 | SYSTOLIC BLOOD PRESSURE: 121 MMHG | HEIGHT: 66 IN | RESPIRATION RATE: 18 BRPM | WEIGHT: 200 LBS | OXYGEN SATURATION: 95 % | DIASTOLIC BLOOD PRESSURE: 79 MMHG | HEART RATE: 78 BPM | TEMPERATURE: 98.1 F

## 2025-05-09 LAB
ALBUMIN SERPL-MCNC: 3 G/DL (ref 3.5–5)
ALBUMIN/GLOB SERPL: 0.8 (ref 1.1–2.2)
ALP SERPL-CCNC: 126 U/L (ref 45–117)
ALT SERPL-CCNC: 336 U/L (ref 12–78)
ANION GAP SERPL CALC-SCNC: 7 MMOL/L (ref 2–12)
AST SERPL-CCNC: 188 U/L (ref 15–37)
BASOPHILS # BLD: 0.03 K/UL (ref 0–0.1)
BASOPHILS NFR BLD: 0.6 % (ref 0–1)
BILIRUB SERPL-MCNC: 0.5 MG/DL (ref 0.2–1)
BUN SERPL-MCNC: 13 MG/DL (ref 6–20)
BUN/CREAT SERPL: 18 (ref 12–20)
CALCIUM SERPL-MCNC: 10 MG/DL (ref 8.5–10.1)
CHLORIDE SERPL-SCNC: 106 MMOL/L (ref 97–108)
CO2 SERPL-SCNC: 27 MMOL/L (ref 21–32)
CREAT SERPL-MCNC: 0.72 MG/DL (ref 0.55–1.02)
CRP SERPL-MCNC: 0.68 MG/DL (ref 0–0.3)
DIFFERENTIAL METHOD BLD: ABNORMAL
EOSINOPHIL # BLD: 0.18 K/UL (ref 0–0.4)
EOSINOPHIL NFR BLD: 3.8 % (ref 0–7)
ERYTHROCYTE [DISTWIDTH] IN BLOOD BY AUTOMATED COUNT: 13.3 % (ref 11.5–14.5)
GLOBULIN SER CALC-MCNC: 3.6 G/DL (ref 2–4)
GLUCOSE SERPL-MCNC: 90 MG/DL (ref 65–100)
HCT VFR BLD AUTO: 34.8 % (ref 35–47)
HGB BLD-MCNC: 11.3 G/DL (ref 11.5–16)
IMM GRANULOCYTES # BLD AUTO: 0.04 K/UL (ref 0–0.04)
IMM GRANULOCYTES NFR BLD AUTO: 0.8 % (ref 0–0.5)
LYMPHOCYTES # BLD: 0.73 K/UL (ref 0.8–3.5)
LYMPHOCYTES NFR BLD: 15.4 % (ref 12–49)
MCH RBC QN AUTO: 31 PG (ref 26–34)
MCHC RBC AUTO-ENTMCNC: 32.5 G/DL (ref 30–36.5)
MCV RBC AUTO: 95.3 FL (ref 80–99)
MONOCYTES # BLD: 0.76 K/UL (ref 0–1)
MONOCYTES NFR BLD: 16.1 % (ref 5–13)
NEUTS SEG # BLD: 2.99 K/UL (ref 1.8–8)
NEUTS SEG NFR BLD: 63.3 % (ref 32–75)
NRBC # BLD: 0 K/UL (ref 0–0.01)
NRBC BLD-RTO: 0 PER 100 WBC
PLATELET # BLD AUTO: 219 K/UL (ref 150–400)
PMV BLD AUTO: 10.1 FL (ref 8.9–12.9)
POTASSIUM SERPL-SCNC: 3.7 MMOL/L (ref 3.5–5.1)
PROT SERPL-MCNC: 6.6 G/DL (ref 6.4–8.2)
RBC # BLD AUTO: 3.65 M/UL (ref 3.8–5.2)
SODIUM SERPL-SCNC: 140 MMOL/L (ref 136–145)
WBC # BLD AUTO: 4.7 K/UL (ref 3.6–11)

## 2025-05-09 PROCEDURE — 6360000002 HC RX W HCPCS

## 2025-05-09 PROCEDURE — 6370000000 HC RX 637 (ALT 250 FOR IP): Performed by: NURSE PRACTITIONER

## 2025-05-09 PROCEDURE — 36415 COLL VENOUS BLD VENIPUNCTURE: CPT

## 2025-05-09 PROCEDURE — 6370000000 HC RX 637 (ALT 250 FOR IP)

## 2025-05-09 PROCEDURE — 85025 COMPLETE CBC W/AUTO DIFF WBC: CPT

## 2025-05-09 PROCEDURE — 80053 COMPREHEN METABOLIC PANEL: CPT

## 2025-05-09 PROCEDURE — 86140 C-REACTIVE PROTEIN: CPT

## 2025-05-09 RX ORDER — METOPROLOL SUCCINATE 25 MG/1
25 TABLET, EXTENDED RELEASE ORAL DAILY
Qty: 30 TABLET | Refills: 5 | Status: SHIPPED
Start: 2025-05-09

## 2025-05-09 RX ORDER — MELOXICAM 15 MG/1
15 TABLET ORAL DAILY
Qty: 30 TABLET | Refills: 0 | Status: SHIPPED
Start: 2025-05-09

## 2025-05-09 RX ORDER — NIACIN 500 MG/1
500 TABLET, EXTENDED RELEASE ORAL NIGHTLY
Qty: 90 TABLET | Refills: 1 | Status: SHIPPED
Start: 2025-05-09

## 2025-05-09 RX ORDER — OLMESARTAN MEDOXOMIL 20 MG/1
20 TABLET ORAL DAILY
Qty: 90 TABLET | Refills: 1 | Status: SHIPPED
Start: 2025-05-09

## 2025-05-09 RX ORDER — AMLODIPINE BESYLATE 2.5 MG/1
2.5 TABLET ORAL DAILY
Qty: 30 TABLET | Refills: 0 | Status: SHIPPED
Start: 2025-05-09

## 2025-05-09 RX ORDER — ATORVASTATIN CALCIUM 20 MG/1
20 TABLET, FILM COATED ORAL NIGHTLY
Qty: 90 TABLET | Refills: 3 | Status: SHIPPED
Start: 2025-05-09

## 2025-05-09 RX ORDER — CLOTRIMAZOLE AND BETAMETHASONE DIPROPIONATE 10; .64 MG/G; MG/G
CREAM TOPICAL
Qty: 30 G | Refills: 1 | Status: SHIPPED
Start: 2025-05-09

## 2025-05-09 RX ORDER — ESTRADIOL 0.05 MG/D
PATCH, EXTENDED RELEASE TRANSDERMAL
Qty: 24 PATCH | Refills: 3 | Status: SHIPPED
Start: 2025-05-09

## 2025-05-09 RX ORDER — GABAPENTIN 300 MG/1
300 CAPSULE ORAL NIGHTLY
Qty: 30 CAPSULE | Refills: 5 | Status: SHIPPED
Start: 2025-05-09 | End: 2025-11-05

## 2025-05-09 RX ADMIN — METOPROLOL SUCCINATE 25 MG: 25 TABLET, EXTENDED RELEASE ORAL at 10:13

## 2025-05-09 RX ADMIN — ENOXAPARIN SODIUM 40 MG: 100 INJECTION SUBCUTANEOUS at 10:14

## 2025-05-09 NOTE — CONSULTS
Gastroenterology Consult  (Shaina Kwong PA-C for Dr. Hunt)     Referring Physician: Miranda Chin PA-C    Consult Date: 5/9/2025     Subjective:     Chief Complaint: fever    History of Present Illness: Anisha Packer is a 69 y.o. female who is seen in consultation for transaminitis and sludge in gallbladder. Patient presented to the hospital on 5/1/25 with left sided weakness and fever.  She was started on antibiotics (cefepime and vancomycin) for  skin ans soft tissue infection.  Blood cultures - coagulase negative staphylococcus species in both bottles  Blood PCR - staphylococcus epidermidis, methicillin resistance by PCR  Final repeat blood cultures - No growth     Latest Reference Range & Units 05/01/25 20:01 05/03/25 05:32 05/04/25 05:53 05/05/25 04:11 05/06/25 04:21 05/07/25 04:38 05/08/25 04:02 05/09/25 05:11   Alkaline Phosphatase 45 - 117 U/L 80 83 108 110 114 130 (H) 127 (H) 126 (H)   ALT 12 - 78 U/L 35 105 (H) 102 (H) 88 (H) 107 (H) 185 (H) 317 (H) 336 (H)   AST 15 - 37 U/L 34 131 (H) 88 (H) 62 (H) 79 (H) 150 (H) 232 (H) 188 (H)   Total Bilirubin 0.2 - 1.0 MG/DL 0.6 0.6 0.6 0.5 0.6 0.6 0.6 0.5   (H): Data is abnormally high    No prior hx of elevated LFTs.  No FH of liver disease.  No ETOH.  No RUQ abd pain, N/V.  U/S Abd 5/8 showed biliary sludge. There are no gallstones, gallbladder wall thickening or pericholecystic fluid. There is no intra or extrahepatic biliary ductal dilatation. The common bile duct measures 6 mm.    Patient states she is being discharged home today and has a discharge summary pending.  Her PCP is Dr. Vivar.     Past Medical History:   Diagnosis Date    Allergic rhinitis 8/9/2017    Anemia 8/9/2017    Anxiety 8/9/2017    Arrhythmia     irregular heart beat hx and beating fast per patient/no cardiologist    Arthritis     JOINTS  WORSE WAIST DOWN     Back pain 8/9/2017    Chronic kidney disease     Stage III    CKD (chronic kidney disease) 8/9/2017    COPD (chronic

## 2025-05-09 NOTE — CARE COORDINATION
Pt is clear from CM standpoint for d/c.     Pt's spouse will transport.     Transition of Care Plan:     RUR: 11%  Prior Level of Functioning: Independent   Disposition: Home with spouse and home health-[facility] health  LILI: 5/7/25  If SNF or IPR: Date FOC offered:   Date FOC received:   Accepting facility:   Date authorization started with reference number:   Date authorization received and expires:   Follow up appointments: PCP   DME needed: No DME needed   Transportation at discharge: Pt's spouse   IM/IMM Medicare/[place] letter given: 5/7/25  Is patient a  and connected with VA? No              If yes, was [place] transfer form completed and VA notified? No  Caregiver Contact: Pt's spouse   Discharge Caregiver contacted prior to discharge? Pt was contacted   Care Conference needed? No  Barriers to discharge: None                Enma Adler    [id number]

## 2025-05-09 NOTE — PROGRESS NOTES
Hospitalist Progress Note    NAME:   Anisha Packer   : 1955   MRN: [id number]     Date/Time: 5/3/2025 10:48 AM  Patient PCP: Justin Vivar MD    Estimated discharge date:-  Barriers: clinical stability, echo , repeat blood cx        Assessment / Plan:  Sepsis of unknown origin   Likely related to SSTI right elbow  Procal 0.28  -resp panel negative   CT chest abd pelv: no acute findings  -UA negative  -blood cultures,+ staphylococcus epidermidis / MRSA   -stay course of Cefepime and vancomyin   -obtain ECHO   -repeat cx drawn - continue to track     CATRACHITA, mildly elevated   -baseline Cr 0.79  -avoid nephrotoxins  -trend bmp    Wound right elbow   -consult wound team     Medical Decision Making:   I personally reviewed labs:y  I personally reviewed imaging:y  I personally reviewed EKG:y  Toxic drug monitoring:vancomycin    Discussed case with: pt and clinical nurse         Code Status: full code  DVT Prophylaxis: lovenox   GI Prophylaxis:protonix     Subjective:     Chief Complaint / Reason for Physician Visit  \"Well I feel better today. Discussed with Pt recent labs blood culture results. Pt positive for staph epidermidis, staphylococcus, pt noted she has a wound to her right elbow she sustain from working in her yard. Dressing removed noted abrasion with surrounding cellulitis. Shared findings with clinical nurse.      Objective:     VITALS:   Last 24hrs VS reviewed since prior progress note. Most recent are:  Patient Vitals for the past 24 hrs:   BP Temp Temp src Pulse Resp SpO2   25 1040 -- -- -- 58 -- --   25 0915 101/75 98.4 °F (36.9 °C) Oral 83 18 96 %   25 0356 127/67 (!) 101.5 °F (38.6 °C) -- 85 -- --   25 2200 -- (!) 100.6 °F (38.1 °C) Oral -- -- --   25 1955 116/68 (!) 101.3 °F (38.5 °C) Oral 79 -- 100 %   25 1557 -- 98.8 °F (37.1 °C) -- -- -- --   25 1433 124/83 (!) 101.8 °F (38.8 °C) -- 91 -- 100 %   25 1432 124/83 -- -- 91 -- 100 
      Hospitalist Progress Note    NAME:   Anisha Packer   : 1955   MRN: [id number]     Date/Time: 2025 11:15 AM  Patient PCP: Justin Vivar MD    Estimated discharge date:  Barriers: clinical stability, echo , repeat blood cx        Assessment / Plan:  Sepsis of unknown origin   Likely related to SSTI right elbow    CT chest abd pelv: no acute findings  -UA negative  -blood cultures,+ staphylococcus epidermidis / MRSA   -stay course of Cefepime and vancomyin   -plan to transitions IV anbx to oral if feasible  -obtain ECHO  +ve blood cx   -repeat cx drawn - continue to track - NGTD    -SED RATE 15 CRP 3.26    Hyponatremia 128  - Trend BMP   - nephrology consulted   -  resolved     CATRACHITA, mildly elevated   -baseline Cr 0.79  -avoid nephrotoxins  -trend bmp    Wound right elbow   -consult wound team     Medical Decision Making:   I personally reviewed labs:y  I personally reviewed imaging:y  I personally reviewed EKG:y  Toxic drug monitoring:vancomycin    Discussed case with: pt and clinical nurse         Code Status: full code  DVT Prophylaxis: lovenox   GI Prophylaxis:protonix     Subjective:     Chief Complaint / Reason for Physician Visit  No acute events noted overnight, updated pt  regarding plan of care trajectory.       Objective:     VITALS:   Last 24hrs VS reviewed since prior progress note. Most recent are:  Patient Vitals for the past 24 hrs:   BP Temp Temp src Pulse Resp SpO2   25 0730 99/78 98.2 °F (36.8 °C) Oral 73 17 99 %   25 0156 (!) 103/56 97.9 °F (36.6 °C) Oral 66 18 97 %   25 -- 99.5 °F (37.5 °C) Oral -- -- --   25 192 (!) 117/57 100 °F (37.8 °C) -- 75 18 94 %         Intake/Output Summary (Last 24 hours) at 2025 1115  Last data filed at 2025 0656  Gross per 24 hour   Intake --   Output 1000 ml   Net -1000 ml        I had a face to face encounter and independently examined this patient on 2025, as outlined below:  PHYSICAL 
     Hospitalist Progress Note     Demographics    Patient Name  Anisha Packer   Date of Birth 1955   Medical Record Number  [id number]      Age  69 y.o.   PCP Justin Vivar MD   Admit date:  5/1/2025  7:20 PM     Room Number  1121/01  @ [facility]           Admission Diagnoses:  SIRS (systemic inflammatory response syndrome) ([facility])   Admission Summary:  \" Anisha Packer is a 69 y.o.  female with PMHx significant for COPD, neuropathy, GERD, HTN, HLD. Patient presented to [facility] by EMS from home with CC of fatigue since Tuesday. Patient felt weak, slid to the ground and was unable to stand up, states her left side has been hurting her. Uses a cane at baseline. NIH 0 when evaluated on admission. Temp 100.6, , resp rate 21, 93% on RA. Patient denies any other concerns or complaints exam. Denies wounds or rashes. Denies bowel or bladder dysfunction, discharge, burning, urgency, frequency. No acute distress noted on exam.  Hemodynamically stable, not septic shock appearing on admission.  Denies any URI/cough/sputum.  Denies abdominal pain/distension, headache, or back pain. Denies any recent travels, chills, or infectious exposures.      Discussed with patient RECs, plan of care as above, admission to the hospital. Risk vs benefits of admit/treatment vs non-treatment discussed with patient, they were actively involved in decision-making, and at this time they are in agreement with admit to hospital and plan as above. All questions answered to patient's satisfaction on exam. We were asked to admit for work up and evaluation of the above problems. \" - Laura Solomon, DEISI-NP, 5/2/25     Assessment and plan:     Sepsis of unknown origin, POA    Likely related to SSTI right elbow  Febrile (Tmax 101.8F) and tachycardic (119bpm) on admission   CXR - no acute intrathoracic process identified  CTA chest - no evidence of pulmonary embolism, no aortic aneurysm or dissection, colonic 
     Hospitalist Progress Note     Demographics    Patient Name  Anisha Packer   Date of Birth 1955   Medical Record Number  [id number]      Age  69 y.o.   PCP Justin Vivar MD   Admit date:  5/1/2025  7:20 PM     Room Number  1121/01  @ [facility]           Admission Diagnoses:  SIRS (systemic inflammatory response syndrome) ([facility])   Admission Summary:  \" Anisha Packer is a 69 y.o.  female with PMHx significant for COPD, neuropathy, GERD, HTN, HLD. Patient presented to [facility] by EMS from home with CC of fatigue since Tuesday. Patient felt weak, slid to the ground and was unable to stand up, states her left side has been hurting her. Uses a cane at baseline. NIH 0 when evaluated on admission. Temp 100.6, , resp rate 21, 93% on RA. Patient denies any other concerns or complaints exam. Denies wounds or rashes. Denies bowel or bladder dysfunction, discharge, burning, urgency, frequency. No acute distress noted on exam.  Hemodynamically stable, not septic shock appearing on admission.  Denies any URI/cough/sputum.  Denies abdominal pain/distension, headache, or back pain. Denies any recent travels, chills, or infectious exposures.      Discussed with patient RECs, plan of care as above, admission to the hospital. Risk vs benefits of admit/treatment vs non-treatment discussed with patient, they were actively involved in decision-making, and at this time they are in agreement with admit to hospital and plan as above. All questions answered to patient's satisfaction on exam. We were asked to admit for work up and evaluation of the above problems. \" - Laura Solomon, DEISI-NP, 5/2/25     Assessment and plan:     Sepsis of unknown origin, POA    Likely related to SSTI right elbow  Febrile (Tmax 101.8F) and tachycardic (119bpm) on admission   CXR - no acute intrathoracic process identified  CTA chest - no evidence of pulmonary embolism, no aortic aneurysm or dissection, colonic 
  Physician Progress Note      PATIENT:               JAIME RUIZ  CSN #:                  [id number]  :                       1955  ADMIT DATE:       2025 7:20 PM  DISCH DATE:  RESPONDING  PROVIDER #:        Poonam Morales MD          QUERY TEXT:    Sepsis is documented in the medical record H&P (LUCIE Solomon). Please provide   additional clinical indicators supportive of your documentation. Or please   document if the diagnosis of sepsis has been ruled out after study.    The clinical indicators include:  adm with susan (H&P)      H&P-Sepsis of unknown origin (NP Aberbre)  t 100.6, rr 21-27, wbc 6, lac 0.88, procal 0.28  Tx: Cefepime, blood cx pending  Options provided:  -- Sepsis present as evidenced by, Please document evidence.  -- Sepsis was ruled out after study  -- Other - I will add my own diagnosis  -- Disagree - Not applicable / Not valid  -- Disagree - Clinically unable to determine / Unknown  -- Refer to Clinical Documentation Reviewer    PROVIDER RESPONSE TEXT:    Sepsis is present as evidenced by Fever + tachypnea= SIRS, those + source   =sepsis    Query created by: OchoaAideea on 2025 11:50 AM      Electronically signed by:  Poonam Morales MD 5/3/2025 1:45 PM          
Attempted to schedule PCP hospital follow up appointment. Unable to reach anyone, unable to leave voicemail. Pending patient discharge.   
Billable note  Extra time 25 min     Excerpt:  Anisha Packer is a 69 y.o.  female with PMHx significant for COPD, neuropathy, GERD, HTN, HLD. Patient presented to [facility] by EMS from home with CC of fatigue since Tuesday. Patient felt weak, slid to the ground and was unable to stand up, states her left side has been hurting her. Uses a cane at baseline. NIH 0 when evaluated on admission. Temp 100.6, , resp rate 21, 93% on RA. Patient denies any other concerns or complaints exam. Denies wounds or rashes. Denies bowel or bladder dysfunction, discharge, burning, urgency, frequency. No acute distress noted on exam.  Hemodynamically stable, not septic shock appearing on admission.  Denies any URI/cough/sputum.  Denies abdominal pain/distension, headache, or back pain. Denies any recent travels, chills, or infectious exposures.      Discussed with patient RECs, plan of care as above, admission to the hospital. Risk vs benefits of admit/treatment vs non-treatment discussed with patient, they were actively involved in decision-making, and at this time they are in agreement with admit to hospital and plan as above. All questions answered to patient's satisfaction on exam.  .  Labs, imaging studies reviewed  Low grade 99.7 wbc 6.8   On exam this am alert social noted brushing her teeth, denies SOB or dyspnea.     Per nursing staff dtr was visiting noted no changes in condition physically or cognitively,  pt is at baseline. PT was in room pt could not walk noted with increased weakness. Dtr notes this is not patients baseline although there were no changes identified by nursing staff.     Plan:   NIH 0   CTA chest /abd /pelvis pending   -repeat CBC. BMP  -continue with therapy as ordered - encourage OOB TO chair   -stay course of anbx,   - ok to remove droplet precautions     Ginger Coronel DNP, AGAGNP-BC   
End of Shift Note    Bedside shift change report given to Caitie RN (oncoming nurse) by Trish Lechuga RN (offgoing nurse).  Report included the following information SBAR, Kardex, and MAR    Shift worked:  0700-190     Shift summary and any significant changes:     Seen by NP and doctor Poonam today, all needs attended     Concerns for physician to address:  none           Activity:  Level of Assistance: Moderate assist, patient does 50-74%  Number times ambulated in hallways past shift: 0  Number of times OOB to chair past shift: 0    Cardiac:   Cardiac Monitoring: Yes      Cardiac Rhythm: Sinus rhythm    Access:  Current line(s): PIV     Genitourinary:        Respiratory:   O2 Device: None (Room air)  Chronic home O2 use?: NO  Incentive spirometer at bedside: NO    GI:     Current diet:  ADULT DIET; Regular  Passing flatus: YES    Pain Management:   Patient states pain is manageable on current regimen: NO    Skin:  Suraj Scale Score: 18  Interventions:      Patient Safety:  Fall Risk:    Fall Risk Interventions  Toilet Every 2 Hours-In Advance of Need: Yes  Hourly Visual Checks: In bed, Awake  Fall Visual Posted: Socks  Room Door Open: Yes  Alarm On: Bed  Patient Moved Closer to Nursing Station: No    Active Consults:   IP CONSULT TO PHARMACY  IP CONSULT TO NEPHROLOGY    Length of Stay:  Expected LOS: 4  Actual LOS: 1    Trish Lechuga RN                           
End of Shift Note    Bedside shift change report given to Hilton (oncoming nurse) by JACQUELYN ROLLINS LPN (offgoing nurse).  Report included the following information SBAR    Shift worked:  4431-5971     Shift summary and any significant changes:     Medications given per MAR.  PRN X2 Tylenol, ( fevers)  x1 Zofran given overnight. CHG bath complete,care and safety rounds complete.     Concerns for physician to address:  Critical Overnight Blood culture gram + with cocci  and clusters  Pt would like to see DR and get up.   Zone phone for oncoming shift:          Activity:  Level of Assistance: Moderate assist, patient does 50-74%  Number times ambulated in hallways past shift: 0  Number of times OOB to chair past shift: 0    Cardiac:   Cardiac Monitoring: Yes      Cardiac Rhythm: Sinus rhythm    Access:  Current line(s): PIV     Genitourinary:        Respiratory:   O2 Device: None (Room air)  Chronic home O2 use?: N/A  Incentive spirometer at bedside: N/A    GI:     Current diet:  ADULT DIET; Regular  Passing flatus: YES    Pain Management:   Patient states pain is manageable on current regimen: YES    Skin:  Suraj Scale Score: 19  Interventions:      Patient Safety:  Fall Risk:    Fall Risk Interventions  Toilet Every 2 Hours-In Advance of Need: Yes  Hourly Visual Checks: In bed  Fall Visual Posted: Mat  Room Door Open: Yes  Alarm On: Bed    Active Consults:   IP CONSULT TO PHARMACY    Length of Stay:  Expected LOS: 4  Actual LOS: 1    JACQUELYN ROLLINS LPN                           
End of Shift Note    Bedside shift change report given to María MARLOW (oncoming nurse) by Harrison Stahl RN (offgoing nurse).  Report included the following information SBAR, Kardex, and MAR    Shift worked:  700-1900     Shift summary and any significant changes:     Pt ambulated in the hallway with walker. Iv antibiotics done. Echo done. Hourly round completed.     Concerns for physician to address:  None     Zone phone for oncoming shift:          Activity:  Level of Assistance: Minimal assist, patient does 75% or more  Number times ambulated in hallways past shift: 1  Number of times OOB to chair past shift: 1    Cardiac:   Cardiac Monitoring: No      Cardiac Rhythm: Sinus rhythm    Access:  Current line(s): PIV     Genitourinary:   Urinary Status: Voiding, External catheter    Respiratory:   O2 Device: None (Room air)  Chronic home O2 use?: NO  Incentive spirometer at bedside: NO    GI:  Last BM (including prior to admit): 05/06/25  Current diet:  ADULT DIET; Regular  Passing flatus: YES    Pain Management:   Patient states pain is manageable on current regimen: YES    Skin:  Suraj Scale Score: 20  Interventions: Wound Offloading (Prevention Methods): Pillows, Repositioning    Patient Safety:  Fall Risk: Nursing Judgement-Fall Risk High(Add Comments): Yes  Fall Risk Interventions  Nursing Judgement-Fall Risk High(Add Comments): Yes  Toilet Every 2 Hours-In Advance of Need: Yes  Hourly Visual Checks: Awake, In bed  Fall Visual Posted: Armband  Room Door Open: Deferred to promote rest  Alarm On: Bed  Patient Moved Closer to Nursing Station: No    Active Consults:   IP CONSULT TO PHARMACY  IP CONSULT TO NEPHROLOGY  IP CONSULT TO INFECTIOUS DISEASES  IP CONSULT TO CASE MANAGEMENT    Length of Stay:  Expected LOS: 6  Actual LOS: 5    Harrison Stahl RN                            
End of Shift Note    Bedside shift change report given to PONCE Fraser (oncoming nurse) by Martha Rolon RN (offgoing nurse).  Report included the following information SBAR, Kardex, MAR, and Recent Results    Shift worked:  7P-7A     Shift summary and any significant changes:     Patient tolerated care well. When doing beside shift report, patient complained of left flank and back pain. Denied any UTI symptoms. Tylenol given PRN. VSS. Scheduled medications given per MAR. Patient reported that tylenol doesn't work for her pain, contacted covering provider, lidocaine patch PRN and toradol once ordered. Hourly rounding completed.     Concerns for physician to address:  Echo, discharge planning     Zone phone for oncoming shift:   [phone]       Activity:  Level of Assistance: Moderate assist, patient does 50-74%  Number times ambulated in hallways past shift: 0  Number of times OOB to chair past shift: 0    Cardiac:   Cardiac Monitoring: No      Cardiac Rhythm: Sinus rhythm    Access:  Current line(s): PIV     Genitourinary:   Urinary Status: Voiding, External catheter    Respiratory:   O2 Device: None (Room air)  Chronic home O2 use?: NO  Incentive spirometer at bedside: NO    GI:  Last BM (including prior to admit): 05/05/25  Current diet:  ADULT DIET; Regular  Passing flatus: YES    Pain Management:   Patient states pain is manageable on current regimen: YES    Skin:  Suraj Scale Score: 20  Interventions: Wound Offloading (Prevention Methods): Pillows, Repositioning    Patient Safety:  Fall Risk: Nursing Judgement-Fall Risk High(Add Comments): Yes  Fall Risk Interventions  Nursing Judgement-Fall Risk High(Add Comments): Yes  Toilet Every 2 Hours-In Advance of Need: Yes  Hourly Visual Checks: Awake, In bed  Fall Visual Posted: Socks  Room Door Open: Deferred to promote rest  Alarm On: Bed  Patient Moved Closer to Nursing Station: No    Active Consults:   IP CONSULT TO PHARMACY  IP CONSULT TO NEPHROLOGY    Length of 
End of Shift Note    Bedside shift change report given to PONCE Randall (oncoming nurse) by JACQUELYN ROLLINS LPN (offgoing nurse).  Report included the following information SBAR    Shift worked:  4513-0951     Shift summary and any significant changes:     Medications given per MAR. PRN tylenol x1 given for back pain. Visitors in room at start of shift.  No other complaints over night. New IV placed  R hand. Care and safety rounds complete.     Concerns for physician to address:  LBM 5.1.2024 day she came into ER     Zone phone for oncoming shift:          Activity:  Level of Assistance: Moderate assist, patient does 50-74%  Number times ambulated in hallways past shift: 0  Number of times OOB to chair past shift: 0    Cardiac:   Cardiac Monitoring: No      Cardiac Rhythm: Sinus rhythm    Access:  Current line(s): PIV     Genitourinary:   Urinary Status: Voiding, External catheter    Respiratory:   O2 Device: None (Room air)  Chronic home O2 use?: NO  Incentive spirometer at bedside: NO    GI:     Current diet:  ADULT DIET; Regular  Passing flatus: NO    Pain Management:   Patient states pain is manageable on current regimen: YES    Skin:  Suraj Scale Score: 19  Interventions: Wound Offloading (Prevention Methods): Pillows, Repositioning, Elevate heels, Wedge pillows, Turning    Patient Safety:  Fall Risk: Nursing Judgement-Fall Risk High(Add Comments): Yes  Fall Risk Interventions  Nursing Judgement-Fall Risk High(Add Comments): Yes  Toilet Every 2 Hours-In Advance of Need: Yes  Hourly Visual Checks: In bed, Awake  Fall Visual Posted: Socks  Room Door Open: Yes  Alarm On: Bed  Patient Moved Closer to Nursing Station: No    Active Consults:   IP CONSULT TO PHARMACY  IP CONSULT TO NEPHROLOGY    Length of Stay:  Expected LOS: 4  Actual LOS: 3    JACQUELYN ROLLINS LPN                           
End of Shift Note    Bedside shift change report given to PONCE Terry (oncoming nurse) by Martha Rolon RN (offgoing nurse).  Report included the following information SBAR, Kardex, Intake/Output, MAR, and Recent Results    Shift worked:  7P-7A     Shift summary and any significant changes:    Patient tolerated care well. Scheduled medications given per MAR. PRN melatonin given. PRN tylenol given for left flank pain. Hourly rounding completed.     Concerns for physician to address:       Zone phone for oncoming shift:          Activity:  Level of Assistance: Moderate assist, patient does 50-74%  Number times ambulated in hallways past shift: 0  Number of times OOB to chair past shift: 0    Cardiac:   Cardiac Monitoring: No      Cardiac Rhythm: Sinus rhythm    Access:  Current line(s): PIV     Genitourinary:   Urinary Status: Voiding, External catheter    Respiratory:   O2 Device: None (Room air)  Chronic home O2 use?: NO  Incentive spirometer at bedside: NO    GI:  Last BM (including prior to admit): 05/06/25  Current diet:  ADULT DIET; Regular  Passing flatus: YES    Pain Management:   Patient states pain is manageable on current regimen: YES    Skin:  Suraj Scale Score: 20  Interventions: Wound Offloading (Prevention Methods): Pillows, Repositioning    Patient Safety:  Fall Risk: Nursing Judgement-Fall Risk High(Add Comments): No  Fall Risk Interventions  Nursing Judgement-Fall Risk High(Add Comments): No  Toilet Every 2 Hours-In Advance of Need: Yes  Hourly Visual Checks: Awake, In bed  Fall Visual Posted: Armband  Room Door Open: Deferred to promote rest  Alarm On: Bed  Patient Moved Closer to Nursing Station: No    Active Consults:   IP CONSULT TO PHARMACY  IP CONSULT TO NEPHROLOGY  IP CONSULT TO INFECTIOUS DISEASES    Length of Stay:  Expected LOS: 6  Actual LOS: 5    Martha Rolon RN                            
End of Shift Note    Bedside shift change report given to PONCE Thomas (oncoming nurse) by DALILA CREWS RN (offgoing nurse).  Report included the following information SBAR, Procedure Summary, Intake/Output, MAR, Recent Results, and Cardiac Rhythm      Shift worked:  7a-7p     Shift summary and any significant changes:     Pending Echo, Continue IV Abt     Concerns for physician to address:  N/a     Zone phone for oncoming shift:   [phone]       Activity:  Level of Assistance: Moderate assist, patient does 50-74%  Number times ambulated in hallways past shift: 1  Number of times OOB to chair past shift: 2    Cardiac:   Cardiac Monitoring: No      Cardiac Rhythm: Sinus rhythm    Access:  Current line(s): PIV     Genitourinary:   Urinary Status: Voiding, External catheter    Respiratory:   O2 Device: None (Room air)  Chronic home O2 use?: NO  Incentive spirometer at bedside: N/A    GI:  Last BM (including prior to admit): 05/05/25  Current diet:  ADULT DIET; Regular  Passing flatus: YES    Pain Management:   Patient states pain is manageable on current regimen: YES    Skin:  Suraj Scale Score: 20  Interventions: Wound Offloading (Prevention Methods): Pillows, Turning, Repositioning    Patient Safety:  Fall Risk: Nursing Judgement-Fall Risk High(Add Comments): Yes  Fall Risk Interventions  Nursing Judgement-Fall Risk High(Add Comments): Yes  Toilet Every 2 Hours-In Advance of Need: Yes  Hourly Visual Checks: Awake  Fall Visual Posted: Armband  Room Door Open: Deferred to promote rest  Alarm On: Bed  Patient Moved Closer to Nursing Station: No    Active Consults:   IP CONSULT TO PHARMACY  IP CONSULT TO NEPHROLOGY  IP CONSULT TO INFECTIOUS DISEASES    Length of Stay:  Expected LOS: 6  Actual LOS: 4    DALILA CREWS RN                            
End of Shift Note    Bedside shift change report given to Raquel MARLOW (oncoming nurse) by Trish Lechuga RN (offgoing nurse).  Report included the following information SBAR, Kardex, and MAR    Shift worked:  7558-6956     Shift summary and any significant changes:     Seen by Np and MD today, able to sit her on the recliner chair, changed all sheets, bath give, mouth care and turning done, all needs attended     Concerns for physician to address:  None           Activity:  Level of Assistance: Moderate assist, patient does 50-74%  Number times ambulated in hallways past shift: 0  Number of times OOB to chair past shift: 1    Cardiac:   Cardiac Monitoring: No      Cardiac Rhythm: Sinus rhythm    Access:  Current line(s): PIV     Genitourinary:   Urinary Status: Voiding, External catheter    Respiratory:   O2 Device: None (Room air)  Chronic home O2 use?: NO  Incentive spirometer at bedside: NO    GI:     Current diet:  ADULT DIET; Regular  Passing flatus: YES    Pain Management:   Patient states pain is manageable on current regimen: YES    Skin:  Suraj Scale Score: 19  Interventions: Wound Offloading (Prevention Methods): Pillows, Repositioning, Elevate heels, Wedge pillows, Turning    Patient Safety:  Fall Risk: Nursing Judgement-Fall Risk High(Add Comments): Yes  Fall Risk Interventions  Nursing Judgement-Fall Risk High(Add Comments): Yes  Toilet Every 2 Hours-In Advance of Need: Yes  Hourly Visual Checks: In bed, Awake  Fall Visual Posted: Socks  Room Door Open: Yes  Alarm On: Bed  Patient Moved Closer to Nursing Station: No    Active Consults:   IP CONSULT TO PHARMACY  IP CONSULT TO NEPHROLOGY    Length of Stay:  Expected LOS: 4  Actual LOS: 2    Trish Lechuga, PONCE                           
End of Shift Note    Bedside shift change report given to Sugar SERRA  (oncoming nurse) by María Payne LPN (offgoing nurse).  Report included the following information SBAR, Kardex, and MAR    Shift worked:  1900 to 0700     Shift summary and any significant changes:     Patient was received AOX4, in no distress, vital signs stable in no distress. Patient verbalized no complaints on shift. Patient took all medications as per orders on shift. Safety precautions maintained for the patient on shift . Report was given to the on coming shift nurse.      Concerns for physician to address:  None      Zone phone for oncoming shift:   [phone]         María Payne LPN                            
End of Shift Note    Bedside shift change report given to Sugar SERRA (oncoming nurse) by María Payne LPN (offgoing nurse).  Report included the following information SBAR, Kardex, and MAR    Shift worked:  1900 to 0700     Shift summary and any significant changes:     Patient was received AOX4 , vital signs stable,in no distress verbalized no complaints on shift. Patient took all medications as per orders on shift. Safety precautions maintained on shift for the patient. . Bedside shift report was given to on coming shift nurse.      Concerns for physician to address:  none     Zone phone for oncoming shift:   [phone]         María Payne LPN                            
Nurse Martha contacted Nocturnist/cross cover provider via non-urgent messaging system [employer] and notified patient c/o left flank pain, asking for something stronger than tylenol, received gabapentin earlier already, states pain back/flank, denies UTI symptoms, states pain not new started on 5/1 POA, already had U/a, on empirical antbx rocephin and vanc for sepsis suspected source wound elbow. No other concerns reported. No acute distress reported. No other information provided by nurse. VSS. Patient denies any further complaints or concerns. See prior hospitalist group notes for complete details of course of treatment. Recent lab work and documented vs reviewed.    Ordered toradol 15mg iv x1, lidocaine patch hs prn back, asked nurse to d/w dayshift team events of overnight. Appreciate Nursing assistance in the care of this patient.    Continue remaining plan/orders as per dayshift team. Will defer further evaluation/management and timing of discontinuation of regimens to the day shift primary attending care team. Nursing to notify dayshift Hospitalist team in the AM of overnight events and for further/continued concerns. Will remain available overnight cross coverage for further concerns if nursing/patient needs. Please note, there are RRT systems in this hospital in place that if nursing has acute or critical patient condition change or concern, this is to help facilitate and notify that patient needs immediate bedside evaluation by a provider.    Non-billable note.       
PCP hospital follow-up transitional care appointment has been scheduled with Dr. Justin Vivar on 5/14/25 9309. Pending patient discharge.   
Pharmacy Antimicrobial Kinetic Dosing    Indication for Antimicrobials: Staph epi bacteremia     Current Regimen of Each Antimicrobial:  Cefepime 2g q8h; Start Date 2025; Day # 3  Vancomycin doses by pharmacy; Start Date 2025: Day #3    Previous Antimicrobial Therapy:  N/A     Goal Level: Vancomycin -600    Date Dose & Interval Measured (mcg/mL) Predicted AUC 24-48 Predicted AUC 24,ss                          Significant Cultures:    Bcx  CoNS  5/3 bcx pending    Labs:  Recent Labs     Units 25  0532 25  1440 25  1034 25   CREATININE MG/DL 0.83 0.92  --  1.19*   BUN MG/DL 18 18  --  26*   PROCAL ng/mL  --   --  0.28  --    WBC K/uL 5.2 6.9  --  6.8   BANDS %  --  11  --  10     Temp (24hrs), Av.6 °F (38.1 °C), Min:98.8 °F (37.1 °C), Max:101.8 °F (38.8 °C)      Conditions for Dosing Consideration: None    Creatinine Clearance (mL/min): Estimated Creatinine Clearance: 72 mL/min (based on SCr of 0.83 mg/dL).       Impression/Plan:   Continue vancomycin 1250mg q18h  Will order vancomycin level with AM labs on   Predicted GTH38-46 = 418, Predicted AUC24,ss = 450  Change cefepime from 2g IV q12h to 2g IV q8h  Antimicrobial stop date TBD     Pharmacy will follow daily and adjust medications as appropriate for renal function and/or serum levels.    Thank you,  Tylor Dubon, [facility]    
Pharmacy Antimicrobial Kinetic Dosing    Indication for Antimicrobials: Staph epi bacteremia     Current Regimen of Each Antimicrobial:  Cefepime 2g q8h; Start Date 2025; Day # 4  Vancomycin doses by pharmacy; Start Date 2025: Day #4    Previous Antimicrobial Therapy:  N/A     Goal Level: Vancomycin -600    Date Dose & Interval Measured (mcg/mL) Predicted AUC 24-48 Predicted AUC 24,ss    1250mg q18h 14.6 465 477                   Significant Cultures:    Bcx 2/4 CoNS, multiple colony types  5/3 bcx pending    Labs:  Recent Labs     Units 25  0553 25  0532 25  1440 25  1034 25   CREATININE MG/DL 0.70 0.83 0.92  --  1.19*   BUN MG/DL 11 18 18  --  26*   PROCAL ng/mL  --   --   --  0.28  --    WBC K/uL 4.6 5.2 6.9  --  6.8   BANDS %  --   --  11  --  10     Temp (24hrs), Av.8 °F (37.1 °C), Min:97.9 °F (36.6 °C), Max:100 °F (37.8 °C)      Conditions for Dosing Consideration: None    Creatinine Clearance (mL/min): Estimated Creatinine Clearance: 85 mL/min (based on SCr of 0.7 mg/dL).       Impression/Plan:   Continue with vancomycin 1250mg q18h  Predicted ZKP43-28 = 465, Predicted AUC24,ss = 477  Antimicrobial stop date TBD     Pharmacy will follow daily and adjust medications as appropriate for renal function and/or serum levels.    Thank you,  Tylor Dubon [facility]      
Pharmacy contacted to change the rate of IV cefepime so that it can be finished soon as pt expecting to go home. Rate changed to 200/hr.  
Pt is being discharge home with hospice. PT left via transport with no issue.   
Pt not discharging today, changed IV antibiotics to 25 ml / hr.  
Immature Granulocytes % 0.6 (H) 0.0 - 0.5 %    Neutrophils Absolute 2.00 1.80 - 8.00 K/UL    Lymphocytes Absolute 0.73 (L) 0.80 - 3.50 K/UL    Monocytes Absolute 0.49 0.00 - 1.00 K/UL    Eosinophils Absolute 0.32 0.00 - 0.40 K/UL    Basophils Absolute 0.01 0.00 - 0.10 K/UL    Immature Granulocytes Absolute 0.02 0.00 - 0.04 K/UL    Differential Type AUTOMATED     Comprehensive Metabolic Panel    Collection Time: 05/05/25  4:11 AM   Result Value Ref Range    Sodium 137 136 - 145 mmol/L    Potassium 3.7 3.5 - 5.1 mmol/L    Chloride 107 97 - 108 mmol/L    CO2 23 21 - 32 mmol/L    Anion Gap 7 2 - 12 mmol/L    Glucose 106 (H) 65 - 100 mg/dL    BUN 12 6 - 20 MG/DL    Creatinine 0.71 0.55 - 1.02 MG/DL    BUN/Creatinine Ratio 17 12 - 20      Est, Glom Filt Rate >90 >60 ml/min/1.73m2    Calcium 8.5 8.5 - 10.1 MG/DL    Total Bilirubin 0.5 0.2 - 1.0 MG/DL    ALT 88 (H) 12 - 78 U/L    AST 62 (H) 15 - 37 U/L    Alk Phosphatase 110 45 - 117 U/L    Total Protein 6.3 (L) 6.4 - 8.2 g/dL    Albumin 2.8 (L) 3.5 - 5.0 g/dL    Globulin 3.5 2.0 - 4.0 g/dL    Albumin/Globulin Ratio 0.8 (L) 1.1 - 2.2            Intake/Output Summary (Last 24 hours) at 5/5/2025 1316  Last data filed at 5/5/2025 0444  Gross per 24 hour   Intake 5 ml   Output 450 ml   Net -445 ml          Data Review:   Recent Labs     05/05/25  0411      K 3.7   BUN 12   CREATININE 0.71   WBC 3.6   HGB 11.3*   HCT 34.7*   *       No current facility-administered medications on file prior to encounter.     Current Outpatient Medications on File Prior to Encounter   Medication Sig Dispense Refill    potassium chloride (KLOR-CON M) 20 MEQ extended release tablet TAKE 1 TABLET TWICE DAILY 180 tablet 3    metoprolol succinate (TOPROL XL) 25 MG extended release tablet Take 1 tablet by mouth daily 30 tablet 5    niacin (NIASPAN) 500 MG extended release tablet TAKE 1 TABLET AT BEDTIME 90 tablet 1    olmesartan (BENICAR) 20 MG tablet TAKE 1 TABLET EVERY DAY 90 tablet 1    
Decision Making:    Relevant labs were reviewed:  Yes - cbc, cmp, lipase   Imaging report reviewed  Yes - Liver ultrasound   EKG reviewed  No    High risk/toxic drug monitoring  Yes - lovenox   Care plan discussed with  Patient/Family , Nurse, , and Other attending Dr. Elkins      Subjective and Objective Data      \"I'm okay.\" Patient sitting up in chair at the time of my evaluation. Discussed lab results and indication for further evaluation, patient expresses agreement and understanding.      Denies night sweats, chills, headache, chest pain, shortness of breath, nausea, abdominal pain or leg swelling.     Review of Systems - Pertinent ROS described as above.     Comments       Patient Vitals for the past 24 hrs:   BP   05/08/25 0813 115/81   05/07/25 2021 (!) 140/92      Patient Vitals for the past 24 hrs:   Pulse   05/08/25 0813 82   05/07/25 2021 65      Patient Vitals for the past 24 hrs:   Resp   05/08/25 0813 16   05/07/25 2021 18      Patient Vitals for the past 24 hrs:   Temp   05/08/25 0813 97.7 °F (36.5 °C)   05/07/25 2021 98.2 °F (36.8 °C)        SpO2 Readings from Last 6 Encounters:   05/08/25 99%   02/18/25 98%   11/18/24 98%   05/10/24 96%   01/30/24 97%   09/15/23 96%            Body mass index is 32.28 kg/m². -  Wt Readings from Last 10 Encounters:   05/01/25 90.7 kg (200 lb)   02/18/25 88.3 kg (194 lb 9.6 oz)   11/18/24 86.4 kg (190 lb 6.4 oz)   05/10/24 84.4 kg (186 lb)   01/30/24 84.4 kg (186 lb)   09/15/23 83.9 kg (185 lb)   08/09/23 81.6 kg (180 lb)   08/04/23 83.9 kg (185 lb)   06/15/23 84.6 kg (186 lb 9.6 oz)   03/03/23 86.3 kg (190 lb 4.8 oz)      I/O last 3 completed shifts:  In: 600 [P.O.:600]  Out: 2050 [Urine:2050]  I/O this shift:  In: -   Out: 1000 [Urine:1000]        Physical Exam:         Physical Exam  Vitals and nursing note reviewed.   Constitutional:       Appearance: She is not toxic-appearing or diaphoretic.   Cardiovascular:      Rate and Rhythm: Normal rate and 
performed using heather Nina SARS-CoV-2 and Influenza A/B nucleic acid assay.  This test is a multiplex Real-Time Reverse Transcriptase Polymerase Chain Reaction (RT-PCR) based in vitro diagnostic test intended for the qualitative detection of nucleic acids from SARS-CoV-2, Influenza A, and Influenza B in nasopharyngeal specimens.         Blood Culture 2 [[id number]]  (Abnormal) Collected: 05/01/25 2006    Order Status: Completed Specimen: Blood Updated: 05/03/25 8945     Special Requests --        RIGHT  Antecubital       Culture       STAPHYLOCOCCUS SPECIES, COAGULASE NEGATIVE Multiple [place] Types GROWING IN BOTH BOTTLES DRAWN SITE = R AC This isolate represents a common contaminant and may or may not be clinically significant. Please notify the laboratory if further work is indicated.        (NOTE) PRELIMINARY REPORT OF [facility] CALLED TO AND READ BACK BY PONCE ROLLINS 5/2/25 AT 2146.MR    Culture, Blood, PCR ID Panel [[id number]]  (Abnormal) Collected: 05/01/25 2006    Order Status: Completed Specimen: Blood Updated: 05/02/25 7082     Accession Number [id number]     Enterococcus faecalis by PCR Not detected        Enterococcus faecium by PCR Not detected        Listeria monocytogenes by PCR Not detected        STAPHYLOCOCCUS Detected        Staphylococcus Aureus Not detected        Staphylococcus epidermidis by PCR Detected        Staphylococcus lugdunensis by PCR Not detected        STREPTOCOCCUS Not detected        Streptococcus agalactiae (Group B) Not detected        Strep pneumoniae Not detected        Strep pyogenes,(Grp. A) Not detected        Acinetobacter calcoac baumannii complex by PCR Not detected        Bacteroides fragilis by PCR Not detected        Enterobacteriaceae by PCR Not detected        Enterobacter cloacae complex by PCR Not detected        Escherichia Coli Not detected        Klebsiella aerogenes by PCR Not detected        Klebsiella oxytoca by PCR Not detected        Klebsiella

## 2025-05-09 NOTE — PLAN OF CARE
Problem: Discharge Planning  Goal: Discharge to home or other facility with appropriate resources  5/2/2025 1226 by Yarely Silverman, RN  Flowsheets (Taken 5/2/2025 1226)  Discharge to home or other facility with appropriate resources:   Identify barriers to discharge with patient and caregiver   Arrange for needed discharge resources and transportation as appropriate   Arrange for interpreters to assist at discharge as needed   Identify discharge learning needs (meds, wound care, etc)  5/2/2025 0548 by Pelon Yuan, RN  Outcome: Progressing     Problem: Pain  Goal: Verbalizes/displays adequate comfort level or baseline comfort level  5/2/2025 1226 by Yarely Silverman, RN  Flowsheets (Taken 5/2/2025 1226)  Verbalizes/displays adequate comfort level or baseline comfort level:   Encourage patient to monitor pain and request assistance   Assess pain using appropriate pain scale   Administer analgesics based on type and severity of pain and evaluate response   Implement non-pharmacological measures as appropriate and evaluate response  5/2/2025 0548 by Pelon Yuan, RN  Outcome: Progressing     Problem: Safety - Adult  Goal: Free from fall injury  5/2/2025 0548 by Pelon Yuan, RN  Outcome: Progressing     
  Problem: Discharge Planning  Goal: Discharge to home or other facility with appropriate resources  5/4/2025 0937 by Trish Lechuga RN  Outcome: Progressing  5/4/2025 0134 by Caitie Nava RN  Outcome: Progressing     Problem: Pain  Goal: Verbalizes/displays adequate comfort level or baseline comfort level  5/4/2025 0937 by Trish Lechuga RN  Outcome: Progressing  5/4/2025 0134 by Caitie Nava RN  Outcome: Progressing     Problem: Safety - Adult  Goal: Free from fall injury  5/4/2025 0937 by Trish Lechuga RN  Outcome: Progressing  5/4/2025 0134 by Caitie Nava RN  Outcome: Progressing     Problem: Skin/Tissue Integrity  Goal: Skin integrity remains intact  Description: 1.  Monitor for areas of redness and/or skin breakdown2.  Assess vascular access sites hourly3.  Every 4-6 hours minimum:  Change oxygen saturation probe site4.  Every 4-6 hours:  If on nasal continuous positive airway pressure, respiratory therapy assess nares and determine need for appliance change or resting period  5/4/2025 0937 by Trish Lechuga RN  Outcome: Progressing  5/4/2025 0134 by Caitie Nava RN  Outcome: Progressing     
  Problem: Discharge Planning  Goal: Discharge to home or other facility with appropriate resources  5/7/2025 1311 by Harrison Stahl RN  Outcome: Progressing  5/7/2025 0005 by Martha Rolon RN  Outcome: Progressing  Flowsheets (Taken 5/6/2025 1927)  Discharge to home or other facility with appropriate resources: Identify barriers to discharge with patient and caregiver     Problem: Pain  Goal: Verbalizes/displays adequate comfort level or baseline comfort level  5/7/2025 1311 by Harrison Stahl RN  Outcome: Progressing  5/7/2025 0005 by Martha Rolon RN  Outcome: Progressing     Problem: Safety - Adult  Goal: Free from fall injury  5/7/2025 1311 by Harrison Stahl RN  Outcome: Progressing  5/7/2025 0005 by Martha Rolon RN  Outcome: Progressing  Flowsheets (Taken 5/6/2025 1927)  Free From Fall Injury: Instruct family/caregiver on patient safety     Problem: Skin/Tissue Integrity  Goal: Skin integrity remains intact  Description: 1.  Monitor for areas of redness and/or skin breakdown2.  Assess vascular access sites hourly3.  Every 4-6 hours minimum:  Change oxygen saturation probe site4.  Every 4-6 hours:  If on nasal continuous positive airway pressure, respiratory therapy assess nares and determine need for appliance change or resting period  5/7/2025 1311 by Harrison Stahl RN  Outcome: Progressing  5/7/2025 0005 by Martha Rolon RN  Outcome: Progressing  Flowsheets (Taken 5/6/2025 1927)  Skin Integrity Remains Intact:   Monitor for areas of redness and/or skin breakdown   Assess vascular access sites hourly   Assess need for specialty bed     
  Problem: Discharge Planning  Goal: Discharge to home or other facility with appropriate resources  5/8/2025 0308 by María Payne LPN  Outcome: Progressing  5/7/2025 1311 by Harrison Stahl RN  Outcome: Progressing     Problem: Pain  Goal: Verbalizes/displays adequate comfort level or baseline comfort level  5/8/2025 0308 by María Payne LPN  Outcome: Progressing  5/7/2025 1311 by Harrison Stahl RN  Outcome: Progressing     Problem: Safety - Adult  Goal: Free from fall injury  5/8/2025 0308 by María Payne LPN  Outcome: Progressing  5/7/2025 1311 by Harrison Stahl RN  Outcome: Progressing     Problem: Skin/Tissue Integrity  Goal: Skin integrity remains intact  Description: 1.  Monitor for areas of redness and/or skin breakdown2.  Assess vascular access sites hourly3.  Every 4-6 hours minimum:  Change oxygen saturation probe site4.  Every 4-6 hours:  If on nasal continuous positive airway pressure, respiratory therapy assess nares and determine need for appliance change or resting period  5/8/2025 0308 by María Payne LPN  Outcome: Progressing  5/7/2025 1311 by Harrison Stahl, RN  Outcome: Progressing     
  Problem: Discharge Planning  Goal: Discharge to home or other facility with appropriate resources  Outcome: Progressing     Problem: Pain  Goal: Verbalizes/displays adequate comfort level or baseline comfort level  Outcome: Progressing     Problem: Safety - Adult  Goal: Free from fall injury  Outcome: Progressing     
  Problem: Discharge Planning  Goal: Discharge to home or other facility with appropriate resources  Outcome: Progressing     Problem: Pain  Goal: Verbalizes/displays adequate comfort level or baseline comfort level  Outcome: Progressing     Problem: Safety - Adult  Goal: Free from fall injury  Outcome: Progressing     Problem: Skin/Tissue Integrity  Goal: Skin integrity remains intact  Description: 1.  Monitor for areas of redness and/or skin breakdown2.  Assess vascular access sites hourly3.  Every 4-6 hours minimum:  Change oxygen saturation probe site4.  Every 4-6 hours:  If on nasal continuous positive airway pressure, respiratory therapy assess nares and determine need for appliance change or resting period  Outcome: Progressing     
  Problem: Discharge Planning  Goal: Discharge to home or other facility with appropriate resources  Outcome: Progressing     Problem: Pain  Goal: Verbalizes/displays adequate comfort level or baseline comfort level  Outcome: Progressing     Problem: Safety - Adult  Goal: Free from fall injury  Outcome: Progressing     Problem: Skin/Tissue Integrity  Goal: Skin integrity remains intact  Description: 1.  Monitor for areas of redness and/or skin breakdown2.  Assess vascular access sites hourly3.  Every 4-6 hours minimum:  Change oxygen saturation probe site4.  Every 4-6 hours:  If on nasal continuous positive airway pressure, respiratory therapy assess nares and determine need for appliance change or resting period  Outcome: Progressing  Flowsheets (Taken 5/5/2025 0757)  Skin Integrity Remains Intact: Monitor for areas of redness and/or skin breakdown     
  Problem: Discharge Planning  Goal: Discharge to home or other facility with appropriate resources  Outcome: Progressing  Flowsheets (Taken 5/5/2025 1958)  Discharge to home or other facility with appropriate resources: Identify barriers to discharge with patient and caregiver     Problem: Pain  Goal: Verbalizes/displays adequate comfort level or baseline comfort level  Outcome: Progressing     Problem: Safety - Adult  Goal: Free from fall injury  Outcome: Progressing  Flowsheets (Taken 5/5/2025 1958)  Free From Fall Injury: Instruct family/caregiver on patient safety     Problem: Physical Therapy - Adult  Goal: By Discharge: Performs mobility at highest level of function for planned discharge setting.  See evaluation for individualized goals.  Description: FUNCTIONAL STATUS PRIOR TO ADMISSION: Patient was independent and active without use of DME. Reports she uses SPC when going up/down steps, but otherwise no AD.    HOME SUPPORT PRIOR TO ADMISSION: The patient lived with her  in a 2-story home, bed and bathroom on 2nd floor. No steps to enter her house but 3 steps with B rails to access remainder of her home.    Physical Therapy Goals  Initiated 5/2/2025  1.  Patient will move from supine to sit and sit to supine in bed with supervision/set-up within 7 day(s).    2.  Patient will perform sit to stand with contact guard assist within 7 day(s).  3.  Patient will transfer from bed to chair and chair to bed with contact guard assist using the least restrictive device within 7 day(s).  4.  Patient will ambulate with contact guard assist for 150 feet with the least restrictive device within 7 day(s).   5.  Patient will ascend/descend 14 stairs with 1 handrail(s) with contact guard assist within 7 day(s).   5/5/2025 1532 by Maricruz Ahmadi, PT  Outcome: Progressing     Problem: Skin/Tissue Integrity  Goal: Skin integrity remains intact  Description: 1.  Monitor for areas of redness and/or skin breakdown2.  
  Problem: Discharge Planning  Goal: Discharge to home or other facility with appropriate resources  Outcome: Progressing  Flowsheets (Taken 5/6/2025 1927)  Discharge to home or other facility with appropriate resources: Identify barriers to discharge with patient and caregiver     Problem: Pain  Goal: Verbalizes/displays adequate comfort level or baseline comfort level  Outcome: Progressing     Problem: Safety - Adult  Goal: Free from fall injury  Outcome: Progressing  Flowsheets (Taken 5/6/2025 1927)  Free From Fall Injury: Instruct family/caregiver on patient safety     Problem: Skin/Tissue Integrity  Goal: Skin integrity remains intact  Description: 1.  Monitor for areas of redness and/or skin breakdown2.  Assess vascular access sites hourly3.  Every 4-6 hours minimum:  Change oxygen saturation probe site4.  Every 4-6 hours:  If on nasal continuous positive airway pressure, respiratory therapy assess nares and determine need for appliance change or resting period  Outcome: Progressing  Flowsheets (Taken 5/6/2025 1927)  Skin Integrity Remains Intact:   Monitor for areas of redness and/or skin breakdown   Assess vascular access sites hourly   Assess need for specialty bed     
  Problem: Occupational Therapy - Adult  Goal: By Discharge: Performs self-care activities at highest level of function for planned discharge setting.  See evaluation for individualized goals.  Description: FUNCTIONAL STATUS PRIOR TO ADMISSION:  Patient was ambulatory using cane for stairs only. Inside and outside the house (on level ground) pt doesn't use any DME. Pt was completely independent with all ADLs/IADLs.   , Prior Level of Assist for ADLs: Independent,  ,  ,  ,  ,  , Prior Level of Assist for Homemaking: Independent,  , Prior Level of Assist for Transfers: Independent, Active : Yes     HOME SUPPORT: Patient lived with spouse but didn't require assistance.    Occupational Therapy Goals:  Initiated 5/2/2025  1.  Patient will perform grooming seated with Minimal Assist within 7 day(s).  2.  Patient will perform upper body dressing with Minimal Assist within 7 day(s).  3.  Patient will perform lower body dressing with Minimal Assist within 7 day(s).  4.  Patient will perform toilet transfers with Minimal Assist  within 7 day(s).  5.  Patient will perform all aspects of toileting with Minimal Assist within 7 day(s).  6.  Patient will participate in upper extremity therapeutic exercise/activities with Minimal Assist for 10 minutes within 7 day(s).    7.  Patient will utilize energy conservation techniques during functional activities with verbal and visual cues within 7 day(s).    Outcome: Completed    OCCUPATIONAL THERAPY TREATMENT/DISCHARGE  Patient: Anisha Packer (69 y.o. female)  Date: 5/8/2025  Primary Diagnosis: Fever of unknown origin (FUO) [R50.9]  Failure to thrive in adult [R62.7]  SIRS (systemic inflammatory response syndrome) (HCC) [R65.10]       Precautions: Fall Risk                  Chart, occupational therapy assessment, plan of care, and goals were reviewed.    ASSESSMENT  Patient continues with skilled OT services and has progressed towards goals.  Pt received OOB in chair, alert and 
  Problem: Occupational Therapy - Adult  Goal: By Discharge: Performs self-care activities at highest level of function for planned discharge setting.  See evaluation for individualized goals.  Description: FUNCTIONAL STATUS PRIOR TO ADMISSION:  Patient was ambulatory using cane for stairs only. Inside and outside the house (on level ground) pt doesn't use any DME. Pt was completely independent with all ADLs/IADLs.   , Prior Level of Assist for ADLs: Independent,  ,  ,  ,  ,  , Prior Level of Assist for Homemaking: Independent,  , Prior Level of Assist for Transfers: Independent, Active : Yes     HOME SUPPORT: Patient lived with spouse but didn't require assistance.    Occupational Therapy Goals:  Initiated 5/2/2025  1.  Patient will perform grooming seated with Minimal Assist within 7 day(s).  2.  Patient will perform upper body dressing with Minimal Assist within 7 day(s).  3.  Patient will perform lower body dressing with Minimal Assist within 7 day(s).  4.  Patient will perform toilet transfers with Minimal Assist  within 7 day(s).  5.  Patient will perform all aspects of toileting with Minimal Assist within 7 day(s).  6.  Patient will participate in upper extremity therapeutic exercise/activities with Minimal Assist for 10 minutes within 7 day(s).    7.  Patient will utilize energy conservation techniques during functional activities with verbal and visual cues within 7 day(s).    Outcome: Progressing     OCCUPATIONAL THERAPY EVALUATION    Patient: Anisha Packer (69 y.o. female)  Date: 5/2/2025  Primary Diagnosis: Fever of unknown origin (FUO) [R50.9]  Failure to thrive in adult [R62.7]  SIRS (systemic inflammatory response syndrome) (HCC) [R65.10]         Precautions: Fall Risk                  ASSESSMENT :  The patient is limited by decreased functional mobility, independence in ADLs, high-level IADLs, strength, body mechanics, activity tolerance, endurance, safety awareness, cognition, command 
  Problem: Physical Therapy - Adult  Goal: By Discharge: Performs mobility at highest level of function for planned discharge setting.  See evaluation for individualized goals.  Description: FUNCTIONAL STATUS PRIOR TO ADMISSION: Patient was independent and active without use of DME. Reports she uses SPC when going up/down steps, but otherwise no AD.    HOME SUPPORT PRIOR TO ADMISSION: The patient lived with her  in a 2-story home, bed and bathroom on 2nd floor. No steps to enter her house but 3 steps with B rails to access remainder of her home.    Physical Therapy Goals  Initiated 5/2/2025  1.  Patient will move from supine to sit and sit to supine in bed with supervision/set-up within 7 day(s).    2.  Patient will perform sit to stand with contact guard assist within 7 day(s).  3.  Patient will transfer from bed to chair and chair to bed with contact guard assist using the least restrictive device within 7 day(s).  4.  Patient will ambulate with contact guard assist for 150 feet with the least restrictive device within 7 day(s).   5.  Patient will ascend/descend 14 stairs with 1 handrail(s) with contact guard assist within 7 day(s).   Outcome: Adequate for Discharge     PHYSICAL THERAPY TREATMENT    Patient: Anisha Packer (69 y.o. female)  Date: 5/7/2025  Diagnosis:   Fever of unknown origin (FUO) [R50.9]  Failure to thrive in adult [R62.7]  SIRS (systemic inflammatory response syndrome) (HCC) [R65.10] SIRS (systemic inflammatory response syndrome) (HCC)      Precautions: Fall Risk                      ASSESSMENT:  Pt tolerated PT services well and has essentially met PT POC goals. Pt received in bed and demo high motivation for therapy services, continued recovery and (hopeful) dc to home later today.    Excellent effort and active engagement t/o. Most tasks performed with supervision/sba including functional transfers, bed mobility, unit ambulation ~ 475ft with RW. Therapist facilitated with occ cues 
  Problem: Physical Therapy - Adult  Goal: By Discharge: Performs mobility at highest level of function for planned discharge setting.  See evaluation for individualized goals.  Description: FUNCTIONAL STATUS PRIOR TO ADMISSION: Patient was independent and active without use of DME. Reports she uses SPC when going up/down steps, but otherwise no AD.    HOME SUPPORT PRIOR TO ADMISSION: The patient lived with her  in a 2-story home, bed and bathroom on 2nd floor. No steps to enter her house but 3 steps with B rails to access remainder of her home.    Physical Therapy Goals  Initiated 5/2/2025  1.  Patient will move from supine to sit and sit to supine in bed with supervision/set-up within 7 day(s).    2.  Patient will perform sit to stand with contact guard assist within 7 day(s).  3.  Patient will transfer from bed to chair and chair to bed with contact guard assist using the least restrictive device within 7 day(s).  4.  Patient will ambulate with contact guard assist for 150 feet with the least restrictive device within 7 day(s).   5.  Patient will ascend/descend 14 stairs with 1 handrail(s) with contact guard assist within 7 day(s).   Outcome: Not Progressing     PHYSICAL THERAPY EVALUATION    Patient: Anisha Packer (69 y.o. female)  Date: 5/2/2025  Primary Diagnosis: Fever of unknown origin (FUO) [R50.9]  Failure to thrive in adult [R62.7]  SIRS (systemic inflammatory response syndrome) (HCC) [R65.10]       Precautions: Restrictions/Precautions  Restrictions/Precautions: Fall Risk            ASSESSMENT :   DEFICITS/IMPAIRMENTS:   The patient is limited by decreased functional mobility, ROM, strength, activity tolerance, safety awareness, cognition, command following, attention/concentration, coordination, balance s/p admission for fatigue and weakness. Received patient supine in bed, daughter present in the room.    Based on the impairments listed above the patient appears significantly below her prior 
  Problem: Physical Therapy - Adult  Goal: By Discharge: Performs mobility at highest level of function for planned discharge setting.  See evaluation for individualized goals.  Description: FUNCTIONAL STATUS PRIOR TO ADMISSION: Patient was independent and active without use of DME. Reports she uses SPC when going up/down steps, but otherwise no AD.    HOME SUPPORT PRIOR TO ADMISSION: The patient lived with her  in a 2-story home, bed and bathroom on 2nd floor. No steps to enter her house but 3 steps with B rails to access remainder of her home.    Physical Therapy Goals  Initiated 5/2/2025  1.  Patient will move from supine to sit and sit to supine in bed with supervision/set-up within 7 day(s).    2.  Patient will perform sit to stand with contact guard assist within 7 day(s).  3.  Patient will transfer from bed to chair and chair to bed with contact guard assist using the least restrictive device within 7 day(s).  4.  Patient will ambulate with contact guard assist for 150 feet with the least restrictive device within 7 day(s).   5.  Patient will ascend/descend 14 stairs with 1 handrail(s) with contact guard assist within 7 day(s).   Outcome: Progressing     PHYSICAL THERAPY TREATMENT    Patient: Anisha Packer (69 y.o. female)  Date: 5/5/2025  Diagnosis:   Fever of unknown origin (FUO) [R50.9]  Failure to thrive in adult [R62.7]  SIRS (systemic inflammatory response syndrome) (HCC) [R65.10] SIRS (systemic inflammatory response syndrome) (HCC)      Precautions: Fall Risk                      ASSESSMENT:  Pt is a pleasant 69 y.o. F who tolerated PT services well and demos good progress toward PT POC goals. Pt received in bed and amenable to therapy services. Pt confirms ambulatory with intermittent SPC use at baseline. Pt resides with her spouse in a 2SH with stairs. Spouse available to provide support, prn as \"we are both retired\".    Excellent effort, active engagement and attempted carryover t/o 
upon arrival.  Pt was able to perform seated toilet hygiene with supervision as well as bathing of LE seated.  CGA with heavy use of left grab bar to achieve sit to stand.  One posterior loss of balance in mid standing from toilet that pt was able to self correct.  Pt would benefit from BSC over toilet for home use.  Stood at sink to wash and dry hands with  CGA.  Returned to bed in stable condition at end of session.             PLAN :  Patient continues to benefit from skilled intervention to address the above impairments.  Continue treatment per established plan of care to address goals.    Recommend with staff: ambulate TID, out of bed to chair as much as possible    Recommend next OT session: sit to stand, standing balance, ADLS    Recommendation for discharge: (in order for the patient to meet his/her long term goals):   Intermittent occupational therapy up to 2-3x/week in previous living setting with additional support needed for mobility and ADLS as needed    Other factors to consider for discharge: concern for safely navigating or managing the home environment    IF patient discharges home will need the following DME:  bedside commode       SUBJECTIVE:   Patient stated “I was hoping to go home today.”    OBJECTIVE DATA SUMMARY:   Cognitive/Behavioral Status:  Orientation  Orientation Level: Oriented X4       Functional Mobility and Transfers for ADLs:  Bed Mobility:  Bed Mobility Training  Sit to Supine: Modified independent  Scooting: Supervision     Transfers:   Transfer Training  Interventions: Verbal cues;Tactile cues;Safety awareness training  Sit to Stand: Contact guard assistance (needs left grab bar to acheive sit to stand)  Stand to Sit: Contact guard assistance  Toilet Transfer: Contact guard assistance (need grab bar)           Balance:     Balance  Sitting - Static: Good (unsupported)  Sitting - Dynamic: Good (unsupported)  Standing - Static: Good  Standing - Dynamic: Good;Fair (one posterior

## 2025-05-09 NOTE — DISCHARGE SUMMARY
Consultations  IP CONSULT TO PHARMACY  IP CONSULT TO NEPHROLOGY  IP CONSULT TO INFECTIOUS DISEASES  IP CONSULT TO CASE MANAGEMENT  IP CONSULT TO GI   Procedures/Surgeries  * No surgery found *     Condition at the time of discharge  Improved and stable       Query  None noted today        Disposition Home with family and home health services   Diet cardiac diet   Care Plan discussed with Patient/Family, Nurse, , Consultant Shaina Kwong PA-C, and Other attending Dr. Elkins   Follow up Follow-up Information       Follow up With Specialties Details Why Contact Info    Justin Vivar MD Internal Medicine Go on 5/14/2025 at 1:40pm for your PCP hospital follow up. [id number] [facility] [address] [phone]      [facility]. If you have any questions regarding your home health services please reach out to [employer]. If you have not heard from [facility] within 24 - 48 hours after you are discharge please reach out to [employer]. [address]  [address]  [facility] [address] [phone]    Gordo Hunt MD Gastroenterology Schedule an appointment as soon as possible for a visit in 1 week(s)  [address]  [address]  [facility] [address] [phone]               Other Pertinent data:   TODAY'S CLINICAL FINDINGS:     Vitals:    05/09/25 1013   BP: 121/79   Pulse: 78   Resp:    Temp:    SpO2:      Wt Readings from Last 10 Encounters:   05/01/25 90.7 kg (200 lb)   02/18/25 88.3 kg (194 lb 9.6 oz)   11/18/24 86.4 kg (190 lb 6.4 oz)   05/10/24 84.4 kg (186 lb)   01/30/24 84.4 kg (186 lb)   09/15/23 83.9 kg (185 lb)   08/09/23 81.6 kg (180 lb)   08/04/23 83.9 kg (185 lb)   06/15/23 84.6 kg (186 lb 9.6 oz)   03/03/23 86.3 kg (190 lb 4.8 oz)      Exam:   Pt is alert and cooperative; in no apparent distress.    Heart: RRR  Lungs: CTABL  Skin: Warm,

## 2025-05-13 PROBLEM — R74.01 TRANSAMINITIS: Status: ACTIVE | Noted: 2025-05-13

## 2025-05-14 ENCOUNTER — TELEPHONE (OUTPATIENT)
Facility: CLINIC | Age: 70
End: 2025-05-14

## 2025-05-14 NOTE — TELEPHONE ENCOUNTER
Spoke to patient regarding need to reschedule today's appointment due to provider being out of office. Patient rescheduled to Monday at 11:20 am however, she stated she was supposed to have lab work rechecked this Monday per the hospital and it was delayed since she had an appointment today. She would like to know if she should come in before Monday and have her lab work rechecked? Please advise.

## 2025-05-15 ENCOUNTER — OFFICE VISIT (OUTPATIENT)
Facility: CLINIC | Age: 70
End: 2025-05-15

## 2025-05-15 VITALS
RESPIRATION RATE: 18 BRPM | OXYGEN SATURATION: 98 % | BODY MASS INDEX: 30.52 KG/M2 | HEIGHT: 66 IN | HEART RATE: 73 BPM | WEIGHT: 189.9 LBS | SYSTOLIC BLOOD PRESSURE: 109 MMHG | TEMPERATURE: 98 F | DIASTOLIC BLOOD PRESSURE: 73 MMHG

## 2025-05-15 DIAGNOSIS — E87.1 HYPONATREMIA: ICD-10-CM

## 2025-05-15 DIAGNOSIS — Z09 HOSPITAL DISCHARGE FOLLOW-UP: ICD-10-CM

## 2025-05-15 DIAGNOSIS — K50.90 CROHN'S DISEASE WITHOUT COMPLICATION, UNSPECIFIED GASTROINTESTINAL TRACT LOCATION (HCC): ICD-10-CM

## 2025-05-15 DIAGNOSIS — S50.311S: ICD-10-CM

## 2025-05-15 DIAGNOSIS — R65.10 SIRS (SYSTEMIC INFLAMMATORY RESPONSE SYNDROME) (HCC): Primary | ICD-10-CM

## 2025-05-15 DIAGNOSIS — N18.30 STAGE 3 CHRONIC KIDNEY DISEASE, UNSPECIFIED WHETHER STAGE 3A OR 3B CKD (HCC): ICD-10-CM

## 2025-05-15 DIAGNOSIS — K83.8 BILIARY SLUDGE: ICD-10-CM

## 2025-05-15 DIAGNOSIS — J44.9 CHRONIC OBSTRUCTIVE PULMONARY DISEASE, UNSPECIFIED COPD TYPE (HCC): ICD-10-CM

## 2025-05-15 DIAGNOSIS — I12.9 HYPERTENSION WITH RENAL DISEASE: ICD-10-CM

## 2025-05-15 DIAGNOSIS — R74.01 TRANSAMINITIS: ICD-10-CM

## 2025-05-15 NOTE — PROGRESS NOTES
No chief complaint on file.    \"Have you been to the ER, urgent care clinic since your last visit?  Hospitalized since your last visit?\"    YES - When: approximately 2  weeks ago.  Where and Why: [facility] ED.    “Have you seen or consulted any other health care providers outside of [facility] since your last visit?”    NO

## 2025-05-15 NOTE — PROGRESS NOTES
Follow-Up from Hospital       HPI:  Anisha Packer is a 69 y.o. year old female who is here for a follow up visit for hospitalization transition of care.   She was last seen by me on 2/18/2025.     Discharged on: Friday 5/9/2025 after being admitted on 5/1/2025.  Transition of care follow-up call made on Monday, 5/12/2025.  Med reconciliation done today 5/15/2025    Diagnosis in hospital:  1.  Sepsis unknown cause although likely from right elbow abrasion  2.  Right elbow wound/abrasion  3.  Transaminitis  4 gallbladder sludge noted on ultrasound  5.  Hyponatremia on admission at 128 resolved 140 at discharge  6.  Mild dehydration on admission with underlying CKD stage III  7.  Hypertension    Complications in hospital: No complications    Medication changes: Vivelle patch 0.05 mg to be used twice weekly with no other medication changes    Discharge Summary reviewed.  Today 5/15/2025      She reports the following: She is accompanied by her  to the visit today and seems to be doing okay other than just general fatigue.  She notes no chest pain, shortness of breath, palpitations, PND, orthopnea or other cardiac or respiratory complaints.  She notes no current GI or  complaints other than she is having some bowel issues but she has a gastroenterology follow-up scheduled.  She has no headaches, dizziness or new neurologic complaints.  She has no current active arthritic complaints but does have her chronic joint aches and pains without change.  There are no other complaints [employer] review of systems.          /73   Pulse 73   Temp 98 °F (36.7 °C) (Temporal)   Resp 18   Ht 1.676 m (5' 6\")   Wt 86.1 kg (189 lb 14.4 oz)   SpO2 98%   BMI 30.65 kg/m²     Historical Data    Past Medical History:   Diagnosis Date    Allergic rhinitis 8/9/2017    Anemia 8/9/2017    Anxiety 8/9/2017    Arrhythmia     irregular heart beat hx and beating fast per patient/no cardiologist    Arthritis     JOINTS  WORSE WAIST

## 2025-05-16 LAB
ALBUMIN SERPL-MCNC: 4 G/DL (ref 3.5–5)
ALBUMIN/GLOB SERPL: 1.1 (ref 1.1–2.2)
ALP SERPL-CCNC: 131 U/L (ref 45–117)
ALT SERPL-CCNC: 98 U/L (ref 12–78)
ANION GAP SERPL CALC-SCNC: 5 MMOL/L (ref 2–12)
AST SERPL-CCNC: 22 U/L (ref 15–37)
BASOPHILS # BLD: 0.03 K/UL (ref 0–0.1)
BASOPHILS NFR BLD: 0.9 % (ref 0–1)
BILIRUB SERPL-MCNC: 0.6 MG/DL (ref 0.2–1)
BUN SERPL-MCNC: 19 MG/DL (ref 6–20)
BUN/CREAT SERPL: 22 (ref 12–20)
CALCIUM SERPL-MCNC: 11.1 MG/DL (ref 8.5–10.1)
CHLORIDE SERPL-SCNC: 99 MMOL/L (ref 97–108)
CO2 SERPL-SCNC: 31 MMOL/L (ref 21–32)
CREAT SERPL-MCNC: 0.86 MG/DL (ref 0.55–1.02)
DIFFERENTIAL METHOD BLD: ABNORMAL
EOSINOPHIL # BLD: 0.09 K/UL (ref 0–0.4)
EOSINOPHIL NFR BLD: 2.7 % (ref 0–7)
ERYTHROCYTE [DISTWIDTH] IN BLOOD BY AUTOMATED COUNT: 12.9 % (ref 11.5–14.5)
GLOBULIN SER CALC-MCNC: 3.7 G/DL (ref 2–4)
GLUCOSE SERPL-MCNC: 88 MG/DL (ref 65–100)
HCT VFR BLD AUTO: 40.1 % (ref 35–47)
HGB BLD-MCNC: 12.7 G/DL (ref 11.5–16)
IMM GRANULOCYTES # BLD AUTO: 0.01 K/UL (ref 0–0.04)
IMM GRANULOCYTES NFR BLD AUTO: 0.3 % (ref 0–0.5)
LYMPHOCYTES # BLD: 0.63 K/UL (ref 0.8–3.5)
LYMPHOCYTES NFR BLD: 19.1 % (ref 12–49)
MCH RBC QN AUTO: 31.3 PG (ref 26–34)
MCHC RBC AUTO-ENTMCNC: 31.7 G/DL (ref 30–36.5)
MCV RBC AUTO: 98.8 FL (ref 80–99)
MONOCYTES # BLD: 0.47 K/UL (ref 0–1)
MONOCYTES NFR BLD: 14.3 % (ref 5–13)
NEUTS SEG # BLD: 2.07 K/UL (ref 1.8–8)
NEUTS SEG NFR BLD: 62.7 % (ref 32–75)
NRBC # BLD: 0 K/UL (ref 0–0.01)
NRBC BLD-RTO: 0 PER 100 WBC
PLATELET # BLD AUTO: 325 K/UL (ref 150–400)
PMV BLD AUTO: 10.3 FL (ref 8.9–12.9)
POTASSIUM SERPL-SCNC: 4.4 MMOL/L (ref 3.5–5.1)
PROT SERPL-MCNC: 7.7 G/DL (ref 6.4–8.2)
RBC # BLD AUTO: 4.06 M/UL (ref 3.8–5.2)
RBC MORPH BLD: ABNORMAL
SODIUM SERPL-SCNC: 135 MMOL/L (ref 136–145)
WBC # BLD AUTO: 3.3 K/UL (ref 3.6–11)

## 2025-05-21 ENCOUNTER — RESULTS FOLLOW-UP (OUTPATIENT)
Facility: CLINIC | Age: 70
End: 2025-05-21

## 2025-05-23 ENCOUNTER — OFFICE VISIT (OUTPATIENT)
Facility: CLINIC | Age: 70
End: 2025-05-23
Payer: MEDICARE

## 2025-05-23 VITALS
HEART RATE: 68 BPM | DIASTOLIC BLOOD PRESSURE: 68 MMHG | OXYGEN SATURATION: 92 % | SYSTOLIC BLOOD PRESSURE: 114 MMHG | BODY MASS INDEX: 30.62 KG/M2 | WEIGHT: 189.7 LBS | TEMPERATURE: 97.8 F

## 2025-05-23 DIAGNOSIS — E78.2 MIXED HYPERLIPIDEMIA: ICD-10-CM

## 2025-05-23 DIAGNOSIS — R74.01 TRANSAMINITIS: ICD-10-CM

## 2025-05-23 DIAGNOSIS — M15.0 PRIMARY OSTEOARTHRITIS INVOLVING MULTIPLE JOINTS: ICD-10-CM

## 2025-05-23 DIAGNOSIS — N18.30 STAGE 3 CHRONIC KIDNEY DISEASE, UNSPECIFIED WHETHER STAGE 3A OR 3B CKD (HCC): ICD-10-CM

## 2025-05-23 DIAGNOSIS — J44.9 CHRONIC OBSTRUCTIVE PULMONARY DISEASE, UNSPECIFIED COPD TYPE (HCC): ICD-10-CM

## 2025-05-23 DIAGNOSIS — I12.9 HYPERTENSION WITH RENAL DISEASE: Primary | ICD-10-CM

## 2025-05-23 DIAGNOSIS — K50.90 CROHN'S DISEASE WITHOUT COMPLICATION, UNSPECIFIED GASTROINTESTINAL TRACT LOCATION (HCC): ICD-10-CM

## 2025-05-23 DIAGNOSIS — K21.9 GASTROESOPHAGEAL REFLUX DISEASE WITHOUT ESOPHAGITIS: ICD-10-CM

## 2025-05-23 PROCEDURE — G8427 DOCREV CUR MEDS BY ELIG CLIN: HCPCS | Performed by: INTERNAL MEDICINE

## 2025-05-23 PROCEDURE — G8399 PT W/DXA RESULTS DOCUMENT: HCPCS | Performed by: INTERNAL MEDICINE

## 2025-05-23 PROCEDURE — 1123F ACP DISCUSS/DSCN MKR DOCD: CPT | Performed by: INTERNAL MEDICINE

## 2025-05-23 PROCEDURE — 99214 OFFICE O/P EST MOD 30 MIN: CPT | Performed by: INTERNAL MEDICINE

## 2025-05-23 PROCEDURE — 1090F PRES/ABSN URINE INCON ASSESS: CPT | Performed by: INTERNAL MEDICINE

## 2025-05-23 PROCEDURE — 1036F TOBACCO NON-USER: CPT | Performed by: INTERNAL MEDICINE

## 2025-05-23 PROCEDURE — 3023F SPIROM DOC REV: CPT | Performed by: INTERNAL MEDICINE

## 2025-05-23 PROCEDURE — 1111F DSCHRG MED/CURRENT MED MERGE: CPT | Performed by: INTERNAL MEDICINE

## 2025-05-23 PROCEDURE — G8417 CALC BMI ABV UP PARAM F/U: HCPCS | Performed by: INTERNAL MEDICINE

## 2025-05-23 PROCEDURE — 3017F COLORECTAL CA SCREEN DOC REV: CPT | Performed by: INTERNAL MEDICINE

## 2025-05-23 RX ORDER — PLECANATIDE 3 MG/1
90 TABLET ORAL DAILY
COMMUNITY
Start: 2025-05-22

## 2025-05-23 NOTE — PROGRESS NOTES
Have you been to the ER, urgent care clinic since your last visit?  Hospitalized since your last visit?   no    Have you seen or consulted any other health care providers outside our system since your last visit?   NO            
pulse 1+   INTEGUMENT: No unusual rashes or suspicious skin lesions noted. Nails appear normal.  PERIPHERAL VASCULAR: normal pulses femoral, PT and DP  NEUROLOGIC: non-focal exam, A & O X 3  PSYCHIATRIC:, appropriate affect     ASSESSMENT:   1. Hypertension with renal disease    2. Mixed hyperlipidemia    3. Gastroesophageal reflux disease without esophagitis    4. Primary osteoarthritis involving multiple joints    5. Chronic obstructive pulmonary disease, unspecified COPD type (HCC)    6. Crohn's disease without complication, unspecified gastrointestinal tract location (HCC)    7. Stage 3 chronic kidney disease, unspecified whether stage 3a or 3b CKD (HCC)    8. Transaminitis      Impression  1.  Hypertension is controlled so continue current therapy reviewed with her.  2.  Hyperlipidemia prior lab reviewed repeat status pending I will adjust if needed.  3 GERD that is stable  4 DJD that is stable  5 COPD O2 sat today is good on room air  6.  Crohn's disease stable  7 CKD stage III repeat status pending  8 recent transaminitis we will see what the liver enzymes look like today.  Provide if the labs look okay and she continues to improve we will see her in 3 months.  I will see her sooner if I need to based upon symptoms or lab results.    PLAN:  1. Hypertension with renal disease  2. Mixed hyperlipidemia  -     CK; Future  -     Lipid Panel; Future  -     Comprehensive Metabolic Panel; Future  3. Gastroesophageal reflux disease without esophagitis  4. Primary osteoarthritis involving multiple joints  5. Chronic obstructive pulmonary disease, unspecified COPD type (HCC)  6. Crohn's disease without complication, unspecified gastrointestinal tract location (HCC)  7. Stage 3 chronic kidney disease, unspecified whether stage 3a or 3b CKD (HCC)  8. Transaminitis          ATTENTION:   This medical record was transcribed using an electronic medical records system.  Although proofread, it may and can contain electronic and

## 2025-05-24 LAB
ALBUMIN SERPL-MCNC: 3.8 G/DL (ref 3.5–5)
ALBUMIN/GLOB SERPL: 1.1 (ref 1.1–2.2)
ALP SERPL-CCNC: 113 U/L (ref 45–117)
ALT SERPL-CCNC: 43 U/L (ref 12–78)
ANION GAP SERPL CALC-SCNC: 7 MMOL/L (ref 2–12)
AST SERPL-CCNC: 20 U/L (ref 15–37)
BILIRUB SERPL-MCNC: 0.5 MG/DL (ref 0.2–1)
BUN SERPL-MCNC: 18 MG/DL (ref 6–20)
BUN/CREAT SERPL: 20 (ref 12–20)
CALCIUM SERPL-MCNC: 10.6 MG/DL (ref 8.5–10.1)
CHLORIDE SERPL-SCNC: 103 MMOL/L (ref 97–108)
CHOLEST SERPL-MCNC: 148 MG/DL
CK SERPL-CCNC: 32 U/L (ref 26–192)
CO2 SERPL-SCNC: 26 MMOL/L (ref 21–32)
CREAT SERPL-MCNC: 0.9 MG/DL (ref 0.55–1.02)
GLOBULIN SER CALC-MCNC: 3.5 G/DL (ref 2–4)
GLUCOSE SERPL-MCNC: 82 MG/DL (ref 65–100)
HDLC SERPL-MCNC: 54 MG/DL
HDLC SERPL: 2.7 (ref 0–5)
LDLC SERPL CALC-MCNC: 74 MG/DL (ref 0–100)
POTASSIUM SERPL-SCNC: 4.2 MMOL/L (ref 3.5–5.1)
PROT SERPL-MCNC: 7.3 G/DL (ref 6.4–8.2)
SODIUM SERPL-SCNC: 136 MMOL/L (ref 136–145)
TRIGL SERPL-MCNC: 100 MG/DL
VLDLC SERPL CALC-MCNC: 20 MG/DL

## 2025-05-29 ENCOUNTER — RESULTS FOLLOW-UP (OUTPATIENT)
Facility: CLINIC | Age: 70
End: 2025-05-29

## 2025-06-07 DIAGNOSIS — G62.9 NEUROPATHY: ICD-10-CM

## 2025-06-09 RX ORDER — NIACIN 500 MG/1
500 TABLET, EXTENDED RELEASE ORAL NIGHTLY
Qty: 90 TABLET | Refills: 0 | Status: SHIPPED | OUTPATIENT
Start: 2025-06-09

## 2025-06-09 RX ORDER — GABAPENTIN 300 MG/1
CAPSULE ORAL
Qty: 30 CAPSULE | Refills: 0 | Status: SHIPPED | OUTPATIENT
Start: 2025-06-09 | End: 2025-07-09

## 2025-06-09 RX ORDER — MELOXICAM 15 MG/1
15 TABLET ORAL DAILY
Qty: 30 TABLET | Refills: 0 | Status: SHIPPED | OUTPATIENT
Start: 2025-06-09

## 2025-06-09 NOTE — TELEPHONE ENCOUNTER
PCP: Justin Vivar MD    Last appt: 5/23/2025    Future Appointments   Date Time Provider Department Center   8/29/2025 10:10 AM Justin Vivar MD [facility]       Requested Prescriptions     Pending Prescriptions Disp Refills    gabapentin (NEURONTIN) 300 MG capsule [Pharmacy Med Name: Gabapentin 300 MG Oral Capsule] 30 capsule 0     Sig: TAKE 1 CAPSULE BY MOUTH NIGHTLY FOR 30 DAYS **MAX  300  MG  DAILY**    niacin (NIASPAN) 500 MG extended release tablet [Pharmacy Med Name: Niacin ER (Antihyperlipidemic) 500 MG Oral Tablet Extended Release] 90 tablet 0     Sig: TAKE 1 TABLET BY MOUTH AT BEDTIME    meloxicam (MOBIC) 15 MG tablet [Pharmacy Med Name: Meloxicam 15 MG Oral Tablet] 30 tablet 0     Sig: Take 1 tablet by mouth once daily

## 2025-06-25 ENCOUNTER — TELEPHONE (OUTPATIENT)
Facility: CLINIC | Age: 70
End: 2025-06-25

## 2025-07-07 RX ORDER — MELOXICAM 15 MG/1
15 TABLET ORAL DAILY
Qty: 30 TABLET | Refills: 0 | Status: SHIPPED | OUTPATIENT
Start: 2025-07-07

## 2025-07-07 NOTE — TELEPHONE ENCOUNTER
PCP: Justin Vivar MD    Last appt: 5/23/2025    Future Appointments   Date Time Provider Department Center   9/8/2025 10:30 AM Justin Vivar MD [facility]       Requested Prescriptions     Pending Prescriptions Disp Refills    meloxicam (MOBIC) 15 MG tablet [Pharmacy Med Name: Meloxicam 15 MG Oral Tablet] 30 tablet 0     Sig: Take 1 tablet by mouth once daily

## 2025-07-14 DIAGNOSIS — G62.9 NEUROPATHY: ICD-10-CM

## 2025-07-15 RX ORDER — GABAPENTIN 300 MG/1
CAPSULE ORAL
Qty: 30 CAPSULE | Refills: 5 | Status: SHIPPED | OUTPATIENT
Start: 2025-07-15 | End: 2025-08-14

## 2025-07-15 NOTE — TELEPHONE ENCOUNTER
RX refill request from the patient/pharmacy. Patient last seen 05- with labs, and next appt. scheduled for 09-  Requested Prescriptions     Pending Prescriptions Disp Refills    gabapentin (NEURONTIN) 300 MG capsule [Pharmacy Med Name: Gabapentin 300 MG Oral Capsule] 30 capsule 5     Sig: TAKE 1 CAPSULE BY MOUTH NIGHTLY FOR 30 DAYS MAX  DAILY  AMOUNT  1  CAPSULE    .

## 2025-07-24 RX ORDER — ATORVASTATIN CALCIUM 20 MG/1
20 TABLET, FILM COATED ORAL NIGHTLY
Qty: 90 TABLET | Refills: 3 | Status: SHIPPED | OUTPATIENT
Start: 2025-07-24

## 2025-07-24 NOTE — TELEPHONE ENCOUNTER
RX refill request from the patient/pharmacy. Patient last seen 05- with labs, and next appt. scheduled for 09-  Requested Prescriptions     Pending Prescriptions Disp Refills    atorvastatin (LIPITOR) 20 MG tablet [Pharmacy Med Name: ATORVASTATIN CALCIUM 20 MG Oral Tablet] 90 tablet 3     Sig: TAKE 1 TABLET EVERY NIGHT    .

## 2025-07-28 RX ORDER — NIACIN 500 MG/1
500 TABLET, EXTENDED RELEASE ORAL NIGHTLY
Qty: 90 TABLET | Refills: 3 | Status: SHIPPED | OUTPATIENT
Start: 2025-07-28

## 2025-07-28 RX ORDER — OLMESARTAN MEDOXOMIL 20 MG/1
20 TABLET ORAL DAILY
Qty: 90 TABLET | Refills: 3 | Status: SHIPPED | OUTPATIENT
Start: 2025-07-28

## 2025-08-04 RX ORDER — MELOXICAM 15 MG/1
15 TABLET ORAL DAILY
Qty: 30 TABLET | Refills: 0 | Status: SHIPPED | OUTPATIENT
Start: 2025-08-04

## 2025-08-06 RX ORDER — MELOXICAM 15 MG/1
15 TABLET ORAL DAILY
Qty: 30 TABLET | Refills: 0 | OUTPATIENT
Start: 2025-08-06

## 2025-08-07 PROBLEM — R65.10 SIRS (SYSTEMIC INFLAMMATORY RESPONSE SYNDROME) (HCC): Status: RESOLVED | Noted: 2025-05-02 | Resolved: 2025-08-07

## 2025-08-07 PROBLEM — M54.9 BACK PAIN: Status: RESOLVED | Noted: 2017-08-09 | Resolved: 2025-08-07

## 2025-08-07 PROBLEM — M17.11 PRIMARY OSTEOARTHRITIS OF RIGHT KNEE: Status: ACTIVE | Noted: 2025-08-07

## 2025-08-07 PROBLEM — M54.2 NECK PAIN: Status: RESOLVED | Noted: 2022-05-20 | Resolved: 2025-08-07

## 2025-08-07 PROBLEM — Z01.810 PREOP CARDIOVASCULAR EXAM: Status: ACTIVE | Noted: 2025-08-07

## 2025-08-07 PROBLEM — M54.6 ACUTE RIGHT-SIDED THORACIC BACK PAIN: Status: RESOLVED | Noted: 2022-11-11 | Resolved: 2025-08-07

## 2025-09-02 RX ORDER — METOPROLOL SUCCINATE 25 MG/1
25 TABLET, EXTENDED RELEASE ORAL DAILY
Qty: 30 TABLET | Refills: 0 | Status: SHIPPED | OUTPATIENT
Start: 2025-09-02

## 2025-09-02 RX ORDER — MELOXICAM 15 MG/1
15 TABLET ORAL DAILY
Qty: 30 TABLET | Refills: 0 | Status: SHIPPED | OUTPATIENT
Start: 2025-09-02

## 2025-09-06 PROBLEM — Z01.810 PREOP CARDIOVASCULAR EXAM: Status: RESOLVED | Noted: 2025-08-07 | Resolved: 2025-09-06

## (undated) DEVICE — SUTURE MCRYL SZ 4-0 L27IN ABSRB UD L19MM PS-2 1/2 CIR PRIM Y426H

## (undated) DEVICE — HOOK RETRCT L5MM E SHRP SELF RET SYS LONE STAR

## (undated) DEVICE — BASIN EMESIS 500CC GRAY 250/CS 60/PLT: Brand: MEDEGEN MEDICAL PRODUCTS, LLC

## (undated) DEVICE — NEONATAL-ADULT SPO2 SENSOR: Brand: NELLCOR

## (undated) DEVICE — SEAL UNIV 5-8MM DISP BX/10 -- DA VINCI XI - SNGL USE

## (undated) DEVICE — 1200 GUARD II KIT W/5MM TUBE W/O VAC TUBE: Brand: GUARDIAN

## (undated) DEVICE — TUBE KT ENDFLTN INSUFFLTN SVL --

## (undated) DEVICE — PROGRASP FORCEPS: Brand: ENDOWRIST

## (undated) DEVICE — SYRINGE 50ML E/T

## (undated) DEVICE — STERILE POLYISOPRENE POWDER-FREE SURGICAL GLOVES: Brand: PROTEXIS

## (undated) DEVICE — Z CONVERTED USE 2107985 COVER FLROSCP W36XL28IN 4 SIDE ADH

## (undated) DEVICE — SUTURE V-LOC 180 SZ 2-0 L9IN ABSRB VLT GS-21 L37MM 1/2 CIR VLOCM0345

## (undated) DEVICE — Z DISCONTINUED PER MEDLINE LINE GAS SAMPLING O2/CO2 LNG AD 13 FT NSL W/ TBNG FILTERLINE

## (undated) DEVICE — SOLUTION IRRIG 3000ML 0.9% SOD CHL FLX CONT 0797208] ICU MEDICAL INC]

## (undated) DEVICE — TUBING, SUCTION, 1/4" X 10', STRAIGHT: Brand: MEDLINE

## (undated) DEVICE — LAMINECTOMY RICHMOND-LF: Brand: MEDLINE INDUSTRIES, INC.

## (undated) DEVICE — INFECTION CONTROL KIT SYS

## (undated) DEVICE — DERMABOND SKIN ADH 0.7ML -- DERMABOND ADVANCED 12/BX

## (undated) DEVICE — STOPCOCK IV 4 W TRNSPAR

## (undated) DEVICE — SUTURE VCRL SZ 3-0 L18IN ABSRB UD CP-2 L26MM 1/2 CIR REV J761D

## (undated) DEVICE — TOWEL SURG W17XL27IN STD BLU COT NONFENESTRATED PREWASHED

## (undated) DEVICE — KIT INFECTION CTRL ST FRAN --

## (undated) DEVICE — NEEDLE HYPO 18GA L1.5IN PNK S STL HUB POLYPR SHLD REG BVL

## (undated) DEVICE — SCISSORS SURG DIA8MM MPLR CRV ENDOWRIST

## (undated) DEVICE — (D)PREP SKN CHLRAPRP APPL 26ML -- CONVERT TO ITEM 371833

## (undated) DEVICE — STERILE SLEEVE: Brand: CONVERTORS

## (undated) DEVICE — DEVON™ KNEE AND BODY STRAP 60" X 3" (1.5 M X 7.6 CM): Brand: DEVON

## (undated) DEVICE — 3000CC GUARDIAN II: Brand: GUARDIAN

## (undated) DEVICE — DRAPE,REIN 53X77,STERILE: Brand: MEDLINE

## (undated) DEVICE — Device

## (undated) DEVICE — FILTER CLP DISP FOR 5513E CLIPVAC

## (undated) DEVICE — NEEDLE HYPO 22GA L1.5IN BLK S STL HUB POLYPR SHLD REG BVL

## (undated) DEVICE — 3M™ IOBAN™ 2 ANTIMICROBIAL INCISE DRAPE 6650EZ: Brand: IOBAN™ 2

## (undated) DEVICE — GARMENT,MEDLINE,DVT,INT,CALF,MED, GEN2: Brand: MEDLINE

## (undated) DEVICE — COVER,MAYO STAND,STERILE: Brand: MEDLINE

## (undated) DEVICE — STRAP,POSITIONING,KNEE/BODY,FOAM,4X60": Brand: MEDLINE

## (undated) DEVICE — SUTURE CAPIO SZ 0 L36IN NONABSORBABLE L26MM 1 2 CIR TC M0068331241

## (undated) DEVICE — PACK,BASIC,SIRUS,V: Brand: MEDLINE

## (undated) DEVICE — BLADELESS OBTURATOR: Brand: WECK VISTA

## (undated) DEVICE — SKIN MARKER,REGULAR TIP WITH RULER AND LABELS: Brand: DEVON

## (undated) DEVICE — NDL FLTR TIP 5 MIC 18GX1.5IN --

## (undated) DEVICE — SPECIMEN RETRIEVAL POUCH: Brand: ENDO CATCH GOLD

## (undated) DEVICE — CATHETER IV 20GA L1.25IN FEP STR HUB TEF INTROCAN SFTY

## (undated) DEVICE — NEEDLE HYPO 25GA L1.5IN BVL ORIENTED ECLIPSE

## (undated) DEVICE — SYRINGE MED 20ML STD CLR PLAS LUERLOCK TIP N CTRL DISP

## (undated) DEVICE — FILTER SMK EVAC FLO CLR MEGADYNE

## (undated) DEVICE — SUTURE DEV SZ 0 L6IN N ABSORBABLE

## (undated) DEVICE — LARGE NEEDLE DRIVER: Brand: ENDOWRIST

## (undated) DEVICE — PREP PAD BNS: Brand: CONVERTORS

## (undated) DEVICE — TRAY PREP DRY W/ PREM GLV 2 APPL 6 SPNG 2 UNDPD 1 OVERWRAP

## (undated) DEVICE — SOLUTION IRRIG 1000ML H2O STRL BLT

## (undated) DEVICE — VISUALIZATION SYSTEM: Brand: CLEARIFY

## (undated) DEVICE — MEDI-VAC NON-CONDUCTIVE SUCTION TUBING: Brand: CARDINAL HEALTH

## (undated) DEVICE — SUTURE MCRYL SZ 3-0 L27IN ABSRB UD L19MM PS-2 3/8 CIR PRIM Y427H

## (undated) DEVICE — INTENDED FOR TISSUE SEPARATION, AND OTHER PROCEDURES THAT REQUIRE A SHARP SURGICAL BLADE TO PUNCTURE OR CUT.: Brand: BARD-PARKER ® CARBON RIB-BACK BLADES

## (undated) DEVICE — SET ADMIN 16ML TBNG L100IN 2 Y INJ SITE IV PIGGY BK DISP (ORDER IN MULIPLES OF 48)

## (undated) DEVICE — BLADE ASSEMB CLP HAIR FINE --

## (undated) DEVICE — SUTURE VCRL SZ 0 L18IN ABSRB VLT L26MM CT-2 1/2 CIR J727D

## (undated) DEVICE — ARM DRAPE

## (undated) DEVICE — BLADELESS OPTICAL TROCAR WITH FIXATION CANNULA: Brand: VERSAPORT

## (undated) DEVICE — SURGICAL PROCEDURE PACK BASIN MAJ SET CUST NO CAUT

## (undated) DEVICE — SUTURE VCRL SZ 0 L45CM ABSRB VLT OS-6 L36.4MM 1/2 CIR REV J711T

## (undated) DEVICE — LAVH/LAPAROSCOPY DRAPE: Brand: CONVERTORS

## (undated) DEVICE — SOLUTION IV 1000ML 0.9% SOD CHL

## (undated) DEVICE — PAD,SANITARY,11 IN,MAXI,N-STRL,IND WRAP: Brand: MEDLINE

## (undated) DEVICE — TIP COVER ACCESSORY

## (undated) DEVICE — TOWEL 4 PLY TISS 19X30 SUE WHT

## (undated) DEVICE — GOWN,SIRUS,NONRNF,SETINSLV,2XL,18/CS: Brand: MEDLINE

## (undated) DEVICE — ELECTRODE,RADIOTRANSLUCENT,FOAM,5PK: Brand: MEDLINE

## (undated) DEVICE — BONE WAX WHITE: Brand: BONE WAX WHITE

## (undated) DEVICE — SURGICAL PROCEDURE PACK GYN LAPAROSCOPY CUST SMH LF

## (undated) DEVICE — PILLOW POS AD L7IN R FOAM HD REST INTUB SLOT DISP

## (undated) DEVICE — ELECTRO LUBE IS A SINGLE PATIENT USE DEVICE THAT IS INTENDED TO BE USED ON ELECTROSURGICAL ELECTRODES TO REDUCE STICKING.: Brand: KEY SURGICAL ELECTRO LUBE

## (undated) DEVICE — ROCKER SWITCH PENCIL BLADE ELECTRODE, HOLSTER: Brand: EDGE

## (undated) DEVICE — MUI SCIENTIFIC BALLOON SR1B

## (undated) DEVICE — DEVICE TRNSF SPIK STL 2008S] MICROTEK MEDICAL INC]

## (undated) DEVICE — HANDLE LT SNAP ON ULT DURABLE LENS FOR TRUMPF ALC DISPOSABLE

## (undated) DEVICE — FLOSEAL MATRIX IS INDICATED IN SURGICAL PROCEDURES (OTHER THAN IN OPHTHALMIC) AS AN ADJUNCT TO HEMOSTASIS WHEN CONTROL OF BLEEDING BY LIGATURE OR CONVENTIONAL PROCEDURES IS INEFFECTIVE OR IMPRACTICAL.: Brand: FLOSEAL HEMOSTATIC MATRIX

## (undated) DEVICE — CATH IV AUTOGRD BC PNK 20GA 25 -- INSYTE

## (undated) DEVICE — KIT,1200CC CANISTER,3/16"X6' TUBING: Brand: MEDLINE INDUSTRIES, INC.

## (undated) DEVICE — REM POLYHESIVE ADULT PATIENT RETURN ELECTRODE: Brand: VALLEYLAB

## (undated) DEVICE — BINDER ABD M/L H12IN FOR 46-62IN WHT 4 SLD PNL DSGN HOOP

## (undated) DEVICE — SOLUTION IRRIG 3000ML H2O STRL BAG

## (undated) DEVICE — YANKAUER,BULB TIP,W/O VENT,RIGID,STERILE: Brand: MEDLINE

## (undated) DEVICE — SURGICAL PROCEDURE KIT GEN LAPAROSCOPY LF

## (undated) DEVICE — RING RETRCTR FIG 8 32.5X18.3CM -- PLAS STRL W/2 CATH CLIPS

## (undated) DEVICE — CORD ELECSURG BPLR 12 FT DISP [810T818750] [ADLER INSTRUMENT CO]

## (undated) DEVICE — PREP SKN PREVAIL 40ML APPL --

## (undated) DEVICE — DEVICE WND CLSR V LOC 2 0 180 GS 21 TAPR PNT 6INCH ABSRB GRN

## (undated) DEVICE — Z INACTIVE USE 2527070 DRAPE SURG W40XL44IN UNDERBUTTOCK SMS POLYPR W/ PCH BK DISP

## (undated) DEVICE — COLUMN DRAPE

## (undated) DEVICE — BASIN EMSIS 16OZ GRAPHITE PLAS KID SHP MOLD GRAD FOR ORAL

## (undated) DEVICE — SYR 10ML LUER LOK 1/5ML GRAD --

## (undated) DEVICE — HYPODERMIC SAFETY NEEDLE: Brand: MAGELLAN

## (undated) DEVICE — KENDALL SCD EXPRESS SLEEVES, KNEE LENGTH, MEDIUM: Brand: KENDALL SCD

## (undated) DEVICE — TOOL 14BA50 LEGEND 14CM 5MM BA: Brand: MIDAS REX ™

## (undated) DEVICE — SHEATH CATH ANORECT MNOMTR

## (undated) DEVICE — TOTAL TRAY, 16FR 10ML SIL FOLEY, URN: Brand: MEDLINE

## (undated) DEVICE — SYR 3ML LL TIP 1/10ML GRAD --